# Patient Record
Sex: FEMALE | Race: ASIAN | NOT HISPANIC OR LATINO | Employment: OTHER | URBAN - METROPOLITAN AREA
[De-identification: names, ages, dates, MRNs, and addresses within clinical notes are randomized per-mention and may not be internally consistent; named-entity substitution may affect disease eponyms.]

---

## 2018-04-04 DIAGNOSIS — Z12.31 VISIT FOR SCREENING MAMMOGRAM: Primary | ICD-10-CM

## 2018-04-23 ENCOUNTER — HOSPITAL ENCOUNTER (OUTPATIENT)
Dept: RADIOLOGY | Facility: HOSPITAL | Age: 63
Discharge: HOME | End: 2018-04-23
Attending: INTERNAL MEDICINE
Payer: COMMERCIAL

## 2018-04-23 DIAGNOSIS — Z12.31 VISIT FOR SCREENING MAMMOGRAM: ICD-10-CM

## 2018-04-23 PROCEDURE — 77067 SCR MAMMO BI INCL CAD: CPT | Mod: 26,,, | Performed by: RADIOLOGY

## 2018-04-23 PROCEDURE — 77067 SCR MAMMO BI INCL CAD: CPT | Mod: TC

## 2018-06-11 ENCOUNTER — OFFICE VISIT (OUTPATIENT)
Dept: NEUROLOGY | Facility: HOSPITAL | Age: 63
End: 2018-06-11
Attending: INTERNAL MEDICINE
Payer: COMMERCIAL

## 2018-06-11 VITALS
WEIGHT: 131.38 LBS | TEMPERATURE: 97 F | HEART RATE: 93 BPM | BODY MASS INDEX: 24.18 KG/M2 | HEIGHT: 62 IN | SYSTOLIC BLOOD PRESSURE: 105 MMHG | DIASTOLIC BLOOD PRESSURE: 72 MMHG

## 2018-06-11 DIAGNOSIS — K14.6 GLOSSALGIA: Primary | ICD-10-CM

## 2018-06-11 PROCEDURE — 99214 OFFICE O/P EST MOD 30 MIN: CPT | Performed by: INTERNAL MEDICINE

## 2018-06-11 RX ORDER — NYSTATIN 100000 [USP'U]/ML
6 SUSPENSION ORAL 4 TIMES DAILY
Qty: 240 ML | Refills: 0 | Status: SHIPPED | OUTPATIENT
Start: 2018-06-11 | End: 2018-06-21

## 2018-06-11 RX ORDER — LORATADINE 10 MG/1
10 TABLET ORAL DAILY
COMMUNITY
End: 2022-05-12

## 2018-06-11 RX ORDER — LANOLIN ALCOHOL/MO/W.PET/CERES
1 CREAM (GRAM) TOPICAL 2 TIMES DAILY
COMMUNITY

## 2018-06-11 RX ORDER — OXYBUTYNIN CHLORIDE 5 MG/1
5 TABLET ORAL 3 TIMES DAILY
COMMUNITY
End: 2022-05-12

## 2018-06-11 RX ORDER — B-COMPLEX WITH VITAMIN C
1 TABLET ORAL 2 TIMES DAILY
COMMUNITY

## 2018-06-11 RX ORDER — METOPROLOL SUCCINATE 25 MG/1
25 TABLET, EXTENDED RELEASE ORAL DAILY
COMMUNITY
End: 2022-12-20 | Stop reason: SDUPTHER

## 2018-06-11 NOTE — PROGRESS NOTES
"[facility]    CC: sore tongue    HPI 63 y.o. female presents to clinic for sore tongue with a history of gluten intolerance and for screening for colon cancer. Patient reports that about 20 years ago, she started having vague systemic symptoms including nodules on b/l thighs, myalgias, fatigue, abdominal bloating, occasional diarrhea, occasional constipation, rashes, and bone pain. She went to see her allergy/immunology physician (Dr. Osman) who ran some tests; she does not remember all her results but believes she was rye sensitive and had negative Celiac serology testing. She stopped eating gluten about 10 years ago and reports "100%" resolution of initial symptoms. She presents to clinic today for tongue pain/inflammation and areas of hypo-/hyper-pigmentation on her b/l arms, legs, and back. Denies recent or current abdominal pain, change in bowel habits. Her last CSC was in  and was reportedly normal. Saw a dermatologist for rash but did not want to try creams.    Past Medical History:  Overactive bladder    Past Surgical History:  ,   Tonsillectomy    Social History  Social History   Substance Use Topics    Smoking status: Never Smoker    Smokeless tobacco: Never Used    Alcohol use No     Family History:  Denies history of Celiac's disease, IBD, colon cancer, breast/ovarian cancer.    Review of Systems  General ROS: negative for chills, fever or weight loss, +fatigue  ENT ROS: negative for epistaxis, sore throat or rhinorrhea; +sore tongue  Gastrointestinal ROS: no abdominal pain, change in bowel habits, or black/ bloody stools  Genito-Urinary ROS: no dysuria, trouble voiding, or hematuria  Musculoskeletal ROS: +myalgias, negative for gait disturbance or muscular weakness  Neurological ROS: no syncope or seizures; no ataxia  Dermatological ROS: +rash, negative for pruritis, jaundice    Physical Examination  /72   Pulse 93   Temp 97.4 °F (36.3 °C) (Oral)   Ht 5' 2" (1.575 " m)   Wt 59.6 kg (131 lb 6.3 oz)   LMP  (LMP Unknown)   BMI 24.03 kg/m²   General appearance: alert, cooperative, no distress  HENT: Normocephalic, atraumatic, neck symmetrical, no nasal discharge; no oropharyngeal or tongue candidiasis  Eyes: conjunctivae/corneas clear, PERRL, EOM's intact  Lungs: clear to auscultation bilaterally, no dullness to percussion bilaterally  Heart: regular rate and rhythm without rub; no displacement of the PMI   Abdomen: soft, non-tender; bowel sounds normoactive; no organomegaly  Extremities: extremities symmetric; no clubbing, cyanosis, or edema    Assessment:   64 yo female presents to clinic with sore tongue, history of gluten intolerance vs Celiac's disease, and need for screening for colon cancer. Believes she had negative serologic testing for Celiac's in the past but is unsure; however, is not interested in repeat serology (anti-tissue transglutaminase Ab) or genetic (HLA DQ2/DQ8) testing at this time. For inflamed/sore tongue, will trial Nystatin swish and swallow QID x 10 days; if no improvement, may need ENT referral. She is due for colon cancer screening but would like to avoid colonoscopy at this time; opts for Cologuard.    Plan:   - Patient does not want to pursue further Celiac testing at this time. Will re-address if patient desires.  - For tongue pain, trial Nystatin swish and swallow QID x 10 days. If no improvement, may need ENT referral.  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

## 2018-06-12 ENCOUNTER — TELEPHONE (OUTPATIENT)
Dept: NEUROLOGY | Facility: HOSPITAL | Age: 63
End: 2018-06-12

## 2018-09-17 ENCOUNTER — DOCUMENTATION ONLY (OUTPATIENT)
Dept: NEUROLOGY | Facility: HOSPITAL | Age: 63
End: 2018-09-17

## 2018-09-17 RX ORDER — NYSTATIN 100000 [USP'U]/ML
4 SUSPENSION ORAL 4 TIMES DAILY
Qty: 160 ML | Refills: 0 | Status: SHIPPED | OUTPATIENT
Start: 2018-09-17 | End: 2018-09-27

## 2018-09-17 NOTE — PROGRESS NOTES
Called patient with cologard result which is negative. This exam was not covered by [employer] as the diagnosis submitted was tongue pain and not colon cancer screening?   Will re-submit to [employer] for coverage.   Patient also has recurrent tongue pain - will prescribe a 2nd course of antifungal

## 2018-10-22 ENCOUNTER — TELEPHONE (OUTPATIENT)
Dept: NEUROLOGY | Facility: HOSPITAL | Age: 63
End: 2018-10-22

## 2018-10-22 NOTE — TELEPHONE ENCOUNTER
----- Message from Mona Mota sent at 10/22/2018  9:10 AM CDT -----  Contact: Patient  GI- Patient would like a call back at [phone] to discuss cologuard not being approved.

## 2018-10-22 NOTE — TELEPHONE ENCOUNTER
Returned call to patient and discussed the denial of the cologuard testing through [employer].   Resubmitted the order form with the correct ICD 10 codes to  Re-process the claim.  Will ask Mandy if this is all that needs to be done in the AM.

## 2018-10-23 NOTE — TELEPHONE ENCOUNTER
Pt notified corrected ICD 10 code of Z12.11 faxed to both [employer] and [employer].  Pt acknowledged understanding.

## 2018-10-23 NOTE — TELEPHONE ENCOUNTER
----- Message from Petey Espinosa MD sent at 10/22/2018  1:08 PM CDT -----  This doctor has called me like 19 times about this appeal. Please send the necessary documentation to her insurance company. Her stool DNA test was, of course, ordered for a diagnosis of screening for colon cancer.

## 2018-10-30 ENCOUNTER — TELEPHONE (OUTPATIENT)
Dept: SURGERY | Facility: CLINIC | Age: 63
End: 2018-10-30

## 2018-10-30 NOTE — TELEPHONE ENCOUNTER
Pt notified written authorization from member received for appeal of Cologuard test from [facility].  Pt states she also received request and will fax to [facility].  Pt request copy of office visit to be emailed to [email or web address].  Requested notes emailed as requested.

## 2018-11-08 ENCOUNTER — TELEPHONE (OUTPATIENT)
Dept: NEUROLOGY | Facility: HOSPITAL | Age: 63
End: 2018-11-08

## 2018-11-08 NOTE — TELEPHONE ENCOUNTER
Giovanna with [employer] notified corrected dx code resubmitted to [employer] on 10/22/18.  Giovanna acknowledged understanding.

## 2018-11-08 NOTE — TELEPHONE ENCOUNTER
----- Message from Shonda French sent at 11/8/2018  8:45 AM CST -----  Contact: [employer]   GI- [employer] called to speak to nurse regarding a wrong diagnosis on a claim. # [phone] Giovanna

## 2018-11-13 ENCOUNTER — TELEPHONE (OUTPATIENT)
Dept: NEUROLOGY | Facility: HOSPITAL | Age: 63
End: 2018-11-13

## 2020-04-27 DIAGNOSIS — Z01.84 ANTIBODY RESPONSE EXAMINATION: ICD-10-CM

## 2020-05-27 DIAGNOSIS — Z01.84 ANTIBODY RESPONSE EXAMINATION: ICD-10-CM

## 2020-06-26 DIAGNOSIS — Z01.84 ANTIBODY RESPONSE EXAMINATION: ICD-10-CM

## 2020-07-26 DIAGNOSIS — Z01.84 ANTIBODY RESPONSE EXAMINATION: ICD-10-CM

## 2020-08-25 DIAGNOSIS — Z01.84 ANTIBODY RESPONSE EXAMINATION: ICD-10-CM

## 2020-09-24 DIAGNOSIS — Z01.84 ANTIBODY RESPONSE EXAMINATION: ICD-10-CM

## 2020-10-15 ENCOUNTER — HOSPITAL ENCOUNTER (OUTPATIENT)
Dept: RADIOLOGY | Facility: HOSPITAL | Age: 65
Discharge: HOME | End: 2020-10-15
Attending: INTERNAL MEDICINE
Payer: COMMERCIAL

## 2020-10-15 DIAGNOSIS — Z78.0 OSTEOPENIA AFTER MENOPAUSE: ICD-10-CM

## 2020-10-15 DIAGNOSIS — Z12.31 ENCOUNTER FOR SCREENING MAMMOGRAM FOR MALIGNANT NEOPLASM OF BREAST: ICD-10-CM

## 2020-10-15 DIAGNOSIS — M85.80 OSTEOPENIA AFTER MENOPAUSE: ICD-10-CM

## 2020-10-15 PROCEDURE — 77063 BREAST TOMOSYNTHESIS BI: CPT | Mod: 26,,, | Performed by: RADIOLOGY

## 2020-10-15 PROCEDURE — 77067 SCR MAMMO BI INCL CAD: CPT | Mod: 26,,, | Performed by: RADIOLOGY

## 2020-10-15 PROCEDURE — 77067 MAMMO DIGITAL SCREENING BILAT WITH TOMO: ICD-10-PCS | Mod: 26,,, | Performed by: RADIOLOGY

## 2020-10-15 PROCEDURE — 77080 DEXA BONE DENSITY SPINE HIP: ICD-10-PCS | Mod: 26,,, | Performed by: RADIOLOGY

## 2020-10-15 PROCEDURE — 77080 DXA BONE DENSITY AXIAL: CPT | Mod: TC

## 2020-10-15 PROCEDURE — 77063 MAMMO DIGITAL SCREENING BILAT WITH TOMO: ICD-10-PCS | Mod: 26,,, | Performed by: RADIOLOGY

## 2020-10-15 PROCEDURE — 77080 DXA BONE DENSITY AXIAL: CPT | Mod: 26,,, | Performed by: RADIOLOGY

## 2020-10-15 PROCEDURE — 77067 SCR MAMMO BI INCL CAD: CPT | Mod: TC

## 2020-10-24 DIAGNOSIS — Z01.84 ANTIBODY RESPONSE EXAMINATION: ICD-10-CM

## 2020-11-23 DIAGNOSIS — Z01.84 ANTIBODY RESPONSE EXAMINATION: ICD-10-CM

## 2021-06-03 ENCOUNTER — OFFICE VISIT (OUTPATIENT)
Dept: CARDIOLOGY | Facility: CLINIC | Age: 66
End: 2021-06-03
Payer: MEDICARE

## 2021-06-03 VITALS
HEART RATE: 85 BPM | SYSTOLIC BLOOD PRESSURE: 95 MMHG | DIASTOLIC BLOOD PRESSURE: 66 MMHG | WEIGHT: 135.69 LBS | HEIGHT: 62 IN | BODY MASS INDEX: 24.97 KG/M2

## 2021-06-03 DIAGNOSIS — R05.9 COUGH: ICD-10-CM

## 2021-06-03 DIAGNOSIS — I34.1 MVP (MITRAL VALVE PROLAPSE): ICD-10-CM

## 2021-06-03 DIAGNOSIS — R06.09 DYSPNEA ON EXERTION: Primary | ICD-10-CM

## 2021-06-03 PROCEDURE — 99204 PR OFFICE/OUTPT VISIT, NEW, LEVL IV, 45-59 MIN: ICD-10-PCS | Mod: S$GLB,,, | Performed by: INTERNAL MEDICINE

## 2021-06-03 PROCEDURE — 3288F FALL RISK ASSESSMENT DOCD: CPT | Mod: CPTII,S$GLB,, | Performed by: INTERNAL MEDICINE

## 2021-06-03 PROCEDURE — 99999 PR PBB SHADOW E&M-EST. PATIENT-LVL III: ICD-10-PCS | Mod: PBBFAC,,, | Performed by: INTERNAL MEDICINE

## 2021-06-03 PROCEDURE — 1101F PR PT FALLS ASSESS DOC 0-1 FALLS W/OUT INJ PAST YR: ICD-10-PCS | Mod: CPTII,S$GLB,, | Performed by: INTERNAL MEDICINE

## 2021-06-03 PROCEDURE — 3288F PR FALLS RISK ASSESSMENT DOCUMENTED: ICD-10-PCS | Mod: CPTII,S$GLB,, | Performed by: INTERNAL MEDICINE

## 2021-06-03 PROCEDURE — 1101F PT FALLS ASSESS-DOCD LE1/YR: CPT | Mod: CPTII,S$GLB,, | Performed by: INTERNAL MEDICINE

## 2021-06-03 PROCEDURE — 99999 PR PBB SHADOW E&M-EST. PATIENT-LVL III: CPT | Mod: PBBFAC,,, | Performed by: INTERNAL MEDICINE

## 2021-06-03 PROCEDURE — 3008F BODY MASS INDEX DOCD: CPT | Mod: CPTII,S$GLB,, | Performed by: INTERNAL MEDICINE

## 2021-06-03 PROCEDURE — 99204 OFFICE O/P NEW MOD 45 MIN: CPT | Mod: S$GLB,,, | Performed by: INTERNAL MEDICINE

## 2021-06-03 PROCEDURE — 3008F PR BODY MASS INDEX (BMI) DOCUMENTED: ICD-10-PCS | Mod: CPTII,S$GLB,, | Performed by: INTERNAL MEDICINE

## 2021-06-03 RX ORDER — ESTRADIOL 10 UG/1
INSERT VAGINAL
COMMUNITY
End: 2023-08-23 | Stop reason: SDUPTHER

## 2021-06-03 RX ORDER — CETIRIZINE HYDROCHLORIDE 10 MG/1
TABLET ORAL
COMMUNITY
End: 2023-04-24 | Stop reason: SDUPTHER

## 2021-06-28 ENCOUNTER — HOSPITAL ENCOUNTER (OUTPATIENT)
Dept: RADIOLOGY | Facility: HOSPITAL | Age: 66
Discharge: HOME | End: 2021-06-28
Attending: INTERNAL MEDICINE
Payer: MEDICARE

## 2021-06-28 ENCOUNTER — HOSPITAL ENCOUNTER (OUTPATIENT)
Dept: CARDIOLOGY | Facility: HOSPITAL | Age: 66
Discharge: HOME | End: 2021-06-28
Attending: INTERNAL MEDICINE
Payer: MEDICARE

## 2021-06-28 VITALS — WEIGHT: 135 LBS | HEIGHT: 62 IN | BODY MASS INDEX: 24.84 KG/M2

## 2021-06-28 DIAGNOSIS — R05.9 COUGH: ICD-10-CM

## 2021-06-28 DIAGNOSIS — R06.09 DYSPNEA ON EXERTION: ICD-10-CM

## 2021-06-28 LAB
AORTIC ROOT ANNULUS: 1.98 CM
AORTIC VALVE CUSP SEPERATION: 1.49 CM
ASCENDING AORTA: 2.79 CM
AV INDEX (PROSTH): 0.64
AV MEAN GRADIENT: 4 MMHG
AV PEAK GRADIENT: 6 MMHG
AV VALVE AREA: 1.66 CM2
AV VELOCITY RATIO: 0.71
BSA FOR ECHO PROCEDURE: 1.64 M2
CV ECHO LV RWT: 0.38 CM
DOP CALC AO PEAK VEL: 1.24 M/S
DOP CALC AO VTI: 30.88 CM
DOP CALC LVOT AREA: 2.6 CM2
DOP CALC LVOT DIAMETER: 1.82 CM
DOP CALC LVOT PEAK VEL: 0.88 M/S
DOP CALC LVOT STROKE VOLUME: 51.33 CM3
DOP CALCLVOT PEAK VEL VTI: 19.74 CM
E WAVE DECELERATION TIME: 144.78 MSEC
E/A RATIO: 1.27
E/E' RATIO: 9.89 M/S
ECHO LV POSTERIOR WALL: 0.78 CM (ref 0.6–1.1)
EJECTION FRACTION: 55 %
FRACTIONAL SHORTENING: 26 % (ref 28–44)
INTERVENTRICULAR SEPTUM: 0.96 CM (ref 0.6–1.1)
IVRT: 106.57 MSEC
LA MAJOR: 4.68 CM
LA MINOR: 3.84 CM
LA WIDTH: 3.44 CM
LEFT ATRIUM SIZE: 2.95 CM
LEFT ATRIUM VOLUME INDEX: 22.5 ML/M2
LEFT ATRIUM VOLUME: 36.39 CM3
LEFT INTERNAL DIMENSION IN SYSTOLE: 3.04 CM (ref 2.1–4)
LEFT VENTRICLE DIASTOLIC VOLUME INDEX: 46.7 ML/M2
LEFT VENTRICLE DIASTOLIC VOLUME: 75.65 ML
LEFT VENTRICLE MASS INDEX: 68 G/M2
LEFT VENTRICLE SYSTOLIC VOLUME INDEX: 22.4 ML/M2
LEFT VENTRICLE SYSTOLIC VOLUME: 36.29 ML
LEFT VENTRICULAR INTERNAL DIMENSION IN DIASTOLE: 4.13 CM (ref 3.5–6)
LEFT VENTRICULAR MASS: 110.27 G
LV LATERAL E/E' RATIO: 8.09 M/S
LV SEPTAL E/E' RATIO: 12.71 M/S
MV A" WAVE DURATION": 11.04 MSEC
MV PEAK A VEL: 0.7 M/S
MV PEAK E VEL: 0.89 M/S
PISA TR MAX VEL: 1.91 M/S
PULM VEIN S/D RATIO: 1.69
PV PEAK D VEL: 0.36 M/S
PV PEAK S VEL: 0.61 M/S
PV PEAK VELOCITY: 0.87 CM/S
RA MAJOR: 4.02 CM
RA PRESSURE: 3 MMHG
RA WIDTH: 3.46 CM
RIGHT VENTRICULAR END-DIASTOLIC DIMENSION: 2.31 CM
RV TISSUE DOPPLER FREE WALL SYSTOLIC VELOCITY 1 (APICAL 4 CHAMBER VIEW): 11.05 CM/S
STJ: 2.4 CM
TDI LATERAL: 0.11 M/S
TDI SEPTAL: 0.07 M/S
TDI: 0.09 M/S
TR MAX PG: 15 MMHG
TRICUSPID ANNULAR PLANE SYSTOLIC EXCURSION: 1.9 CM
TV REST PULMONARY ARTERY PRESSURE: 18 MMHG

## 2021-06-28 PROCEDURE — 71260 CT CHEST WITH CONTRAST: ICD-10-PCS | Mod: 26,,, | Performed by: RADIOLOGY

## 2021-06-28 PROCEDURE — 93306 TTE W/DOPPLER COMPLETE: CPT | Mod: 26,,, | Performed by: INTERNAL MEDICINE

## 2021-06-28 PROCEDURE — 71260 CT THORAX DX C+: CPT | Mod: TC

## 2021-06-28 PROCEDURE — 93306 ECHO (CUPID ONLY): ICD-10-PCS | Mod: 26,,, | Performed by: INTERNAL MEDICINE

## 2021-06-28 PROCEDURE — 71260 CT THORAX DX C+: CPT | Mod: 26,,, | Performed by: RADIOLOGY

## 2021-06-28 PROCEDURE — 25500020 PHARM REV CODE 255: Performed by: INTERNAL MEDICINE

## 2021-06-28 PROCEDURE — 93306 TTE W/DOPPLER COMPLETE: CPT

## 2021-06-28 RX ADMIN — IOHEXOL 75 ML: 350 INJECTION, SOLUTION INTRAVENOUS at 10:06

## 2021-10-05 LAB — NONINV COLON CA DNA+OCC BLD SCRN STL QL: NEGATIVE

## 2021-11-04 ENCOUNTER — HOSPITAL ENCOUNTER (OUTPATIENT)
Dept: RADIOLOGY | Facility: HOSPITAL | Age: 66
Discharge: HOME | End: 2021-11-04
Attending: INTERNAL MEDICINE
Payer: MEDICARE

## 2021-11-04 DIAGNOSIS — Z12.31 ENCOUNTER FOR SCREENING MAMMOGRAM FOR MALIGNANT NEOPLASM OF BREAST: ICD-10-CM

## 2021-11-04 PROCEDURE — 77063 BREAST TOMOSYNTHESIS BI: CPT | Mod: 26,,, | Performed by: RADIOLOGY

## 2021-11-04 PROCEDURE — 77067 SCR MAMMO BI INCL CAD: CPT | Mod: TC

## 2021-11-04 PROCEDURE — 77067 SCR MAMMO BI INCL CAD: CPT | Mod: 26,,, | Performed by: RADIOLOGY

## 2021-11-04 PROCEDURE — 77067 MAMMO DIGITAL SCREENING BILAT WITH TOMO: ICD-10-PCS | Mod: 26,,, | Performed by: RADIOLOGY

## 2021-11-04 PROCEDURE — 77063 MAMMO DIGITAL SCREENING BILAT WITH TOMO: ICD-10-PCS | Mod: 26,,, | Performed by: RADIOLOGY

## 2021-11-08 ENCOUNTER — OFFICE VISIT (OUTPATIENT)
Dept: PULMONOLOGY | Facility: CLINIC | Age: 66
End: 2021-11-08
Payer: MEDICARE

## 2021-11-08 VITALS
WEIGHT: 135.81 LBS | RESPIRATION RATE: 18 BRPM | OXYGEN SATURATION: 98 % | DIASTOLIC BLOOD PRESSURE: 78 MMHG | HEIGHT: 62 IN | HEART RATE: 99 BPM | BODY MASS INDEX: 24.99 KG/M2 | SYSTOLIC BLOOD PRESSURE: 113 MMHG

## 2021-11-08 DIAGNOSIS — T78.40XA ALLERGIC DISORDER, INITIAL ENCOUNTER: Primary | ICD-10-CM

## 2021-11-08 PROCEDURE — 1101F PR PT FALLS ASSESS DOC 0-1 FALLS W/OUT INJ PAST YR: ICD-10-PCS | Mod: CPTII,S$GLB,, | Performed by: INTERNAL MEDICINE

## 2021-11-08 PROCEDURE — 99205 PR OFFICE/OUTPT VISIT, NEW, LEVL V, 60-74 MIN: ICD-10-PCS | Mod: S$GLB,,, | Performed by: INTERNAL MEDICINE

## 2021-11-08 PROCEDURE — 3008F PR BODY MASS INDEX (BMI) DOCUMENTED: ICD-10-PCS | Mod: CPTII,S$GLB,, | Performed by: INTERNAL MEDICINE

## 2021-11-08 PROCEDURE — 3008F BODY MASS INDEX DOCD: CPT | Mod: CPTII,S$GLB,, | Performed by: INTERNAL MEDICINE

## 2021-11-08 PROCEDURE — 3074F PR MOST RECENT SYSTOLIC BLOOD PRESSURE < 130 MM HG: ICD-10-PCS | Mod: CPTII,S$GLB,, | Performed by: INTERNAL MEDICINE

## 2021-11-08 PROCEDURE — 1160F RVW MEDS BY RX/DR IN RCRD: CPT | Mod: CPTII,S$GLB,, | Performed by: INTERNAL MEDICINE

## 2021-11-08 PROCEDURE — 99205 OFFICE O/P NEW HI 60 MIN: CPT | Mod: S$GLB,,, | Performed by: INTERNAL MEDICINE

## 2021-11-08 PROCEDURE — 3288F FALL RISK ASSESSMENT DOCD: CPT | Mod: CPTII,S$GLB,, | Performed by: INTERNAL MEDICINE

## 2021-11-08 PROCEDURE — 1159F PR MEDICATION LIST DOCUMENTED IN MEDICAL RECORD: ICD-10-PCS | Mod: CPTII,S$GLB,, | Performed by: INTERNAL MEDICINE

## 2021-11-08 PROCEDURE — 3288F PR FALLS RISK ASSESSMENT DOCUMENTED: ICD-10-PCS | Mod: CPTII,S$GLB,, | Performed by: INTERNAL MEDICINE

## 2021-11-08 PROCEDURE — 3078F PR MOST RECENT DIASTOLIC BLOOD PRESSURE < 80 MM HG: ICD-10-PCS | Mod: CPTII,S$GLB,, | Performed by: INTERNAL MEDICINE

## 2021-11-08 PROCEDURE — 1159F MED LIST DOCD IN RCRD: CPT | Mod: CPTII,S$GLB,, | Performed by: INTERNAL MEDICINE

## 2021-11-08 PROCEDURE — 3078F DIAST BP <80 MM HG: CPT | Mod: CPTII,S$GLB,, | Performed by: INTERNAL MEDICINE

## 2021-11-08 PROCEDURE — 99999 PR PBB SHADOW E&M-EST. PATIENT-LVL IV: ICD-10-PCS | Mod: PBBFAC,,, | Performed by: INTERNAL MEDICINE

## 2021-11-08 PROCEDURE — 1101F PT FALLS ASSESS-DOCD LE1/YR: CPT | Mod: CPTII,S$GLB,, | Performed by: INTERNAL MEDICINE

## 2021-11-08 PROCEDURE — 99999 PR PBB SHADOW E&M-EST. PATIENT-LVL IV: CPT | Mod: PBBFAC,,, | Performed by: INTERNAL MEDICINE

## 2021-11-08 PROCEDURE — 3074F SYST BP LT 130 MM HG: CPT | Mod: CPTII,S$GLB,, | Performed by: INTERNAL MEDICINE

## 2021-11-08 PROCEDURE — 1160F PR REVIEW ALL MEDS BY PRESCRIBER/CLIN PHARMACIST DOCUMENTED: ICD-10-PCS | Mod: CPTII,S$GLB,, | Performed by: INTERNAL MEDICINE

## 2021-11-08 RX ORDER — PANTOPRAZOLE SODIUM 40 MG/1
40 TABLET, DELAYED RELEASE ORAL DAILY
COMMUNITY
Start: 2021-09-27 | End: 2023-09-14

## 2022-05-08 NOTE — PROGRESS NOTES
Subjective:     Patient ID: Juan Cruz is a 67 y.o. female sent by Dr. Carpenter for +RA serology    Chief Complaint: No chief complaint on file.  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  These appear to be incidental & unexpected findings as patient has very little joint swelling.  Patient feels that myalgias began after her last Covid vaccine.    In February got severe pain in R shoulder that responded to CSI but then developed excruciating spasms neck & around both shoulders. Labs gotten by Orthopedic (Dr. Iglesias);   In Jan 27,2022 had FESS for sinus surgery & needed bad infection and required more antibiotics. Developed rash on cheeks on levaquin. Same rash came back now again.    In March had only shoulder pain & pronating L wrist.  Now R knee, L ankle pain & swelling & R foot 3rd & 4th toes even at rest.  Also weak all over  Baclofen helps her spasms & stiffness    AM stiffness x 3-4 hrs (since ~ February, 2022);     .  Denies Raynaud's, tight skin, alopecia, oral ulcers, parotid gland swelling, pleurisy, pericarditis, photosensitivity, skin rashes, thromboses, muscle weakness; fevers, headaches, conjunctivitis, chronic or bloody diarrhea, vaginal/urethral D/C; paresthesias; LBP, thyroid issues;   1 miscarriage 4-6 weeks in 1997; has 1 daughter Csection;   Chronic cough x 1.5 yr after swallowing--fatigues & drains her  Has dry eyes > mouth  Has BAKER & st w eating comes & goes;   Saw Dr. Siegel & told no obstructive pulmonary disease but restrictive.  Has FHx of RA & myelodysplasia (mom)    Started prednisone 10 mg qod ~ end of March, 2022; Helped pain   But had adverse effects on higher steroids in past.  In 1999 NYLA was on decadron but developed myopathy & psychosis on it.  .    TMJ on L x 1 2/22 lasted 1-2days  Neck since 2/22 L>R  B shoulders L>R  Elbows no  Wrists L>R  (flexi carpi ulnaris)  MCPs no but only 2nd R MCP  PIPs & DIP  Hip: no  GT no  Knee: R 1 month ago; since Monday both; just hurt; hurt all the time.  Ankles: L 1 month R since Monday --only swelling  Toes 3 & 4th on R; L of  Both heels hurt  Achilles bilateral.      Current Outpatient Medications   Medication Sig Dispense Refill    B-complex with vitamin C (Z-BEC OR EQUIV) tablet Take 1 tablet by mouth once daily.      calcium citrate-vitamin D3 315-200 mg (CITRACAL+D) 315-200 mg-unit per tablet Take 1 tablet by mouth 2 (two) times daily.      cetirizine (ZYRTEC) 10 MG tablet cetirizine 10 mg tablet   TAKE 1 TABLET BY MOUTH ONCE DAILY      estradioL (VAGIFEM) 10 mcg Tab Yuvafem 10 mcg vaginal tablet   INSERT 1 TABLET VAGINALLY TWICE A WEEK      Lactobacillus rhamnosus GG (CULTERELLE) 5 billion cell PwPk packet Take 1 packet by mouth once daily.      loratadine (CLARITIN) 10 mg tablet Take 10 mg by mouth once daily.      metoprolol succinate (TOPROL-XL) 25 MG 24 hr tablet Take 25 mg by mouth once daily.      oxybutynin (DITROPAN) 5 MG Tab Take 5 mg by mouth 3 (three) times daily.      pantoprazole (PROTONIX) 40 MG tablet        No current facility-administered medications for this visit.       Review of patient's allergies indicates:   Allergen Reactions    Gluten protein     Ppd black rubber mix        Review of Systems   Constitutional: Positive for fatigue. Negative for fever and unexpected weight change.   HENT: Positive for hearing loss (R loss of hearing; following SIADH), postnasal drip, sinus pain and tinnitus. Negative for mouth sores and trouble swallowing.    Eyes: Negative for redness.   Respiratory: Positive for cough (felt post nasal) and shortness of breath. Negative for chest tightness.    Cardiovascular: Negative for chest pain and palpitations (Has had MVP on metoprolol).   Gastrointestinal: Negative.  Negative for constipation and diarrhea.   Endocrine: Positive for cold intolerance.  "  Genitourinary: Positive for enuresis. Negative for dysuria and genital sores.        Incontinence   Musculoskeletal: Positive for arthralgias, joint swelling, myalgias, neck pain and neck stiffness. Negative for back pain.   Skin: Negative for rash.   Neurological: Positive for numbness. Negative for dizziness, seizures, syncope and headaches.   Hematological: Does not bruise/bleed easily.   Psychiatric/Behavioral: Positive for dysphoric mood and sleep disturbance. The patient is not nervous/anxious.        No past medical history on file.    Past Surgical History:   Procedure Laterality Date    BREAST BIOPSY Bilateral        FH:  M: dx 83 w RA; passed away on 84; also had some intestinal problems.  Had myelodysplasia.  1 S hx of chronic myalgia & gets bedridden 2-3 days.  B  due to Covid; seasonal allergies; breathing issues; smoker  2 B: lipids  1 daughter 2 bouts of Covid    Social History     Tobacco Use    Smoking status: Never Smoker    Smokeless tobacco: Never Used   Substance Use Topics    Alcohol use: No    Drug use: No       Objective:   /76   Pulse 103   Ht 5' 2" (1.575 m)   Wt 60.1 kg (132 lb 7.9 oz)   LMP  (LMP Unknown)   BMI 24.23 kg/m²     Physical Exam   Constitutional: She is oriented to person, place, and time. No distress.   Constant coughing   HENT:   Head: Normocephalic and atraumatic.   Mouth/Throat: Oropharynx is clear and moist. No oropharyngeal exudate.   No facial rashes  Parotids not enlarged  No oral ulcers   Eyes: Pupils are equal, round, and reactive to light. Conjunctivae are normal. Right eye exhibits no discharge. Left eye exhibits no discharge. No scleral icterus.   Neck: No JVD present. No tracheal deviation present. No thyromegaly present.   Cardiovascular: Regular rhythm and normal heart sounds. Tachycardia present. Exam reveals no gallop and no friction rub.   No murmur heard.  Pulmonary/Chest: Effort normal. No respiratory distress. She has no wheezes. She " has no rales. She exhibits no tenderness.   Abdominal: Soft. Bowel sounds are normal. She exhibits no distension and no mass. There is no splenomegaly or hepatomegaly. There is no abdominal tenderness. There is no rebound and no guarding.   Musculoskeletal:         General: No tenderness. Normal range of motion.      Cervical back: Neck supple.      Comments: Minimal SHT R 2nd MCP  Adequate ROM all joints.          Lymphadenopathy:     She has no cervical adenopathy.   Neurological: She is alert and oriented to person, place, and time. She has normal reflexes. No cranial nerve deficit. Gait normal.   Proximal and distal muscle strength 5/5.   Skin: Skin is warm and dry. No rash noted. She is not diaphoretic.   Psychiatric: Mood, memory, affect and judgment normal.   Vitals reviewed.    3/14/22: ESR 50 (30) CRP 2; ROBBIE neg; RF 74 (<6) CCP >250  11/30/21: RF<14;      6/28/21: CT chest:  predominant LL patchy increased attenuation and reticulation within the periphery of the bilateral lower lobes c/w sequela of atelectasis; r/o early ILD; no bronchiectasis.  No honeycombing.  No significant ground-glass attenuation.  2. Small sliding-type hiatal hernia.    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  However, there is concern about ILD as it can be associated with autoimmune/rheum serology in the absence of a diagnosable CTD.  We will confirm and update labs and imaging.  She will need to  see Pulmonary; we may put in a referral to our [facility] (REF 1320) if she cannot get an appointment to see her pulmonologist.    As a courtesy to her PCP, I have sent a rx for benzonatate 100 mg tid prn as a symptomatic rx of her cough.    We will contact her after her w/u is completed for disposition on our part.      CC: Gianna Carpenter MD

## 2022-05-12 ENCOUNTER — HOSPITAL ENCOUNTER (OUTPATIENT)
Dept: RADIOLOGY | Facility: HOSPITAL | Age: 67
Discharge: HOME | End: 2022-05-12
Attending: INTERNAL MEDICINE
Payer: MEDICARE

## 2022-05-12 ENCOUNTER — OFFICE VISIT (OUTPATIENT)
Dept: RHEUMATOLOGY | Facility: CLINIC | Age: 67
End: 2022-05-12
Payer: MEDICARE

## 2022-05-12 VITALS
HEIGHT: 62 IN | DIASTOLIC BLOOD PRESSURE: 76 MMHG | BODY MASS INDEX: 24.38 KG/M2 | SYSTOLIC BLOOD PRESSURE: 115 MMHG | HEART RATE: 103 BPM | WEIGHT: 132.5 LBS

## 2022-05-12 DIAGNOSIS — M25.50 PAIN IN JOINT INVOLVING MULTIPLE SITES: ICD-10-CM

## 2022-05-12 DIAGNOSIS — M79.10 MYALGIA AFTER COVID-19 VACCINATION: ICD-10-CM

## 2022-05-12 DIAGNOSIS — R05.9 COUGH: ICD-10-CM

## 2022-05-12 DIAGNOSIS — Z82.61 FAMILY HISTORY OF RHEUMATOID ARTHRITIS: ICD-10-CM

## 2022-05-12 DIAGNOSIS — R06.02 SHORTNESS OF BREATH: ICD-10-CM

## 2022-05-12 DIAGNOSIS — R89.9 ABNORMAL LABORATORY TEST RESULT: ICD-10-CM

## 2022-05-12 DIAGNOSIS — M25.50 PAIN IN JOINT INVOLVING MULTIPLE SITES: Primary | ICD-10-CM

## 2022-05-12 DIAGNOSIS — T50.B95A MYALGIA AFTER COVID-19 VACCINATION: ICD-10-CM

## 2022-05-12 PROCEDURE — 3074F PR MOST RECENT SYSTOLIC BLOOD PRESSURE < 130 MM HG: ICD-10-PCS | Mod: CPTII,S$GLB,, | Performed by: INTERNAL MEDICINE

## 2022-05-12 PROCEDURE — 77077 JOINT SURVEY SINGLE VIEW: CPT | Mod: 26,,, | Performed by: RADIOLOGY

## 2022-05-12 PROCEDURE — 3008F PR BODY MASS INDEX (BMI) DOCUMENTED: ICD-10-PCS | Mod: CPTII,S$GLB,, | Performed by: INTERNAL MEDICINE

## 2022-05-12 PROCEDURE — 99205 OFFICE O/P NEW HI 60 MIN: CPT | Mod: S$GLB,,, | Performed by: INTERNAL MEDICINE

## 2022-05-12 PROCEDURE — 3074F SYST BP LT 130 MM HG: CPT | Mod: CPTII,S$GLB,, | Performed by: INTERNAL MEDICINE

## 2022-05-12 PROCEDURE — 3078F PR MOST RECENT DIASTOLIC BLOOD PRESSURE < 80 MM HG: ICD-10-PCS | Mod: CPTII,S$GLB,, | Performed by: INTERNAL MEDICINE

## 2022-05-12 PROCEDURE — 73130 X-RAY EXAM OF HAND: CPT | Mod: TC,50

## 2022-05-12 PROCEDURE — 1159F PR MEDICATION LIST DOCUMENTED IN MEDICAL RECORD: ICD-10-PCS | Mod: CPTII,S$GLB,, | Performed by: INTERNAL MEDICINE

## 2022-05-12 PROCEDURE — 1160F RVW MEDS BY RX/DR IN RCRD: CPT | Mod: CPTII,S$GLB,, | Performed by: INTERNAL MEDICINE

## 2022-05-12 PROCEDURE — 99205 PR OFFICE/OUTPT VISIT, NEW, LEVL V, 60-74 MIN: ICD-10-PCS | Mod: S$GLB,,, | Performed by: INTERNAL MEDICINE

## 2022-05-12 PROCEDURE — 99999 PR PBB SHADOW E&M-EST. PATIENT-LVL V: ICD-10-PCS | Mod: PBBFAC,,, | Performed by: INTERNAL MEDICINE

## 2022-05-12 PROCEDURE — 1160F PR REVIEW ALL MEDS BY PRESCRIBER/CLIN PHARMACIST DOCUMENTED: ICD-10-PCS | Mod: CPTII,S$GLB,, | Performed by: INTERNAL MEDICINE

## 2022-05-12 PROCEDURE — 73130 X-RAY EXAM OF HAND: CPT | Mod: 26,50,, | Performed by: RADIOLOGY

## 2022-05-12 PROCEDURE — 3078F DIAST BP <80 MM HG: CPT | Mod: CPTII,S$GLB,, | Performed by: INTERNAL MEDICINE

## 2022-05-12 PROCEDURE — 1125F PR PAIN SEVERITY QUANTIFIED, PAIN PRESENT: ICD-10-PCS | Mod: CPTII,S$GLB,, | Performed by: INTERNAL MEDICINE

## 2022-05-12 PROCEDURE — 3008F BODY MASS INDEX DOCD: CPT | Mod: CPTII,S$GLB,, | Performed by: INTERNAL MEDICINE

## 2022-05-12 PROCEDURE — 71046 X-RAY EXAM CHEST 2 VIEWS: CPT | Mod: 26,,, | Performed by: RADIOLOGY

## 2022-05-12 PROCEDURE — 71046 X-RAY EXAM CHEST 2 VIEWS: CPT | Mod: TC

## 2022-05-12 PROCEDURE — 1159F MED LIST DOCD IN RCRD: CPT | Mod: CPTII,S$GLB,, | Performed by: INTERNAL MEDICINE

## 2022-05-12 PROCEDURE — 73130 XR HAND COMPLETE 3 VIEWS BILATERAL: ICD-10-PCS | Mod: 26,50,, | Performed by: RADIOLOGY

## 2022-05-12 PROCEDURE — 71046 XR CHEST PA AND LATERAL: ICD-10-PCS | Mod: 26,,, | Performed by: RADIOLOGY

## 2022-05-12 PROCEDURE — 99999 PR PBB SHADOW E&M-EST. PATIENT-LVL V: CPT | Mod: PBBFAC,,, | Performed by: INTERNAL MEDICINE

## 2022-05-12 PROCEDURE — 1125F AMNT PAIN NOTED PAIN PRSNT: CPT | Mod: CPTII,S$GLB,, | Performed by: INTERNAL MEDICINE

## 2022-05-12 PROCEDURE — 77077 JOINT SURVEY SINGLE VIEW: CPT | Mod: TC

## 2022-05-12 PROCEDURE — 77077 XR ARTHRITIS SURVEY: ICD-10-PCS | Mod: 26,,, | Performed by: RADIOLOGY

## 2022-05-12 RX ORDER — BENZONATATE 100 MG/1
100 CAPSULE ORAL 3 TIMES DAILY PRN
Qty: 90 CAPSULE | Refills: 2 | Status: SHIPPED | OUTPATIENT
Start: 2022-05-12 | End: 2022-05-31

## 2022-05-12 RX ORDER — BACLOFEN 10 MG/1
10 TABLET ORAL 3 TIMES DAILY PRN
COMMUNITY
End: 2022-07-28

## 2022-05-12 RX ORDER — FLUTICASONE PROPIONATE 50 MCG
1 SPRAY, SUSPENSION (ML) NASAL DAILY
COMMUNITY
End: 2022-12-20

## 2022-05-12 RX ORDER — PREDNISONE 5 MG/1
10 TABLET ORAL DAILY
COMMUNITY
End: 2022-07-28

## 2022-05-12 RX ORDER — IBUPROFEN 200 MG
200 TABLET ORAL EVERY 6 HOURS PRN
COMMUNITY
End: 2022-09-16

## 2022-05-12 RX ORDER — ACETAMINOPHEN 325 MG/1
650 TABLET ORAL EVERY 6 HOURS PRN
COMMUNITY

## 2022-05-12 ASSESSMENT — ROUTINE ASSESSMENT OF PATIENT INDEX DATA (RAPID3)
FATIGUE SCORE: 5
MDHAQ FUNCTION SCORE: 1.4
PAIN SCORE: 10
WHEN YOU AWAKENED IN THE MORNING OVER THE LAST WEEK, PLEASE INDICATE THE AMOUNT OF TIME IT TAKES UNTIL YOU ARE AS LIMBER AS YOU WILL BE FOR THE DAY: 4
PSYCHOLOGICAL DISTRESS SCORE: 4.4
TOTAL RAPID3 SCORE: 7.55
AM STIFFNESS SCORE: 1, YES
PATIENT GLOBAL ASSESSMENT SCORE: 8

## 2022-05-12 NOTE — Clinical Note
Please make sure she gets additional labs & imaging Orders may have been put in twice, as there was a computer crash when I was working on the chart. 
clarisse lopez  [phone]/[facility]

## 2022-05-12 NOTE — PROGRESS NOTES
Rapid3 Question Responses and Scores 5/11/2022   MDHAQ Score 1.4   Psychologic Score 4.4   Pain Score 10   When you awakened in the morning OVER THE LAST WEEK, did you feel stiff? Yes   If Yes, please indicate the number of hours until you are as limber as you will be for the day 4   Fatigue Score 5   Global Health Score 8   RAPID3 Score 7.56

## 2022-05-13 ENCOUNTER — TELEPHONE (OUTPATIENT)
Dept: RHEUMATOLOGY | Facility: CLINIC | Age: 67
End: 2022-05-13
Payer: MEDICARE

## 2022-05-13 ENCOUNTER — PATIENT MESSAGE (OUTPATIENT)
Dept: RHEUMATOLOGY | Facility: CLINIC | Age: 67
End: 2022-05-13
Payer: MEDICARE

## 2022-05-13 DIAGNOSIS — M05.79 RHEUMATOID ARTHRITIS INVOLVING MULTIPLE SITES WITH POSITIVE RHEUMATOID FACTOR: Primary | ICD-10-CM

## 2022-05-13 DIAGNOSIS — R06.02 SHORTNESS OF BREATH: ICD-10-CM

## 2022-05-13 DIAGNOSIS — J84.10 PULMONARY FIBROSIS: ICD-10-CM

## 2022-05-13 DIAGNOSIS — M25.50 PAIN IN JOINT INVOLVING MULTIPLE SITES: Primary | ICD-10-CM

## 2022-05-13 DIAGNOSIS — M05.79 RHEUMATOID ARTHRITIS INVOLVING MULTIPLE SITES WITH POSITIVE RHEUMATOID FACTOR: ICD-10-CM

## 2022-05-13 DIAGNOSIS — R05.9 COUGH: ICD-10-CM

## 2022-05-13 NOTE — TELEPHONE ENCOUNTER
2nd note:  1st note lost due to Epic Hang Up.  5/12/22:   ESR 74; CRP 37.2; ; CCP 1162.7; ROBBIE neg; C3, C4 ok; QIG ok; UA ok  CXR w bibasilar coarse interstitial reticulations c/w pulmonary fibrosis.  Joint imaging with mild generalized OA, non erosive changes & blake STS over MCPs.  X-rays personally viewed and interpreted.    Contacted patient about further labs.   Azathioprine, might be a good drug for her.  This was discussed as was referral to [facility].

## 2022-05-13 NOTE — PROGRESS NOTES
Subjective:     Patient ID: Juan Cruz is a 67 y.o. female sent by Dr. Carpenter for +RA serology    Chief Complaint: No chief complaint on file.  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  These appear to be incidental & unexpected findings as patient has very little joint swelling.  Patient feels that myalgias began after her last Covid vaccine.    In February got severe pain in R shoulder that responded to CSI but then developed excruciating spasms neck & around both shoulders. Labs gotten by Orthopedic (Dr. Iglesias);   In Jan 27,2022 had FESS for sinus surgery & needed bad infection and required more antibiotics. Developed rash on cheeks on levaquin. Same rash came back now again.    In March had only shoulder pain & pronating L wrist.  Now R knee, L ankle pain & swelling & R foot 3rd & 4th toes even at rest.  Also weak all over  Baclofen helps her spasms & stiffness    AM stiffness x 3-4 hrs (since ~ February, 2022);     .  Denies Raynaud's, tight skin, alopecia, oral ulcers, parotid gland swelling, pleurisy, pericarditis, photosensitivity, skin rashes, thromboses, muscle weakness; fevers, headaches, conjunctivitis, chronic or bloody diarrhea, vaginal/urethral D/C; paresthesias; LBP, thyroid issues;   1 miscarriage 4-6 weeks in 1997; has 1 daughter Csection;   Chronic cough x 1.5 yr after swallowing--fatigues & drains her  Has dry eyes > mouth  Has BAKER & st w eating comes & goes;   Saw Dr. Siegel & told no obstructive pulmonary disease but restrictive.  Has FHx of RA & myelodysplasia (mom)    Started prednisone 10 mg qod ~ end of March, 2022; Helped pain   But had adverse effects on higher steroids in past.  In 1999 NYLA was on decadron but developed myopathy & psychosis on it.  .    TMJ on L x 1 2/22 lasted 1-2days  Neck since 2/22 L>R  B shoulders L>R  Elbows no  Wrists L>R  (flexi carpi ulnaris)  MCPs no but only 2nd R MCP  PIPs & DIP  Hip: no  GT no  Knee: R 1 month ago; since Monday both; just hurt; hurt all the time.  Ankles: L 1 month R since Monday --only swelling  Toes 3 & 4th on R; L of  Both heels hurt  Achilles bilateral.      Current Outpatient Medications   Medication Sig Dispense Refill    B-complex with vitamin C (Z-BEC OR EQUIV) tablet Take 1 tablet by mouth once daily.      calcium citrate-vitamin D3 315-200 mg (CITRACAL+D) 315-200 mg-unit per tablet Take 1 tablet by mouth 2 (two) times daily.      cetirizine (ZYRTEC) 10 MG tablet cetirizine 10 mg tablet   TAKE 1 TABLET BY MOUTH ONCE DAILY      estradioL (VAGIFEM) 10 mcg Tab Yuvafem 10 mcg vaginal tablet   INSERT 1 TABLET VAGINALLY TWICE A WEEK      Lactobacillus rhamnosus GG (CULTERELLE) 5 billion cell PwPk packet Take 1 packet by mouth once daily.      loratadine (CLARITIN) 10 mg tablet Take 10 mg by mouth once daily.      metoprolol succinate (TOPROL-XL) 25 MG 24 hr tablet Take 25 mg by mouth once daily.      oxybutynin (DITROPAN) 5 MG Tab Take 5 mg by mouth 3 (three) times daily.      pantoprazole (PROTONIX) 40 MG tablet        No current facility-administered medications for this visit.       Review of patient's allergies indicates:   Allergen Reactions    Gluten protein     Ppd black rubber mix        Review of Systems   Constitutional: Positive for fatigue. Negative for fever and unexpected weight change.   HENT: Positive for hearing loss (R loss of hearing; following SIADH), postnasal drip, sinus pain and tinnitus. Negative for mouth sores and trouble swallowing.    Eyes: Negative for redness.   Respiratory: Positive for cough (felt post nasal) and shortness of breath. Negative for chest tightness.    Cardiovascular: Negative for chest pain and palpitations (Has had MVP on metoprolol).   Gastrointestinal: Negative.  Negative for constipation and diarrhea.   Endocrine: Positive for cold intolerance.  "  Genitourinary: Positive for enuresis. Negative for dysuria and genital sores.        Incontinence   Musculoskeletal: Positive for arthralgias, joint swelling, myalgias, neck pain and neck stiffness. Negative for back pain.   Skin: Negative for rash.   Neurological: Positive for numbness. Negative for dizziness, seizures, syncope and headaches.   Hematological: Does not bruise/bleed easily.   Psychiatric/Behavioral: Positive for dysphoric mood and sleep disturbance. The patient is not nervous/anxious.        No past medical history on file.    Past Surgical History:   Procedure Laterality Date    BREAST BIOPSY Bilateral        FH:  M: dx 83 w RA; passed away on 84; also had some intestinal problems.  Had myelodysplasia.  1 S hx of chronic myalgia & gets bedridden 2-3 days.  B  due to Covid; seasonal allergies; breathing issues; smoker  2 B: lipids  1 daughter 2 bouts of Covid    Social History     Tobacco Use    Smoking status: Never Smoker    Smokeless tobacco: Never Used   Substance Use Topics    Alcohol use: No    Drug use: No       Objective:   /76   Pulse 103   Ht 5' 2" (1.575 m)   Wt 60.1 kg (132 lb 7.9 oz)   LMP  (LMP Unknown)   BMI 24.23 kg/m²     Physical Exam   Constitutional: She is oriented to person, place, and time. No distress.   Constant coughing   HENT:   Head: Normocephalic and atraumatic.   Mouth/Throat: Oropharynx is clear and moist. No oropharyngeal exudate.   No facial rashes  Parotids not enlarged  No oral ulcers   Eyes: Pupils are equal, round, and reactive to light. Conjunctivae are normal. Right eye exhibits no discharge. Left eye exhibits no discharge. No scleral icterus.   Neck: No JVD present. No tracheal deviation present. No thyromegaly present.   Cardiovascular: Regular rhythm and normal heart sounds. Tachycardia present. Exam reveals no gallop and no friction rub.   No murmur heard.  Pulmonary/Chest: Effort normal. No respiratory distress. She has no wheezes. She " has no rales. She exhibits no tenderness.   Abdominal: Soft. Bowel sounds are normal. She exhibits no distension and no mass. There is no splenomegaly or hepatomegaly. There is no abdominal tenderness. There is no rebound and no guarding.   Musculoskeletal:         General: No tenderness. Normal range of motion.      Cervical back: Neck supple.      Comments: Minimal SHT R 2nd MCP  Adequate ROM all joints.          Lymphadenopathy:     She has no cervical adenopathy.   Neurological: She is alert and oriented to person, place, and time. She has normal reflexes. No cranial nerve deficit. Gait normal.   Proximal and distal muscle strength 5/5.   Skin: Skin is warm and dry. No rash noted. She is not diaphoretic.   Psychiatric: Mood, memory, affect and judgment normal.   Vitals reviewed.    3/14/22: ESR 50 (30) CRP 2; ROBBIE neg; RF 74 (<6) CCP >250  11/30/21: RF<14;      6/28/21: CT chest:  predominant LL patchy increased attenuation and reticulation within the periphery of the bilateral lower lobes c/w sequela of atelectasis; r/o early ILD; no bronchiectasis.  No honeycombing.  No significant ground-glass attenuation.  2. Small sliding-type hiatal hernia.    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  However, there is concern about ILD as it can be associated with autoimmune/rheum serology in the absence of a diagnosable CTD.  We will confirm and update labs and imaging.  She will need to  see Pulmonary; we may put in a referral to our [facility] (REF 1320) if she cannot get an appointment to see her pulmonologist.    As a courtesy to her PCP, I have sent a rx for benzonatate 100 mg tid prn as a symptomatic rx of her cough.    We will contact her after her w/u is completed for disposition on our part.      CC: Gianna Carpenter MD    Addendum: 5/12/22: ESR 74 (36); CRP 37.2; CBC & CMP ND; UA ok; ; CCP 1162.7; ROBBIE neg; C3 173; C4 22;  QIG ok;     5/12/22: Bilateral hands: personally viewed: mild OA changes bilaterally; STS over blake MCPs; no erosive changes; R w remote nonunited ulnar styloid fx w hardware.    5/12/22: Arthritis survey: personally viewed: mild generalized OA: Cspine, knees, hands, feet; no erosive changes    5/12/22: CXR: personally viewed: bibasilar coarse interstitial reticulations & opacities c/w pulmonary fibrosis.

## 2022-05-16 ENCOUNTER — PATIENT MESSAGE (OUTPATIENT)
Dept: RHEUMATOLOGY | Facility: CLINIC | Age: 67
End: 2022-05-16
Payer: MEDICARE

## 2022-05-16 NOTE — PROGRESS NOTES
Sero+(173) CCP+(1162.7) non erosive RA   Mild SHT MCPs   AM stiffness x3-4 hrs   FHx: RA & myelodysplasia       Chronic cough/SOB/ intermittent BAKER/abn CXR & CT   CT chest r/o early CT   CXR: blake coarse interstitial reticular opacities c/w PF   R/O ILD vs PF    Joint pain/muscle spasms/myalgia--multiple sites--post Covid vaccine   RA/OA/?CSS    Bilateral hip pain    Bilateral shoulder    L wrist    R knee    L ankle    R foot    Cervical spine: mild DDD & spondylolistesis     Some response to baclofen (spasm & stiffness)     Osteoporosis   S/P R wrist fx 3/2019 (ORIF)    S/P rx w alendronate x 9 yrs--currently on holiday >5 yrs   10/15/20: DXA: TLS -2.7; TFN -2.2; TTH -1.8; FRAX 18.9/3.5    S/P SIADH 1999   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    5/12/22: Arthritis survey: personally viewed:  Mild OA Cspine, T spine; tibial spines; hands (Lwrist fx) & feet.    5/12/22: CXR: personally viewed: bibasilar coarse interstitial reticulated opacities c/w PF      Rx: CBC, CMP, TPMT, bilateral hip imaging & ambulatory referral to [facility] ordered. Probably should avoid MTX.

## 2022-05-17 ENCOUNTER — HOSPITAL ENCOUNTER (OUTPATIENT)
Dept: RADIOLOGY | Facility: HOSPITAL | Age: 67
Discharge: HOME | End: 2022-05-17
Attending: INTERNAL MEDICINE
Payer: MEDICARE

## 2022-05-17 ENCOUNTER — TELEPHONE (OUTPATIENT)
Dept: TRANSPLANT | Facility: CLINIC | Age: 67
End: 2022-05-17
Payer: MEDICARE

## 2022-05-17 DIAGNOSIS — M05.79 RHEUMATOID ARTHRITIS INVOLVING MULTIPLE SITES WITH POSITIVE RHEUMATOID FACTOR: ICD-10-CM

## 2022-05-17 DIAGNOSIS — J84.9 INTERSTITIAL LUNG DISEASE: Primary | ICD-10-CM

## 2022-05-17 PROCEDURE — 73521 XR HIPS BILATERAL 2 VIEW INCL AP PELVIS: ICD-10-PCS | Mod: 26,,, | Performed by: RADIOLOGY

## 2022-05-17 PROCEDURE — 73521 X-RAY EXAM HIPS BI 2 VIEWS: CPT | Mod: 26,,, | Performed by: RADIOLOGY

## 2022-05-17 PROCEDURE — 73521 X-RAY EXAM HIPS BI 2 VIEWS: CPT | Mod: TC,FY

## 2022-05-17 NOTE — TELEPHONE ENCOUNTER
"[facility] ([facility]) [facility] Referral Note    Referral from:  Dr. Holcomb    Lung diagnosis: R/o ILD  "Patient does not have significant findings to currently justify a dx of RA.  However, there is concern about ILD as it can be associated with autoimmune/rheum serology in the absence of a diagnosable CTD."    Age: 67 y.o.    Height/Weight/BMI:   5' 2" / 60.1 kg / 24.23    PFT date:   Not done     CXR date: 05/13/2022  FINDINGS:  Redemonstration of bibasal interstitial reticulations representing underlining atelectasis or scarring.  No large consolidation.  Normal appearance of pulmonary vasculature and no pleural effusion or pneumothorax.     The cardiac silhouette is normal in size. The hilar and mediastinal contours are unremarkable.     Bones are intact.     Impression:     No acute abnormalities.    Chest CT date:  6/28/2021  Impression:     1. Lower lobe predominant patchy increased attenuation and reticulation within the periphery of the bilateral lower lobes, favored to represent sequela of atelectasis.  An early interstitial lung disease can present with similar findings and cannot be entirely excluded.  No bronchiectasis.  No honeycombing.  No significant ground-glass attenuation.  2. Small sliding-type hiatal hernia.    Echo date: 06/28/2021    Impression:  · The estimated ejection fraction is 55%.  · Normal systolic function.  · Normal right ventricular size with normal right ventricular systolic function.  · Mild to moderate left atrial enlargement.  · Mild right atrial enlargement.  · Normal central venous pressure (3 mmHg).  · The estimated PA systolic pressure is 18 mmHg.     "

## 2022-05-18 NOTE — TELEPHONE ENCOUNTER
Notified patient of referral to ALD clinic and discussed testing that would be needed with appointment.  Offered to schedule her June 7th.  Pt accepts.  Scheduling card placed on 's desk.

## 2022-05-30 ENCOUNTER — PATIENT MESSAGE (OUTPATIENT)
Dept: RHEUMATOLOGY | Facility: CLINIC | Age: 67
End: 2022-05-30
Payer: MEDICARE

## 2022-05-30 DIAGNOSIS — M05.79 RHEUMATOID ARTHRITIS INVOLVING MULTIPLE SITES WITH POSITIVE RHEUMATOID FACTOR: Primary | ICD-10-CM

## 2022-05-30 NOTE — PROGRESS NOTES
Answers for HPI/ROS submitted by the patient on 5/30/2022  fever: No  eye redness: No  mouth sores: No  headaches: No  shortness of breath: No  chest pain: No  trouble swallowing: No  diarrhea: No  constipation: No  unexpected weight change: No  genital sore: No  dysuria: No  During the last 3 days, have you had a skin rash?: No  Bruises or bleeds easily: No  cough: Yes    Subjective:     Patient ID: Juan Cruz is a 67 y.o. female sent by Dr. Carpenter for +RA serology    Chief Complaint: No chief complaint on file.  PCP: Gianna Carpenter MD   Ortho: Minh Iglesias MD  HPI     67 yr old lady seen for the first time on 5/12/22 w sero+CCP+ non erosive OA and a chronic cough with an abnormal CXR & CT chest c/w pulmonary fibrosis.    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    She has contacted us several times since her initial visit c/o generalized weakness & pain, tinnitus; peeing a lot; hip & knee pain; leg edema and was asked to come in to see us again, but she presents prior to her w/u and appointment to see Dr. Haile on 6/7/22.    Today she has multiple complaints and feels that she is in severe pain and unable to function. She has AM stiffness lasting 4 hrs. She has difficulty getting in and out of bed, dressing herself, washing herself, etc. She is fatigued & is unable to sleep but denies depression & anxiety. She now volunteers she had been seen by Dr.Luis Bee in the past ~ 2007 for similar sxs & no autoimmune/rheum dx was made.     Today, she feels she has developed joint swelling since her last visit, especially in some MCPs, but also c/o edema which improved after she stopped taking NSAIDs. She continue to c/o chronic neck pain.    She is currently taking prednisone 10 mg/d. She states she has bilateral shoulder pain  & is unable to get OOB in AM. No GCA sxs. She is stiff x 4 hrs.     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  These appear to be incidental & unexpected findings as patient has very little joint swelling.  Patient feels that myalgias began after her last Covid vaccine.    In February got severe pain in R shoulder that responded to CSI but then developed excruciating spasms neck & around both shoulders. Labs gotten by Orthopedic (Dr. Iglesias);   In Jan 27,2022 had FESS for sinus surgery & needed bad infection and required more antibiotics. Developed rash on cheeks on levaquin. Same rash came back now again.    In March had only shoulder pain & pronating L wrist.  Now R knee, L ankle pain & swelling & R foot 3rd & 4th toes even at rest.  Also weak all over  Baclofen helps her spasms & stiffness    AM stiffness x 3-4 hrs (since ~ February, 2022);     .  Denies Raynaud's, tight skin, alopecia, oral ulcers, parotid gland swelling, pleurisy, pericarditis, photosensitivity, skin rashes, thromboses, muscle weakness; fevers, headaches, conjunctivitis, chronic or bloody diarrhea, vaginal/urethral D/C; paresthesias; LBP, thyroid issues;   1 miscarriage 4-6 weeks in 1997; has 1 daughter Csection;   Chronic cough x 1.5 yr after swallowing--fatigues & drains her  Has dry eyes > mouth  Has BAKER & st w eating comes & goes;   Saw Dr. Siegel & told no obstructive pulmonary disease but restrictive.  Has FHx of RA & myelodysplasia (mom)    Started prednisone 10 mg qod ~ end of March, 2022; Helped pain   But had adverse effects on higher steroids in past.  In 1999 NYLA was on decadron but developed myopathy & psychosis on it.  .    TMJ on L x 1 2/22 lasted 1-2days  Neck since 2/22 L>R  B shoulders L>R  Elbows no  Wrists L>R (flexi carpi ulnaris)  MCPs no but only 2nd R MCP  PIPs & DIP  Hip: no  GT no  Knee: R 1 month ago; since Monday both; just  hurt; hurt all the time.  Ankles: L 1 month R since Monday --only swelling  Toes 3 & 4th on R; L of  Both heels hurt  Achilles bilateral.    Current Outpatient Medications   Medication Sig Dispense Refill    acetaminophen (TYLENOL) 325 MG tablet Take 650 mg by mouth every 6 (six) hours as needed for Pain.      baclofen (LIORESAL) 10 MG tablet Take 10 mg by mouth 3 (three) times daily.      guaiFENesin (MUCINEX) 600 mg 12 hr tablet Take 1,200 mg by mouth 2 (two) times daily.      predniSONE (DELTASONE) 5 MG tablet Take 5 mg by mouth once daily.      B-complex with vitamin C (Z-BEC OR EQUIV) tablet Take 1 tablet by mouth once daily.      calcium citrate-vitamin D3 315-200 mg (CITRACAL+D) 315-200 mg-unit per tablet Take 1 tablet by mouth 2 (two) times daily.      cetirizine (ZYRTEC) 10 MG tablet cetirizine 10 mg tablet   TAKE 1 TABLET BY MOUTH ONCE DAILY      estradioL (VAGIFEM) 10 mcg Tab Yuvafem 10 mcg vaginal tablet   INSERT 1 TABLET VAGINALLY TWICE A WEEK      fluticasone propionate (FLONASE) 50 mcg/actuation nasal spray 1 spray by Each Nostril route once daily.      fluticasone/vilanterol (BREO ELLIPTA INHL) Inhale into the lungs.      ibuprofen (ADVIL,MOTRIN) 200 MG tablet Take 200 mg by mouth every 6 (six) hours as needed for Pain.      metoprolol succinate (TOPROL-XL) 25 MG 24 hr tablet Take 25 mg by mouth once daily.      pantoprazole (PROTONIX) 40 MG tablet        No current facility-administered medications for this visit.       Review of patient's allergies indicates:   Allergen Reactions    Gluten protein     Ppd black rubber mix        Review of Systems   Constitutional: Positive for fatigue. Negative for fever and unexpected weight change.   HENT: Positive for hearing loss (R loss of hearing; following SIADH), postnasal drip, sinus pain and tinnitus. Negative for mouth sores and trouble swallowing.    Eyes: Negative for redness.   Respiratory: Positive for cough (felt post nasal). Negative  "for chest tightness and shortness of breath.    Cardiovascular: Negative for chest pain and palpitations (Has had MVP on metoprolol).   Gastrointestinal: Negative.  Negative for constipation and diarrhea.   Endocrine: Positive for cold intolerance.   Genitourinary: Positive for enuresis. Negative for dysuria and genital sores.        Incontinence   Musculoskeletal: Positive for arthralgias, joint swelling, myalgias, neck pain and neck stiffness. Negative for back pain.   Skin: Negative for rash.   Neurological: Positive for numbness. Negative for dizziness, seizures, syncope and headaches.   Hematological: Does not bruise/bleed easily.   Psychiatric/Behavioral: Positive for sleep disturbance. Negative for dysphoric mood. The patient is not nervous/anxious.         Denies anxiety/depression but  says she's irritable.       No past medical history on file.    Past Surgical History:   Procedure Laterality Date    BREAST BIOPSY Bilateral        FH:  M: dx 83 w RA; passed away on 85; also had some intestinal problems.  Had myelodysplasia.  1 S hx of chronic myalgia & gets bedridden 2-3 days.  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  Was 132 lb 7.9 oz on 22  Physical Exam   Constitutional: She is oriented to person, place, and time. No distress.   Intermittent dry cough   HENT:   Head: Normocephalic and atraumatic.   Mouth/Throat: Oropharynx is clear and moist. No oropharyngeal exudate.   No facial rashes  Parotids not enlarged  No oral ulcers   Eyes: Pupils " are equal, round, and reactive to light. Conjunctivae are normal. Right eye exhibits no discharge. Left eye exhibits no discharge. No scleral icterus.   Neck: No JVD present. No tracheal deviation present. No thyromegaly present.   Cardiovascular: Regular rhythm and normal heart sounds. Tachycardia present. Exam reveals no gallop and no friction rub.   No murmur heard.  Pulmonary/Chest: Effort normal. No respiratory distress. She has no wheezes. She has no rales. She exhibits no tenderness.   Abdominal: Soft. Bowel sounds are normal. She exhibits no distension and no mass. There is no splenomegaly or hepatomegaly. There is no abdominal tenderness. There is no rebound and no guarding.   Musculoskeletal:         General: No tenderness. Normal range of motion.      Cervical back: Neck supple.      Comments: Mild SHT R 2nd MCP  Adequate ROM all joints.  Bilateral metatarsalgia        Lymphadenopathy:     She has no cervical adenopathy.   Neurological: She is alert and oriented to person, place, and time. She has normal reflexes. No cranial nerve deficit. Gait normal.   Proximal & distal upper extremity strength appropriate.  LE strength probably 4+ (limited by some muscle pain)   Skin: Skin is warm and dry. No rash noted. She is not diaphoretic.   Psychiatric: Memory, affect, judgment and thought content normal.   Vitals reviewed.    5/17/22: CBC Hg 11.6; Ht 34.3; CMP Na 135; alb 3.3; TPMT normal metabolizer  5/12/22: ESR 74 (36); CRP 37.2; UA ok; ; .2; ROBBIE/C3/C4/QIG wnl or neg; IgM aCLA 16.70 (12.49);   3/14/22: ESR 50 (30) CRP 2; ROBBIE neg; RF 74 (<6) CCP >250  11/30/21: RF<14;     5/12/22: Bilateral hands: personally viewed:mild STS dorsal MCPs bilaterally; min OA otherwise; remote healed distal radius fx w hardware.  5/12/22: Arthritis survey: personally viewed:  Mild OA Cspine, T spine; tibial spines; hands (Lwrist fx) & feet.  5/12/22: CXR: personally viewed: bibasilar coarse interstitial reticulated  opacities c/w PF    6/28/21: CT chest:  predominant LL patchy increased attenuation and reticulation within the periphery of the bilateral lower lobes c/w sequela of atelectasis; r/o early ILD; no bronchiectasis.  No honeycombing.  No significant ground-glass attenuation. Small sliding-type hiatal hernia.    10/15/20: DXA: TLS -2.7; TFN -2.2;  TTH -1.8; FRAX 18.9%;/3.5%  Assessment:   Sero+(173) CCP+(1162.7) non erosive RA              Mild SHT MCPs              AM stiffness x3-4 hrs              FHx: RA & myelodysplasia   On prednisone 10 mg/d    Shoulder/pelvic discomfort w AM stiffness   R/O PMR                 Chronic cough/SOB/ intermittent BAKER/abn CXR & CT              CT chest r/o early CT              CXR: blake coarse interstitial reticular opacities c/w PF              R/O ILD vs PF    Central sensitivity syndrome   Muscle aches & pains since childhood.   Some response to gluten restriction   CSI = 47 = hi moderate     Joint pain/muscle spasms/myalgia--multiple sites--   Worse post Covid vaccine              RA/OA/CSS                          Bilateral hip pain                          Bilateral shoulder                          L wrist                          R knee                          L ankle                          R foot                          Cervical spine: mild DDD & spondylolistesis                                      Some response to baclofen (spasm & stiffness)                Osteoporosis              S/P R wrist fx 3/2019 (ORIF)                S/P rx w alendronate x 9 yrs--currently on holiday >5 yrs              10/15/20: DXA: TLS -2.7; TFN -2.2; TTH -1.8; FRAX 18.9/3.5     S/P SIADH 1999              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  Pain is her biggest issue.  Discussed not all joint pain due to RA. Likely most not due to RA.  Discussed PMR--unlikely but w complaints possible--will empirically increase  prednisone for a few days (10 bid & patient will contact me with response. Nevertheless, stressed energizing and sometimes euphoric effect of steroids but mindful of her hx of steroid induced psychosis w hi doses.  Discussed CSS.  Handout provided for [facility]  Best to rx RA w MTX, but azathioprine may be a better choice to get both RA & lung. We will have to get Dr. Beck's input.  Discussed side effects and toxicities of azathioprine, stressing frequent labs, especially at beginning, if this choice is selected.    Pre-DMARDs  If azathioprine: will need weekly labs for 6 weeks then every 2 weeks x 2 then monthly x 3 then q 3 months  CBC & CMP    We will see patient after her visit with the [facility].      CC: Gianna Carpenter MD

## 2022-05-31 ENCOUNTER — OFFICE VISIT (OUTPATIENT)
Dept: RHEUMATOLOGY | Facility: CLINIC | Age: 67
End: 2022-05-31
Payer: MEDICARE

## 2022-05-31 ENCOUNTER — LAB VISIT (OUTPATIENT)
Dept: LAB | Facility: HOSPITAL | Age: 67
End: 2022-05-31
Attending: INTERNAL MEDICINE
Payer: MEDICARE

## 2022-05-31 VITALS
HEART RATE: 91 BPM | DIASTOLIC BLOOD PRESSURE: 67 MMHG | HEIGHT: 62 IN | SYSTOLIC BLOOD PRESSURE: 114 MMHG | WEIGHT: 130 LBS | BODY MASS INDEX: 23.92 KG/M2

## 2022-05-31 DIAGNOSIS — J84.10 PULMONARY FIBROSIS: ICD-10-CM

## 2022-05-31 DIAGNOSIS — Z55.9 EDUCATIONAL CIRCUMSTANCE: ICD-10-CM

## 2022-05-31 DIAGNOSIS — M05.79 RHEUMATOID ARTHRITIS INVOLVING MULTIPLE SITES WITH POSITIVE RHEUMATOID FACTOR: Primary | ICD-10-CM

## 2022-05-31 DIAGNOSIS — M79.10 MYALGIA: ICD-10-CM

## 2022-05-31 DIAGNOSIS — M25.50 PAIN IN JOINT INVOLVING MULTIPLE SITES: ICD-10-CM

## 2022-05-31 DIAGNOSIS — M05.79 RHEUMATOID ARTHRITIS INVOLVING MULTIPLE SITES WITH POSITIVE RHEUMATOID FACTOR: ICD-10-CM

## 2022-05-31 DIAGNOSIS — R05.9 COUGH: ICD-10-CM

## 2022-05-31 LAB
CK SERPL-CCNC: 13 U/L (ref 20–180)
CK SERPL-CCNC: 13 U/L (ref 20–180)

## 2022-05-31 PROCEDURE — 3008F BODY MASS INDEX DOCD: CPT | Mod: CPTII,NTX,S$GLB, | Performed by: INTERNAL MEDICINE

## 2022-05-31 PROCEDURE — 99999 PR PBB SHADOW E&M-EST. PATIENT-LVL III: CPT | Mod: PBBFAC,TXP,, | Performed by: INTERNAL MEDICINE

## 2022-05-31 PROCEDURE — 86682 HELMINTH ANTIBODY: CPT | Mod: NTX | Performed by: INTERNAL MEDICINE

## 2022-05-31 PROCEDURE — 87340 HEPATITIS B SURFACE AG IA: CPT | Mod: NTX | Performed by: INTERNAL MEDICINE

## 2022-05-31 PROCEDURE — 99999 PR PBB SHADOW E&M-EST. PATIENT-LVL III: ICD-10-PCS | Mod: PBBFAC,TXP,, | Performed by: INTERNAL MEDICINE

## 2022-05-31 PROCEDURE — 86790 VIRUS ANTIBODY NOS: CPT | Mod: NTX | Performed by: INTERNAL MEDICINE

## 2022-05-31 PROCEDURE — 87389 HIV-1 AG W/HIV-1&-2 AB AG IA: CPT | Mod: TXP | Performed by: INTERNAL MEDICINE

## 2022-05-31 PROCEDURE — 86803 HEPATITIS C AB TEST: CPT | Mod: TXP | Performed by: INTERNAL MEDICINE

## 2022-05-31 PROCEDURE — 3078F DIAST BP <80 MM HG: CPT | Mod: CPTII,NTX,S$GLB, | Performed by: INTERNAL MEDICINE

## 2022-05-31 PROCEDURE — 3008F PR BODY MASS INDEX (BMI) DOCUMENTED: ICD-10-PCS | Mod: CPTII,NTX,S$GLB, | Performed by: INTERNAL MEDICINE

## 2022-05-31 PROCEDURE — 1159F MED LIST DOCD IN RCRD: CPT | Mod: CPTII,NTX,S$GLB, | Performed by: INTERNAL MEDICINE

## 2022-05-31 PROCEDURE — 36415 COLL VENOUS BLD VENIPUNCTURE: CPT | Mod: TXP | Performed by: INTERNAL MEDICINE

## 2022-05-31 PROCEDURE — 86704 HEP B CORE ANTIBODY TOTAL: CPT | Mod: NTX | Performed by: INTERNAL MEDICINE

## 2022-05-31 PROCEDURE — 3074F SYST BP LT 130 MM HG: CPT | Mod: CPTII,NTX,S$GLB, | Performed by: INTERNAL MEDICINE

## 2022-05-31 PROCEDURE — 1125F PR PAIN SEVERITY QUANTIFIED, PAIN PRESENT: ICD-10-PCS | Mod: CPTII,NTX,S$GLB, | Performed by: INTERNAL MEDICINE

## 2022-05-31 PROCEDURE — 99214 OFFICE O/P EST MOD 30 MIN: CPT | Mod: NTX,S$GLB,, | Performed by: INTERNAL MEDICINE

## 2022-05-31 PROCEDURE — 3074F PR MOST RECENT SYSTOLIC BLOOD PRESSURE < 130 MM HG: ICD-10-PCS | Mod: CPTII,NTX,S$GLB, | Performed by: INTERNAL MEDICINE

## 2022-05-31 PROCEDURE — 82085 ASSAY OF ALDOLASE: CPT | Mod: TXP | Performed by: INTERNAL MEDICINE

## 2022-05-31 PROCEDURE — 86592 SYPHILIS TEST NON-TREP QUAL: CPT | Mod: NTX | Performed by: INTERNAL MEDICINE

## 2022-05-31 PROCEDURE — 82550 ASSAY OF CK (CPK): CPT | Mod: NTX | Performed by: INTERNAL MEDICINE

## 2022-05-31 PROCEDURE — 3078F PR MOST RECENT DIASTOLIC BLOOD PRESSURE < 80 MM HG: ICD-10-PCS | Mod: CPTII,NTX,S$GLB, | Performed by: INTERNAL MEDICINE

## 2022-05-31 PROCEDURE — 1159F PR MEDICATION LIST DOCUMENTED IN MEDICAL RECORD: ICD-10-PCS | Mod: CPTII,NTX,S$GLB, | Performed by: INTERNAL MEDICINE

## 2022-05-31 PROCEDURE — 86706 HEP B SURFACE ANTIBODY: CPT | Mod: TXP | Performed by: INTERNAL MEDICINE

## 2022-05-31 PROCEDURE — 1160F RVW MEDS BY RX/DR IN RCRD: CPT | Mod: CPTII,NTX,S$GLB, | Performed by: INTERNAL MEDICINE

## 2022-05-31 PROCEDURE — 99214 PR OFFICE/OUTPT VISIT, EST, LEVL IV, 30-39 MIN: ICD-10-PCS | Mod: NTX,S$GLB,, | Performed by: INTERNAL MEDICINE

## 2022-05-31 PROCEDURE — 1160F PR REVIEW ALL MEDS BY PRESCRIBER/CLIN PHARMACIST DOCUMENTED: ICD-10-PCS | Mod: CPTII,NTX,S$GLB, | Performed by: INTERNAL MEDICINE

## 2022-05-31 PROCEDURE — 86787 VARICELLA-ZOSTER ANTIBODY: CPT | Mod: TXP | Performed by: INTERNAL MEDICINE

## 2022-05-31 PROCEDURE — 1125F AMNT PAIN NOTED PAIN PRSNT: CPT | Mod: CPTII,NTX,S$GLB, | Performed by: INTERNAL MEDICINE

## 2022-05-31 RX ORDER — GUAIFENESIN 600 MG/1
1200 TABLET, EXTENDED RELEASE ORAL 2 TIMES DAILY
COMMUNITY

## 2022-05-31 SDOH — SOCIAL DETERMINANTS OF HEALTH (SDOH): PROBLEMS RELATED TO EDUCATION AND LITERACY, UNSPECIFIED: Z55.9

## 2022-05-31 ASSESSMENT — ROUTINE ASSESSMENT OF PATIENT INDEX DATA (RAPID3)
PAIN SCORE: 10
TOTAL RAPID3 SCORE: 8.78
WHEN YOU AWAKENED IN THE MORNING OVER THE LAST WEEK, PLEASE INDICATE THE AMOUNT OF TIME IT TAKES UNTIL YOU ARE AS LIMBER AS YOU WILL BE FOR THE DAY: 4 HOURS
MDHAQ FUNCTION SCORE: 2.5
AM STIFFNESS SCORE: 1, YES
PATIENT GLOBAL ASSESSMENT SCORE: 8
PSYCHOLOGICAL DISTRESS SCORE: 3.3
FATIGUE SCORE: 8

## 2022-05-31 NOTE — PROGRESS NOTES
Rapid3 Question Responses and Scores 5/30/2022   MDHAQ Score 2.5   Psychologic Score 3.3   Pain Score 10   When you awakened in the morning OVER THE LAST WEEK, did you feel stiff? Yes   If Yes, please indicate the number of hours until you are as limber as you will be for the day 4   Fatigue Score 8   Global Health Score 8   RAPID3 Score 8.78

## 2022-05-31 NOTE — PATIENT INSTRUCTIONS
Read on fibromyalgia/central sensitivity    [email or web address]  [email or web address]    For your pain try prednisone 10 mg twice a day.  Let us know by Thursday or Friday how you feel.

## 2022-06-01 LAB
ALDOLASE SERPL-CCNC: 4.4 U/L (ref 1.2–7.6)
ALDOLASE SERPL-CCNC: 4.4 U/L (ref 1.2–7.6)
HAV IGG SER QL IA: POSITIVE
HBV CORE AB SERPL QL IA: NEGATIVE
HBV SURFACE AB SER-ACNC: POSITIVE M[IU]/ML
HBV SURFACE AG SERPL QL IA: NEGATIVE
HCV AB SERPL QL IA: NEGATIVE
HIV 1+2 AB+HIV1 P24 AG SERPL QL IA: NEGATIVE
RPR SER QL: NORMAL
STRONGYLOIDES ANTIBODY IGG: NEGATIVE

## 2022-06-02 LAB
VARICELLA INTERPRETATION: POSITIVE
VARICELLA ZOSTER IGG: 3.6 ISR (ref 0–0.9)

## 2022-06-06 ENCOUNTER — DOCUMENTATION ONLY (OUTPATIENT)
Dept: TRANSPLANT | Facility: CLINIC | Age: 67
End: 2022-06-06
Payer: MEDICARE

## 2022-06-06 ENCOUNTER — TELEPHONE (OUTPATIENT)
Dept: TRANSPLANT | Facility: CLINIC | Age: 67
End: 2022-06-06
Payer: MEDICARE

## 2022-06-07 ENCOUNTER — OFFICE VISIT (OUTPATIENT)
Dept: TRANSPLANT | Facility: CLINIC | Age: 67
End: 2022-06-07
Payer: MEDICARE

## 2022-06-07 ENCOUNTER — HOSPITAL ENCOUNTER (OUTPATIENT)
Dept: PULMONOLOGY | Facility: CLINIC | Age: 67
Discharge: HOME | End: 2022-06-07
Payer: MEDICARE

## 2022-06-07 VITALS — WEIGHT: 130.06 LBS | BODY MASS INDEX: 23.93 KG/M2 | HEIGHT: 62 IN

## 2022-06-07 VITALS
TEMPERATURE: 98 F | SYSTOLIC BLOOD PRESSURE: 126 MMHG | BODY MASS INDEX: 23.93 KG/M2 | WEIGHT: 130.06 LBS | OXYGEN SATURATION: 98 % | RESPIRATION RATE: 20 BRPM | HEART RATE: 80 BPM | DIASTOLIC BLOOD PRESSURE: 63 MMHG | HEIGHT: 62 IN

## 2022-06-07 DIAGNOSIS — J84.9 INTERSTITIAL LUNG DISEASE: ICD-10-CM

## 2022-06-07 DIAGNOSIS — R05.9 COUGH: Primary | ICD-10-CM

## 2022-06-07 DIAGNOSIS — J84.10 PULMONARY FIBROSIS: ICD-10-CM

## 2022-06-07 DIAGNOSIS — M05.79 RHEUMATOID ARTHRITIS INVOLVING MULTIPLE SITES WITH POSITIVE RHEUMATOID FACTOR: ICD-10-CM

## 2022-06-07 PROCEDURE — 3008F PR BODY MASS INDEX (BMI) DOCUMENTED: ICD-10-PCS | Mod: CPTII,NTX,S$GLB, | Performed by: INTERNAL MEDICINE

## 2022-06-07 PROCEDURE — 3078F DIAST BP <80 MM HG: CPT | Mod: CPTII,NTX,S$GLB, | Performed by: INTERNAL MEDICINE

## 2022-06-07 PROCEDURE — 3288F FALL RISK ASSESSMENT DOCD: CPT | Mod: CPTII,NTX,S$GLB, | Performed by: INTERNAL MEDICINE

## 2022-06-07 PROCEDURE — 3074F PR MOST RECENT SYSTOLIC BLOOD PRESSURE < 130 MM HG: ICD-10-PCS | Mod: CPTII,NTX,S$GLB, | Performed by: INTERNAL MEDICINE

## 2022-06-07 PROCEDURE — 94010 BREATHING CAPACITY TEST: CPT | Mod: NTX,S$GLB,, | Performed by: INTERNAL MEDICINE

## 2022-06-07 PROCEDURE — 99204 OFFICE O/P NEW MOD 45 MIN: CPT | Mod: NTX,S$GLB,, | Performed by: INTERNAL MEDICINE

## 2022-06-07 PROCEDURE — 1159F PR MEDICATION LIST DOCUMENTED IN MEDICAL RECORD: ICD-10-PCS | Mod: CPTII,NTX,S$GLB, | Performed by: INTERNAL MEDICINE

## 2022-06-07 PROCEDURE — 1159F MED LIST DOCD IN RCRD: CPT | Mod: CPTII,NTX,S$GLB, | Performed by: INTERNAL MEDICINE

## 2022-06-07 PROCEDURE — 1160F RVW MEDS BY RX/DR IN RCRD: CPT | Mod: CPTII,NTX,S$GLB, | Performed by: INTERNAL MEDICINE

## 2022-06-07 PROCEDURE — 1125F PR PAIN SEVERITY QUANTIFIED, PAIN PRESENT: ICD-10-PCS | Mod: CPTII,NTX,S$GLB, | Performed by: INTERNAL MEDICINE

## 2022-06-07 PROCEDURE — 3288F PR FALLS RISK ASSESSMENT DOCUMENTED: ICD-10-PCS | Mod: CPTII,NTX,S$GLB, | Performed by: INTERNAL MEDICINE

## 2022-06-07 PROCEDURE — 99999 PR PBB SHADOW E&M-EST. PATIENT-LVL V: ICD-10-PCS | Mod: PBBFAC,TXP,, | Performed by: INTERNAL MEDICINE

## 2022-06-07 PROCEDURE — 94729 DIFFUSING CAPACITY: CPT | Mod: NTX,S$GLB,, | Performed by: INTERNAL MEDICINE

## 2022-06-07 PROCEDURE — 3074F SYST BP LT 130 MM HG: CPT | Mod: CPTII,NTX,S$GLB, | Performed by: INTERNAL MEDICINE

## 2022-06-07 PROCEDURE — 94010 BREATHING CAPACITY TEST: ICD-10-PCS | Mod: NTX,S$GLB,, | Performed by: INTERNAL MEDICINE

## 2022-06-07 PROCEDURE — 3008F BODY MASS INDEX DOCD: CPT | Mod: CPTII,NTX,S$GLB, | Performed by: INTERNAL MEDICINE

## 2022-06-07 PROCEDURE — 1101F PR PT FALLS ASSESS DOC 0-1 FALLS W/OUT INJ PAST YR: ICD-10-PCS | Mod: CPTII,NTX,S$GLB, | Performed by: INTERNAL MEDICINE

## 2022-06-07 PROCEDURE — 94618 PULMONARY STRESS TESTING: ICD-10-PCS | Mod: NTX,S$GLB,, | Performed by: INTERNAL MEDICINE

## 2022-06-07 PROCEDURE — 1160F PR REVIEW ALL MEDS BY PRESCRIBER/CLIN PHARMACIST DOCUMENTED: ICD-10-PCS | Mod: CPTII,NTX,S$GLB, | Performed by: INTERNAL MEDICINE

## 2022-06-07 PROCEDURE — 1101F PT FALLS ASSESS-DOCD LE1/YR: CPT | Mod: CPTII,NTX,S$GLB, | Performed by: INTERNAL MEDICINE

## 2022-06-07 PROCEDURE — 94618 PULMONARY STRESS TESTING: CPT | Mod: NTX,S$GLB,, | Performed by: INTERNAL MEDICINE

## 2022-06-07 PROCEDURE — 1125F AMNT PAIN NOTED PAIN PRSNT: CPT | Mod: CPTII,NTX,S$GLB, | Performed by: INTERNAL MEDICINE

## 2022-06-07 PROCEDURE — 99999 PR PBB SHADOW E&M-EST. PATIENT-LVL V: CPT | Mod: PBBFAC,TXP,, | Performed by: INTERNAL MEDICINE

## 2022-06-07 PROCEDURE — 3078F PR MOST RECENT DIASTOLIC BLOOD PRESSURE < 80 MM HG: ICD-10-PCS | Mod: CPTII,NTX,S$GLB, | Performed by: INTERNAL MEDICINE

## 2022-06-07 PROCEDURE — 94727 PR PULM FUNCTION TEST BY GAS: ICD-10-PCS | Mod: NTX,S$GLB,, | Performed by: INTERNAL MEDICINE

## 2022-06-07 PROCEDURE — 94729 PR C02/MEMBANE DIFFUSE CAPACITY: ICD-10-PCS | Mod: NTX,S$GLB,, | Performed by: INTERNAL MEDICINE

## 2022-06-07 PROCEDURE — 99204 PR OFFICE/OUTPT VISIT, NEW, LEVL IV, 45-59 MIN: ICD-10-PCS | Mod: NTX,S$GLB,, | Performed by: INTERNAL MEDICINE

## 2022-06-07 PROCEDURE — 94727 GAS DIL/WSHOT DETER LNG VOL: CPT | Mod: NTX,S$GLB,, | Performed by: INTERNAL MEDICINE

## 2022-06-07 NOTE — LETTER
June 8, 2022        Ibis Holcomb  [address]  [facility] [address]  Phone: [phone]  Fax: [phone]             [facility] 1st [facility]  [address]  [facility] [address]  Phone: [phone]   Patient: Juan Cruz   MR Number: [id number]   YOB: 1955   Date of Visit: 6/7/2022       Dear Dr. Ibis Holcomb    Thank you for referring Juan Cruz to me for evaluation. Attached you will find relevant portions of my assessment and plan of care.    If you have questions, please do not hesitate to call me. I look forward to following Juan Cruz along with you.    Sincerely,    Barbara Haile, DO    Enclosure    If you would like to receive this communication electronically, please contact [email or web address] or [phone] to request [employer] access.    [employer] is a tool which provides read-only access to select patient information with whom you have a relationship. Its easy to use and provides real time access to review your patients record including encounter summaries, notes, results, and demographic information.    If you feel you have received this communication in error or would no longer like to receive these types of communications, please e-mail [email or web address]

## 2022-06-07 NOTE — PROCEDURES
Juan Cruz is a 67 y.o.  female patient, who presents for a 6 minute walk test ordered by MD Lazara.  The diagnosis is  (Interstitial lung disease).  The patient's BMI is 23.8 kg/m2.  Predicted distance (lower limit of normal) is 344.71 meters.      Test Results:    The test was completed without stopping.  The total time walked was 360 seconds.  During walking, the patient reported:  Dyspnea (coughing). The patient used a cane  during testing.     06/07/2022---------Distance: 368.81 meters (1210 feet)     O2 Sat % Supplemental Oxygen Heart Rate Blood Pressure Rigoberto Scale   Pre-exercise  (Resting) 98 % Room Air 89 bpm 129/70 mmHg 0   During Exercise 94 % Room Air 116 bpm 132/80 mmHg 2   Post-exercise  (Recovery) 98 % Room Air  93 bpm   mmHg       Recovery Time: 115 seconds    Performing nurse/tech: LIZZETH Miller      PREVIOUS STUDY:   The patient has not had a previous study.      CLINICAL INTERPRETATION:  Six minute walk distance is 368.81 meters (1210 feet) with light dyspnea.  During exercise, there was significant desaturation while breathing room air.  Blood pressure remained stable and Heart rate increased significantly with walking.  This may represent a tachycardic response to exercise.  The patient did not report non-pulmonary symptoms during exercise.  No previous study performed.  Based upon age and body mass index, exercise capacity is normal.

## 2022-06-08 NOTE — PROGRESS NOTES
ADVANCED LUNG DISEASE INITIAL VISIT                                                                                                                                          Reason for Visit:  Possible ILD in the setting of RA    Referring Physician: Ibis Holcomb *    History of Present Illness: Juan Cruz is a 67 y.o. female who is on 0L of oxygen.  She is on no assisted ventilation.  Her [place] Heart Association Class is II and a Karnofsky score of 80% - Normal activity with effort: some symptoms of disease. She is not diabetic.     She presents today for evaluation of possible ILD and is followed by rheum for RA.  She states that she does not have any previous pulmonary history.  In 2019, she was staying at a hotel with down pillows and noted some dyspnea and wheezing but these symptoms resolved when she came back to [place].  In 2020, she noticed increased coughing episodes when she was eating.  These episodes worsened and prompted further evaluation by GI.  She had manometry/BRAVO and EGD which did not show any evidence of reflux or aspiration.  She now complains of a dry non-productive upper airway cough.  Her cardiologist noticed fine bibasilar crackles and she was sent for further pulmonary evaluation which revealed reticulations on CT chest.  She was given Breo and albuterol for which she occasionally takes.  After having cardiology and GI evaluations were normal, she went to see ENT for the continued cough and post nasal gtt.  She underwent FESS and had multiple nasal polyps removed.  She states that the ENT noted arytenoid swelling on laryngoscopy.  She has been on flonase daily, nasal saline rinse, mucinex, and zyrtec.  She cannot identify any environmental triggers (no down pillows, bird exposure, dust exposure, travel history).  She never had asthma as a child.  [facility]  She endorses that she continues to have continued post nasal gtt of clear  thin mucus despite treatments.  In Feb, she developed severe and debilitating pain in her shoulders and knees.  Further work-up found a very elevated RF and antiCCP.  She was started on prednisone which has improved her joint pains but not helped with her cough.  She otherwise has no dyspnea and is limited in mobility/exercise by her joint pain.      Pulm hx: No history of asthma or known allergens.  No smoking history.  No occupational exposures.  Family hx: Negative for any pulmonary disease or autoimmune diseases  Pets: None.  No exposures to birds or pet dander of any kind  Cardiac hx: MVP.  Followed by cardiology.  No history of PH  GI hx: No reflux.  EGD/BRAVO negative for GERD or aspiration  Meds: No pulmonary toxic medications.  Breo and prn Albuterol (no significant relief).  Prednisone for RA  Autoimmune hx: +RF and anti-CCP.  Joint pains in shoulders and knees    Past Medical History:   Diagnosis Date    Chronic sinusitis, unspecified     History of SIADH     Mitral valve prolapse     Osteopenia     Rheumatoid arthritis involving multiple sites        Past Surgical History:   Procedure Laterality Date    BREAST BIOPSY Bilateral     FUNCTIONAL ENDOSCOPIC SINUS SURGERY (FESS)      ORIF WRIST FRACTURE Right        Allergies: Gluten protein and Ppd black rubber mix    Current Outpatient Medications   Medication Sig    acetaminophen (TYLENOL) 325 MG tablet Take 650 mg by mouth every 6 (six) hours as needed for Pain.    B-complex with vitamin C (Z-BEC OR EQUIV) tablet Take 1 tablet by mouth once daily.    baclofen (LIORESAL) 10 MG tablet Take 10 mg by mouth 3 (three) times daily as needed.    calcium citrate-vitamin D3 315-200 mg (CITRACAL+D) 315-200 mg-unit per tablet Take 1 tablet by mouth 2 (two) times daily.    cetirizine (ZYRTEC) 10 MG tablet cetirizine 10 mg tablet   TAKE 1 TABLET BY MOUTH ONCE DAILY    estradioL (VAGIFEM) 10 mcg Tab Yuvafem 10 mcg vaginal tablet   INSERT 1 TABLET VAGINALLY  TWICE A WEEK    fluticasone propionate (FLONASE) 50 mcg/actuation nasal spray 1 spray by Each Nostril route once daily.    fluticasone/vilanterol (BREO ELLIPTA INHL) Inhale 1 puff into the lungs once daily.    guaiFENesin (MUCINEX) 600 mg 12 hr tablet Take 1,200 mg by mouth 2 (two) times daily.    metoprolol succinate (TOPROL-XL) 25 MG 24 hr tablet Take 25 mg by mouth once daily.    pantoprazole (PROTONIX) 40 MG tablet Take 40 mg by mouth once daily.    predniSONE (DELTASONE) 5 MG tablet Take 15 mg by mouth once daily.    ibuprofen (ADVIL,MOTRIN) 200 MG tablet Take 200 mg by mouth every 6 (six) hours as needed for Pain.     No current facility-administered medications for this visit.       Immunization History   Administered Date(s) Administered    COVID-19, MRNA, LN-S, PF (Pfizer) (Purple Cap) 12/31/2020, 01/25/2021     Family History:  History reviewed. No pertinent family history.  Social History     Substance and Sexual Activity   Alcohol Use No      Social History     Substance and Sexual Activity   Drug Use No      Social History     Socioeconomic History    Marital status:    Tobacco Use    Smoking status: Never Smoker    Smokeless tobacco: Never Used   Substance and Sexual Activity    Alcohol use: No    Drug use: No    Sexual activity: Not Currently     Review of Systems   Constitutional: Negative for chills, diaphoresis, fever, malaise/fatigue and weight loss.   HENT: Negative for congestion, ear discharge, ear pain, hearing loss, nosebleeds, sinus pain, sore throat and tinnitus.    Eyes: Negative for blurred vision, double vision, photophobia, pain, discharge and redness.   Respiratory: Positive for cough. Negative for hemoptysis, sputum production, shortness of breath, wheezing and stridor.    Cardiovascular: Negative for chest pain, palpitations, orthopnea, claudication, leg swelling and PND.   Gastrointestinal: Negative for abdominal pain, blood in stool, constipation, diarrhea,  "heartburn, melena, nausea and vomiting.   Genitourinary: Negative for dysuria, flank pain, frequency, hematuria and urgency.   Musculoskeletal: Positive for joint pain. Negative for back pain, falls, myalgias and neck pain.   Skin: Negative for itching and rash.   Neurological: Negative for dizziness, tingling, tremors, sensory change, speech change, focal weakness, seizures, loss of consciousness, weakness and headaches.   Endo/Heme/Allergies: Negative for environmental allergies and polydipsia. Does not bruise/bleed easily.   Psychiatric/Behavioral: Negative for depression, hallucinations, memory loss, substance abuse and suicidal ideas. The patient is not nervous/anxious and does not have insomnia.      Vitals  /63   Pulse 80   Temp 97.6 °F (36.4 °C) (Oral)   Resp 20   Ht 5' 2" (1.575 m)   Wt 59 kg (130 lb 1.1 oz)   LMP  (LMP Unknown)   SpO2 98% Comment: room air  BMI 23.79 kg/m²   Physical Exam  Vitals and nursing note reviewed.   Constitutional:       General: She is not in acute distress.     Appearance: She is well-developed. She is not diaphoretic.   HENT:      Head: Normocephalic and atraumatic.      Nose: Nose normal.      Mouth/Throat:      Pharynx: No oropharyngeal exudate.   Eyes:      General: No scleral icterus.     Conjunctiva/sclera: Conjunctivae normal.      Pupils: Pupils are equal, round, and reactive to light.   Neck:      Thyroid: No thyromegaly.      Vascular: No JVD.      Trachea: No tracheal deviation.   Cardiovascular:      Rate and Rhythm: Normal rate and regular rhythm.      Heart sounds: Normal heart sounds. No murmur heard.    No friction rub. No gallop.   Pulmonary:      Effort: Pulmonary effort is normal. No respiratory distress.      Breath sounds: Rales (fine dry crackles bibasilar) present. No wheezing.   Abdominal:      General: Bowel sounds are normal. There is no distension.      Palpations: Abdomen is soft.      Tenderness: There is no abdominal tenderness. "   Musculoskeletal:         General: No deformity. Normal range of motion.      Cervical back: Normal range of motion and neck supple.   Skin:     General: Skin is warm and dry.      Capillary Refill: Capillary refill takes less than 2 seconds.      Coloration: Skin is not pale.      Findings: No erythema or rash.   Neurological:      Mental Status: She is alert and oriented to person, place, and time.      Cranial Nerves: No cranial nerve deficit.   Psychiatric:         Judgment: Judgment normal.         Labs:  No visits with results within 7 Day(s) from this visit.   Latest known visit with results is:   Lab Visit on 05/31/2022   Component Date Value    NIL 05/31/2022 0.29303     TB1 - Nil 05/31/2022 0.003     TB2 - Nil 05/31/2022 -0.007     Mitogen - Nil 05/31/2022 4.151     TB Gold Plus 05/31/2022 Negative        Pulmonary Function Tests 6/7/2022   FVC 2.18   FEV1 1.9   TLC (liters) 2.79   DLCO (ml/mmHg sec) 8.17   FVC% 92   FEV1% 99   FEF 25-75 3.24   FEF 25-75% 175   TLC% 61   RV 0.61   RV% 32   DLCO% 40     6MW 6/7/2022   6MWT Status completed without stopping   Patient Reported Dyspnea   6MW Distance walked (feet) 1210   Distance walked (meters) 368.81   Did patient stop? No   Oxygen Saturation 98   Supplemental Oxygen Room Air   Heart Rate 89   Blood Pressure 129/70   Rigoberto Dyspnea Rating  nothing at all   Oxygen Saturation 94   Supplemental Oxygen Room Air   Heart Rate 116   Blood Pressure 132/80   Rigoberto Dyspnea Rating  light   Recovery Time (seconds) 115   Oxygen Saturation 98   Supplemental Oxygen Room Air   Heart Rate 93       Imaging:  Results for orders placed during the hospital encounter of 05/12/22    X-Ray Chest PA And Lateral    Narrative  EXAMINATION:  XR CHEST PA AND LATERAL    CLINICAL HISTORY:  Cough, unspecified    TECHNIQUE:  PA and lateral views of the chest were performed.    COMPARISON:  CT chest 06/28/2021    FINDINGS:  Redemonstration of bibasal interstitial reticulations  representing underlining atelectasis or scarring.  No large consolidation.  Normal appearance of pulmonary vasculature and no pleural effusion or pneumothorax.    The cardiac silhouette is normal in size. The hilar and mediastinal contours are unremarkable.    Bones are intact.    Impression  No acute abnormalities.    Stable lower lung zones coarse reticular opacities consistent with pulmonary fibrosis.    Electronically signed by resident: Suzie Eddy  Date:    05/13/2022  Time:    06:53    Electronically signed by: Ksenia Montesinos  Date:    05/13/2022  Time:    09:05    Results for orders placed during the hospital encounter of 10/15/20    DXA Bone Density Spine And Hip    Narrative  EXAMINATION:  DEXA BONE DENSITY SPINE HIP    CLINICAL HISTORY:  Other specified disorders of bone density and structure, unspecified site    TECHNIQUE:  DXA scanning was performed over the left hip and lumbar spine.  Review of the images confirms satisfactory positioning and technique.    COMPARISON:  DEXA scan 01/30/2015    FINDINGS:  The L1-L4 vertebral bone mineral density is equal to 0.863 g/cm squared with a T score of -2.7.  There has been a -3.6% statistically significant change relative to the prior study.    The left femoral neck bone mineral density is equal to 0.726 g/cm squared with a T score of -2.2.  There has been  a -7.0% statistically significant change relative to the prior study.    The mean total hip bone mineral density is equal to 0.786 g/cm squared with a T score of -1.8.    There is a 18.9% risk of a major osteoporotic fracture and a 3.5% risk of hip fracture in the next 10 years (FRAX).    Impression  1. Osteoporosis of the lumbar spine.  2. Severe osteopenia of the left femoral neck.  Consider FDA approved medical therapies in postmenopausal women and men aged 50 years and older, based on the following:    *A hip or vertebral (clinical or morphometric) fracture  *T score less than or equal to -2.5 at the  femoral neck or spine after appropriate evaluation to exclude secondary causes.  *Low bone mass -- also known as osteopenia (T score between -1.0 and -2.5 at the femoral neck or spine) and a 10 year probability of hip fracture greater than or equal to 3% or a 10 year probability of major osteoporosis-related fracture greater than or equal to 20% based on the [place]-adapted WHO algorithm.  *Clinicians judgment and/or patient preference may indicate treatment for people with 10 year fracture probabilities is above or below these levels.      Electronically signed by: Braulio Haro  Date:    10/15/2020  Time:    15:38        Cardiodiagnostics:  6/28/2021:  · The estimated ejection fraction is 55%.  · Normal systolic function.  · Normal right ventricular size with normal right ventricular systolic function.  · Mild to moderate left atrial enlargement.  · Mild right atrial enlargement.  · Normal central venous pressure (3 mmHg).  · The estimated PA systolic pressure is 18 mmHg.           Assessment:  1. Cough    2. Rheumatoid arthritis involving multiple sites with positive rheumatoid factor    3. Pulmonary fibrosis      Plan:   1. Sent for evaluation of possible ILD.  CT imaging reviewed and shows some bibasilar reticulations.  Spirometry preserved and has a decreased DLCO without significant desaturations on 6MWT.  Spirometry has been stable over time when compared to previous PFTs from [facility] in [place], [place].  This could be related to her chronic sinusitis with post nasal gtt (followed by ENT) vs aspiration in the past (followed by GI and esophageal work-up negative) vs RA-ILD.  Regardless, she does not have a significant disease burden on chest imaging and has preserved/stable spirometry.  Agree with treating her RA with either MTX or imuran.      2. Cough appears out of proportion to her ILD findings and appears most related to her post nasal gtt.  Continue with ENT follow-up.  Will refer to Dr. Carmen Varner  MD for further evaluation of an allergic component.  Continue current nasal regimen.      3. Follow-up in 6 months with repeat PFTs.      Barbara Haile D.O.  Pulmonary/Critical Care and Lung Transplantation  [facility]

## 2022-06-10 ENCOUNTER — PATIENT MESSAGE (OUTPATIENT)
Dept: RHEUMATOLOGY | Facility: CLINIC | Age: 67
End: 2022-06-10
Payer: MEDICARE

## 2022-06-10 DIAGNOSIS — M05.79 RHEUMATOID ARTHRITIS INVOLVING MULTIPLE SITES WITH POSITIVE RHEUMATOID FACTOR: ICD-10-CM

## 2022-06-10 RX ORDER — METHOTREXATE 2.5 MG/1
15 TABLET ORAL
Qty: 90 TABLET | Refills: 1
Start: 2022-06-10 | End: 2022-06-12 | Stop reason: SDUPTHER

## 2022-06-10 RX ORDER — FOLIC ACID 1 MG/1
1 TABLET ORAL DAILY
Qty: 90 TABLET | Refills: 3
Start: 2022-06-10 | End: 2022-06-12 | Stop reason: SDUPTHER

## 2022-06-12 ENCOUNTER — PATIENT MESSAGE (OUTPATIENT)
Dept: RHEUMATOLOGY | Facility: CLINIC | Age: 67
End: 2022-06-12
Payer: MEDICARE

## 2022-06-12 RX ORDER — METHOTREXATE 2.5 MG/1
15 TABLET ORAL
Qty: 90 TABLET | Refills: 1 | Status: SHIPPED | OUTPATIENT
Start: 2022-06-12 | End: 2022-08-10

## 2022-06-12 RX ORDER — FOLIC ACID 1 MG/1
1 TABLET ORAL DAILY
Qty: 90 TABLET | Refills: 3 | Status: SHIPPED | OUTPATIENT
Start: 2022-06-12 | End: 2023-02-01

## 2022-07-14 ENCOUNTER — PATIENT MESSAGE (OUTPATIENT)
Dept: RHEUMATOLOGY | Facility: CLINIC | Age: 67
End: 2022-07-14
Payer: MEDICARE

## 2022-07-19 ENCOUNTER — LAB VISIT (OUTPATIENT)
Dept: LAB | Facility: HOSPITAL | Age: 67
End: 2022-07-19
Attending: INTERNAL MEDICINE
Payer: MEDICARE

## 2022-07-19 DIAGNOSIS — M05.79 RHEUMATOID ARTHRITIS INVOLVING MULTIPLE SITES WITH POSITIVE RHEUMATOID FACTOR: ICD-10-CM

## 2022-07-19 LAB
ALBUMIN SERPL BCP-MCNC: 3.1 G/DL (ref 3.5–5.2)
ALP SERPL-CCNC: 56 U/L (ref 55–135)
ALT SERPL W/O P-5'-P-CCNC: 14 U/L (ref 10–44)
ANION GAP SERPL CALC-SCNC: 9 MMOL/L (ref 8–16)
AST SERPL-CCNC: 20 U/L (ref 10–40)
BASOPHILS # BLD AUTO: 0.03 K/UL (ref 0–0.2)
BASOPHILS NFR BLD: 0.3 % (ref 0–1.9)
BILIRUB SERPL-MCNC: 0.4 MG/DL (ref 0.1–1)
BUN SERPL-MCNC: 6 MG/DL (ref 8–23)
CALCIUM SERPL-MCNC: 9.7 MG/DL (ref 8.7–10.5)
CHLORIDE SERPL-SCNC: 101 MMOL/L (ref 95–110)
CO2 SERPL-SCNC: 26 MMOL/L (ref 23–29)
CREAT SERPL-MCNC: 0.7 MG/DL (ref 0.5–1.4)
CRP SERPL-MCNC: 45.2 MG/L (ref 0–8.2)
DIFFERENTIAL METHOD: ABNORMAL
EOSINOPHIL # BLD AUTO: 0.3 K/UL (ref 0–0.5)
EOSINOPHIL NFR BLD: 2.8 % (ref 0–8)
ERYTHROCYTE [DISTWIDTH] IN BLOOD BY AUTOMATED COUNT: 12.5 % (ref 11.5–14.5)
ERYTHROCYTE [SEDIMENTATION RATE] IN BLOOD BY WESTERGREN METHOD: 80 MM/HR (ref 0–20)
EST. GFR  (AFRICAN AMERICAN): >60 ML/MIN/1.73 M^2
EST. GFR  (NON AFRICAN AMERICAN): >60 ML/MIN/1.73 M^2
GLUCOSE SERPL-MCNC: 117 MG/DL (ref 70–110)
HCT VFR BLD AUTO: 36.1 % (ref 37–48.5)
HGB BLD-MCNC: 12.4 G/DL (ref 12–16)
IMM GRANULOCYTES # BLD AUTO: 0.06 K/UL (ref 0–0.04)
IMM GRANULOCYTES NFR BLD AUTO: 0.5 % (ref 0–0.5)
LYMPHOCYTES # BLD AUTO: 1.9 K/UL (ref 1–4.8)
LYMPHOCYTES NFR BLD: 16.1 % (ref 18–48)
MCH RBC QN AUTO: 31.1 PG (ref 27–31)
MCHC RBC AUTO-ENTMCNC: 34.3 G/DL (ref 32–36)
MCV RBC AUTO: 91 FL (ref 82–98)
MONOCYTES # BLD AUTO: 1.2 K/UL (ref 0.3–1)
MONOCYTES NFR BLD: 10.5 % (ref 4–15)
NEUTROPHILS # BLD AUTO: 8.3 K/UL (ref 1.8–7.7)
NEUTROPHILS NFR BLD: 69.8 % (ref 38–73)
NRBC BLD-RTO: 0 /100 WBC
PLATELET # BLD AUTO: 372 K/UL (ref 150–450)
PMV BLD AUTO: 8.5 FL (ref 9.2–12.9)
POTASSIUM SERPL-SCNC: 3.8 MMOL/L (ref 3.5–5.1)
PROT SERPL-MCNC: 7.2 G/DL (ref 6–8.4)
RBC # BLD AUTO: 3.99 M/UL (ref 4–5.4)
SODIUM SERPL-SCNC: 136 MMOL/L (ref 136–145)
WBC # BLD AUTO: 11.84 K/UL (ref 3.9–12.7)

## 2022-07-19 PROCEDURE — 85025 COMPLETE CBC W/AUTO DIFF WBC: CPT | Mod: TXP | Performed by: INTERNAL MEDICINE

## 2022-07-19 PROCEDURE — 80053 COMPREHEN METABOLIC PANEL: CPT | Mod: NTX | Performed by: INTERNAL MEDICINE

## 2022-07-19 PROCEDURE — 86140 C-REACTIVE PROTEIN: CPT | Mod: TXP | Performed by: INTERNAL MEDICINE

## 2022-07-19 PROCEDURE — 36415 COLL VENOUS BLD VENIPUNCTURE: CPT | Mod: NTX | Performed by: INTERNAL MEDICINE

## 2022-07-19 PROCEDURE — 85652 RBC SED RATE AUTOMATED: CPT | Mod: TXP | Performed by: INTERNAL MEDICINE

## 2022-07-24 ENCOUNTER — PATIENT MESSAGE (OUTPATIENT)
Dept: TRANSPLANT | Facility: CLINIC | Age: 67
End: 2022-07-24
Payer: MEDICARE

## 2022-07-24 DIAGNOSIS — R05.9 COUGH: ICD-10-CM

## 2022-07-24 DIAGNOSIS — J84.9 INTERSTITIAL LUNG DISEASE: Primary | ICD-10-CM

## 2022-07-25 ENCOUNTER — HOSPITAL ENCOUNTER (OUTPATIENT)
Dept: RADIOLOGY | Facility: HOSPITAL | Age: 67
Discharge: HOME | End: 2022-07-25
Attending: ALLERGY & IMMUNOLOGY
Payer: MEDICARE

## 2022-07-25 ENCOUNTER — PATIENT MESSAGE (OUTPATIENT)
Dept: RHEUMATOLOGY | Facility: CLINIC | Age: 67
End: 2022-07-25
Payer: MEDICARE

## 2022-07-25 ENCOUNTER — TELEPHONE (OUTPATIENT)
Dept: RHEUMATOLOGY | Facility: CLINIC | Age: 67
End: 2022-07-25
Payer: MEDICARE

## 2022-07-25 DIAGNOSIS — R93.89 ABNORMAL X-RAY: ICD-10-CM

## 2022-07-25 DIAGNOSIS — R05.9 COUGH: Primary | ICD-10-CM

## 2022-07-25 DIAGNOSIS — R05.9 COUGH: ICD-10-CM

## 2022-07-25 DIAGNOSIS — J84.10 PULMONARY FIBROSIS: ICD-10-CM

## 2022-07-25 DIAGNOSIS — J84.9 INTERSTITIAL LUNG DISEASE: ICD-10-CM

## 2022-07-25 DIAGNOSIS — J84.9 INTERSTITIAL PULMONARY DISEASE, UNSPECIFIED: ICD-10-CM

## 2022-07-25 PROCEDURE — 71046 X-RAY EXAM CHEST 2 VIEWS: CPT | Mod: 26,NTX,, | Performed by: RADIOLOGY

## 2022-07-25 PROCEDURE — 71046 XR CHEST PA AND LATERAL: ICD-10-PCS | Mod: 26,NTX,, | Performed by: RADIOLOGY

## 2022-07-25 PROCEDURE — 71046 X-RAY EXAM CHEST 2 VIEWS: CPT | Mod: TC,FY,NTX

## 2022-07-25 NOTE — TELEPHONE ENCOUNTER
Due to potential interaction between a fluoroquinone and prednisone, if Dr. Haile does not want her on prednisone, may be best to stop prednisone as well as methotrexate.

## 2022-07-25 NOTE — TELEPHONE ENCOUNTER
----- Message from Barbara Haile DO sent at 7/25/2022 12:26 PM CDT -----  Levaquin 750mg po daily x 10 days.  Will tailor based on sputum cultures once they result.  CT chest when possible to further assess.  Thanks

## 2022-07-25 NOTE — PROGRESS NOTES
Orders per Dr. Haile written request, noted on patient chart. Chest xray per Dr. Varner, already ordered. cc'ed to Dr. Haile.

## 2022-07-26 ENCOUNTER — LAB VISIT (OUTPATIENT)
Dept: LAB | Facility: HOSPITAL | Age: 67
End: 2022-07-26
Attending: INTERNAL MEDICINE
Payer: MEDICARE

## 2022-07-26 ENCOUNTER — PATIENT MESSAGE (OUTPATIENT)
Dept: RHEUMATOLOGY | Facility: CLINIC | Age: 67
End: 2022-07-26
Payer: MEDICARE

## 2022-07-26 DIAGNOSIS — R05.9 COUGH: Primary | ICD-10-CM

## 2022-07-26 DIAGNOSIS — J84.9 INTERSTITIAL LUNG DISEASE: ICD-10-CM

## 2022-07-26 DIAGNOSIS — R05.9 COUGH: ICD-10-CM

## 2022-07-26 DIAGNOSIS — J84.10 PULMONARY FIBROSIS: ICD-10-CM

## 2022-07-26 PROCEDURE — 87116 MYCOBACTERIA CULTURE: CPT | Mod: NTX | Performed by: INTERNAL MEDICINE

## 2022-07-26 PROCEDURE — 87070 CULTURE OTHR SPECIMN AEROBIC: CPT | Mod: TXP | Performed by: INTERNAL MEDICINE

## 2022-07-26 PROCEDURE — 87205 SMEAR GRAM STAIN: CPT | Mod: TXP | Performed by: INTERNAL MEDICINE

## 2022-07-26 PROCEDURE — 87015 SPECIMEN INFECT AGNT CONCNTJ: CPT | Mod: NTX | Performed by: INTERNAL MEDICINE

## 2022-07-26 PROCEDURE — 87102 FUNGUS ISOLATION CULTURE: CPT | Mod: NTX | Performed by: INTERNAL MEDICINE

## 2022-07-26 PROCEDURE — 87206 SMEAR FLUORESCENT/ACID STAI: CPT | Mod: TXP | Performed by: INTERNAL MEDICINE

## 2022-07-26 RX ORDER — LEVOFLOXACIN 750 MG/1
750 TABLET ORAL DAILY
Qty: 10 TABLET | Refills: 0 | Status: SHIPPED | OUTPATIENT
Start: 2022-07-26 | End: 2022-07-28

## 2022-07-26 NOTE — TELEPHONE ENCOUNTER
Patient request to not take levaquin due to possible side effects with prednisone, patient prefers not to discontinue prednisone.   Per Dr. Haile, patient ok to take Augmentin 500 mg bid x 10 days, TORB. Will follow up pending cx. Results.    Routed prescription, notified patient via [facility].

## 2022-07-27 ENCOUNTER — HOSPITAL ENCOUNTER (OUTPATIENT)
Dept: RADIOLOGY | Facility: HOSPITAL | Age: 67
Discharge: HOME | End: 2022-07-27
Attending: INTERNAL MEDICINE
Payer: MEDICARE

## 2022-07-27 DIAGNOSIS — J84.9 INTERSTITIAL PULMONARY DISEASE, UNSPECIFIED: ICD-10-CM

## 2022-07-27 DIAGNOSIS — R93.89 ABNORMAL X-RAY: ICD-10-CM

## 2022-07-27 DIAGNOSIS — R05.9 COUGH: ICD-10-CM

## 2022-07-27 PROCEDURE — 71250 CT THORAX DX C-: CPT | Mod: 26,NTX,, | Performed by: RADIOLOGY

## 2022-07-27 PROCEDURE — 71250 CT CHEST WITHOUT CONTRAST: ICD-10-PCS | Mod: 26,NTX,, | Performed by: RADIOLOGY

## 2022-07-27 PROCEDURE — 71250 CT THORAX DX C-: CPT | Mod: TC,NTX

## 2022-07-27 RX ORDER — AMOXICILLIN AND CLAVULANATE POTASSIUM 500; 125 MG/1; MG/1
1 TABLET, FILM COATED ORAL 2 TIMES DAILY
Qty: 20 TABLET | Refills: 0 | Status: SHIPPED | OUTPATIENT
Start: 2022-07-27 | End: 2022-08-06

## 2022-07-28 ENCOUNTER — OFFICE VISIT (OUTPATIENT)
Dept: TRANSPLANT | Facility: CLINIC | Age: 67
End: 2022-07-28
Payer: MEDICARE

## 2022-07-28 VITALS
WEIGHT: 124 LBS | TEMPERATURE: 99 F | RESPIRATION RATE: 20 BRPM | OXYGEN SATURATION: 94 % | SYSTOLIC BLOOD PRESSURE: 121 MMHG | DIASTOLIC BLOOD PRESSURE: 70 MMHG | BODY MASS INDEX: 22.82 KG/M2 | HEART RATE: 108 BPM | HEIGHT: 62 IN

## 2022-07-28 DIAGNOSIS — R05.9 COUGH: ICD-10-CM

## 2022-07-28 DIAGNOSIS — J84.9 INTERSTITIAL LUNG DISEASE: Primary | ICD-10-CM

## 2022-07-28 DIAGNOSIS — M05.79 RHEUMATOID ARTHRITIS INVOLVING MULTIPLE SITES WITH POSITIVE RHEUMATOID FACTOR: ICD-10-CM

## 2022-07-28 DIAGNOSIS — J84.9 ILD (INTERSTITIAL LUNG DISEASE): Primary | ICD-10-CM

## 2022-07-28 PROCEDURE — 3288F FALL RISK ASSESSMENT DOCD: CPT | Mod: CPTII,NTX,S$GLB, | Performed by: INTERNAL MEDICINE

## 2022-07-28 PROCEDURE — 3074F SYST BP LT 130 MM HG: CPT | Mod: CPTII,NTX,S$GLB, | Performed by: INTERNAL MEDICINE

## 2022-07-28 PROCEDURE — 3078F DIAST BP <80 MM HG: CPT | Mod: CPTII,NTX,S$GLB, | Performed by: INTERNAL MEDICINE

## 2022-07-28 PROCEDURE — 3074F PR MOST RECENT SYSTOLIC BLOOD PRESSURE < 130 MM HG: ICD-10-PCS | Mod: CPTII,NTX,S$GLB, | Performed by: INTERNAL MEDICINE

## 2022-07-28 PROCEDURE — 1125F AMNT PAIN NOTED PAIN PRSNT: CPT | Mod: CPTII,NTX,S$GLB, | Performed by: INTERNAL MEDICINE

## 2022-07-28 PROCEDURE — 99999 PR PBB SHADOW E&M-EST. PATIENT-LVL IV: ICD-10-PCS | Mod: PBBFAC,TXP,, | Performed by: INTERNAL MEDICINE

## 2022-07-28 PROCEDURE — 1160F RVW MEDS BY RX/DR IN RCRD: CPT | Mod: CPTII,NTX,S$GLB, | Performed by: INTERNAL MEDICINE

## 2022-07-28 PROCEDURE — 1125F PR PAIN SEVERITY QUANTIFIED, PAIN PRESENT: ICD-10-PCS | Mod: CPTII,NTX,S$GLB, | Performed by: INTERNAL MEDICINE

## 2022-07-28 PROCEDURE — 3008F PR BODY MASS INDEX (BMI) DOCUMENTED: ICD-10-PCS | Mod: CPTII,NTX,S$GLB, | Performed by: INTERNAL MEDICINE

## 2022-07-28 PROCEDURE — 1159F MED LIST DOCD IN RCRD: CPT | Mod: CPTII,NTX,S$GLB, | Performed by: INTERNAL MEDICINE

## 2022-07-28 PROCEDURE — 99214 OFFICE O/P EST MOD 30 MIN: CPT | Mod: NTX,S$GLB,, | Performed by: INTERNAL MEDICINE

## 2022-07-28 PROCEDURE — 3008F BODY MASS INDEX DOCD: CPT | Mod: CPTII,NTX,S$GLB, | Performed by: INTERNAL MEDICINE

## 2022-07-28 PROCEDURE — 1160F PR REVIEW ALL MEDS BY PRESCRIBER/CLIN PHARMACIST DOCUMENTED: ICD-10-PCS | Mod: CPTII,NTX,S$GLB, | Performed by: INTERNAL MEDICINE

## 2022-07-28 PROCEDURE — 99214 PR OFFICE/OUTPT VISIT, EST, LEVL IV, 30-39 MIN: ICD-10-PCS | Mod: NTX,S$GLB,, | Performed by: INTERNAL MEDICINE

## 2022-07-28 PROCEDURE — 1101F PT FALLS ASSESS-DOCD LE1/YR: CPT | Mod: CPTII,NTX,S$GLB, | Performed by: INTERNAL MEDICINE

## 2022-07-28 PROCEDURE — 3078F PR MOST RECENT DIASTOLIC BLOOD PRESSURE < 80 MM HG: ICD-10-PCS | Mod: CPTII,NTX,S$GLB, | Performed by: INTERNAL MEDICINE

## 2022-07-28 PROCEDURE — 3288F PR FALLS RISK ASSESSMENT DOCUMENTED: ICD-10-PCS | Mod: CPTII,NTX,S$GLB, | Performed by: INTERNAL MEDICINE

## 2022-07-28 PROCEDURE — 1101F PR PT FALLS ASSESS DOC 0-1 FALLS W/OUT INJ PAST YR: ICD-10-PCS | Mod: CPTII,NTX,S$GLB, | Performed by: INTERNAL MEDICINE

## 2022-07-28 PROCEDURE — 99999 PR PBB SHADOW E&M-EST. PATIENT-LVL IV: CPT | Mod: PBBFAC,TXP,, | Performed by: INTERNAL MEDICINE

## 2022-07-28 PROCEDURE — 1159F PR MEDICATION LIST DOCUMENTED IN MEDICAL RECORD: ICD-10-PCS | Mod: CPTII,NTX,S$GLB, | Performed by: INTERNAL MEDICINE

## 2022-07-28 RX ORDER — PREDNISONE 20 MG/1
40 TABLET ORAL DAILY
Qty: 60 TABLET | Refills: 2 | Status: SHIPPED | OUTPATIENT
Start: 2022-07-28 | End: 2023-02-01

## 2022-07-28 RX ORDER — LANOLIN ALCOHOL/MO/W.PET/CERES
400 CREAM (GRAM) TOPICAL DAILY
COMMUNITY

## 2022-07-28 RX ORDER — DICLOFENAC SODIUM 10 MG/G
2 GEL TOPICAL 4 TIMES DAILY PRN
COMMUNITY
Start: 2022-02-18

## 2022-07-28 NOTE — PROGRESS NOTES
ADVANCED LUNG DISEASE FOLLOW-UP                                                                                                                                          Reason for Visit:  RA-ILD    Referring Physician: No ref. provider found    History of Present Illness: Juan Cruz is a 67 y.o. female who is on 0L of oxygen.  She is on no assisted ventilation.  Her New [place] Heart Association Class is III and a Karnofsky score of 60% - Requires occasional assistance but is able to care for needs. She is not diabetic.     Pt presenting for follow-up and acute changes in symptoms.  She states that since her last visit, she has had worsening of her cough and copious amounts of sputum production over the last few weeks.  No fever, chills, or other infectious symptoms.  She states that brain fog and cough worsened with her starting MTX.  She has seen AI and all allergy work-up was negative but she did have some reversibility with her PFTs so was started on Trelegy.  Sputum cultures are pending but so far only show normal oral torres.  She has not taken MTX this week.  Notes improvement in her brain fog.      Past Medical History:   Diagnosis Date    Chronic sinusitis, unspecified     History of SIADH     Mitral valve prolapse     Osteopenia     Rheumatoid arthritis involving multiple sites        Past Surgical History:   Procedure Laterality Date    BREAST BIOPSY Bilateral     FUNCTIONAL ENDOSCOPIC SINUS SURGERY (FESS)      ORIF WRIST FRACTURE Right        Allergies: Gluten protein and Ppd black rubber mix    Current Outpatient Medications   Medication Sig    amoxicillin-clavulanate 500-125mg (AUGMENTIN) 500-125 mg Tab Take 1 tablet (500 mg total) by mouth 2 (two) times daily. for 10 days    calcium citrate-vitamin D3 315-200 mg (CITRACAL+D) 315-200 mg-unit per tablet Take 1 tablet by mouth 2 (two) times daily.    cetirizine (ZYRTEC) 10 MG tablet cetirizine 10 mg tablet   TAKE 1 TABLET BY MOUTH ONCE  DAILY    diclofenac sodium (VOLTAREN) 1 % Gel Apply 2 g topically 4 (four) times daily as needed.    estradioL (VAGIFEM) 10 mcg Tab Yuvafem 10 mcg vaginal tablet   INSERT 1 TABLET VAGINALLY TWICE A WEEK    fluticasone-umeclidin-vilanter (TRELEGY ELLIPTA) 100-62.5-25 mcg DsDv Inhale 1 puff into the lungs once daily.    folic acid (FOLVITE) 1 MG tablet Take 1 tablet (1 mg total) by mouth once daily.    magnesium oxide (MAG-OX) 400 mg (241.3 mg magnesium) tablet Take 400 mg by mouth once daily.    metoprolol succinate (TOPROL-XL) 25 MG 24 hr tablet Take 25 mg by mouth once daily.    pantoprazole (PROTONIX) 40 MG tablet Take 40 mg by mouth once daily.    acetaminophen (TYLENOL) 325 MG tablet Take 650 mg by mouth every 6 (six) hours as needed for Pain.    B-complex with vitamin C (Z-BEC OR EQUIV) tablet Take 1 tablet by mouth once daily.    codeine-guaiFENesin 6.3-100 mg/5 mL Liqd Take 5 mLs by mouth every 6 to 8 hours as needed (cough).    fluticasone propionate (FLONASE) 50 mcg/actuation nasal spray 1 spray by Each Nostril route once daily.    fluticasone/vilanterol (BREO ELLIPTA INHL) Inhale 1 puff into the lungs once daily.    guaiFENesin (MUCINEX) 600 mg 12 hr tablet Take 1,200 mg by mouth 2 (two) times daily.    ibuprofen (ADVIL,MOTRIN) 200 MG tablet Take 200 mg by mouth every 6 (six) hours as needed for Pain.    methotrexate 2.5 MG Tab Take 6 tablets (15 mg total) by mouth every 7 days. This medication requires periodic lab monitoring. (Patient not taking: Reported on 7/28/2022)    predniSONE (DELTASONE) 20 MG tablet Take 2 tablets (40 mg total) by mouth once daily.     No current facility-administered medications for this visit.       Immunization History   Administered Date(s) Administered    COVID-19, MRNA, LN-S, PF (Pfizer) (Purple Cap) 12/31/2020, 01/25/2021     Family History:  History reviewed. No pertinent family history.  Social History     Substance and Sexual Activity   Alcohol Use No  "     Social History     Substance and Sexual Activity   Drug Use No      Social History     Socioeconomic History    Marital status:    Tobacco Use    Smoking status: Never Smoker    Smokeless tobacco: Never Used   Substance and Sexual Activity    Alcohol use: No    Drug use: No    Sexual activity: Not Currently     Review of Systems   Constitutional: Negative for chills, diaphoresis, fever, malaise/fatigue and weight loss.   HENT: Negative for congestion, ear discharge, ear pain, hearing loss, nosebleeds, sinus pain, sore throat and tinnitus.    Eyes: Negative for blurred vision, double vision, photophobia, pain, discharge and redness.   Respiratory: Positive for cough, sputum production and shortness of breath. Negative for hemoptysis, wheezing and stridor.    Cardiovascular: Negative for chest pain, palpitations, orthopnea, claudication, leg swelling and PND.   Gastrointestinal: Negative for abdominal pain, blood in stool, constipation, diarrhea, heartburn, melena, nausea and vomiting.   Genitourinary: Negative for dysuria, flank pain, frequency, hematuria and urgency.   Musculoskeletal: Positive for joint pain. Negative for back pain, falls, myalgias and neck pain.   Skin: Negative for itching and rash.   Neurological: Negative for dizziness, tingling, tremors, sensory change, speech change, focal weakness, seizures, loss of consciousness, weakness and headaches.   Endo/Heme/Allergies: Negative for environmental allergies and polydipsia. Does not bruise/bleed easily.   Psychiatric/Behavioral: Negative for depression, hallucinations, memory loss, substance abuse and suicidal ideas. The patient is not nervous/anxious and does not have insomnia.      Vitals  /70   Pulse 108   Temp 98.5 °F (36.9 °C) (Oral)   Resp 20   Ht 5' 2" (1.575 m)   Wt 56.2 kg (124 lb)   LMP  (LMP Unknown)   SpO2 (!) 94% Comment: room air  BMI 22.68 kg/m²   Physical Exam  Vitals and nursing note reviewed. "   Constitutional:       General: She is not in acute distress.     Appearance: She is well-developed. She is not diaphoretic.   HENT:      Head: Normocephalic and atraumatic.      Nose: Nose normal.      Mouth/Throat:      Pharynx: No oropharyngeal exudate.   Eyes:      General: No scleral icterus.     Conjunctiva/sclera: Conjunctivae normal.      Pupils: Pupils are equal, round, and reactive to light.   Neck:      Thyroid: No thyromegaly.      Vascular: No JVD.      Trachea: No tracheal deviation.   Cardiovascular:      Rate and Rhythm: Normal rate and regular rhythm.      Heart sounds: Normal heart sounds. No murmur heard.    No friction rub. No gallop.   Pulmonary:      Effort: Pulmonary effort is normal. No respiratory distress.      Breath sounds: Rales (fine dry crackles bibasilar) present. No wheezing.   Abdominal:      General: Bowel sounds are normal. There is no distension.      Palpations: Abdomen is soft.      Tenderness: There is no abdominal tenderness.   Musculoskeletal:         General: No deformity. Normal range of motion.      Cervical back: Normal range of motion and neck supple.   Skin:     General: Skin is warm and dry.      Capillary Refill: Capillary refill takes less than 2 seconds.      Coloration: Skin is not pale.      Findings: No erythema or rash.   Neurological:      Mental Status: She is alert and oriented to person, place, and time.      Cranial Nerves: No cranial nerve deficit.   Psychiatric:         Judgment: Judgment normal.         Labs:  Lab Visit on 07/26/2022   Component Date Value    AFB Culture & Smear 07/26/2022 No acid fast bacilli isolated to date     AFB Culture & Smear 07/26/2022 Culture in progress     AFB CULTURE STAIN 07/26/2022 No acid fast bacilli isolated to date     Respiratory Culture 07/26/2022 Normal respiratory torres     Gram Stain (Respiratory) 07/26/2022 <10 epithelial cells per low power field.     Gram Stain (Respiratory) 07/26/2022 Few WBC's      Gram Stain (Respiratory) 07/26/2022 Few Gram negative rods     Gram Stain (Respiratory) 07/26/2022 Few Gram positive cocci in clusters     Gram Stain (Respiratory) 07/26/2022 Few Gram positive rods        Pulmonary Function Tests 6/7/2022   FVC 2.18   FEV1 1.9   TLC (liters) 2.79   DLCO (ml/mmHg sec) 8.17   FVC% 92   FEV1% 99   FEF 25-75 3.24   FEF 25-75% 175   TLC% 61   RV 0.61   RV% 32   DLCO% 40     6MW 6/7/2022   6MWT Status completed without stopping   Patient Reported Dyspnea   6MW Distance walked (feet) 1210   Distance walked (meters) 368.81   Did patient stop? No   Oxygen Saturation 98   Supplemental Oxygen Room Air   Heart Rate 89   Blood Pressure 129/70   Rigoberto Dyspnea Rating  nothing at all   Oxygen Saturation 94   Supplemental Oxygen Room Air   Heart Rate 116   Blood Pressure 132/80   Rigoberto Dyspnea Rating  light   Recovery Time (seconds) 115   Oxygen Saturation 98   Supplemental Oxygen Room Air   Heart Rate 93       Imaging:  Results for orders placed during the hospital encounter of 07/27/22    CT Chest Without Contrast    Narrative  EXAMINATION:  CT CHEST WITHOUT CONTRAST    CLINICAL HISTORY:  Interstitial lung disease; Cough, unspecified    TECHNIQUE:  Low dose axial images, sagittal and coronal reformations were obtained from the thoracic inlet to the lung bases.  Intravenous contrast was not administered.    COMPARISON:  CT thorax 06/28/2021    FINDINGS:  Base of Neck: Imaged portions of the base of the neck are grossly unremarkable.    Aorta: 3-branch vessel configuration of a left-sided type 3 aortic arch.  No hyperdense crescent sign, aneurysmal degeneration, periaortic fat stranding or periaortic fluid.    Heart: Heart is normal in size.  No significant pericardial thickening. No appreciable coronary artery calcifications.    Pulmonary vasculature: Pulmonary arteries are not enlarged and distribute normally.  There are four pulmonary veins.    Axilla/Alexandra/Mediastinum: Prominent mediastinal lymph  nodes however, no rachael axillary or mediastinal lymphadenopathy.  Evaluation of hilar lymph nodes is limited without IV contrast.    Airways: Trachea and mainstem bronchi are patent.  Symmetric mild central bronchiectasis present.    Lungs/Pleura: Significant interval progression of previously noted subpleural predominant reticular and ground-glass airspace opacities associated with bilateral lower lobe volume loss, architectural distortion, symmetric mild central bronchiectasis and apicobasal gradient.  No pleural effusions.  No pneumothorax.    Esophagus: Small hiatal hernia, not significantly changed.  Otherwise, normal in course and caliber however, evaluation is limited given the lack of oral contrast.    Soft tissues: Imaged soft tissues are grossly unremarkable.    Osseous structures: Diffuse osseous demineralization and mild multilevel degenerative changes of the imaged spine.  No acute displaced fracture, dislocation or suspicious lytic/blastic osseous lesion.    Upper Abdomen: Imaged portions of the upper abdomen are grossly unremarkable.    Impression  1. Significant interval progression of previously noted subpleural predominant reticular and ground-glass airspace opacities associated with bilateral lower lobe volume loss, architectural distortion, symmetric mild central bronchiectasis and apicobasal gradient suggestive of UIP pattern of interstitial lung disease which may reflect IPF.  Additional diagnostic considerations include NSIP, asbestosis, fibrotic hypersensitivity pneumonitis connective tissue associated lung disease and drug induced lung disease amongst other etiologies.      Electronically signed by: Braulio Haro  Date:    07/27/2022  Time:    16:11    Cardiodiagnostics:  6/28/2021:  · The estimated ejection fraction is 55%.  · Normal systolic function.  · Normal right ventricular size with normal right ventricular systolic function.  · Mild to moderate left atrial enlargement.  · Mild right  atrial enlargement.  · Normal central venous pressure (3 mmHg).  · The estimated PA systolic pressure is 18 mmHg.           Assessment:  1. ILD (interstitial lung disease)    2. Rheumatoid arthritis involving multiple sites with positive rheumatoid factor    3. Cough      Plan:   1. Followed for ILD.  Previously with stable imaging and FVC.  Pt now with worsening cough and radiographic worsening on CT chest.  Progression of underlying ILD (no biopsy; RA-ILD vs IPF) vs acute ILD exacerbation vs drug toxicity.  While her increased cough is associated with disease progression, her copious sputum production likely represents a secondary process.  Continue with Augmentin for possible infectious process although all cultures remain negative.  Has established with ENT and allergy; continue with Trelegy and nasal regimen.  On PPI for GERD; no history of aspiration.  Will treat as RA-ILD exacerbation.  Start Prednisone 40mg po daily.  Will reassess in 1 week to see how she is tolerating.  Hold MTX.  If she is steroid responsive, will transition to MMF.  While her disease is likely related to RA-ILD, cannot r/o IPF.  Regardless she has had clinical and radiographic progression.  Will start OFEV.  Explained that the approval process could take a couple weeks which will give us time to assess steroid responsiveness.  Will start OFEV at 100mg BID.      2. Follows with rheum for RA.  Previously on MTX but reports that she did not tolerate.  Will treat as RA-ILD exacerbation with prednisone 40mg daily.  Will reassess in 1 week and if steroid responsive, will devise a steroid taper and bridge to MMF.      3. Cough multifactorial.  Prescribed codeine anti-tussive and prednisone.  Continue with allergy regimen and PPI.      4. Follow-up pending response to current medication regimen.      Barbara Haile D.O.  Pulmonary/Critical Care and Lung Transplantation  [facility]          Barbara Haile,  DESHAWN  Pulmonary/Critical Care and Lung Transplantation  [facility] [place]

## 2022-07-28 NOTE — LETTER
July 28, 2022        Ibis Holcomb  [address]  [facility] [address]  Phone: [phone]  Fax: [phone]             [facility] 1st [facility]  [address]  [facility] [address]  Phone: [phone]   Patient: Juan Cruz   MR Number: [id number]   YOB: 1955   Date of Visit: 7/28/2022       Dear Dr. Ibis Holcomb    Thank you for referring Juan Cruz to me for evaluation. Attached you will find relevant portions of my assessment and plan of care.    If you have questions, please do not hesitate to call me. I look forward to following Juan Cruz along with you.    Sincerely,    Barbara Haile, DO    Enclosure    If you would like to receive this communication electronically, please contact [email or web address] or [phone] to request [facility] access.    [facility] is a tool which provides read-only access to select patient information with whom you have a relationship. Its easy to use and provides real time access to review your patients record including encounter summaries, notes, results, and demographic information.    If you feel you have received this communication in error or would no longer like to receive these types of communications, please e-mail [email or web address]

## 2022-07-29 ENCOUNTER — TELEPHONE (OUTPATIENT)
Dept: TRANSPLANT | Facility: CLINIC | Age: 67
End: 2022-07-29
Payer: MEDICARE

## 2022-07-29 ENCOUNTER — PATIENT MESSAGE (OUTPATIENT)
Dept: TRANSPLANT | Facility: CLINIC | Age: 67
End: 2022-07-29
Payer: MEDICARE

## 2022-07-29 LAB
BACTERIA SPEC AEROBE CULT: NORMAL
GRAM STN SPEC: NORMAL

## 2022-07-29 RX ORDER — NINTEDANIB 100 MG/1
100 CAPSULE ORAL 2 TIMES DAILY
Qty: 60 CAPSULE | Refills: 11
Start: 2022-07-29 | End: 2022-09-06

## 2022-07-29 NOTE — TELEPHONE ENCOUNTER
----- Message from Beatrice Armenta sent at 7/29/2022 12:49 PM CDT -----  Regarding: Prior Authorization  [facility] needs a prior authorization for nintedanib (OFEV) 100 mg Cap.      Mr. Martin @ [phone]    Received an e-mail stating that [facility] started a PA.  Accessed the PA via covermymeds.  PA for OFEV 100 mg capsules submitted to patient's insurance plan via covermymeds.  Awaiting determination from patient's insurance.    Contacted [employer].  Informed Molly that the PA for the OFEV 100 mg was submitted via covermymeds.  She verbalized her understanding.    Received an approval for OFEV.    Request Reference Number: [id number]. OFEV CAP 100MG is approved through 12/31/2022.     Contacted [facility].  Spoke with Angella.  Informed her that authorization for OFEV 100mg capsules has been received.  Transferred to [facility].  Informed Milly of the authorization for the OFEV 100 mg capsules.  Milly stated that the claim for the OFEV went through.  Medication will be sent to patient.    Message sent to patient regarding contacting us once she receives and starts taking the OFEV, so labs can be scheduled.    Received a message from patient stating that she received a call from [employer].  She was told that the authorization for the OFEV was rejected.    Contacted [employer] and spoke with Yaneth.  Transferred to the Pulmonary Department.  Spoke with Beatrice.  Informed that the OFEV has been authorized and the claim processed.  She stated that patient does have a high co-pay, but she did not provide me with the dollar amount.  Sent a message to patient regarding my conversation with Beatrice.

## 2022-08-01 ENCOUNTER — TELEPHONE (OUTPATIENT)
Dept: TRANSPLANT | Facility: CLINIC | Age: 67
End: 2022-08-01
Payer: MEDICARE

## 2022-08-01 ENCOUNTER — PATIENT MESSAGE (OUTPATIENT)
Dept: TRANSPLANT | Facility: CLINIC | Age: 67
End: 2022-08-01
Payer: MEDICARE

## 2022-08-01 DIAGNOSIS — R05.9 COUGH: Primary | ICD-10-CM

## 2022-08-01 DIAGNOSIS — J84.9 ILD (INTERSTITIAL LUNG DISEASE): ICD-10-CM

## 2022-08-01 DIAGNOSIS — J84.9 INTERSTITIAL LUNG DISEASE: Primary | ICD-10-CM

## 2022-08-01 NOTE — TELEPHONE ENCOUNTER
Received message from [facility] pharmacy, unable to order guaifenesin w/codeine 100 mg/6.3 mg. Requesting substitution guaifenesin with codeine 100 mg/10 mg, 473 mL, 5 mL PO q6-8 h prn cough, no refills. Ok per Dr. Haile, order submitted, per torb, routed to provider.

## 2022-08-01 NOTE — PROGRESS NOTES
Received a message from patient inquiring about pulmonary rehab.  Patient stated that she discussed pulmonary rehab with Dr. Haile during her visit last week.  Patient requested [facility] for the location.      Discussed with Dr. Haile.  Instructions received for pulmonary rehab.      Message sent to Aurora Marinelli , regarding orders for pulmonary rehab.

## 2022-08-01 NOTE — TELEPHONE ENCOUNTER
"SW contacted Rudy Stanford Jr / [facility] (ph: [phone], fx: [phone]) re: internal orders. Confirmed orders were received.     8/2/2022 - Per Rudy Stanford Jr, "Checked on your patients status and it's still pending. I have authorizations pushing it through."    8/4/22 - Authorization approved. Pt to be scheduled for first apt today. SW remains available.     ----- Message from Alejandra Dimas RN sent at 8/1/2022  9:42 AM CDT -----  Regarding: pulmonary rehab  Received orders for pulmonary rehab from Dr. Haile.  Orders entered for [facility] per patient's request.    Thanks,  SL      "

## 2022-08-02 RX ORDER — CODEINE PHOSPHATE AND GUAIFENESIN 10; 100 MG/5ML; MG/5ML
5 SOLUTION ORAL
Qty: 473 ML | Refills: 0 | Status: SHIPPED | OUTPATIENT
Start: 2022-08-02 | End: 2022-08-10

## 2022-08-04 NOTE — PROGRESS NOTES
"    Subjective:     Patient ID: Juan Cruz is a 67 y.o. female w sero+CCP+RA-ILD    Chief Complaint: No chief complaint on file.  PCP: Gianna Carpenter MD   Ortho: Minh Iglesias MD  HPI     67 yr old lady seen for the first time on 5/12/22 w sero+CCP+ non erosive RA and a chronic cough with an abnormal CXR & CT chest c/w pulmonary fibrosis.  She was seen again on 5/31/22 at which time MTX was added to her prednisone for her joint pain, but she had intolerance to it (headaches, fatigue, anorexia, dysgeusia, sleep issues) but stayed on it until ~ 7/26 when she developed worsening cough and abnormal CT chest and it was stopped. CT Chest on 7/27 showed significant interval progression of subpleural predominant reticular and ground-glass airspace opacities associated with bilateral lower lobe volume loss, architectural distortion, symmetric mild central bronchiectasis and apicobasal gradient suggestive of UIP pattern of ILD which may reflect IPF.  Additional diagnostic considerations include NSIP, asbestosis, fibrotic hypersensitivity pneumonitis connective tissue associated lung disease and drug induced lung disease amongst other etiologies.    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    She was seen by Dr. Haile on 7/28 at which time due to progression of cough & SOB (& worsening CT chest) prednisone 40 mg/d was begun for presumed RA-ILD. Patient was to continue her antibiotic (copious sputum; cultures NTD) & Trelegy which was begun by AI for some PFT reversibility. The plan was to reassess & if there has been a response to add MMF. Ofev also to be added (has not gotten it yet).    She returns for f/u. She is much improved on prednisone and has no joint swelling but has some "tendon" pain L wrist and  L foot.  Also some neck " pain. Feels that steroids + her new mattress helping pain & sleep. AM stiffness is minimal by her report (1 hr by questionnaire--was 4 hrs previously). Has some dry eyes & mild dry mouth. Denies Raynaud's, tight skin, alopecia, oral ulcers, parotid gland swelling, pleurisy, pericarditis, photosensitivity, skin rashes, thromboses, muscle weakness; fevers, headaches, conjunctivitis, chronic or bloody diarrhea, vaginal/urethral D/C; paresthesias; LBP, thyroid issues;   1 miscarriage 4-6 weeks in 1997; has 1 daughter Csection;  Has FHx of RA & myelodysplasia (mom).      5/31/22  She has contacted us several times since her initial visit c/o generalized weakness & pain, tinnitus; peeing a lot; hip & knee pain; leg edema and was asked to come in to see us again, but she presents prior to her w/u and appointment to see Dr. Haile on 6/7/22.    Today she has multiple complaints and feels that she is in severe pain and unable to function. She has AM stiffness lasting 4 hrs. She has difficulty getting in and out of bed, dressing herself, washing herself, etc. She is fatigued & is unable to sleep but denies depression & anxiety. She now volunteers she had been seen by Dr.Luis Bee in the past ~ 2007 for similar sxs & no autoimmune/rheum dx was made.     Today, she feels she has developed joint swelling since her last visit, especially in some MCPs, but also c/o edema which improved after she stopped taking NSAIDs. She continue to c/o chronic neck pain.    She is currently taking prednisone 10 mg/d. She states she has bilateral shoulder pain & is unable to get OOB in AM. No GCA sxs. She is stiff x 4 hrs.     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  These appear to be incidental & unexpected findings as patient has very little joint swelling.  Patient feels that myalgias began after her last Covid vaccine.    In February got  severe pain in R shoulder that responded to CSI but then developed excruciating spasms neck & around both shoulders. Labs gotten by Orthopedic (Dr. Iglesias);   In Jan 27,2022 had FESS for sinus surgery & needed bad infection and required more antibiotics. Developed rash on cheeks on levaquin. Same rash came back now again.    In March had only shoulder pain & pronating L wrist.  Now R knee, L ankle pain & swelling & R foot 3rd & 4th toes even at rest.  Also weak all over  Baclofen helps her spasms & stiffness    AM stiffness x 3-4 hrs (since ~ February, 2022);     .  Denies Raynaud's, tight skin, alopecia, oral ulcers, parotid gland swelling, pleurisy, pericarditis, photosensitivity, skin rashes, thromboses, muscle weakness; fevers, headaches, conjunctivitis, chronic or bloody diarrhea, vaginal/urethral D/C; paresthesias; LBP, thyroid issues;   1 miscarriage 4-6 weeks in 1997; has 1 daughter Csection;   Chronic cough x 1.5 yr after swallowing--fatigues & drains her  Has dry eyes > mouth  Has BAKER & st w eating comes & goes;   Saw Dr. Siegel & told no obstructive pulmonary disease but restrictive.  Has FHx of RA & myelodysplasia (mom)    Started prednisone 10 mg qod ~ end of March, 2022; Helped pain   But had adverse effects on higher steroids in past.  In 1999 [facility]LA was on decadron but developed myopathy & psychosis on it.  .    TMJ on L x 1 2/22 lasted 1-2days  Neck since 2/22 L>R  B shoulders L>R  Elbows no  Wrists L>R (flexi carpi ulnaris)  MCPs no but only 2nd R MCP  PIPs & DIP  Hip: no  GT no  Knee: R 1 month ago; since Monday both; just hurt; hurt all the time.  Ankles: L 1 month R since Monday --only swelling  Toes 3 & 4th on R; L of  Both heels hurt  Achilles bilateral.    Current Outpatient Medications   Medication Sig Dispense Refill    B-complex with vitamin C (Z-BEC OR EQUIV) tablet Take 1 tablet by mouth once daily.      calcium citrate-vitamin D3 315-200 mg (CITRACAL+D) 315-200 mg-unit per tablet Take 1  tablet by mouth 2 (two) times daily.      cetirizine (ZYRTEC) 10 MG tablet cetirizine 10 mg tablet   TAKE 1 TABLET BY MOUTH ONCE DAILY      estradioL (VAGIFEM) 10 mcg Tab Yuvafem 10 mcg vaginal tablet   INSERT 1 TABLET VAGINALLY TWICE A WEEK      fluticasone-umeclidin-vilanter (TRELEGY ELLIPTA) 100-62.5-25 mcg DsDv Inhale 1 puff into the lungs once daily.      folic acid (FOLVITE) 1 MG tablet Take 1 tablet (1 mg total) by mouth once daily. 90 tablet 3    magnesium oxide (MAG-OX) 400 mg (241.3 mg magnesium) tablet Take 400 mg by mouth once daily.      metoprolol succinate (TOPROL-XL) 25 MG 24 hr tablet Take 25 mg by mouth once daily.      pantoprazole (PROTONIX) 40 MG tablet Take 40 mg by mouth once daily.      acetaminophen (TYLENOL) 325 MG tablet Take 650 mg by mouth every 6 (six) hours as needed for Pain.      diclofenac sodium (VOLTAREN) 1 % Gel Apply 2 g topically 4 (four) times daily as needed.      fluticasone propionate (FLONASE) 50 mcg/actuation nasal spray 1 spray by Each Nostril route once daily.      fluticasone/vilanterol (BREO ELLIPTA INHL) Inhale 1 puff into the lungs once daily.      guaiFENesin (MUCINEX) 600 mg 12 hr tablet Take 1,200 mg by mouth 2 (two) times daily.      ibuprofen (ADVIL,MOTRIN) 200 MG tablet Take 200 mg by mouth every 6 (six) hours as needed for Pain.      nintedanib (OFEV) 100 mg Cap Take 100 mg by mouth 2 (two) times a day. (Patient not taking: Reported on 8/10/2022) 60 capsule 11    predniSONE (DELTASONE) 20 MG tablet Take 2 tablets (40 mg total) by mouth once daily. 60 tablet 2    predniSONE (DELTASONE) 5 MG tablet Take 7 tablets (35 mg total) by mouth once daily for 7 days, THEN 6 tablets (30 mg total) once daily for 7 days, THEN 5 tablets (25 mg total) once daily for 7 days, THEN 4 tablets (20 mg total) once daily for 7 days. 154 tablet 0     No current facility-administered medications for this visit.         Review of patient's allergies indicates:    Allergen Reactions    Gluten protein     Ppd black rubber mix        Review of Systems   Constitutional: Negative for fatigue, fever and unexpected weight change.   HENT: Positive for hearing loss (R loss of hearing; following SIADH) and tinnitus. Negative for mouth sores, postnasal drip, sinus pain and trouble swallowing.    Eyes: Positive for redness.   Respiratory: Positive for cough (better) and shortness of breath. Negative for chest tightness.    Cardiovascular: Negative for chest pain and palpitations (Has had MVP on metoprolol).   Gastrointestinal: Negative.  Negative for constipation and diarrhea.   Endocrine: Positive for cold intolerance.   Genitourinary: Positive for enuresis. Negative for dysuria and genital sores.        Incontinence   Musculoskeletal: Positive for arthralgias, myalgias, neck pain and neck stiffness. Negative for back pain and joint swelling.   Skin: Negative for rash.   Neurological: Positive for numbness. Negative for dizziness, seizures, syncope and headaches.   Hematological: Does not bruise/bleed easily.   Psychiatric/Behavioral: Positive for dysphoric mood and sleep disturbance (attributed in part to steroids.). The patient is not nervous/anxious.         Feels prednisone has made her irritable.       Past Medical History:   Diagnosis Date    Chronic sinusitis, unspecified     History of SIADH     Mitral valve prolapse     Osteopenia     Rheumatoid arthritis involving multiple sites        Past Surgical History:   Procedure Laterality Date    BREAST BIOPSY Bilateral     FUNCTIONAL ENDOSCOPIC SINUS SURGERY (FESS)      ORIF WRIST FRACTURE Right        FH:  M: dx 83 w RA; passed away on 85; also had some intestinal problems.  Had myelodysplasia.  1 S hx of chronic myalgia & gets bedridden 2-3 days.  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   Was 132 lb 7.9 oz on 5/12/22  Physical Exam   Constitutional: She is oriented to person, place, and time. No distress.   Not coughing this time   HENT:   Head: Normocephalic and atraumatic.   Mouth/Throat: Oropharynx is clear and moist. No oropharyngeal exudate.   No facial rashes  Parotids not enlarged  No oral ulcers   Eyes: Pupils are equal, round, and reactive to light. Conjunctivae are normal. Right eye exhibits no discharge. Left eye exhibits no discharge. No scleral icterus.   Neck: No JVD present. No tracheal deviation present. No thyromegaly present.   Cardiovascular: Regular rhythm and normal heart sounds. Tachycardia present. Exam reveals no gallop and no friction rub.   No murmur heard.  Pulmonary/Chest: Effort normal. No respiratory distress. She has no wheezes. She has no rales. She exhibits no tenderness.   Pulmonary Comments: Scattered post basilar rales;  Breath sounds diminished at bases.  Abdominal: Soft. Normal appearance and bowel sounds are normal. She exhibits no distension and no mass. There is no splenomegaly or hepatomegaly. There is no abdominal tenderness. There is no rebound and no guarding.   Musculoskeletal:         General: No tenderness. Normal range of motion.      Cervical back: Neck supple.      Comments: No SHT anywhere  Adequate ROM all joints.  No metatarsalgia        Lymphadenopathy:     She has no cervical adenopathy.   Neurological: She is alert and oriented to person, place, and time. She has normal reflexes. No cranial nerve deficit. Gait normal.   Proximal & distal upper extremity strength appropriate.  LE strength probably 4+ (limited by some muscle pain)   Skin: Skin is warm and " dry. No rash noted. She is not diaphoretic.   Psychiatric: Mood, memory, affect, judgment and thought content normal.   Vitals reviewed.    7/19/22: ESR 80 (20); CRP 45.2; CBC Ht 36.1; CMP glu 117; alb 3.1;   5/17/22: CBC Hg 11.6; Ht 34.3; CMP Na 135; alb 3.3; TPMT normal metabolizer; CPK 13; pre-DMARDs ok;   5/12/22: ESR 74 (36); CRP 37.2; UA ok; ; .2; ROBBIE/C3/C4/QIG wnl or neg; IgM aCLA 16.70 (12.49);   3/14/22: ESR 50 (30) CRP 2; ROBBIE neg; RF 74 (<6) CCP >250  11/30/21: RF<14;     7/28/22: CT chest:  significant interval progression of previously noted subpleural predominant reticular and ground-glass airspace opacities associated with bilateral lower lobe volume loss, architectural distortion, symmetric mild central bronchiectasis and apicobasal gradient suggestive of UIP pattern of interstitial lung disease which may reflect IPF.  Additional diagnostic considerations include NSIP, asbestosis, fibrotic hypersensitivity pneumonitis connective tissue associated lung disease and drug induced lung disease amongst other etiologies.    5/12/22: Bilateral hands: personally viewed:mild STS dorsal MCPs bilaterally; min OA otherwise; remote healed distal radius fx w hardware.  5/12/22: Arthritis survey: personally viewed:  Mild OA Cspine, T spine; tibial spines; hands (Lwrist fx) & feet.  5/12/22: CXR: personally viewed: bibasilar coarse interstitial reticulated opacities c/w PF    6/28/21: CT chest:  predominant LL patchy increased attenuation and reticulation within the periphery of the bilateral lower lobes c/w sequela of atelectasis; r/o early ILD; no bronchiectasis.  No honeycombing.  No significant ground-glass attenuation. Small sliding-type hiatal hernia.    10/15/20: DXA: TLS -2.7; TFN -2.2;  TTH -1.8; FRAX 18.9%;/3.5%  Assessment:   Sero+(173) CCP+(1162.7) non erosive RA              Mild SHT MCPs--resolved on mod dose pred              AM stiffness x3-4 hrs now <1 hr.              FHx: RA &  myelodysplasia   On prednisone 35 mg/d   MTX ~ 5/31 - 7/26/22 w multiple intolerance exs           Chronic cough/SOB/ intermittent BAKER/abn CXR & CT c/w ILD              CT chest w ILD              CXR: blake coarse interstitial reticular opacities c/w PF              On prednisone started 40 mg to taper   Pulmonary getting Ofev for her    Central sensitivity syndrome   Muscle aches & pains since childhood.   Some response to gluten restriction   CSI = 48 = hi moderate     Joint pain/muscle spasms/myalgia--multiple sites--   Worse post Covid vaccine              RA/OA/CSS                          Bilateral hip pain                          Bilateral shoulder                          L wrist                          R knee                          L ankle                          R foot                          Cervical spine: mild DDD & spondylolistesis                                      Some response to baclofen (spasm & stiffness)                Osteoporosis              S/P R wrist fx 3/2019 (ORIF)                S/P rx w alendronate x 9 yrs--currently on holiday >5 yrs              10/15/20: DXA: TLS -2.7; TFN -2.2; TTH -1.8; FRAX 18.9/3.5     S/P SIADH 1999              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  Nevertheless, will need definitive rx for RA.  Discussed rx to help both IPF and RA.  MMF does not help joints much.  Discussed azathioprine and rituximab at length.  Patient ok to use azathioprine and follow labs and clinical px.  Dr. Haile is ok with azathioprine, too.  On azathioprine: will need weekly labs for 8 weeks then every 2 weeks x 2 then monthly x 3 then q 3 months  Patient to contact us in 6 weeks to review labs and progress.  Will also get labs today  Also discussed need for OP rx.  She thinks she had a recent DXA.  She prefers to discuss with Dr. Carpenter.  RTC 3 months or prn      CC: Barbara  DO Gianna Haile MD      Addendum: 8/10/22: ESR 25 (36); CRP 0.5; CMP alb 3.1;

## 2022-08-05 ENCOUNTER — HOSPITAL ENCOUNTER (OUTPATIENT)
Dept: PULMONOLOGY | Facility: OTHER | Age: 67
Discharge: HOME | End: 2022-08-05
Attending: INTERNAL MEDICINE
Payer: MEDICARE

## 2022-08-05 VITALS — BODY MASS INDEX: 22.82 KG/M2 | HEIGHT: 62 IN | WEIGHT: 124 LBS

## 2022-08-05 DIAGNOSIS — J84.9 ILD (INTERSTITIAL LUNG DISEASE): Primary | ICD-10-CM

## 2022-08-05 DIAGNOSIS — J84.9 INTERSTITIAL LUNG DISEASE: ICD-10-CM

## 2022-08-05 PROCEDURE — 94618 PULMONARY STRESS TESTING: CPT | Mod: TXP

## 2022-08-05 NOTE — PROGRESS NOTES
"[facility] ([facility])  Six Minute Walk     SUMMARY     Ordering Provider: Barbara Haile      Performing nurse/tech/RT: KIM Stanford RRT  Diagnosis: Pulmonary Fibrosis  Height: 5' 2" (157.5 cm)  Weight: 56.2 kg (124 lb)  BMI (Calculated): 22.7   Patient Race:             Phase Oxygen Assessment Supplemental O2 Heart   Rate Blood Pressure Rigoberto Dyspnea Scale Rating   Resting 96 % Room Air 82 bpm 106/65 4   Exercise        Minute        1           2           3           4           5           6  91 % Room Air 110 bpm 118/79 7-8   Recovery        Minute        1           2           3           4 96 % Room Air 86 bpm 113/76 3     Six Minute Walk Summary  6MWT Status: completed without stopping  Patient Reported: No complaints   Distance: 1034 ft.     Interpretation:                                                     Predicted Distance Meters (Calculated): 489.04 meters                        "

## 2022-08-08 ENCOUNTER — PATIENT MESSAGE (OUTPATIENT)
Dept: TRANSPLANT | Facility: CLINIC | Age: 67
End: 2022-08-08
Payer: MEDICARE

## 2022-08-08 DIAGNOSIS — J84.10 PULMONARY FIBROSIS: ICD-10-CM

## 2022-08-08 DIAGNOSIS — J84.9 INTERSTITIAL LUNG DISEASE: ICD-10-CM

## 2022-08-08 DIAGNOSIS — R05.9 COUGH: Primary | ICD-10-CM

## 2022-08-08 NOTE — TELEPHONE ENCOUNTER
Prescription to pharmacy per DAMIEN Ward. Patient reports improved breathing on the steroids.     Prednisone 5 mg, take 7 tabs daily x 7 days, then 6 tabs daily x 7 days, then 5 tabs daily x 7 days, then 4 tabs daily x 7 days. #154 tabs. No refills. To patient's preferred pharmacy..

## 2022-08-09 ENCOUNTER — HOSPITAL ENCOUNTER (OUTPATIENT)
Dept: PULMONOLOGY | Facility: OTHER | Age: 67
Discharge: HOME | End: 2022-08-09
Attending: INTERNAL MEDICINE
Payer: MEDICARE

## 2022-08-09 DIAGNOSIS — T88.7XXA SIDE EFFECT OF MEDICATION: ICD-10-CM

## 2022-08-09 DIAGNOSIS — Z79.60 LONG-TERM USE OF IMMUNOSUPPRESSANT MEDICATION: ICD-10-CM

## 2022-08-09 DIAGNOSIS — B37.0 THRUSH: Primary | ICD-10-CM

## 2022-08-09 PROCEDURE — G0238 OTH RESP PROC, INDIV: HCPCS | Mod: TXP

## 2022-08-09 PROCEDURE — G0237 THERAPEUTIC PROCD STRG ENDUR: HCPCS | Mod: NTX

## 2022-08-09 RX ORDER — PREDNISONE 5 MG/1
TABLET ORAL
Qty: 154 TABLET | Refills: 0 | Status: SHIPPED | OUTPATIENT
Start: 2022-08-09 | End: 2022-09-06

## 2022-08-09 RX ORDER — NYSTATIN 100000 [USP'U]/ML
5 SUSPENSION ORAL 4 TIMES DAILY
Qty: 473 ML | Refills: 0 | Status: SHIPPED | OUTPATIENT
Start: 2022-08-09 | End: 2022-08-10

## 2022-08-09 NOTE — TELEPHONE ENCOUNTER
Per Dr. Haile, Order for nystatin suspension, swish and swallow 5 mL qid, #473 mL, 0 refills. Routed for signature, then to pharmacy

## 2022-08-10 ENCOUNTER — OFFICE VISIT (OUTPATIENT)
Dept: RHEUMATOLOGY | Facility: CLINIC | Age: 67
End: 2022-08-10
Payer: MEDICARE

## 2022-08-10 ENCOUNTER — LAB VISIT (OUTPATIENT)
Dept: LAB | Facility: HOSPITAL | Age: 67
End: 2022-08-10
Attending: INTERNAL MEDICINE
Payer: MEDICARE

## 2022-08-10 ENCOUNTER — PATIENT MESSAGE (OUTPATIENT)
Dept: RHEUMATOLOGY | Facility: CLINIC | Age: 67
End: 2022-08-10

## 2022-08-10 VITALS
HEIGHT: 62 IN | HEART RATE: 80 BPM | DIASTOLIC BLOOD PRESSURE: 72 MMHG | WEIGHT: 131.81 LBS | BODY MASS INDEX: 24.26 KG/M2 | SYSTOLIC BLOOD PRESSURE: 110 MMHG

## 2022-08-10 DIAGNOSIS — T88.7XXA SIDE EFFECT OF MEDICATION: ICD-10-CM

## 2022-08-10 DIAGNOSIS — Z55.9 EDUCATIONAL CIRCUMSTANCE: ICD-10-CM

## 2022-08-10 DIAGNOSIS — Z79.60 LONG-TERM USE OF IMMUNOSUPPRESSANT MEDICATION: ICD-10-CM

## 2022-08-10 DIAGNOSIS — B37.0 THRUSH: Primary | ICD-10-CM

## 2022-08-10 DIAGNOSIS — J84.9 INTERSTITIAL LUNG DISEASE: ICD-10-CM

## 2022-08-10 DIAGNOSIS — M05.79 RHEUMATOID ARTHRITIS INVOLVING MULTIPLE SITES WITH POSITIVE RHEUMATOID FACTOR: Primary | ICD-10-CM

## 2022-08-10 DIAGNOSIS — Z79.52 LONG TERM (CURRENT) USE OF SYSTEMIC STEROIDS: ICD-10-CM

## 2022-08-10 DIAGNOSIS — M81.0 OSTEOPOROSIS, UNSPECIFIED OSTEOPOROSIS TYPE, UNSPECIFIED PATHOLOGICAL FRACTURE PRESENCE: ICD-10-CM

## 2022-08-10 DIAGNOSIS — M05.79 RHEUMATOID ARTHRITIS INVOLVING MULTIPLE SITES WITH POSITIVE RHEUMATOID FACTOR: ICD-10-CM

## 2022-08-10 LAB
ALBUMIN SERPL BCP-MCNC: 3.1 G/DL (ref 3.5–5.2)
ALP SERPL-CCNC: 42 U/L (ref 55–135)
ALT SERPL W/O P-5'-P-CCNC: 14 U/L (ref 10–44)
ANION GAP SERPL CALC-SCNC: 6 MMOL/L (ref 8–16)
AST SERPL-CCNC: 13 U/L (ref 10–40)
BASOPHILS # BLD AUTO: 0.03 K/UL (ref 0–0.2)
BASOPHILS NFR BLD: 0.2 % (ref 0–1.9)
BILIRUB SERPL-MCNC: 0.2 MG/DL (ref 0.1–1)
BUN SERPL-MCNC: 11 MG/DL (ref 8–23)
CALCIUM SERPL-MCNC: 9 MG/DL (ref 8.7–10.5)
CHLORIDE SERPL-SCNC: 102 MMOL/L (ref 95–110)
CO2 SERPL-SCNC: 29 MMOL/L (ref 23–29)
CREAT SERPL-MCNC: 0.7 MG/DL (ref 0.5–1.4)
CRP SERPL-MCNC: 0.5 MG/L (ref 0–8.2)
DIFFERENTIAL METHOD: ABNORMAL
EOSINOPHIL # BLD AUTO: 0.1 K/UL (ref 0–0.5)
EOSINOPHIL NFR BLD: 0.4 % (ref 0–8)
ERYTHROCYTE [DISTWIDTH] IN BLOOD BY AUTOMATED COUNT: 13.7 % (ref 11.5–14.5)
ERYTHROCYTE [SEDIMENTATION RATE] IN BLOOD BY PHOTOMETRIC METHOD: 25 MM/HR (ref 0–36)
EST. GFR  (NO RACE VARIABLE): >60 ML/MIN/1.73 M^2
GLUCOSE SERPL-MCNC: 94 MG/DL (ref 70–110)
HCT VFR BLD AUTO: 38.1 % (ref 37–48.5)
HGB BLD-MCNC: 12.9 G/DL (ref 12–16)
IMM GRANULOCYTES # BLD AUTO: 0.29 K/UL (ref 0–0.04)
IMM GRANULOCYTES NFR BLD AUTO: 1.5 % (ref 0–0.5)
LYMPHOCYTES # BLD AUTO: 3.1 K/UL (ref 1–4.8)
LYMPHOCYTES NFR BLD: 16.4 % (ref 18–48)
MCH RBC QN AUTO: 31.2 PG (ref 27–31)
MCHC RBC AUTO-ENTMCNC: 33.9 G/DL (ref 32–36)
MCV RBC AUTO: 92 FL (ref 82–98)
MONOCYTES # BLD AUTO: 1.5 K/UL (ref 0.3–1)
MONOCYTES NFR BLD: 7.9 % (ref 4–15)
NEUTROPHILS # BLD AUTO: 13.9 K/UL (ref 1.8–7.7)
NEUTROPHILS NFR BLD: 73.6 % (ref 38–73)
NRBC BLD-RTO: 0 /100 WBC
PLATELET # BLD AUTO: 369 K/UL (ref 150–450)
PMV BLD AUTO: 9.6 FL (ref 9.2–12.9)
POTASSIUM SERPL-SCNC: 3.6 MMOL/L (ref 3.5–5.1)
PROT SERPL-MCNC: 6.4 G/DL (ref 6–8.4)
RBC # BLD AUTO: 4.13 M/UL (ref 4–5.4)
SODIUM SERPL-SCNC: 137 MMOL/L (ref 136–145)
WBC # BLD AUTO: 18.89 K/UL (ref 3.9–12.7)

## 2022-08-10 PROCEDURE — 1125F AMNT PAIN NOTED PAIN PRSNT: CPT | Mod: CPTII,NTX,S$GLB, | Performed by: INTERNAL MEDICINE

## 2022-08-10 PROCEDURE — 99999 PR PBB SHADOW E&M-EST. PATIENT-LVL V: ICD-10-PCS | Mod: PBBFAC,TXP,, | Performed by: INTERNAL MEDICINE

## 2022-08-10 PROCEDURE — 99214 PR OFFICE/OUTPT VISIT, EST, LEVL IV, 30-39 MIN: ICD-10-PCS | Mod: NTX,S$GLB,, | Performed by: INTERNAL MEDICINE

## 2022-08-10 PROCEDURE — 36415 COLL VENOUS BLD VENIPUNCTURE: CPT | Mod: TXP | Performed by: INTERNAL MEDICINE

## 2022-08-10 PROCEDURE — 3008F PR BODY MASS INDEX (BMI) DOCUMENTED: ICD-10-PCS | Mod: CPTII,NTX,S$GLB, | Performed by: INTERNAL MEDICINE

## 2022-08-10 PROCEDURE — 1125F PR PAIN SEVERITY QUANTIFIED, PAIN PRESENT: ICD-10-PCS | Mod: CPTII,NTX,S$GLB, | Performed by: INTERNAL MEDICINE

## 2022-08-10 PROCEDURE — 3078F PR MOST RECENT DIASTOLIC BLOOD PRESSURE < 80 MM HG: ICD-10-PCS | Mod: CPTII,NTX,S$GLB, | Performed by: INTERNAL MEDICINE

## 2022-08-10 PROCEDURE — 99999 PR PBB SHADOW E&M-EST. PATIENT-LVL V: CPT | Mod: PBBFAC,TXP,, | Performed by: INTERNAL MEDICINE

## 2022-08-10 PROCEDURE — 80053 COMPREHEN METABOLIC PANEL: CPT | Mod: TXP | Performed by: INTERNAL MEDICINE

## 2022-08-10 PROCEDURE — 99499 UNLISTED E&M SERVICE: CPT | Mod: S$GLB,TXP,, | Performed by: INTERNAL MEDICINE

## 2022-08-10 PROCEDURE — 1160F RVW MEDS BY RX/DR IN RCRD: CPT | Mod: CPTII,NTX,S$GLB, | Performed by: INTERNAL MEDICINE

## 2022-08-10 PROCEDURE — 3078F DIAST BP <80 MM HG: CPT | Mod: CPTII,NTX,S$GLB, | Performed by: INTERNAL MEDICINE

## 2022-08-10 PROCEDURE — 1159F PR MEDICATION LIST DOCUMENTED IN MEDICAL RECORD: ICD-10-PCS | Mod: CPTII,NTX,S$GLB, | Performed by: INTERNAL MEDICINE

## 2022-08-10 PROCEDURE — 1160F PR REVIEW ALL MEDS BY PRESCRIBER/CLIN PHARMACIST DOCUMENTED: ICD-10-PCS | Mod: CPTII,NTX,S$GLB, | Performed by: INTERNAL MEDICINE

## 2022-08-10 PROCEDURE — 99499 RISK ADDL DX/OHS AUDIT: ICD-10-PCS | Mod: S$GLB,TXP,, | Performed by: INTERNAL MEDICINE

## 2022-08-10 PROCEDURE — 1159F MED LIST DOCD IN RCRD: CPT | Mod: CPTII,NTX,S$GLB, | Performed by: INTERNAL MEDICINE

## 2022-08-10 PROCEDURE — 3074F PR MOST RECENT SYSTOLIC BLOOD PRESSURE < 130 MM HG: ICD-10-PCS | Mod: CPTII,NTX,S$GLB, | Performed by: INTERNAL MEDICINE

## 2022-08-10 PROCEDURE — 99214 OFFICE O/P EST MOD 30 MIN: CPT | Mod: NTX,S$GLB,, | Performed by: INTERNAL MEDICINE

## 2022-08-10 PROCEDURE — 3008F BODY MASS INDEX DOCD: CPT | Mod: CPTII,NTX,S$GLB, | Performed by: INTERNAL MEDICINE

## 2022-08-10 PROCEDURE — 3074F SYST BP LT 130 MM HG: CPT | Mod: CPTII,NTX,S$GLB, | Performed by: INTERNAL MEDICINE

## 2022-08-10 PROCEDURE — 86140 C-REACTIVE PROTEIN: CPT | Mod: NTX | Performed by: INTERNAL MEDICINE

## 2022-08-10 PROCEDURE — 85652 RBC SED RATE AUTOMATED: CPT | Mod: NTX | Performed by: INTERNAL MEDICINE

## 2022-08-10 PROCEDURE — 85025 COMPLETE CBC W/AUTO DIFF WBC: CPT | Mod: NTX | Performed by: INTERNAL MEDICINE

## 2022-08-10 RX ORDER — AZATHIOPRINE 50 MG/1
150 TABLET ORAL DAILY
Qty: 90 TABLET | Refills: 1 | Status: SHIPPED | OUTPATIENT
Start: 2022-08-10 | End: 2022-11-01 | Stop reason: ALTCHOICE

## 2022-08-10 SDOH — SOCIAL DETERMINANTS OF HEALTH (SDOH): PROBLEMS RELATED TO EDUCATION AND LITERACY, UNSPECIFIED: Z55.9

## 2022-08-11 ENCOUNTER — HOSPITAL ENCOUNTER (OUTPATIENT)
Dept: PULMONOLOGY | Facility: OTHER | Age: 67
Discharge: HOME | End: 2022-08-11
Attending: INTERNAL MEDICINE
Payer: MEDICARE

## 2022-08-11 DIAGNOSIS — Z79.60 LONG-TERM USE OF IMMUNOSUPPRESSANT MEDICATION: Primary | ICD-10-CM

## 2022-08-11 DIAGNOSIS — J84.10 PULMONARY FIBROSIS: ICD-10-CM

## 2022-08-11 PROCEDURE — G0237 THERAPEUTIC PROCD STRG ENDUR: HCPCS | Mod: TXP

## 2022-08-11 PROCEDURE — G0238 OTH RESP PROC, INDIV: HCPCS | Mod: TXP

## 2022-08-11 RX ORDER — NYSTATIN 100000 [USP'U]/ML
5 SUSPENSION ORAL 4 TIMES DAILY
Qty: 473 ML | Refills: 0 | Status: SHIPPED | OUTPATIENT
Start: 2022-08-11 | End: 2022-11-01

## 2022-08-11 NOTE — PROGRESS NOTES
Per protocol SARTHAK-33, initiation of OFEV, CMP prior to therapy (8/10/22), repeat CMP at 2 weeks (8/24), q4 weeks x2 (9/7, 10/7), and every three months thereafter while on therapy. Patient notified on plan, scheduled for labs.

## 2022-08-12 ENCOUNTER — PATIENT MESSAGE (OUTPATIENT)
Dept: TRANSPLANT | Facility: CLINIC | Age: 67
End: 2022-08-12
Payer: MEDICARE

## 2022-08-12 DIAGNOSIS — J84.10 PULMONARY FIBROSIS: Primary | ICD-10-CM

## 2022-08-12 DIAGNOSIS — M05.79 RHEUMATOID ARTHRITIS INVOLVING MULTIPLE SITES WITH POSITIVE RHEUMATOID FACTOR: ICD-10-CM

## 2022-08-12 DIAGNOSIS — J84.9 INTERSTITIAL LUNG DISEASE: ICD-10-CM

## 2022-08-12 NOTE — PROGRESS NOTES
Per DAMIEN Ward patient to have PFTs, 6MWT with RTC in 6 weeks (~9/26/22). Orders per provider, request to .   Ambulatory

## 2022-08-16 ENCOUNTER — HOSPITAL ENCOUNTER (OUTPATIENT)
Dept: PULMONOLOGY | Facility: OTHER | Age: 67
Discharge: HOME | End: 2022-08-16
Attending: INTERNAL MEDICINE
Payer: MEDICARE

## 2022-08-16 PROCEDURE — G0238 OTH RESP PROC, INDIV: HCPCS | Mod: TXP

## 2022-08-16 PROCEDURE — G0237 THERAPEUTIC PROCD STRG ENDUR: HCPCS | Mod: NTX

## 2022-08-17 DIAGNOSIS — J84.9 INTERSTITIAL LUNG DISEASE: Primary | ICD-10-CM

## 2022-08-18 ENCOUNTER — HOSPITAL ENCOUNTER (OUTPATIENT)
Dept: PULMONOLOGY | Facility: OTHER | Age: 67
Discharge: HOME | End: 2022-08-18
Attending: INTERNAL MEDICINE
Payer: MEDICARE

## 2022-08-18 PROCEDURE — G0237 THERAPEUTIC PROCD STRG ENDUR: HCPCS | Mod: NTX

## 2022-08-18 PROCEDURE — G0238 OTH RESP PROC, INDIV: HCPCS | Mod: NTX

## 2022-08-22 ENCOUNTER — LAB VISIT (OUTPATIENT)
Dept: LAB | Facility: HOSPITAL | Age: 67
End: 2022-08-22
Attending: INTERNAL MEDICINE
Payer: MEDICARE

## 2022-08-22 ENCOUNTER — PATIENT MESSAGE (OUTPATIENT)
Dept: TRANSPLANT | Facility: CLINIC | Age: 67
End: 2022-08-22
Payer: MEDICARE

## 2022-08-22 ENCOUNTER — TELEPHONE (OUTPATIENT)
Dept: TRANSPLANT | Facility: CLINIC | Age: 67
End: 2022-08-22
Payer: MEDICARE

## 2022-08-22 DIAGNOSIS — M05.79 RHEUMATOID ARTHRITIS INVOLVING MULTIPLE SITES WITH POSITIVE RHEUMATOID FACTOR: ICD-10-CM

## 2022-08-22 DIAGNOSIS — J84.10 PULMONARY FIBROSIS: ICD-10-CM

## 2022-08-22 DIAGNOSIS — J84.9 INTERSTITIAL LUNG DISEASE: ICD-10-CM

## 2022-08-22 DIAGNOSIS — Z79.60 LONG-TERM USE OF IMMUNOSUPPRESSANT MEDICATION: ICD-10-CM

## 2022-08-22 LAB
ALBUMIN SERPL BCP-MCNC: 3 G/DL (ref 3.5–5.2)
ALP SERPL-CCNC: 39 U/L (ref 55–135)
ALT SERPL W/O P-5'-P-CCNC: 18 U/L (ref 10–44)
ANION GAP SERPL CALC-SCNC: 9 MMOL/L (ref 8–16)
AST SERPL-CCNC: 14 U/L (ref 10–40)
BASOPHILS # BLD AUTO: 0.01 K/UL (ref 0–0.2)
BASOPHILS NFR BLD: 0.1 % (ref 0–1.9)
BILIRUB SERPL-MCNC: 0.4 MG/DL (ref 0.1–1)
BUN SERPL-MCNC: 13 MG/DL (ref 8–23)
CALCIUM SERPL-MCNC: 8.8 MG/DL (ref 8.7–10.5)
CHLORIDE SERPL-SCNC: 105 MMOL/L (ref 95–110)
CO2 SERPL-SCNC: 22 MMOL/L (ref 23–29)
CREAT SERPL-MCNC: 0.6 MG/DL (ref 0.5–1.4)
DIFFERENTIAL METHOD: ABNORMAL
EOSINOPHIL # BLD AUTO: 0.1 K/UL (ref 0–0.5)
EOSINOPHIL NFR BLD: 1.1 % (ref 0–8)
ERYTHROCYTE [DISTWIDTH] IN BLOOD BY AUTOMATED COUNT: 13.9 % (ref 11.5–14.5)
EST. GFR  (NO RACE VARIABLE): >60 ML/MIN/1.73 M^2
FUNGUS SPEC CULT: NORMAL
GLUCOSE SERPL-MCNC: 122 MG/DL (ref 70–110)
HCT VFR BLD AUTO: 35.4 % (ref 37–48.5)
HGB BLD-MCNC: 11.9 G/DL (ref 12–16)
IMM GRANULOCYTES # BLD AUTO: 0.06 K/UL (ref 0–0.04)
IMM GRANULOCYTES NFR BLD AUTO: 0.5 % (ref 0–0.5)
LYMPHOCYTES # BLD AUTO: 2.7 K/UL (ref 1–4.8)
LYMPHOCYTES NFR BLD: 20.9 % (ref 18–48)
MCH RBC QN AUTO: 30.8 PG (ref 27–31)
MCHC RBC AUTO-ENTMCNC: 33.6 G/DL (ref 32–36)
MCV RBC AUTO: 92 FL (ref 82–98)
MONOCYTES # BLD AUTO: 0.9 K/UL (ref 0.3–1)
MONOCYTES NFR BLD: 7 % (ref 4–15)
NEUTROPHILS # BLD AUTO: 9 K/UL (ref 1.8–7.7)
NEUTROPHILS NFR BLD: 70.4 % (ref 38–73)
NRBC BLD-RTO: 0 /100 WBC
PLATELET # BLD AUTO: 203 K/UL (ref 150–450)
PMV BLD AUTO: 9.6 FL (ref 9.2–12.9)
POTASSIUM SERPL-SCNC: 3.8 MMOL/L (ref 3.5–5.1)
PROT SERPL-MCNC: 6.4 G/DL (ref 6–8.4)
RBC # BLD AUTO: 3.86 M/UL (ref 4–5.4)
SODIUM SERPL-SCNC: 136 MMOL/L (ref 136–145)
WBC # BLD AUTO: 12.82 K/UL (ref 3.9–12.7)

## 2022-08-22 PROCEDURE — 80053 COMPREHEN METABOLIC PANEL: CPT | Mod: TXP | Performed by: INTERNAL MEDICINE

## 2022-08-22 PROCEDURE — 85025 COMPLETE CBC W/AUTO DIFF WBC: CPT | Mod: TXP | Performed by: INTERNAL MEDICINE

## 2022-08-22 PROCEDURE — 36415 COLL VENOUS BLD VENIPUNCTURE: CPT | Mod: NTX | Performed by: INTERNAL MEDICINE

## 2022-08-22 NOTE — TELEPHONE ENCOUNTER
----- Message from Barbara Haile DO sent at 8/22/2022 10:36 AM CDT -----  No changes to anti fibrotic therapy    Message regarding lab results sent to patient via [facility].

## 2022-08-23 ENCOUNTER — HOSPITAL ENCOUNTER (OUTPATIENT)
Dept: PULMONOLOGY | Facility: OTHER | Age: 67
Discharge: HOME | End: 2022-08-23
Attending: INTERNAL MEDICINE
Payer: MEDICARE

## 2022-08-23 PROCEDURE — G0237 THERAPEUTIC PROCD STRG ENDUR: HCPCS | Mod: NTX

## 2022-08-23 PROCEDURE — G0238 OTH RESP PROC, INDIV: HCPCS | Mod: NTX

## 2022-08-25 ENCOUNTER — HOSPITAL ENCOUNTER (OUTPATIENT)
Dept: PULMONOLOGY | Facility: OTHER | Age: 67
Discharge: HOME | End: 2022-08-25
Attending: INTERNAL MEDICINE
Payer: MEDICARE

## 2022-08-25 PROCEDURE — G0237 THERAPEUTIC PROCD STRG ENDUR: HCPCS | Mod: NTX

## 2022-08-25 PROCEDURE — G0238 OTH RESP PROC, INDIV: HCPCS | Mod: TXP

## 2022-08-29 ENCOUNTER — PATIENT MESSAGE (OUTPATIENT)
Dept: RHEUMATOLOGY | Facility: CLINIC | Age: 67
End: 2022-08-29
Payer: MEDICARE

## 2022-08-29 ENCOUNTER — LAB VISIT (OUTPATIENT)
Dept: LAB | Facility: HOSPITAL | Age: 67
End: 2022-08-29
Attending: INTERNAL MEDICINE
Payer: MEDICARE

## 2022-08-29 ENCOUNTER — PATIENT MESSAGE (OUTPATIENT)
Dept: TRANSPLANT | Facility: CLINIC | Age: 67
End: 2022-08-29
Payer: MEDICARE

## 2022-08-29 DIAGNOSIS — M10.9 PODAGRA: Primary | ICD-10-CM

## 2022-08-29 DIAGNOSIS — Z79.60 LONG-TERM USE OF IMMUNOSUPPRESSANT MEDICATION: ICD-10-CM

## 2022-08-29 DIAGNOSIS — M05.79 RHEUMATOID ARTHRITIS INVOLVING MULTIPLE SITES WITH POSITIVE RHEUMATOID FACTOR: ICD-10-CM

## 2022-08-29 DIAGNOSIS — J84.9 INTERSTITIAL LUNG DISEASE: ICD-10-CM

## 2022-08-29 LAB
ALBUMIN SERPL BCP-MCNC: 3.1 G/DL (ref 3.5–5.2)
ALP SERPL-CCNC: 41 U/L (ref 55–135)
ALT SERPL W/O P-5'-P-CCNC: 20 U/L (ref 10–44)
ANION GAP SERPL CALC-SCNC: 6 MMOL/L (ref 8–16)
AST SERPL-CCNC: 19 U/L (ref 10–40)
BASOPHILS # BLD AUTO: 0.03 K/UL (ref 0–0.2)
BASOPHILS # BLD AUTO: 0.03 K/UL (ref 0–0.2)
BASOPHILS NFR BLD: 0.3 % (ref 0–1.9)
BASOPHILS NFR BLD: 0.3 % (ref 0–1.9)
BILIRUB SERPL-MCNC: 0.4 MG/DL (ref 0.1–1)
BUN SERPL-MCNC: 9 MG/DL (ref 8–23)
CALCIUM SERPL-MCNC: 8.9 MG/DL (ref 8.7–10.5)
CHLORIDE SERPL-SCNC: 104 MMOL/L (ref 95–110)
CO2 SERPL-SCNC: 28 MMOL/L (ref 23–29)
CREAT SERPL-MCNC: 0.7 MG/DL (ref 0.5–1.4)
CRP SERPL-MCNC: 6.6 MG/L (ref 0–8.2)
DIFFERENTIAL METHOD: ABNORMAL
DIFFERENTIAL METHOD: ABNORMAL
EOSINOPHIL # BLD AUTO: 0.3 K/UL (ref 0–0.5)
EOSINOPHIL # BLD AUTO: 0.3 K/UL (ref 0–0.5)
EOSINOPHIL NFR BLD: 3.1 % (ref 0–8)
EOSINOPHIL NFR BLD: 3.1 % (ref 0–8)
ERYTHROCYTE [DISTWIDTH] IN BLOOD BY AUTOMATED COUNT: 14.2 % (ref 11.5–14.5)
ERYTHROCYTE [DISTWIDTH] IN BLOOD BY AUTOMATED COUNT: 14.2 % (ref 11.5–14.5)
ERYTHROCYTE [SEDIMENTATION RATE] IN BLOOD BY WESTERGREN METHOD: 26 MM/HR (ref 0–20)
EST. GFR  (NO RACE VARIABLE): >60 ML/MIN/1.73 M^2
GLUCOSE SERPL-MCNC: 124 MG/DL (ref 70–110)
HCT VFR BLD AUTO: 37.6 % (ref 37–48.5)
HCT VFR BLD AUTO: 37.6 % (ref 37–48.5)
HGB BLD-MCNC: 12.6 G/DL (ref 12–16)
HGB BLD-MCNC: 12.6 G/DL (ref 12–16)
IMM GRANULOCYTES # BLD AUTO: 0.06 K/UL (ref 0–0.04)
IMM GRANULOCYTES # BLD AUTO: 0.06 K/UL (ref 0–0.04)
IMM GRANULOCYTES NFR BLD AUTO: 0.6 % (ref 0–0.5)
IMM GRANULOCYTES NFR BLD AUTO: 0.6 % (ref 0–0.5)
LYMPHOCYTES # BLD AUTO: 2.2 K/UL (ref 1–4.8)
LYMPHOCYTES # BLD AUTO: 2.2 K/UL (ref 1–4.8)
LYMPHOCYTES NFR BLD: 21.7 % (ref 18–48)
LYMPHOCYTES NFR BLD: 21.7 % (ref 18–48)
MCH RBC QN AUTO: 30.5 PG (ref 27–31)
MCH RBC QN AUTO: 30.5 PG (ref 27–31)
MCHC RBC AUTO-ENTMCNC: 33.5 G/DL (ref 32–36)
MCHC RBC AUTO-ENTMCNC: 33.5 G/DL (ref 32–36)
MCV RBC AUTO: 91 FL (ref 82–98)
MCV RBC AUTO: 91 FL (ref 82–98)
MONOCYTES # BLD AUTO: 0.9 K/UL (ref 0.3–1)
MONOCYTES # BLD AUTO: 0.9 K/UL (ref 0.3–1)
MONOCYTES NFR BLD: 9.1 % (ref 4–15)
MONOCYTES NFR BLD: 9.1 % (ref 4–15)
NEUTROPHILS # BLD AUTO: 6.5 K/UL (ref 1.8–7.7)
NEUTROPHILS # BLD AUTO: 6.5 K/UL (ref 1.8–7.7)
NEUTROPHILS NFR BLD: 65.2 % (ref 38–73)
NEUTROPHILS NFR BLD: 65.2 % (ref 38–73)
NRBC BLD-RTO: 0 /100 WBC
NRBC BLD-RTO: 0 /100 WBC
PLATELET # BLD AUTO: 217 K/UL (ref 150–450)
PLATELET # BLD AUTO: 217 K/UL (ref 150–450)
PMV BLD AUTO: 8.9 FL (ref 9.2–12.9)
PMV BLD AUTO: 8.9 FL (ref 9.2–12.9)
POTASSIUM SERPL-SCNC: 3.5 MMOL/L (ref 3.5–5.1)
PROT SERPL-MCNC: 6.4 G/DL (ref 6–8.4)
RBC # BLD AUTO: 4.13 M/UL (ref 4–5.4)
RBC # BLD AUTO: 4.13 M/UL (ref 4–5.4)
SODIUM SERPL-SCNC: 138 MMOL/L (ref 136–145)
WBC # BLD AUTO: 9.95 K/UL (ref 3.9–12.7)
WBC # BLD AUTO: 9.95 K/UL (ref 3.9–12.7)

## 2022-08-29 PROCEDURE — 85652 RBC SED RATE AUTOMATED: CPT | Mod: TXP | Performed by: INTERNAL MEDICINE

## 2022-08-29 PROCEDURE — 80053 COMPREHEN METABOLIC PANEL: CPT | Mod: NTX | Performed by: INTERNAL MEDICINE

## 2022-08-29 PROCEDURE — 86140 C-REACTIVE PROTEIN: CPT | Mod: TXP | Performed by: INTERNAL MEDICINE

## 2022-08-29 PROCEDURE — 85025 COMPLETE CBC W/AUTO DIFF WBC: CPT | Mod: NTX | Performed by: INTERNAL MEDICINE

## 2022-08-29 PROCEDURE — 36415 COLL VENOUS BLD VENIPUNCTURE: CPT | Mod: TXP | Performed by: INTERNAL MEDICINE

## 2022-08-30 ENCOUNTER — HOSPITAL ENCOUNTER (OUTPATIENT)
Dept: PULMONOLOGY | Facility: OTHER | Age: 67
Discharge: HOME | End: 2022-08-30
Attending: INTERNAL MEDICINE
Payer: MEDICARE

## 2022-08-30 ENCOUNTER — TELEPHONE (OUTPATIENT)
Dept: TRANSPLANT | Facility: CLINIC | Age: 67
End: 2022-08-30
Payer: MEDICARE

## 2022-08-30 PROCEDURE — G0237 THERAPEUTIC PROCD STRG ENDUR: HCPCS | Mod: NTX

## 2022-08-30 PROCEDURE — G0238 OTH RESP PROC, INDIV: HCPCS | Mod: NTX

## 2022-09-01 ENCOUNTER — HOSPITAL ENCOUNTER (OUTPATIENT)
Dept: PULMONOLOGY | Facility: OTHER | Age: 67
Discharge: HOME | End: 2022-09-01
Attending: INTERNAL MEDICINE
Payer: MEDICARE

## 2022-09-01 PROCEDURE — G0238 OTH RESP PROC, INDIV: HCPCS | Mod: NTX

## 2022-09-01 PROCEDURE — G0237 THERAPEUTIC PROCD STRG ENDUR: HCPCS | Mod: TXP

## 2022-09-02 ENCOUNTER — PATIENT MESSAGE (OUTPATIENT)
Dept: RHEUMATOLOGY | Facility: CLINIC | Age: 67
End: 2022-09-02
Payer: MEDICARE

## 2022-09-06 ENCOUNTER — TELEPHONE (OUTPATIENT)
Dept: TRANSPLANT | Facility: CLINIC | Age: 67
End: 2022-09-06
Payer: MEDICARE

## 2022-09-06 ENCOUNTER — HOSPITAL ENCOUNTER (OUTPATIENT)
Dept: PULMONOLOGY | Facility: OTHER | Age: 67
Discharge: HOME | End: 2022-09-06
Attending: INTERNAL MEDICINE
Payer: MEDICARE

## 2022-09-06 ENCOUNTER — PATIENT MESSAGE (OUTPATIENT)
Dept: TRANSPLANT | Facility: CLINIC | Age: 67
End: 2022-09-06
Payer: MEDICARE

## 2022-09-06 ENCOUNTER — TELEPHONE (OUTPATIENT)
Dept: RHEUMATOLOGY | Facility: CLINIC | Age: 67
End: 2022-09-06
Payer: MEDICARE

## 2022-09-06 DIAGNOSIS — J84.9 INTERSTITIAL LUNG DISEASE: Primary | ICD-10-CM

## 2022-09-06 DIAGNOSIS — Z79.899 HIGH RISK MEDICATION USE: ICD-10-CM

## 2022-09-06 DIAGNOSIS — R82.90 URINE ABNORMALITY: ICD-10-CM

## 2022-09-06 DIAGNOSIS — Z79.52 LONG TERM (CURRENT) USE OF SYSTEMIC STEROIDS: Primary | ICD-10-CM

## 2022-09-06 PROCEDURE — G0237 THERAPEUTIC PROCD STRG ENDUR: HCPCS | Mod: NTX

## 2022-09-06 PROCEDURE — G0238 OTH RESP PROC, INDIV: HCPCS | Mod: TXP

## 2022-09-06 NOTE — TELEPHONE ENCOUNTER
----- Message from Barbara Haile DO sent at 9/6/2022 12:37 PM CDT -----  Stop OFEV.  Repeat CMP Friday

## 2022-09-06 NOTE — TELEPHONE ENCOUNTER
Per Dr. Haile, patient to have PFTs/clinic appt on 9/12. Patient aware and agreeable. Patient to repeat CMP per Dr. Haile on Friday, 9/9. Orders per provider.  ----- Message from Barbara Haile DO sent at 9/6/2022  9:24 AM CDT -----  Can you see about getting her PFTs and clinic appointment sooner?  Thanks

## 2022-09-07 ENCOUNTER — PATIENT MESSAGE (OUTPATIENT)
Dept: TRANSPLANT | Facility: CLINIC | Age: 67
End: 2022-09-07
Payer: MEDICARE

## 2022-09-08 ENCOUNTER — PATIENT MESSAGE (OUTPATIENT)
Dept: TRANSPLANT | Facility: CLINIC | Age: 67
End: 2022-09-08
Payer: MEDICARE

## 2022-09-08 ENCOUNTER — PATIENT MESSAGE (OUTPATIENT)
Dept: RHEUMATOLOGY | Facility: CLINIC | Age: 67
End: 2022-09-08
Payer: MEDICARE

## 2022-09-08 ENCOUNTER — HOSPITAL ENCOUNTER (OUTPATIENT)
Dept: PULMONOLOGY | Facility: OTHER | Age: 67
Discharge: HOME | End: 2022-09-08
Attending: INTERNAL MEDICINE
Payer: MEDICARE

## 2022-09-08 DIAGNOSIS — Z79.52 LONG TERM (CURRENT) USE OF SYSTEMIC STEROIDS: Primary | ICD-10-CM

## 2022-09-08 DIAGNOSIS — J84.9 INTERSTITIAL LUNG DISEASE: ICD-10-CM

## 2022-09-08 DIAGNOSIS — R23.3 PETECHIAE: ICD-10-CM

## 2022-09-08 DIAGNOSIS — Z79.899 HIGH RISK MEDICATION USE: Primary | ICD-10-CM

## 2022-09-08 DIAGNOSIS — Z79.60 LONG-TERM USE OF IMMUNOSUPPRESSANT MEDICATION: ICD-10-CM

## 2022-09-08 DIAGNOSIS — T14.8XXA BRUISE: ICD-10-CM

## 2022-09-08 PROCEDURE — G0237 THERAPEUTIC PROCD STRG ENDUR: HCPCS | Mod: NTX

## 2022-09-08 PROCEDURE — 27000221 HC OXYGEN, UP TO 24 HOURS: Mod: TXP

## 2022-09-08 PROCEDURE — G0238 OTH RESP PROC, INDIV: HCPCS | Mod: TXP

## 2022-09-09 ENCOUNTER — PATIENT MESSAGE (OUTPATIENT)
Dept: TRANSPLANT | Facility: CLINIC | Age: 67
End: 2022-09-09
Payer: MEDICARE

## 2022-09-09 ENCOUNTER — LAB VISIT (OUTPATIENT)
Dept: LAB | Facility: HOSPITAL | Age: 67
End: 2022-09-09
Attending: INTERNAL MEDICINE
Payer: MEDICARE

## 2022-09-09 DIAGNOSIS — R23.3 PETECHIAE: ICD-10-CM

## 2022-09-09 DIAGNOSIS — M10.9 PODAGRA: ICD-10-CM

## 2022-09-09 DIAGNOSIS — Z79.899 HIGH RISK MEDICATION USE: ICD-10-CM

## 2022-09-09 DIAGNOSIS — T14.8XXA BRUISE: ICD-10-CM

## 2022-09-09 DIAGNOSIS — Z79.60 LONG-TERM USE OF IMMUNOSUPPRESSANT MEDICATION: ICD-10-CM

## 2022-09-09 DIAGNOSIS — J84.9 INTERSTITIAL LUNG DISEASE: ICD-10-CM

## 2022-09-09 DIAGNOSIS — M05.79 RHEUMATOID ARTHRITIS INVOLVING MULTIPLE SITES WITH POSITIVE RHEUMATOID FACTOR: ICD-10-CM

## 2022-09-09 DIAGNOSIS — Z79.52 LONG TERM (CURRENT) USE OF SYSTEMIC STEROIDS: ICD-10-CM

## 2022-09-09 LAB
ALBUMIN SERPL BCP-MCNC: 3 G/DL (ref 3.5–5.2)
ALP SERPL-CCNC: 109 U/L (ref 55–135)
ALT SERPL W/O P-5'-P-CCNC: 187 U/L (ref 10–44)
ANION GAP SERPL CALC-SCNC: 9 MMOL/L (ref 8–16)
APTT BLDCRRT: 23.2 SEC (ref 21–32)
AST SERPL-CCNC: 104 U/L (ref 10–40)
BASOPHILS # BLD AUTO: 0.04 K/UL (ref 0–0.2)
BASOPHILS # BLD AUTO: 0.04 K/UL (ref 0–0.2)
BASOPHILS NFR BLD: 0.4 % (ref 0–1.9)
BASOPHILS NFR BLD: 0.4 % (ref 0–1.9)
BILIRUB SERPL-MCNC: 1.1 MG/DL (ref 0.1–1)
BUN SERPL-MCNC: 7 MG/DL (ref 8–23)
CALCIUM SERPL-MCNC: 9.5 MG/DL (ref 8.7–10.5)
CHLORIDE SERPL-SCNC: 101 MMOL/L (ref 95–110)
CO2 SERPL-SCNC: 28 MMOL/L (ref 23–29)
CREAT SERPL-MCNC: 0.7 MG/DL (ref 0.5–1.4)
CRP SERPL-MCNC: 8.5 MG/L (ref 0–8.2)
DIFFERENTIAL METHOD: ABNORMAL
DIFFERENTIAL METHOD: ABNORMAL
EOSINOPHIL # BLD AUTO: 0.1 K/UL (ref 0–0.5)
EOSINOPHIL # BLD AUTO: 0.1 K/UL (ref 0–0.5)
EOSINOPHIL NFR BLD: 1.2 % (ref 0–8)
EOSINOPHIL NFR BLD: 1.2 % (ref 0–8)
ERYTHROCYTE [DISTWIDTH] IN BLOOD BY AUTOMATED COUNT: 14.8 % (ref 11.5–14.5)
ERYTHROCYTE [DISTWIDTH] IN BLOOD BY AUTOMATED COUNT: 14.8 % (ref 11.5–14.5)
ERYTHROCYTE [SEDIMENTATION RATE] IN BLOOD BY WESTERGREN METHOD: 37 MM/HR (ref 0–20)
EST. GFR  (NO RACE VARIABLE): >60 ML/MIN/1.73 M^2
GLUCOSE SERPL-MCNC: 135 MG/DL (ref 70–110)
HCT VFR BLD AUTO: 39.8 % (ref 37–48.5)
HCT VFR BLD AUTO: 39.8 % (ref 37–48.5)
HGB BLD-MCNC: 13.7 G/DL (ref 12–16)
HGB BLD-MCNC: 13.7 G/DL (ref 12–16)
IMM GRANULOCYTES # BLD AUTO: 0.13 K/UL (ref 0–0.04)
IMM GRANULOCYTES # BLD AUTO: 0.13 K/UL (ref 0–0.04)
IMM GRANULOCYTES NFR BLD AUTO: 1.3 % (ref 0–0.5)
IMM GRANULOCYTES NFR BLD AUTO: 1.3 % (ref 0–0.5)
INR PPP: 0.9 (ref 0.8–1.2)
LYMPHOCYTES # BLD AUTO: 2.8 K/UL (ref 1–4.8)
LYMPHOCYTES # BLD AUTO: 2.8 K/UL (ref 1–4.8)
LYMPHOCYTES NFR BLD: 28 % (ref 18–48)
LYMPHOCYTES NFR BLD: 28 % (ref 18–48)
MCH RBC QN AUTO: 31.5 PG (ref 27–31)
MCH RBC QN AUTO: 31.5 PG (ref 27–31)
MCHC RBC AUTO-ENTMCNC: 34.4 G/DL (ref 32–36)
MCHC RBC AUTO-ENTMCNC: 34.4 G/DL (ref 32–36)
MCV RBC AUTO: 92 FL (ref 82–98)
MCV RBC AUTO: 92 FL (ref 82–98)
MONOCYTES # BLD AUTO: 0.5 K/UL (ref 0.3–1)
MONOCYTES # BLD AUTO: 0.5 K/UL (ref 0.3–1)
MONOCYTES NFR BLD: 4.8 % (ref 4–15)
MONOCYTES NFR BLD: 4.8 % (ref 4–15)
NEUTROPHILS # BLD AUTO: 6.5 K/UL (ref 1.8–7.7)
NEUTROPHILS # BLD AUTO: 6.5 K/UL (ref 1.8–7.7)
NEUTROPHILS NFR BLD: 64.3 % (ref 38–73)
NEUTROPHILS NFR BLD: 64.3 % (ref 38–73)
NRBC BLD-RTO: 0 /100 WBC
NRBC BLD-RTO: 0 /100 WBC
PLATELET # BLD AUTO: 290 K/UL (ref 150–450)
PLATELET # BLD AUTO: 290 K/UL (ref 150–450)
PMV BLD AUTO: 9.6 FL (ref 9.2–12.9)
PMV BLD AUTO: 9.6 FL (ref 9.2–12.9)
POTASSIUM SERPL-SCNC: 3.8 MMOL/L (ref 3.5–5.1)
PROT SERPL-MCNC: 6.9 G/DL (ref 6–8.4)
PROTHROMBIN TIME: 9.4 SEC (ref 9–12.5)
RBC # BLD AUTO: 4.35 M/UL (ref 4–5.4)
RBC # BLD AUTO: 4.35 M/UL (ref 4–5.4)
SODIUM SERPL-SCNC: 138 MMOL/L (ref 136–145)
URATE SERPL-MCNC: 3.8 MG/DL (ref 2.4–5.7)
WBC # BLD AUTO: 10.07 K/UL (ref 3.9–12.7)
WBC # BLD AUTO: 10.07 K/UL (ref 3.9–12.7)

## 2022-09-09 PROCEDURE — 85652 RBC SED RATE AUTOMATED: CPT | Mod: NTX | Performed by: INTERNAL MEDICINE

## 2022-09-09 PROCEDURE — 85610 PROTHROMBIN TIME: CPT | Mod: NTX | Performed by: INTERNAL MEDICINE

## 2022-09-09 PROCEDURE — 84550 ASSAY OF BLOOD/URIC ACID: CPT | Mod: NTX | Performed by: INTERNAL MEDICINE

## 2022-09-09 PROCEDURE — 85025 COMPLETE CBC W/AUTO DIFF WBC: CPT | Mod: TXP | Performed by: INTERNAL MEDICINE

## 2022-09-09 PROCEDURE — 80053 COMPREHEN METABOLIC PANEL: CPT | Mod: NTX | Performed by: INTERNAL MEDICINE

## 2022-09-09 PROCEDURE — 36415 COLL VENOUS BLD VENIPUNCTURE: CPT | Mod: NTX | Performed by: INTERNAL MEDICINE

## 2022-09-09 PROCEDURE — 85730 THROMBOPLASTIN TIME PARTIAL: CPT | Mod: NTX | Performed by: INTERNAL MEDICINE

## 2022-09-09 PROCEDURE — 86140 C-REACTIVE PROTEIN: CPT | Mod: NTX | Performed by: INTERNAL MEDICINE

## 2022-09-12 ENCOUNTER — LAB VISIT (OUTPATIENT)
Dept: LAB | Facility: HOSPITAL | Age: 67
End: 2022-09-12
Attending: INTERNAL MEDICINE
Payer: MEDICARE

## 2022-09-12 ENCOUNTER — HOSPITAL ENCOUNTER (OUTPATIENT)
Dept: PULMONOLOGY | Facility: CLINIC | Age: 67
Discharge: HOME | End: 2022-09-12
Payer: MEDICARE

## 2022-09-12 ENCOUNTER — TELEPHONE (OUTPATIENT)
Dept: TRANSPLANT | Facility: CLINIC | Age: 67
End: 2022-09-12
Payer: MEDICARE

## 2022-09-12 ENCOUNTER — OFFICE VISIT (OUTPATIENT)
Dept: TRANSPLANT | Facility: CLINIC | Age: 67
End: 2022-09-12
Payer: MEDICARE

## 2022-09-12 VITALS
WEIGHT: 130.5 LBS | SYSTOLIC BLOOD PRESSURE: 133 MMHG | HEART RATE: 99 BPM | TEMPERATURE: 99 F | OXYGEN SATURATION: 96 % | HEIGHT: 62 IN | DIASTOLIC BLOOD PRESSURE: 76 MMHG | RESPIRATION RATE: 20 BRPM | BODY MASS INDEX: 24.01 KG/M2

## 2022-09-12 VITALS — WEIGHT: 130.5 LBS | BODY MASS INDEX: 24.01 KG/M2 | HEIGHT: 62 IN

## 2022-09-12 DIAGNOSIS — J84.10 PULMONARY FIBROSIS: ICD-10-CM

## 2022-09-12 DIAGNOSIS — J84.9 INTERSTITIAL LUNG DISEASE: ICD-10-CM

## 2022-09-12 DIAGNOSIS — R74.8 ELEVATED LIVER ENZYMES: ICD-10-CM

## 2022-09-12 DIAGNOSIS — K21.9 GASTROESOPHAGEAL REFLUX DISEASE WITHOUT ESOPHAGITIS: ICD-10-CM

## 2022-09-12 DIAGNOSIS — M05.79 RHEUMATOID ARTHRITIS INVOLVING MULTIPLE SITES WITH POSITIVE RHEUMATOID FACTOR: Primary | ICD-10-CM

## 2022-09-12 DIAGNOSIS — Z79.60 LONG-TERM USE OF IMMUNOSUPPRESSANT MEDICATION: ICD-10-CM

## 2022-09-12 DIAGNOSIS — T14.8XXA BRUISE: ICD-10-CM

## 2022-09-12 DIAGNOSIS — R23.3 PETECHIAE: ICD-10-CM

## 2022-09-12 LAB
ALBUMIN SERPL BCP-MCNC: 3.5 G/DL (ref 3.5–5.2)
ALP SERPL-CCNC: 96 U/L (ref 55–135)
ALT SERPL W/O P-5'-P-CCNC: 109 U/L (ref 10–44)
ANION GAP SERPL CALC-SCNC: 8 MMOL/L (ref 8–16)
AST SERPL-CCNC: 34 U/L (ref 10–40)
BILIRUB SERPL-MCNC: 0.6 MG/DL (ref 0.1–1)
BUN SERPL-MCNC: 9 MG/DL (ref 8–23)
CALCIUM SERPL-MCNC: 10 MG/DL (ref 8.7–10.5)
CHLORIDE SERPL-SCNC: 104 MMOL/L (ref 95–110)
CO2 SERPL-SCNC: 28 MMOL/L (ref 23–29)
CREAT SERPL-MCNC: 0.7 MG/DL (ref 0.5–1.4)
DLCO ADJ PRE: 8.61 ML/(MIN*MMHG) (ref 14.89–26.36)
DLCO SINGLE BREATH LLN: 14.89
DLCO SINGLE BREATH PRE REF: 42.1 %
DLCO SINGLE BREATH REF: 20.63
DLCOC SBVA LLN: 2.91
DLCOC SBVA PRE REF: 80.3 %
DLCOC SBVA REF: 4.48
DLCOC SINGLE BREATH LLN: 14.89
DLCOC SINGLE BREATH PRE REF: 41.7 %
DLCOC SINGLE BREATH REF: 20.63
DLCOCSBVAULN: 6.06
DLCOCSINGLEBREATHULN: 26.36
DLCOSINGLEBREATHULN: 26.36
DLCOVA LLN: 2.91
DLCOVA PRE REF: 81 %
DLCOVA PRE: 3.63 ML/(MIN*MMHG*L) (ref 2.91–6.06)
DLCOVA REF: 4.48
DLCOVAULN: 6.06
DLVAADJ PRE: 3.6 ML/(MIN*MMHG*L) (ref 2.91–6.06)
ERV LLN: -16449.31
ERV PRE REF: 99.8 %
ERV REF: 0.69
ERVULN: ABNORMAL
EST. GFR  (NO RACE VARIABLE): >60 ML/MIN/1.73 M^2
FEF 25 75 LLN: 0.92
FEF 25 75 PRE REF: 146.4 %
FEF 25 75 REF: 1.84
FEV05 LLN: 0.81
FEV05 REF: 1.66
FEV1 FVC LLN: 70
FEV1 FVC PRE REF: 109.4 %
FEV1 FVC REF: 81
FEV1 LLN: 1.37
FEV1 PRE REF: 84.6 %
FEV1 REF: 1.91
FRCPLETH LLN: 1.77
FRCPLETH PREREF: 61.5 %
FRCPLETH REF: 2.59
FRCPLETHULN: 3.42
FVC LLN: 1.72
FVC PRE REF: 77 %
FVC REF: 2.37
GLUCOSE SERPL-MCNC: 135 MG/DL (ref 70–110)
IVC PRE: 1.82 L (ref 1.72–3.04)
IVC SINGLE BREATH LLN: 1.72
IVC SINGLE BREATH PRE REF: 77.2 %
IVC SINGLE BREATH REF: 2.37
IVCSINGLEBREATHULN: 3.04
LLN IC: -16448.21
PEF LLN: 4.09
PEF PRE REF: 70.1 %
PEF REF: 5.57
PHYSICIAN COMMENT: ABNORMAL
POTASSIUM SERPL-SCNC: 4.9 MMOL/L (ref 3.5–5.1)
PRE DLCO: 8.69 ML/(MIN*MMHG) (ref 14.89–26.36)
PRE ERV: 0.69 L (ref -16449.31–16450.69)
PRE FEF 25 75: 2.69 L/S (ref 0.92–3.1)
PRE FET 100: 5.77 SEC
PRE FEV05 REF: 83.7 %
PRE FEV1 FVC: 88.72 % (ref 69.87–90.94)
PRE FEV1: 1.62 L (ref 1.37–2.43)
PRE FEV5: 1.39 L (ref 0.81–2.52)
PRE FRC PL: 1.59 L (ref 1.77–3.42)
PRE FVC: 1.82 L (ref 1.72–3.04)
PRE IC: 1.14 L (ref -16448.21–16451.79)
PRE PEF: 3.9 L/S (ref 4.09–7.05)
PRE REF IC: 63.7 %
PRE RV: 0.91 L (ref 1.33–2.48)
PRE TLC: 2.73 L (ref 3.62–5.59)
PRE VTG: 1.73 L
PROT SERPL-MCNC: 7.5 G/DL (ref 6–8.4)
RAW PRE REF: 180.9 %
RAW PRE: 5.53 CMH2O*S/L (ref 3.06–3.06)
RAW REF: 3.06
REF IC: 1.79
RV LLN: 1.33
RV PRE REF: 47.7 %
RV REF: 1.91
RVTLC LLN: 32
RVTLC PRE REF: 80.3 %
RVTLC PRE: 33.25 % (ref 31.81–50.99)
RVTLC REF: 41
RVTLCULN: 51
RVULN: 2.48
SGAW PRE REF: 92.6 %
SGAW PRE: 0.09 1/(CMH2O*S) (ref 0.1–0.1)
SGAW REF: 0.1
SODIUM SERPL-SCNC: 140 MMOL/L (ref 136–145)
TLC LLN: 3.62
TLC PRE REF: 59.4 %
TLC REF: 4.6
TLC ULN: 5.59
ULN IC: ABNORMAL
VA PRE: 2.39 L (ref 4.45–4.45)
VA SINGLE BREATH LLN: 4.45
VA SINGLE BREATH PRE REF: 53.7 %
VA SINGLE BREATH REF: 4.45
VASINGLEBREATHULN: 4.45
VC LLN: 1.72
VC PRE REF: 77.2 %
VC PRE: 1.82 L (ref 1.72–3.04)
VC REF: 2.37
VC ULN: 3.04

## 2022-09-12 PROCEDURE — 1101F PT FALLS ASSESS-DOCD LE1/YR: CPT | Mod: CPTII,NTX,S$GLB, | Performed by: INTERNAL MEDICINE

## 2022-09-12 PROCEDURE — 1101F PR PT FALLS ASSESS DOC 0-1 FALLS W/OUT INJ PAST YR: ICD-10-PCS | Mod: CPTII,NTX,S$GLB, | Performed by: INTERNAL MEDICINE

## 2022-09-12 PROCEDURE — 3075F SYST BP GE 130 - 139MM HG: CPT | Mod: CPTII,NTX,S$GLB, | Performed by: INTERNAL MEDICINE

## 2022-09-12 PROCEDURE — 36415 COLL VENOUS BLD VENIPUNCTURE: CPT | Mod: NTX | Performed by: INTERNAL MEDICINE

## 2022-09-12 PROCEDURE — 94010 BREATHING CAPACITY TEST: CPT | Mod: NTX,S$GLB,, | Performed by: INTERNAL MEDICINE

## 2022-09-12 PROCEDURE — 94010 BREATHING CAPACITY TEST: ICD-10-PCS | Mod: NTX,S$GLB,, | Performed by: INTERNAL MEDICINE

## 2022-09-12 PROCEDURE — 3078F PR MOST RECENT DIASTOLIC BLOOD PRESSURE < 80 MM HG: ICD-10-PCS | Mod: CPTII,NTX,S$GLB, | Performed by: INTERNAL MEDICINE

## 2022-09-12 PROCEDURE — 99215 OFFICE O/P EST HI 40 MIN: CPT | Mod: NTX,S$GLB,, | Performed by: INTERNAL MEDICINE

## 2022-09-12 PROCEDURE — 1160F RVW MEDS BY RX/DR IN RCRD: CPT | Mod: CPTII,NTX,S$GLB, | Performed by: INTERNAL MEDICINE

## 2022-09-12 PROCEDURE — 3075F PR MOST RECENT SYSTOLIC BLOOD PRESS GE 130-139MM HG: ICD-10-PCS | Mod: CPTII,NTX,S$GLB, | Performed by: INTERNAL MEDICINE

## 2022-09-12 PROCEDURE — 1125F AMNT PAIN NOTED PAIN PRSNT: CPT | Mod: CPTII,NTX,S$GLB, | Performed by: INTERNAL MEDICINE

## 2022-09-12 PROCEDURE — 94726 PLETHYSMOGRAPHY LUNG VOLUMES: CPT | Mod: NTX,S$GLB,, | Performed by: INTERNAL MEDICINE

## 2022-09-12 PROCEDURE — 80053 COMPREHEN METABOLIC PANEL: CPT | Mod: TXP | Performed by: INTERNAL MEDICINE

## 2022-09-12 PROCEDURE — 94618 PULMONARY STRESS TESTING: CPT | Mod: NTX,S$GLB,, | Performed by: INTERNAL MEDICINE

## 2022-09-12 PROCEDURE — 94729 PR C02/MEMBANE DIFFUSE CAPACITY: ICD-10-PCS | Mod: NTX,S$GLB,, | Performed by: INTERNAL MEDICINE

## 2022-09-12 PROCEDURE — 94618 PULMONARY STRESS TESTING: ICD-10-PCS | Mod: NTX,S$GLB,, | Performed by: INTERNAL MEDICINE

## 2022-09-12 PROCEDURE — 3008F PR BODY MASS INDEX (BMI) DOCUMENTED: ICD-10-PCS | Mod: CPTII,NTX,S$GLB, | Performed by: INTERNAL MEDICINE

## 2022-09-12 PROCEDURE — 1159F PR MEDICATION LIST DOCUMENTED IN MEDICAL RECORD: ICD-10-PCS | Mod: CPTII,NTX,S$GLB, | Performed by: INTERNAL MEDICINE

## 2022-09-12 PROCEDURE — 3078F DIAST BP <80 MM HG: CPT | Mod: CPTII,NTX,S$GLB, | Performed by: INTERNAL MEDICINE

## 2022-09-12 PROCEDURE — 3288F FALL RISK ASSESSMENT DOCD: CPT | Mod: CPTII,NTX,S$GLB, | Performed by: INTERNAL MEDICINE

## 2022-09-12 PROCEDURE — 94729 DIFFUSING CAPACITY: CPT | Mod: NTX,S$GLB,, | Performed by: INTERNAL MEDICINE

## 2022-09-12 PROCEDURE — 3288F PR FALLS RISK ASSESSMENT DOCUMENTED: ICD-10-PCS | Mod: CPTII,NTX,S$GLB, | Performed by: INTERNAL MEDICINE

## 2022-09-12 PROCEDURE — 99999 PR PBB SHADOW E&M-EST. PATIENT-LVL V: CPT | Mod: PBBFAC,TXP,, | Performed by: INTERNAL MEDICINE

## 2022-09-12 PROCEDURE — 1159F MED LIST DOCD IN RCRD: CPT | Mod: CPTII,NTX,S$GLB, | Performed by: INTERNAL MEDICINE

## 2022-09-12 PROCEDURE — 3008F BODY MASS INDEX DOCD: CPT | Mod: CPTII,NTX,S$GLB, | Performed by: INTERNAL MEDICINE

## 2022-09-12 PROCEDURE — 94726 PULM FUNCT TST PLETHYSMOGRAP: ICD-10-PCS | Mod: NTX,S$GLB,, | Performed by: INTERNAL MEDICINE

## 2022-09-12 PROCEDURE — 1160F PR REVIEW ALL MEDS BY PRESCRIBER/CLIN PHARMACIST DOCUMENTED: ICD-10-PCS | Mod: CPTII,NTX,S$GLB, | Performed by: INTERNAL MEDICINE

## 2022-09-12 PROCEDURE — 99999 PR PBB SHADOW E&M-EST. PATIENT-LVL V: ICD-10-PCS | Mod: PBBFAC,TXP,, | Performed by: INTERNAL MEDICINE

## 2022-09-12 PROCEDURE — 99215 PR OFFICE/OUTPT VISIT, EST, LEVL V, 40-54 MIN: ICD-10-PCS | Mod: NTX,S$GLB,, | Performed by: INTERNAL MEDICINE

## 2022-09-12 PROCEDURE — 1125F PR PAIN SEVERITY QUANTIFIED, PAIN PRESENT: ICD-10-PCS | Mod: CPTII,NTX,S$GLB, | Performed by: INTERNAL MEDICINE

## 2022-09-12 NOTE — LETTER
September 13, 2022        Ibis Holcomb  [address]  [facility] [address]  Phone: [phone]  Fax: [phone]             [facility] 1st Fl  [address]  [facility] [address]  Phone: [phone]   Patient: Juan Cruz   MR Number: [id number]   YOB: 1955   Date of Visit: 9/12/2022       Dear Dr. Ibis Holcomb    Thank you for referring Juan Cruz to me for evaluation. Attached you will find relevant portions of my assessment and plan of care.    If you have questions, please do not hesitate to call me. I look forward to following Juan Cruz along with you.    Sincerely,    Barbara Haile, DO    Enclosure    If you would like to receive this communication electronically, please contact [email or web address] or [phone] to request [employer] Link access.    [employer] Link is a tool which provides read-only access to select patient information with whom you have a relationship. Its easy to use and provides real time access to review your patients record including encounter summaries, notes, results, and demographic information.    If you feel you have received this communication in error or would no longer like to receive these types of communications, please e-mail [email or web address]

## 2022-09-12 NOTE — PROGRESS NOTES
ADVANCED LUNG DISEASE FOLLOW-UP                                                                                                                                          Reason for Visit:  RA-ILD    Referring Physician: Ibis Holcomb    History of Present Illness: Juan Cruz is a 67 y.o. female who is on 0L of oxygen.  She is on no assisted ventilation.  Her New York Heart Association Class is III and a Karnofsky score of 60% - Requires occasional assistance but is able to care for needs. She is not diabetic.     She presents today with her  for follow-up and worsening symptoms over the past few days. Since her last visit with us, she completed a course of antibiotics and started on a prednisone taper and stopped MTX with concerns for MTX toxicity. She at that time endorsed rapid improvement and ultimately started on azathioprine for her RA and OFEV due to concerns of her rapid change in her imaging and clinical picture. She initially tolerated the changes well, felt significantly improved over the next few weeks. She continued to taper her prednisone without any change in her functional status, however labs showed a significant transaminitis and instructed to stop her OFEV which she did on 9/6/22. She then began noticing bruising along her thighs and pruritis and informed her rheumatologist, however ultimately unable to tolerate it and stopped azathioprine this past Friday. She initially felt not change, however since yesterday she has noticed worsening post nasal drip, cough, productive sputum, fatigue, and tachypnea that feels similar to before (albeit not as symptomatic). She is currently on prednisone 15mg daily. We discussed restarting azathioprine until she can see her Dr. Holcomb and she is hesitant due to the bruising and concerns of liver toxicity. She continues to take the trelegy inhalers, zyrtec, and nasal saline. Reviewed PFTs and 6MWT with her today as well as her  labwork.    Past Medical History:   Diagnosis Date    Chronic sinusitis, unspecified     History of SIADH     Mitral valve prolapse     Osteopenia     Rheumatoid arthritis involving multiple sites        Past Surgical History:   Procedure Laterality Date    BREAST BIOPSY Bilateral     FUNCTIONAL ENDOSCOPIC SINUS SURGERY (FESS)      ORIF WRIST FRACTURE Right        Allergies: Gluten protein and Ppd black rubber mix    Current Outpatient Medications   Medication Sig    B-complex with vitamin C (Z-BEC OR EQUIV) tablet Take 0.5 tablets by mouth 2 (two) times a day.    calcium citrate-vitamin D3 315-200 mg (CITRACAL+D) 315-200 mg-unit per tablet Take 1 tablet by mouth 2 (two) times daily.    cetirizine (ZYRTEC) 10 MG tablet cetirizine 10 mg tablet   TAKE 1 TABLET BY MOUTH ONCE DAILY    estradioL (VAGIFEM) 10 mcg Tab Yuvafem 10 mcg vaginal tablet   INSERT 1 TABLET VAGINALLY TWICE A WEEK    fluticasone/vilanterol (BREO ELLIPTA INHL) Inhale 1 puff into the lungs once daily.    folic acid (FOLVITE) 1 MG tablet Take 1 tablet (1 mg total) by mouth once daily.    magnesium oxide (MAG-OX) 400 mg (241.3 mg magnesium) tablet Take 400 mg by mouth once daily.    metoprolol succinate (TOPROL-XL) 25 MG 24 hr tablet Take 25 mg by mouth once daily.    nystatin (MYCOSTATIN) 100,000 unit/mL suspension Take 5 mLs (500,000 Units total) by mouth 4 (four) times daily. Swish and swallow 5mL (1 teaspoon) by mouth four times daily.    pantoprazole (PROTONIX) 40 MG tablet Take 40 mg by mouth once daily.    predniSONE (DELTASONE) 20 MG tablet Take 2 tablets (40 mg total) by mouth once daily.    sod chlor,sod bicarb/neti pot (NEILMED NASAFLO FRANK) 1 Dose by sinus irrigation route daily as needed.    acetaminophen (TYLENOL) 325 MG tablet Take 650 mg by mouth every 6 (six) hours as needed for Pain.    azaTHIOprine (IMURAN) 50 mg Tab Take 3 tablets (150 mg total) by mouth once daily. (Patient not taking: Reported on 9/12/2022)    diclofenac sodium  (VOLTAREN) 1 % Gel Apply 2 g topically 4 (four) times daily as needed.    fluticasone propionate (FLONASE) 50 mcg/actuation nasal spray 1 spray by Each Nostril route once daily.    fluticasone-umeclidin-vilanter (TRELEGY ELLIPTA) 100-62.5-25 mcg DsDv Inhale 1 puff into the lungs once daily.    guaiFENesin (MUCINEX) 600 mg 12 hr tablet Take 1,200 mg by mouth 2 (two) times daily.    ibuprofen (ADVIL,MOTRIN) 200 MG tablet Take 200 mg by mouth every 6 (six) hours as needed for Pain. None since June     No current facility-administered medications for this visit.       Immunization History   Administered Date(s) Administered    COVID-19, MRNA, LN-S, PF (Pfizer) (Purple Cap) 12/31/2020, 01/25/2021     Family History:  No family history on file.  Social History     Substance and Sexual Activity   Alcohol Use No      Social History     Substance and Sexual Activity   Drug Use No      Social History     Socioeconomic History    Marital status:    Tobacco Use    Smoking status: Never    Smokeless tobacco: Never   Substance and Sexual Activity    Alcohol use: No    Drug use: No    Sexual activity: Not Currently     Review of Systems   Constitutional:  Positive for malaise/fatigue. Negative for chills, diaphoresis, fever and weight loss.   HENT:  Positive for congestion. Negative for ear discharge, ear pain, hearing loss, nosebleeds, sinus pain, sore throat and tinnitus.    Eyes:  Negative for blurred vision, double vision, photophobia, pain, discharge and redness.   Respiratory:  Positive for cough, sputum production and shortness of breath. Negative for hemoptysis, wheezing and stridor.    Cardiovascular:  Negative for chest pain, palpitations, orthopnea, claudication, leg swelling and PND.   Gastrointestinal:  Negative for abdominal pain, blood in stool, constipation, diarrhea, heartburn, melena, nausea and vomiting.   Genitourinary:  Negative for dysuria, flank pain, frequency, hematuria and urgency.  "  Musculoskeletal:  Positive for joint pain. Negative for back pain, falls, myalgias and neck pain.   Skin:  Negative for itching and rash.   Neurological:  Negative for dizziness, tingling, tremors, sensory change, speech change, focal weakness, seizures, loss of consciousness, weakness and headaches.   Endo/Heme/Allergies:  Negative for environmental allergies and polydipsia. Bruises/bleeds easily.   Psychiatric/Behavioral:  Negative for depression, hallucinations, memory loss, substance abuse and suicidal ideas. The patient is not nervous/anxious and does not have insomnia.    Vitals  /76 (BP Location: Right arm, Patient Position: Sitting, BP Method: Medium (Automatic))   Pulse 99   Temp 98.9 °F (37.2 °C) (Oral)   Resp 20   Ht 5' 2" (1.575 m)   Wt 59.2 kg (130 lb 8.2 oz)   LMP  (LMP Unknown)   SpO2 96% Comment: room air  BMI 23.87 kg/m²   Physical Exam  Vitals and nursing note reviewed.   Constitutional:       General: She is not in acute distress.     Appearance: She is well-developed. She is not diaphoretic.   HENT:      Head: Normocephalic and atraumatic.      Nose: Rhinorrhea present.      Mouth/Throat:      Mouth: Mucous membranes are moist.      Pharynx: No oropharyngeal exudate.   Eyes:      General: No scleral icterus.     Conjunctiva/sclera: Conjunctivae normal.      Pupils: Pupils are equal, round, and reactive to light.   Neck:      Thyroid: No thyromegaly.      Vascular: No JVD.      Trachea: No tracheal deviation.   Cardiovascular:      Rate and Rhythm: Normal rate and regular rhythm.      Pulses: Normal pulses.      Heart sounds: Normal heart sounds. No murmur heard.    No friction rub. No gallop.   Pulmonary:      Effort: Pulmonary effort is normal. No respiratory distress.      Breath sounds: Rales (fine dry crackles bibasilar) present. No wheezing.   Abdominal:      General: Abdomen is flat. Bowel sounds are normal. There is no distension.      Palpations: Abdomen is soft.      " Tenderness: There is no abdominal tenderness.   Musculoskeletal:         General: No deformity. Normal range of motion.      Cervical back: Normal range of motion and neck supple.   Skin:     General: Skin is warm and dry.      Capillary Refill: Capillary refill takes less than 2 seconds.      Coloration: Skin is not pale.      Findings: Bruising present. No erythema or rash.   Neurological:      General: No focal deficit present.      Mental Status: She is alert and oriented to person, place, and time. Mental status is at baseline.      Cranial Nerves: No cranial nerve deficit.   Psychiatric:         Mood and Affect: Mood normal.         Behavior: Behavior normal.         Thought Content: Thought content normal.         Judgment: Judgment normal.       Labs:  Lab Visit on 09/12/2022   Component Date Value    Sodium 09/12/2022 140     Potassium 09/12/2022 4.9     Chloride 09/12/2022 104     CO2 09/12/2022 28     Glucose 09/12/2022 135 (H)     BUN 09/12/2022 9     Creatinine 09/12/2022 0.7     Calcium 09/12/2022 10.0     Total Protein 09/12/2022 7.5     Albumin 09/12/2022 3.5     Total Bilirubin 09/12/2022 0.6     Alkaline Phosphatase 09/12/2022 96     AST 09/12/2022 34     ALT 09/12/2022 109 (H)     Anion Gap 09/12/2022 8     eGFR 09/12/2022 >60.0    Hospital Outpatient Visit on 09/12/2022   Component Date Value    Physician Comment 09/12/2022                      Value:Spirometry is normal. Lung volumes show moderate restriction is present. Airway mechanics are abnormal showing increased airway resistance and decreased conductance. DLCO is moderately decreased.   Â   Notes:  Â       Pre FVC 09/12/2022 1.82     PRE FEV5 09/12/2022 1.39     Pre FEV1 09/12/2022 1.62     Pre FEV1 FVC 09/12/2022 88.72     Pre FEF 25 75 09/12/2022 2.69     Pre PEF 09/12/2022 3.90 (L)     Pre  09/12/2022 5.77     Pre DLCO 09/12/2022 8.69 (L)     DLCO ADJ PRE 09/12/2022 8.61 (L)     DLCOVA PRE 09/12/2022 3.63     DLVAAdj PRE  09/12/2022 3.60     VA PRE 09/12/2022 2.39 (L)     IVC PRE 09/12/2022 1.82     Pre TLC 09/12/2022 2.73 (L)     VC PRE 09/12/2022 1.82     PRE IC 09/12/2022 1.14     Pre FRC PL 09/12/2022 1.59 (L)     Pre ERV 09/12/2022 0.69     Pre RV 09/12/2022 0.91 (L)     RVTLC PRE 09/12/2022 33.25     Raw PRE 09/12/2022 5.53 (H)     sGaw PRE 09/12/2022 0.09 (L)     Pre VTG 09/12/2022 1.73     FVC Ref 09/12/2022 2.37     FVC LLN 09/12/2022 1.72     FVC Pre Ref 09/12/2022 77.0     FEV05 REF 09/12/2022 1.66     FEV05 LLN 09/12/2022 0.81     PRE FEV05 REF 09/12/2022 83.7     FEV1 Ref 09/12/2022 1.91     FEV1 LLN 09/12/2022 1.37     FEV1 Pre Ref 09/12/2022 84.6     FEV1 FVC Ref 09/12/2022 81     FEV1 FVC LLN 09/12/2022 70     FEV1 FVC Pre Ref 09/12/2022 109.4     FEF 25 75 Ref 09/12/2022 1.84     FEF 25 75 LLN 09/12/2022 0.92     FEF 25 75 Pre Ref 09/12/2022 146.4     PEF Ref 09/12/2022 5.57     PEF LLN 09/12/2022 4.09     PEF Pre Ref 09/12/2022 70.1     TLC Ref 09/12/2022 4.60     TLC LLN 09/12/2022 3.62     TLC ULN 09/12/2022 5.59     TLC Pre Ref 09/12/2022 59.4     VC Ref 09/12/2022 2.37     VC LLN 09/12/2022 1.72     VC ULN 09/12/2022 3.04     VC Pre Ref 09/12/2022 77.2     REF IC 09/12/2022 1.79     LLN IC 09/12/2022 -45534.21     ULN IC 09/12/2022 33441.79     PRE REF IC 09/12/2022 63.7     FRCpleth Ref 09/12/2022 2.59     FRCpleth LLN 09/12/2022 1.77     FRC PLETH ULN 09/12/2022 3.42     FRCpleth PreRef 09/12/2022 61.5     ERV Ref 09/12/2022 0.69     ERV LLN 09/12/2022 -67033.31     ERV ULN 09/12/2022 49895.69     ERV Pre Ref 09/12/2022 99.8     RV Ref 09/12/2022 1.91     RV LLN 09/12/2022 1.33     RV ULN 09/12/2022 2.48     RV Pre Ref 09/12/2022 47.7     RVTLC Ref 09/12/2022 41     RVTLC LLN 09/12/2022 32     RV TLC ULN 09/12/2022 51     RVTLC Pre Ref 09/12/2022 80.3     Raw Ref 09/12/2022 3.06     Raw Pre Ref 09/12/2022 180.9     sGaw Ref 09/12/2022 0.10     sGaw Pre Ref 09/12/2022 92.6     DLCO Single Breath Ref 09/12/2022  20.63     DLCO Single Breath LLN 09/12/2022 14.89     DLCO SINGLEBREATH ULN 09/12/2022 26.36     DLCO Single Breath Pre R* 09/12/2022 42.1     DLCOc Single Breath Ref 09/12/2022 20.63     DLCOc Single Breath LLN 09/12/2022 14.89     DLCOC SINGLEBREATH ULN 09/12/2022 26.36     DLCOc Single Breath Pre * 09/12/2022 41.7     DLCOVA Ref 09/12/2022 4.48     DLCOVA LLN 09/12/2022 2.91     DLCOVA ULN 09/12/2022 6.06     DLCOVA Pre Ref 09/12/2022 81.0     DLCOc SBVA Ref 09/12/2022 4.48     DLCOc SBVA LLN 09/12/2022 2.91     DLCOCSBVA ULN 09/12/2022 6.06     DLCOc SBVA Pre Ref 09/12/2022 80.3     VA Single Breath Ref 09/12/2022 4.45     VA Single Breath LLN 09/12/2022 4.45     VA SINGLEBREATH ULN 09/12/2022 4.45     VA Single Breath Pre Ref 09/12/2022 53.7     IVC Single Breath Ref 09/12/2022 2.37     IVC Single Breath LLN 09/12/2022 1.72     IVC SINGLEBREATH ULN 09/12/2022 3.04     IVC Single Breath Pre Ref 09/12/2022 77.2    Lab Visit on 09/09/2022   Component Date Value    Uric Acid 09/09/2022 3.8     Sodium 09/09/2022 138     Potassium 09/09/2022 3.8     Chloride 09/09/2022 101     CO2 09/09/2022 28     Glucose 09/09/2022 135 (H)     BUN 09/09/2022 7 (L)     Creatinine 09/09/2022 0.7     Calcium 09/09/2022 9.5     Total Protein 09/09/2022 6.9     Albumin 09/09/2022 3.0 (L)     Total Bilirubin 09/09/2022 1.1 (H)     Alkaline Phosphatase 09/09/2022 109     AST 09/09/2022 104 (H)     ALT 09/09/2022 187 (H)     Anion Gap 09/09/2022 9     eGFR 09/09/2022 >60     CRP 09/09/2022 8.5 (H)     Sed Rate 09/09/2022 37 (H)     WBC 09/09/2022 10.07     RBC 09/09/2022 4.35     Hemoglobin 09/09/2022 13.7     Hematocrit 09/09/2022 39.8     MCV 09/09/2022 92     MCH 09/09/2022 31.5 (H)     MCHC 09/09/2022 34.4     RDW 09/09/2022 14.8 (H)     Platelets 09/09/2022 290     MPV 09/09/2022 9.6     Immature Granulocytes 09/09/2022 1.3 (H)     Gran # (ANC) 09/09/2022 6.5     Immature Grans (Abs) 09/09/2022 0.13 (H)     Lymph # 09/09/2022 2.8      Mono # 09/09/2022 0.5     Eos # 09/09/2022 0.1     Baso # 09/09/2022 0.04     nRBC 09/09/2022 0     Gran % 09/09/2022 64.3     Lymph % 09/09/2022 28.0     Mono % 09/09/2022 4.8     Eosinophil % 09/09/2022 1.2     Basophil % 09/09/2022 0.4     Differential Method 09/09/2022 Automated     WBC 09/09/2022 10.07     RBC 09/09/2022 4.35     Hemoglobin 09/09/2022 13.7     Hematocrit 09/09/2022 39.8     MCV 09/09/2022 92     MCH 09/09/2022 31.5 (H)     MCHC 09/09/2022 34.4     RDW 09/09/2022 14.8 (H)     Platelets 09/09/2022 290     MPV 09/09/2022 9.6     Immature Granulocytes 09/09/2022 1.3 (H)     Gran # (ANC) 09/09/2022 6.5     Immature Grans (Abs) 09/09/2022 0.13 (H)     Lymph # 09/09/2022 2.8     Mono # 09/09/2022 0.5     Eos # 09/09/2022 0.1     Baso # 09/09/2022 0.04     nRBC 09/09/2022 0     Gran % 09/09/2022 64.3     Lymph % 09/09/2022 28.0     Mono % 09/09/2022 4.8     Eosinophil % 09/09/2022 1.2     Basophil % 09/09/2022 0.4     Differential Method 09/09/2022 Automated     aPTT 09/09/2022 23.2     Prothrombin Time 09/09/2022 9.4     INR 09/09/2022 0.9    Lab Visit on 09/06/2022   Component Date Value    Specimen UA 09/06/2022 Urine, Clean Catch     Color, UA 09/06/2022 Yellow     Appearance, UA 09/06/2022 Clear     pH, UA 09/06/2022 7.0     Specific Gravity, UA 09/06/2022 <=1.005 (A)     Protein, UA 09/06/2022 Negative     Glucose, UA 09/06/2022 Negative     Ketones, UA 09/06/2022 Negative     Bilirubin (UA) 09/06/2022 Negative     Occult Blood UA 09/06/2022 Negative     Nitrite, UA 09/06/2022 Negative     Urobilinogen, UA 09/06/2022 Negative     Leukocytes, UA 09/06/2022 Negative     Protein, Urine Random 09/06/2022 <7     Creatinine, Urine 09/06/2022 19.8     Prot/Creat Ratio, Urine 09/06/2022 Unable to calculate    Lab Visit on 09/06/2022   Component Date Value    WBC 09/06/2022 11.44     RBC 09/06/2022 4.46     Hemoglobin 09/06/2022 13.8     Hematocrit 09/06/2022 41.6     MCV 09/06/2022 93     MCH 09/06/2022  30.9     MCHC 09/06/2022 33.2     RDW 09/06/2022 14.3     Platelets 09/06/2022 279     MPV 09/06/2022 9.5     Immature Granulocytes 09/06/2022 0.8 (H)     Gran # (ANC) 09/06/2022 8.6 (H)     Immature Grans (Abs) 09/06/2022 0.09 (H)     Lymph # 09/06/2022 1.6     Mono # 09/06/2022 0.9     Eos # 09/06/2022 0.2     Baso # 09/06/2022 0.02     nRBC 09/06/2022 0     Gran % 09/06/2022 75.3 (H)     Lymph % 09/06/2022 14.1 (L)     Mono % 09/06/2022 8.0     Eosinophil % 09/06/2022 1.6     Basophil % 09/06/2022 0.2     Differential Method 09/06/2022 Automated     Sodium 09/06/2022 137     Potassium 09/06/2022 4.4     Chloride 09/06/2022 102     CO2 09/06/2022 23     Glucose 09/06/2022 102     BUN 09/06/2022 5 (L)     Creatinine 09/06/2022 0.6     Calcium 09/06/2022 9.3     Total Protein 09/06/2022 7.0     Albumin 09/06/2022 3.1 (L)     Total Bilirubin 09/06/2022 1.9 (H)     Alkaline Phosphatase 09/06/2022 97     AST 09/06/2022 139 (H)     ALT 09/06/2022 167 (H)     Anion Gap 09/06/2022 12     eGFR 09/06/2022 >60     Uric Acid 09/06/2022 3.7     Sed Rate 09/06/2022 35 (H)     CRP 09/06/2022 5.8        Pulmonary Function Tests 9/12/2022 6/7/2022   FVC 1.82 2.18   FEV1 1.62 1.9   TLC (liters) - 2.79   DLCO (ml/mmHg sec) - 8.17   FVC% 77 92   FEV1% 85 99   FEF 25-75 2.69 3.24   FEF 25-75% 146 175   TLC% - 61   RV - 0.61   RV% - 32   DLCO% - 40     6MW 9/12/2022 8/5/2022 6/7/2022   6MWT Status completed without stopping completed without stopping completed without stopping   Patient Reported Dyspnea No complaints Dyspnea   Was O2 used? No - -   6MW Distance walked (feet) 1300 1034 1210   Distance walked (meters) 396.24 315.16 368.81   Did patient stop? No No No   Oxygen Saturation 98 96 98   Supplemental Oxygen Room Air Room Air Room Air   Heart Rate 89 82 89   Blood Pressure 120/74 106/65 129/70   Rigoberto Dyspnea Rating  light somewhat heavy nothing at all   Oxygen Saturation 95 91 94   Supplemental Oxygen Room Air Room Air Room Air    Heart Rate 113 110 116   Blood Pressure 131/77 118/79 132/80   Rigoberto Dyspnea Rating  somewhat heavy very heavy light   Recovery Time (seconds) 72 180 115   Oxygen Saturation 98 96 98   Supplemental Oxygen Room Air Room Air Room Air   Heart Rate 97 86 93       Imaging:  Results for orders placed during the hospital encounter of 07/27/22    CT Chest Without Contrast    Narrative  EXAMINATION:  CT CHEST WITHOUT CONTRAST    CLINICAL HISTORY:  Interstitial lung disease; Cough, unspecified    TECHNIQUE:  Low dose axial images, sagittal and coronal reformations were obtained from the thoracic inlet to the lung bases.  Intravenous contrast was not administered.    COMPARISON:  CT thorax 06/28/2021    FINDINGS:  Base of Neck: Imaged portions of the base of the neck are grossly unremarkable.    Aorta: 3-branch vessel configuration of a left-sided type 3 aortic arch.  No hyperdense crescent sign, aneurysmal degeneration, periaortic fat stranding or periaortic fluid.    Heart: Heart is normal in size.  No significant pericardial thickening. No appreciable coronary artery calcifications.    Pulmonary vasculature: Pulmonary arteries are not enlarged and distribute normally.  There are four pulmonary veins.    Axilla/Alexandra/Mediastinum: Prominent mediastinal lymph nodes however, no rachael axillary or mediastinal lymphadenopathy.  Evaluation of hilar lymph nodes is limited without IV contrast.    Airways: Trachea and mainstem bronchi are patent.  Symmetric mild central bronchiectasis present.    Lungs/Pleura: Significant interval progression of previously noted subpleural predominant reticular and ground-glass airspace opacities associated with bilateral lower lobe volume loss, architectural distortion, symmetric mild central bronchiectasis and apicobasal gradient.  No pleural effusions.  No pneumothorax.    Esophagus: Small hiatal hernia, not significantly changed.  Otherwise, normal in course and caliber however, evaluation is  limited given the lack of oral contrast.    Soft tissues: Imaged soft tissues are grossly unremarkable.    Osseous structures: Diffuse osseous demineralization and mild multilevel degenerative changes of the imaged spine.  No acute displaced fracture, dislocation or suspicious lytic/blastic osseous lesion.    Upper Abdomen: Imaged portions of the upper abdomen are grossly unremarkable.    Impression  1. Significant interval progression of previously noted subpleural predominant reticular and ground-glass airspace opacities associated with bilateral lower lobe volume loss, architectural distortion, symmetric mild central bronchiectasis and apicobasal gradient suggestive of UIP pattern of interstitial lung disease which may reflect IPF.  Additional diagnostic considerations include NSIP, asbestosis, fibrotic hypersensitivity pneumonitis connective tissue associated lung disease and drug induced lung disease amongst other etiologies.      Electronically signed by: Braulio Haro  Date:    07/27/2022  Time:    16:11    Cardiodiagnostics:  6/28/2021:  The estimated ejection fraction is 55%.  Normal systolic function.  Normal right ventricular size with normal right ventricular systolic function.  Mild to moderate left atrial enlargement.  Mild right atrial enlargement.  Normal central venous pressure (3 mmHg).  The estimated PA systolic pressure is 18 mmHg.       Assessment:  1. Rheumatoid arthritis involving multiple sites with positive rheumatoid factor    2. Elevated liver enzymes    3. Interstitial lung disease    4. Long-term use of immunosuppressant medication    5. Petechiae    6. Bruise    7. Pulmonary fibrosis    8. Gastroesophageal reflux disease without esophagitis      Plan:   1. Followed for ILD. Since her last visit had stopped MTX, started prednisone due to concerns of drug toxicity and subjectively improved a substantial amount. She had been started on azathioprine and OFEV and continued to endorse  clinical improvement, however over the past few weeks noticed bruising and labs showed a transaminitis. OFEV was instructed to stop 9/6 and she ultimately could not tolerate the pruritis any further so stopped azathioprine 9/9. She now endorses worsening symptoms the past day: cough, dyspnea, tachypnea, post nasal drip.  - With her symptoms resolving so rapidly with drug cessation and prednisone, suspect this is from RA-ILD  - Discussed with her regarding restarting azathioprine until she can be transitioned to another agent such as rituximab, however she is resistant due to the side effects and her overall concerns. Will discuss with Dr. Holcomb regarding her care and other potential options  - For the time being, increase prednisone from 15mg to 20mg daily, will have her keep at that dose to see if it alleviates some symptoms.  - Continue f/u with ENT & Rheumatology; continue trelegy, nasal regimen, and PPI for GERD    2. Rheumatoid Arthritis. Follows with Rheumatology.  Previously on MTX, then azathioprine but reports that she did not tolerate. Increase prednisone to 20mg daily and will discuss with rheumatology to see if we can get her on another steroid-sparing agent.    3. Cough multifactorial: Post Nasal Drip, GERD. Continue allergy regimen, PPI.    4. Transaminitis. Likely 2/2 OFEV, has since stopped. 9/12 labs with improvement. Synthetic function normal and bilirubin was mildly elevated, will defer ultrasound unless clinically indicated. Will stop OFEV.     Clinic follow-up dependent on when we can get her initiated on another therapy and plan for follow-up imaging when we can get her on a more stable regimen; will likely be in the next 1-3 months.    Jaime Ramirez MD  [facility]    Attending Note:     I have seen and evaluated the patient with Dr. Ramirez. Their note reflects the content of our discussion and my plan of care.  Pt with RA-ILD and recent MTX induced lung  injury.  Had improved with steroids.  Has been tapering off while taking Aza and OFEV.  She developed drug induced liver injury from OFEV and transaminases/tbili have been trending down since discontinuation.  She also stopped taking Aza last week and does not wish to resume.  She prefers rituxan and I instructed her to reach out to Dr. Holcomb.      Follow-up in 1 month.      Barbara Haile D.O.  Pulmonary/Critical Care and Lung Transplantation  [facility] [place]

## 2022-09-12 NOTE — TELEPHONE ENCOUNTER
----- Message from Barbara Haile DO sent at 9/12/2022  4:24 PM CDT -----  LFTs improving back to baseline.

## 2022-09-13 ENCOUNTER — HOSPITAL ENCOUNTER (OUTPATIENT)
Dept: PULMONOLOGY | Facility: OTHER | Age: 67
Discharge: HOME | End: 2022-09-13
Attending: INTERNAL MEDICINE
Payer: MEDICARE

## 2022-09-13 LAB
ACID FAST MOD KINY STN SPEC: NORMAL
MYCOBACTERIUM SPEC QL CULT: NORMAL

## 2022-09-13 PROCEDURE — G0237 THERAPEUTIC PROCD STRG ENDUR: HCPCS | Mod: TXP

## 2022-09-13 PROCEDURE — G0238 OTH RESP PROC, INDIV: HCPCS | Mod: NTX

## 2022-09-13 NOTE — PROCEDURES
Juan Cruz is a 67 y.o.  female patient, who presents for a 6 minute walk test ordered by DO Lazara.  The diagnosis is Interstitial Lung Disease.  The patient's BMI is 23.9 kg/m2.  Predicted distance (lower limit of normal) is 344.08 meters.      Test Results:    The test was completed without stopping.  The total time walked was 360 seconds.  During walking, the patient reported:  Dyspnea.  The patient used no assistive devices during testing.     09/12/2022---------Distance: 396.24 meters (1300 feet)     O2 Sat % Supplemental Oxygen Heart Rate Blood Pressure Rigoberto Scale   Pre-exercise  (Resting) 98 % Room Air 89 bpm 120/74 mmHg 2   During Exercise 95 % Room Air 113 bpm 131/77 mmHg 4   Post-exercise  (Recovery) 98 % Room Air  97 bpm       Recovery Time: 72 seconds    Performing nurse/tech: Estopinal [id number]      PREVIOUS STUDY:   06/07/2022---------Distance: 368.81 meters (1210 feet)       O2 Sat % Supplemental Oxygen Heart Rate Blood Pressure Rigoberto Scale   Pre-exercise  (Resting) 98 % Room Air 89 bpm 129/70 mmHg 0   During Exercise 94 % Room Air 116 bpm 132/80 mmHg 2   Post-exercise  (Recovery) 98 % Room Air  93 bpm   mmHg        CLINICAL INTERPRETATION:  Six minute walk distance is 396.24 meters (1300 feet) with somewhat heavy dyspnea.  During exercise, there was no significant desaturation while breathing room air.  Blood pressure remained stable and Heart rate increased significantly with walking.  The patient did not report non-pulmonary symptoms during exercise.  Since the previous study in June 2022, exercise capacity is unchanged.  Based upon age and body mass index, exercise capacity is normal.

## 2022-09-15 ENCOUNTER — HOSPITAL ENCOUNTER (OUTPATIENT)
Dept: PULMONOLOGY | Facility: OTHER | Age: 67
Discharge: HOME | End: 2022-09-15
Attending: INTERNAL MEDICINE
Payer: MEDICARE

## 2022-09-15 ENCOUNTER — PATIENT MESSAGE (OUTPATIENT)
Dept: RHEUMATOLOGY | Facility: CLINIC | Age: 67
End: 2022-09-15
Payer: MEDICARE

## 2022-09-15 PROCEDURE — 27000221 HC OXYGEN, UP TO 24 HOURS: Mod: TXP

## 2022-09-15 PROCEDURE — G0238 OTH RESP PROC, INDIV: HCPCS | Mod: TXP

## 2022-09-15 PROCEDURE — G0237 THERAPEUTIC PROCD STRG ENDUR: HCPCS | Mod: NTX

## 2022-09-15 RX ORDER — HEPARIN 100 UNIT/ML
500 SYRINGE INTRAVENOUS
Status: CANCELLED | OUTPATIENT
Start: 2022-09-20

## 2022-09-15 RX ORDER — FAMOTIDINE 10 MG/ML
20 INJECTION INTRAVENOUS
Status: CANCELLED | OUTPATIENT
Start: 2022-09-20

## 2022-09-15 RX ORDER — SODIUM CHLORIDE 0.9 % (FLUSH) 0.9 %
10 SYRINGE (ML) INJECTION
Status: CANCELLED | OUTPATIENT
Start: 2022-09-20

## 2022-09-15 RX ORDER — ACETAMINOPHEN 325 MG/1
650 TABLET ORAL
Status: CANCELLED | OUTPATIENT
Start: 2022-09-20

## 2022-09-16 ENCOUNTER — OFFICE VISIT (OUTPATIENT)
Dept: PRIMARY CARE CLINIC | Facility: CLINIC | Age: 67
End: 2022-09-16
Payer: MEDICARE

## 2022-09-16 VITALS
BODY MASS INDEX: 24.07 KG/M2 | RESPIRATION RATE: 18 BRPM | HEART RATE: 97 BPM | SYSTOLIC BLOOD PRESSURE: 118 MMHG | HEIGHT: 62 IN | OXYGEN SATURATION: 97 % | WEIGHT: 130.81 LBS | DIASTOLIC BLOOD PRESSURE: 66 MMHG | TEMPERATURE: 98 F

## 2022-09-16 DIAGNOSIS — R06.09 DYSPNEA ON EXERTION: ICD-10-CM

## 2022-09-16 DIAGNOSIS — K21.9 GASTROESOPHAGEAL REFLUX DISEASE WITHOUT ESOPHAGITIS: ICD-10-CM

## 2022-09-16 DIAGNOSIS — R06.02 SHORTNESS OF BREATH: ICD-10-CM

## 2022-09-16 DIAGNOSIS — Z00.00 PREVENTATIVE HEALTH CARE: ICD-10-CM

## 2022-09-16 DIAGNOSIS — R00.0 SINUS TACHYCARDIA: ICD-10-CM

## 2022-09-16 DIAGNOSIS — M81.8 OTHER OSTEOPOROSIS WITHOUT CURRENT PATHOLOGICAL FRACTURE: Primary | ICD-10-CM

## 2022-09-16 DIAGNOSIS — M81.0 OSTEOPOROSIS WITHOUT CURRENT PATHOLOGICAL FRACTURE, UNSPECIFIED OSTEOPOROSIS TYPE: ICD-10-CM

## 2022-09-16 DIAGNOSIS — M05.79 RHEUMATOID ARTHRITIS INVOLVING MULTIPLE SITES WITH POSITIVE RHEUMATOID FACTOR: ICD-10-CM

## 2022-09-16 DIAGNOSIS — Z79.60 LONG-TERM USE OF IMMUNOSUPPRESSANT MEDICATION: ICD-10-CM

## 2022-09-16 DIAGNOSIS — J84.9 INTERSTITIAL LUNG DISEASE: ICD-10-CM

## 2022-09-16 PROCEDURE — 3074F SYST BP LT 130 MM HG: CPT | Mod: CPTII,NTX,S$GLB, | Performed by: INTERNAL MEDICINE

## 2022-09-16 PROCEDURE — 99499 RISK ADDL DX/OHS AUDIT: ICD-10-PCS | Mod: S$GLB,TXP,, | Performed by: INTERNAL MEDICINE

## 2022-09-16 PROCEDURE — 3078F DIAST BP <80 MM HG: CPT | Mod: CPTII,NTX,S$GLB, | Performed by: INTERNAL MEDICINE

## 2022-09-16 PROCEDURE — 1125F AMNT PAIN NOTED PAIN PRSNT: CPT | Mod: CPTII,NTX,S$GLB, | Performed by: INTERNAL MEDICINE

## 2022-09-16 PROCEDURE — 3074F PR MOST RECENT SYSTOLIC BLOOD PRESSURE < 130 MM HG: ICD-10-PCS | Mod: CPTII,NTX,S$GLB, | Performed by: INTERNAL MEDICINE

## 2022-09-16 PROCEDURE — 1101F PR PT FALLS ASSESS DOC 0-1 FALLS W/OUT INJ PAST YR: ICD-10-PCS | Mod: CPTII,NTX,S$GLB, | Performed by: INTERNAL MEDICINE

## 2022-09-16 PROCEDURE — 99499 UNLISTED E&M SERVICE: CPT | Mod: S$GLB,TXP,, | Performed by: INTERNAL MEDICINE

## 2022-09-16 PROCEDURE — 3078F PR MOST RECENT DIASTOLIC BLOOD PRESSURE < 80 MM HG: ICD-10-PCS | Mod: CPTII,NTX,S$GLB, | Performed by: INTERNAL MEDICINE

## 2022-09-16 PROCEDURE — 99999 PR PBB SHADOW E&M-EST. PATIENT-LVL IV: CPT | Mod: PBBFAC,TXP,, | Performed by: INTERNAL MEDICINE

## 2022-09-16 PROCEDURE — 1101F PT FALLS ASSESS-DOCD LE1/YR: CPT | Mod: CPTII,NTX,S$GLB, | Performed by: INTERNAL MEDICINE

## 2022-09-16 PROCEDURE — 3288F PR FALLS RISK ASSESSMENT DOCUMENTED: ICD-10-PCS | Mod: CPTII,NTX,S$GLB, | Performed by: INTERNAL MEDICINE

## 2022-09-16 PROCEDURE — 3008F BODY MASS INDEX DOCD: CPT | Mod: CPTII,NTX,S$GLB, | Performed by: INTERNAL MEDICINE

## 2022-09-16 PROCEDURE — 99999 PR PBB SHADOW E&M-EST. PATIENT-LVL IV: ICD-10-PCS | Mod: PBBFAC,TXP,, | Performed by: INTERNAL MEDICINE

## 2022-09-16 PROCEDURE — 1125F PR PAIN SEVERITY QUANTIFIED, PAIN PRESENT: ICD-10-PCS | Mod: CPTII,NTX,S$GLB, | Performed by: INTERNAL MEDICINE

## 2022-09-16 PROCEDURE — 99397 PER PM REEVAL EST PAT 65+ YR: CPT | Mod: GZ,NTX,S$GLB, | Performed by: INTERNAL MEDICINE

## 2022-09-16 PROCEDURE — 3008F PR BODY MASS INDEX (BMI) DOCUMENTED: ICD-10-PCS | Mod: CPTII,NTX,S$GLB, | Performed by: INTERNAL MEDICINE

## 2022-09-16 PROCEDURE — 3288F FALL RISK ASSESSMENT DOCD: CPT | Mod: CPTII,NTX,S$GLB, | Performed by: INTERNAL MEDICINE

## 2022-09-16 PROCEDURE — 99397 PR PREVENTIVE VISIT,EST,65 & OVER: ICD-10-PCS | Mod: GZ,NTX,S$GLB, | Performed by: INTERNAL MEDICINE

## 2022-09-16 NOTE — ASSESSMENT & PLAN NOTE
-cont Breo, pulm rehab and keep appt with [facility], Dr. Haile; pt now off of OFEV due to transaminitis with repeat on LFTs on 9/19/2022  -repeat TTE given ILD and hx hypoxemia  -monitor O2 sat and keep >90%

## 2022-09-16 NOTE — PROGRESS NOTES
[facility] Progress Note      Chief Complaint     Establish Care   for annual PE    Subjective:      History of Present Illness:  Juan Cruz is a 67 y.o. female     RA with ILD/pulmonary fibrosis confirmed by CT of chest 7/2022 and PFTS 9/2022 officially dx'ed by labs in 3/2022 by Dr. Holcomb; did not tolerate MTX due to SOB and hypoxemia due to ILD; Aug 15 on Azathioprine; was stopped off of OFEV for ILD as per pulmonary, Dr. Haile due to transamintiis on 9/6 with improved liver enzymes; off of Azathioprine due to elevated ALT down from 187 9/9 down 109 on 9/12 and repeat on 9/19; now on prednisone 20mg daily increased from 15 gm po daily this week due to SOB; now has joint pain of hands and feet; stopped working since March 2022 due to pain and stiffness of neck.     ILD/Pulm fibrosis:  Currently taking Breo which helps slightly but aluberol does not help.  Goes to Pulm rehab at [facility] biweely for the past 5 weeks through Dec 2022  O2 sat 95% with ambulation on Breo;    Pt sees Pulm, Dr. Haile; NYHA Class III with Karnofsky score 60% - requires assistance but able to care for her needs most recently in 9/2022 who agreed to stop OFEV on 9/06 due to transaminitis which has been trending downward.  She has been trying to relax with yoga breathing and has help from her brother-in-law who has moved in with her  and her to help out since she has BAKER with minimal activity.   Last ECHO 6/2021 with Dr. Sheehan.   PFTs: 9/12/2022:  normal; with mod restriction and mod decreased DLCO    Osteoporosis of LS -2.7 and left hip -1.8 by DEXA 5/2022 with Vit D def'y:  check Vit D level; start Prolia 60mg SQ bid; s/p Vit D injection 5/2022; did not tolerate bisphosphonates due to GERD ie Alendronate.     Immunocompromised:  COVID x 3; needs bivalent vaccine; needs Prevnar 20 - did have pneumovax and prevnar    Sinus Tachycardia: stable on Metoprolol Er 25 mg po daily with HR   and max 110 with exercise.    GERD: cont to take PPI on steroids     Past Medical History:  Past Medical History:   Diagnosis Date    Chronic sinusitis, unspecified     History of SIADH     Mitral valve prolapse     Osteopenia     Rheumatoid arthritis involving multiple sites        Past Surgical History:  Past Surgical History:   Procedure Laterality Date    BREAST BIOPSY Bilateral     FUNCTIONAL ENDOSCOPIC SINUS SURGERY (FESS)      ORIF WRIST FRACTURE Right        Allergies:  Review of patient's allergies indicates:   Allergen Reactions    Methotrexate Shortness Of Breath    Gluten protein Other (See Comments)     Sensitive - not allergic    Ppd black rubber mix        Home Medications:  Current Outpatient Medications   Medication Sig Dispense Refill    B-complex with vitamin C (Z-BEC OR EQUIV) tablet Take 0.5 tablets by mouth 2 (two) times a day.      calcium citrate-vitamin D3 315-200 mg (CITRACAL+D) 315-200 mg-unit per tablet Take 1 tablet by mouth 2 (two) times daily.      cetirizine (ZYRTEC) 10 MG tablet cetirizine 10 mg tablet   TAKE 1 TABLET BY MOUTH ONCE DAILY      diclofenac sodium (VOLTAREN) 1 % Gel Apply 2 g topically 4 (four) times daily as needed.      estradioL (VAGIFEM) 10 mcg Tab Yuvafem 10 mcg vaginal tablet   INSERT 1 TABLET VAGINALLY TWICE A WEEK      fluticasone propionate (FLONASE) 50 mcg/actuation nasal spray 1 spray by Each Nostril route once daily.      folic acid (FOLVITE) 1 MG tablet Take 1 tablet (1 mg total) by mouth once daily. 90 tablet 3    guaiFENesin (MUCINEX) 600 mg 12 hr tablet Take 1,200 mg by mouth 2 (two) times daily.      metoprolol succinate (TOPROL-XL) 25 MG 24 hr tablet Take 25 mg by mouth once daily.      nystatin (MYCOSTATIN) 100,000 unit/mL suspension Take 5 mLs (500,000 Units total) by mouth 4 (four) times daily. Swish and swallow 5mL (1 teaspoon) by mouth four times daily. 473 mL 0    pantoprazole (PROTONIX) 40 MG tablet Take 40 mg by mouth once daily.       "predniSONE (DELTASONE) 20 MG tablet Take 2 tablets (40 mg total) by mouth once daily. 60 tablet 2    sod chlor,sod bicarb/neti pot (NEILMED NASAFLO FRANK) 1 Dose by sinus irrigation route daily as needed.      acetaminophen (TYLENOL) 325 MG tablet Take 650 mg by mouth every 6 (six) hours as needed for Pain.      azaTHIOprine (IMURAN) 50 mg Tab Take 3 tablets (150 mg total) by mouth once daily. (Patient not taking: No sig reported) 90 tablet 1    denosumab (PROLIA) 60 mg/mL Syrg Inject 1 mL (60 mg total) into the skin every 6 (six) months. 2 mL 3    fluticasone/vilanterol (BREO ELLIPTA INHL) Inhale 1 puff into the lungs once daily.      magnesium oxide (MAG-OX) 400 mg (241.3 mg magnesium) tablet Take 400 mg by mouth once daily.       No current facility-administered medications for this visit.        Family History:  No family history on file.    Social History:  Social History     Tobacco Use    Smoking status: Never    Smokeless tobacco: Never   Substance Use Topics    Alcohol use: No    Drug use: No       Review of Systems:  Pertinent positives and negatives listed in HPI. All other systems are reviewed and are negative.    Health Maintaince :   Health Maintenance Topics with due status: Not Due       Topic Last Completion Date    DEXA Scan 05/06/2022           Eye: appt 10/2022  Dental: need to make    Immunizations:   Tdap: UTD in 2016.  Influenza: NONE due to reaction.  Pneumovax: UTD?  Shingrex x 2: UTD  Hepatitis C: neg  Cancer Screening:  PAP: UTD Dr. Glass 2021  Mammogram: UTD 2022   Colonoscopy: Cologuard 10/2021 negative  DEXA:  osteoporosis 5/2022 of LS  The ASCVD Risk score (Perkinston DK, et al., 2019) failed to calculate for the following reasons:    Cannot find a previous HDL lab    Cannot find a previous total cholesterol lab      Objective:   /66 (BP Location: Left arm, Patient Position: Sitting, BP Method: Small (Manual))   Pulse 97   Temp 98.1 °F (36.7 °C) (Oral)   Resp 18   Ht 5' 2" (1.575 " m)   Wt 59.3 kg (130 lb 12.8 oz)   LMP  (LMP Unknown)   SpO2 97%   BMI 23.92 kg/m²      Body mass index is 23.92 kg/m².       Physical Examination:  General: Alert and awake in no apparent distress  Head:  Normocephalic and atraumatic  Eyes:  PERRL; EOMi with anicteric sclera and clear conjunctivae  Mouth:  Oropharynx clear and without exudate; moist mucous membranes  Neck:   Cervical nodes not enlarged; supple; no bruits  Cardio:  Regular rate and rhythm with normal S1 and S2; no murmurs or rubs; crackles at the base  Resp:  CTAB; respirations unlabored; no wheezes, crackles or rhonchi  Abdom: Soft, NTND with normoactive bowel sounds  Extrem: Warm and well-perfused with no clubbing, cyanosis or edema; Jose and Heberdon nodes; right 5th metacarpal tendonitis   Skin:  No rashes, lesions, or color changes  Pulses:  2+ and symmetric distally  Neuro:  AAOx3; cooperative and pleasant with no focal deficits    Laboratory:      Most Recent Data:  Lab Results   Component Value Date    WBC 10.07 09/09/2022    WBC 10.07 09/09/2022    HGB 13.7 09/09/2022    HGB 13.7 09/09/2022    HCT 39.8 09/09/2022    HCT 39.8 09/09/2022     09/09/2022     09/09/2022     (H) 09/12/2022    AST 34 09/12/2022     09/12/2022    K 4.9 09/12/2022     09/12/2022    BUN 9 09/12/2022    CO2 28 09/12/2022    INR 0.9 09/09/2022              CBC:   WBC   Date Value Ref Range Status   09/09/2022 10.07 3.90 - 12.70 K/uL Final   09/09/2022 10.07 3.90 - 12.70 K/uL Final     Hemoglobin   Date Value Ref Range Status   09/09/2022 13.7 12.0 - 16.0 g/dL Final   09/09/2022 13.7 12.0 - 16.0 g/dL Final     Hematocrit   Date Value Ref Range Status   09/09/2022 39.8 37.0 - 48.5 % Final   09/09/2022 39.8 37.0 - 48.5 % Final     Platelets   Date Value Ref Range Status   09/09/2022 290 150 - 450 K/uL Final   09/09/2022 290 150 - 450 K/uL Final     MCV   Date Value Ref Range Status   09/09/2022 92 82 - 98 fL Final   09/09/2022 92  82 - 98 fL Final     RDW   Date Value Ref Range Status   09/09/2022 14.8 (H) 11.5 - 14.5 % Final   09/09/2022 14.8 (H) 11.5 - 14.5 % Final     BMP:   Sodium   Date Value Ref Range Status   09/12/2022 140 136 - 145 mmol/L Final     Potassium   Date Value Ref Range Status   09/12/2022 4.9 3.5 - 5.1 mmol/L Final     Chloride   Date Value Ref Range Status   09/12/2022 104 95 - 110 mmol/L Final     CO2   Date Value Ref Range Status   09/12/2022 28 23 - 29 mmol/L Final     BUN   Date Value Ref Range Status   09/12/2022 9 8 - 23 mg/dL Final     Creatinine   Date Value Ref Range Status   09/12/2022 0.7 0.5 - 1.4 mg/dL Final     Glucose   Date Value Ref Range Status   09/12/2022 135 (H) 70 - 110 mg/dL Final     Calcium   Date Value Ref Range Status   09/12/2022 10.0 8.7 - 10.5 mg/dL Final     LFTs:   Total Protein   Date Value Ref Range Status   09/12/2022 7.5 6.0 - 8.4 g/dL Final     Albumin   Date Value Ref Range Status   09/12/2022 3.5 3.5 - 5.2 g/dL Final     Total Bilirubin   Date Value Ref Range Status   09/12/2022 0.6 0.1 - 1.0 mg/dL Final     Comment:     For infants and newborns, interpretation of results should be based  on gestational age, weight and in agreement with clinical  observations.    Premature Infant recommended reference ranges:  Up to 24 hours.............<8.0 mg/dL  Up to 48 hours............<12.0 mg/dL  3-5 days..................<15.0 mg/dL  6-29 days.................<15.0 mg/dL       AST   Date Value Ref Range Status   09/12/2022 34 10 - 40 U/L Final     Alkaline Phosphatase   Date Value Ref Range Status   09/12/2022 96 55 - 135 U/L Final     ALT   Date Value Ref Range Status   09/12/2022 109 (H) 10 - 44 U/L Final     Coags:   INR   Date Value Ref Range Status   09/09/2022 0.9 0.8 - 1.2 Final     Comment:     Coumadin Therapy:  2.0 - 3.0 for INR for all indicators except mechanical heart valves  and antiphospholipid syndromes which should use 2.5 - 3.5.       FLP:    No results found for: CHOL  No  results found for: HDL  No results found for: LDLCALC  No results found for: TRIG  No results found for: CHOLHDL   DM:      Creatinine   Date Value Ref Range Status   09/12/2022 0.7 0.5 - 1.4 mg/dL Final     Thyroid: No results found for: TSH, FREET4, Z0VKEGN, C4UADHW, THYROIDAB  Anemia: No results found for: IRON, TIBC, FERRITIN, [id number], FOLATE  Cardiac: No results found for: TROPONINI, CKTOTAL, CKMB, BNP  Urinalysis:   Color, UA   Date Value Ref Range Status   09/06/2022 Yellow Yellow, Straw, Krystal Final     Specific Gravity, UA   Date Value Ref Range Status   09/06/2022 <=1.005 (A) 1.005 - 1.030 Final     Nitrite, UA   Date Value Ref Range Status   09/06/2022 Negative Negative Final     Ketones, UA   Date Value Ref Range Status   09/06/2022 Negative Negative Final     Urobilinogen, UA   Date Value Ref Range Status   09/06/2022 Negative <2.0 EU/dL Final       Other Laboratory Data:      Other Results:  EKG (my interpretation):   Radiology:  CT Chest Without Contrast  Narrative: EXAMINATION:  CT CHEST WITHOUT CONTRAST    CLINICAL HISTORY:  Interstitial lung disease; Cough, unspecified    TECHNIQUE:  Low dose axial images, sagittal and coronal reformations were obtained from the thoracic inlet to the lung bases.  Intravenous contrast was not administered.    COMPARISON:  CT thorax 06/28/2021    FINDINGS:  Base of Neck: Imaged portions of the base of the neck are grossly unremarkable.    Aorta: 3-branch vessel configuration of a left-sided type 3 aortic arch.  No hyperdense crescent sign, aneurysmal degeneration, periaortic fat stranding or periaortic fluid.    Heart: Heart is normal in size.  No significant pericardial thickening. No appreciable coronary artery calcifications.    Pulmonary vasculature: Pulmonary arteries are not enlarged and distribute normally.  There are four pulmonary veins.    Axilla/Alexandra/Mediastinum: Prominent mediastinal lymph nodes however, no rachael axillary or mediastinal  lymphadenopathy.  Evaluation of hilar lymph nodes is limited without IV contrast.    Airways: Trachea and mainstem bronchi are patent.  Symmetric mild central bronchiectasis present.    Lungs/Pleura: Significant interval progression of previously noted subpleural predominant reticular and ground-glass airspace opacities associated with bilateral lower lobe volume loss, architectural distortion, symmetric mild central bronchiectasis and apicobasal gradient.  No pleural effusions.  No pneumothorax.    Esophagus: Small hiatal hernia, not significantly changed.  Otherwise, normal in course and caliber however, evaluation is limited given the lack of oral contrast.    Soft tissues: Imaged soft tissues are grossly unremarkable.    Osseous structures: Diffuse osseous demineralization and mild multilevel degenerative changes of the imaged spine.  No acute displaced fracture, dislocation or suspicious lytic/blastic osseous lesion.    Upper Abdomen: Imaged portions of the upper abdomen are grossly unremarkable.  Impression: 1. Significant interval progression of previously noted subpleural predominant reticular and ground-glass airspace opacities associated with bilateral lower lobe volume loss, architectural distortion, symmetric mild central bronchiectasis and apicobasal gradient suggestive of UIP pattern of interstitial lung disease which may reflect IPF.  Additional diagnostic considerations include NSIP, asbestosis, fibrotic hypersensitivity pneumonitis connective tissue associated lung disease and drug induced lung disease amongst other etiologies.    Electronically signed by: Braulio Haro  Date:    07/27/2022  Time:    16:11          Assessment/Plan     Juan Benny Cruz is a 67 y.o. female with:  1. Other osteoporosis without current pathological fracture  Assessment & Plan:  -start Prolia 60mg SQ bid  -s/p Vit D injection on 5/2022; check level now    Orders:  -     Vitamin D    2. Osteoporosis without current  pathological fracture, unspecified osteoporosis type  -     denosumab (PROLIA) 60 mg/mL Syrg  -     Vitamin D    3. Preventative health care  Assessment & Plan:  -get COVID #4; check pneumococcal immunizations  -check Vit D level and FLP    Orders:  -     Vitamin D    4. Rheumatoid arthritis involving multiple sites with positive rheumatoid factor  Assessment & Plan:  -hope to start Rituximab soon  -check FLP   -get routine labs ESR, CRP, CBC, CMP on 9/19  -cont Pulm Rehab    Orders:  -     Lipid Panel  -     Echo  -     Vitamin D    5. Interstitial lung disease  Assessment & Plan:  -cont Breo, pulm rehab and keep appt with Pulm, Dr. Haile; pt now off of OFEV due to transaminitis with repeat on LFTs on 9/19/2022  -repeat TTE given ILD and hx hypoxemia  -monitor O2 sat and keep >90%      Orders:  -     Echo  -     Ambulatory referral/consult to Cardiology    6. Shortness of breath  -     Vitamin D    7. Essential (primary) hypertension  -     Lipid Panel  -     Vitamin D    8. Dyspnea on exertion  Assessment & Plan:  -refer back to Dr. Russell  -get TTE given ILD    Orders:  -     Vitamin D    9. Long-term use of immunosuppressant medication  Assessment & Plan:  -check pneumococcal status  -get COVID #4      Orders:  -     Vitamin D    10. Sinus tachycardia  Assessment & Plan:  -cont Metoprolol ER 25mg po daily and keep HR <100 BPM    Orders:  -     Vitamin D    11. Gastroesophageal reflux disease without esophagitis  Assessment & Plan:  -cont PPI while on sterroids    Orders:  -     Vitamin D           I spent a total of 45 minutes on the day of the visit.This includes face to face time and non-face to face time preparing to see the patient (eg, review of tests), obtaining and/or reviewing separately obtained history, documenting clinical information in the electronic or other health record, independently interpreting results and communicating results to the patient/family/caregiver, or care coordinator.   Code  Status:     Gianna Carpenter MD

## 2022-09-16 NOTE — ASSESSMENT & PLAN NOTE
-hope to start Rituximab soon  -check FLP   -get routine labs ESR, CRP, CBC, CMP on 9/19  -cont Pulm Rehab

## 2022-09-17 ENCOUNTER — PATIENT MESSAGE (OUTPATIENT)
Dept: RHEUMATOLOGY | Facility: CLINIC | Age: 67
End: 2022-09-17
Payer: MEDICARE

## 2022-09-19 ENCOUNTER — LAB VISIT (OUTPATIENT)
Dept: LAB | Facility: HOSPITAL | Age: 67
End: 2022-09-19
Attending: INTERNAL MEDICINE
Payer: MEDICARE

## 2022-09-19 ENCOUNTER — TELEPHONE (OUTPATIENT)
Dept: TRANSPLANT | Facility: CLINIC | Age: 67
End: 2022-09-19
Payer: MEDICARE

## 2022-09-19 DIAGNOSIS — K21.9 GASTROESOPHAGEAL REFLUX DISEASE WITHOUT ESOPHAGITIS: ICD-10-CM

## 2022-09-19 DIAGNOSIS — T14.8XXA BRUISE: ICD-10-CM

## 2022-09-19 DIAGNOSIS — M81.8 OTHER OSTEOPOROSIS WITHOUT CURRENT PATHOLOGICAL FRACTURE: ICD-10-CM

## 2022-09-19 DIAGNOSIS — M10.9 PODAGRA: ICD-10-CM

## 2022-09-19 DIAGNOSIS — I10 ESSENTIAL (PRIMARY) HYPERTENSION: ICD-10-CM

## 2022-09-19 DIAGNOSIS — R00.0 SINUS TACHYCARDIA: ICD-10-CM

## 2022-09-19 DIAGNOSIS — Z00.00 PREVENTATIVE HEALTH CARE: ICD-10-CM

## 2022-09-19 DIAGNOSIS — M05.79 RHEUMATOID ARTHRITIS INVOLVING MULTIPLE SITES WITH POSITIVE RHEUMATOID FACTOR: ICD-10-CM

## 2022-09-19 DIAGNOSIS — Z79.60 LONG-TERM USE OF IMMUNOSUPPRESSANT MEDICATION: ICD-10-CM

## 2022-09-19 DIAGNOSIS — M81.0 OSTEOPOROSIS WITHOUT CURRENT PATHOLOGICAL FRACTURE, UNSPECIFIED OSTEOPOROSIS TYPE: ICD-10-CM

## 2022-09-19 DIAGNOSIS — R06.02 SHORTNESS OF BREATH: ICD-10-CM

## 2022-09-19 DIAGNOSIS — R23.3 PETECHIAE: ICD-10-CM

## 2022-09-19 DIAGNOSIS — J84.9 INTERSTITIAL LUNG DISEASE: ICD-10-CM

## 2022-09-19 DIAGNOSIS — Z79.899 HIGH RISK MEDICATION USE: ICD-10-CM

## 2022-09-19 DIAGNOSIS — R06.09 DYSPNEA ON EXERTION: ICD-10-CM

## 2022-09-19 LAB
25(OH)D3+25(OH)D2 SERPL-MCNC: 61 NG/ML (ref 30–96)
ALBUMIN SERPL BCP-MCNC: 3.1 G/DL (ref 3.5–5.2)
ALP SERPL-CCNC: 59 U/L (ref 55–135)
ALT SERPL W/O P-5'-P-CCNC: 37 U/L (ref 10–44)
ANION GAP SERPL CALC-SCNC: 9 MMOL/L (ref 8–16)
AST SERPL-CCNC: 19 U/L (ref 10–40)
BASOPHILS # BLD AUTO: 0.04 K/UL (ref 0–0.2)
BASOPHILS NFR BLD: 0.4 % (ref 0–1.9)
BILIRUB SERPL-MCNC: 0.5 MG/DL (ref 0.1–1)
BUN SERPL-MCNC: 8 MG/DL (ref 8–23)
CALCIUM SERPL-MCNC: 9.5 MG/DL (ref 8.7–10.5)
CHLORIDE SERPL-SCNC: 106 MMOL/L (ref 95–110)
CHOLEST SERPL-MCNC: 238 MG/DL (ref 120–199)
CHOLEST/HDLC SERPL: 3.3 {RATIO} (ref 2–5)
CO2 SERPL-SCNC: 25 MMOL/L (ref 23–29)
CREAT SERPL-MCNC: 0.7 MG/DL (ref 0.5–1.4)
CRP SERPL-MCNC: 27.2 MG/L (ref 0–8.2)
DIFFERENTIAL METHOD: ABNORMAL
EOSINOPHIL # BLD AUTO: 0.2 K/UL (ref 0–0.5)
EOSINOPHIL NFR BLD: 1.5 % (ref 0–8)
ERYTHROCYTE [DISTWIDTH] IN BLOOD BY AUTOMATED COUNT: 13.8 % (ref 11.5–14.5)
ERYTHROCYTE [SEDIMENTATION RATE] IN BLOOD BY WESTERGREN METHOD: 51 MM/HR (ref 0–20)
EST. GFR  (NO RACE VARIABLE): >60 ML/MIN/1.73 M^2
GLUCOSE SERPL-MCNC: 87 MG/DL (ref 70–110)
HCT VFR BLD AUTO: 40 % (ref 37–48.5)
HDLC SERPL-MCNC: 73 MG/DL (ref 40–75)
HDLC SERPL: 30.7 % (ref 20–50)
HGB BLD-MCNC: 13.4 G/DL (ref 12–16)
IMM GRANULOCYTES # BLD AUTO: 0.12 K/UL (ref 0–0.04)
IMM GRANULOCYTES NFR BLD AUTO: 1.2 % (ref 0–0.5)
LDLC SERPL CALC-MCNC: 143.6 MG/DL (ref 63–159)
LYMPHOCYTES # BLD AUTO: 3.4 K/UL (ref 1–4.8)
LYMPHOCYTES NFR BLD: 32.8 % (ref 18–48)
MCH RBC QN AUTO: 30.9 PG (ref 27–31)
MCHC RBC AUTO-ENTMCNC: 33.5 G/DL (ref 32–36)
MCV RBC AUTO: 92 FL (ref 82–98)
MONOCYTES # BLD AUTO: 0.9 K/UL (ref 0.3–1)
MONOCYTES NFR BLD: 9 % (ref 4–15)
NEUTROPHILS # BLD AUTO: 5.7 K/UL (ref 1.8–7.7)
NEUTROPHILS NFR BLD: 55.1 % (ref 38–73)
NONHDLC SERPL-MCNC: 165 MG/DL
NRBC BLD-RTO: 0 /100 WBC
PLATELET # BLD AUTO: 384 K/UL (ref 150–450)
PMV BLD AUTO: 9.2 FL (ref 9.2–12.9)
POTASSIUM SERPL-SCNC: 4.3 MMOL/L (ref 3.5–5.1)
PROT SERPL-MCNC: 6.9 G/DL (ref 6–8.4)
RBC # BLD AUTO: 4.34 M/UL (ref 4–5.4)
SODIUM SERPL-SCNC: 140 MMOL/L (ref 136–145)
TRIGL SERPL-MCNC: 107 MG/DL (ref 30–150)
URATE SERPL-MCNC: 3.5 MG/DL (ref 2.4–5.7)
WBC # BLD AUTO: 10.28 K/UL (ref 3.9–12.7)

## 2022-09-19 PROCEDURE — 85652 RBC SED RATE AUTOMATED: CPT | Mod: TXP | Performed by: INTERNAL MEDICINE

## 2022-09-19 PROCEDURE — 80053 COMPREHEN METABOLIC PANEL: CPT | Mod: NTX | Performed by: INTERNAL MEDICINE

## 2022-09-19 PROCEDURE — 36415 COLL VENOUS BLD VENIPUNCTURE: CPT | Mod: NTX | Performed by: INTERNAL MEDICINE

## 2022-09-19 PROCEDURE — 82306 VITAMIN D 25 HYDROXY: CPT | Mod: NTX | Performed by: INTERNAL MEDICINE

## 2022-09-19 PROCEDURE — 80061 LIPID PANEL: CPT | Mod: NTX | Performed by: INTERNAL MEDICINE

## 2022-09-19 PROCEDURE — 86140 C-REACTIVE PROTEIN: CPT | Mod: NTX | Performed by: INTERNAL MEDICINE

## 2022-09-19 PROCEDURE — 85025 COMPLETE CBC W/AUTO DIFF WBC: CPT | Mod: TXP | Performed by: INTERNAL MEDICINE

## 2022-09-19 PROCEDURE — 84550 ASSAY OF BLOOD/URIC ACID: CPT | Mod: NTX | Performed by: INTERNAL MEDICINE

## 2022-09-19 NOTE — TELEPHONE ENCOUNTER
Patient notified via Zova message.  ----- Message from Barbara Haile DO sent at 9/19/2022  8:27 AM CDT -----  LFTs back to normal following discontinuation of OFEV

## 2022-09-20 ENCOUNTER — PATIENT MESSAGE (OUTPATIENT)
Dept: ADMINISTRATIVE | Facility: HOSPITAL | Age: 67
End: 2022-09-20
Payer: MEDICARE

## 2022-09-20 ENCOUNTER — HOSPITAL ENCOUNTER (OUTPATIENT)
Dept: PULMONOLOGY | Facility: OTHER | Age: 67
Discharge: HOME | End: 2022-09-20
Attending: INTERNAL MEDICINE
Payer: MEDICARE

## 2022-09-20 PROCEDURE — G0238 OTH RESP PROC, INDIV: HCPCS | Mod: NTX

## 2022-09-20 PROCEDURE — G0237 THERAPEUTIC PROCD STRG ENDUR: HCPCS | Mod: NTX

## 2022-09-21 ENCOUNTER — PATIENT MESSAGE (OUTPATIENT)
Dept: TRANSPLANT | Facility: CLINIC | Age: 67
End: 2022-09-21
Payer: MEDICARE

## 2022-09-22 ENCOUNTER — HOSPITAL ENCOUNTER (OUTPATIENT)
Dept: PULMONOLOGY | Facility: OTHER | Age: 67
Discharge: HOME | End: 2022-09-22
Attending: INTERNAL MEDICINE
Payer: MEDICARE

## 2022-09-22 PROCEDURE — G0237 THERAPEUTIC PROCD STRG ENDUR: HCPCS | Mod: NTX

## 2022-09-22 PROCEDURE — 41000056 HC PULMONARY REHAB - PHASE IV: Mod: NTX

## 2022-09-23 ENCOUNTER — PATIENT MESSAGE (OUTPATIENT)
Dept: RHEUMATOLOGY | Facility: CLINIC | Age: 67
End: 2022-09-23
Payer: MEDICARE

## 2022-09-23 ENCOUNTER — PATIENT MESSAGE (OUTPATIENT)
Dept: TRANSPLANT | Facility: CLINIC | Age: 67
End: 2022-09-23
Payer: MEDICARE

## 2022-09-26 ENCOUNTER — PATIENT MESSAGE (OUTPATIENT)
Dept: PRIMARY CARE CLINIC | Facility: CLINIC | Age: 67
End: 2022-09-26
Payer: MEDICARE

## 2022-09-26 ENCOUNTER — LAB VISIT (OUTPATIENT)
Dept: LAB | Facility: HOSPITAL | Age: 67
End: 2022-09-26
Attending: INTERNAL MEDICINE
Payer: MEDICARE

## 2022-09-26 DIAGNOSIS — M10.9 PODAGRA: ICD-10-CM

## 2022-09-26 DIAGNOSIS — J84.10 PULMONARY FIBROSIS: Primary | ICD-10-CM

## 2022-09-26 LAB
CRP SERPL-MCNC: 28.6 MG/L (ref 0–8.2)
ERYTHROCYTE [SEDIMENTATION RATE] IN BLOOD BY WESTERGREN METHOD: 54 MM/HR (ref 0–20)
URATE SERPL-MCNC: 4 MG/DL (ref 2.4–5.7)

## 2022-09-26 PROCEDURE — 85652 RBC SED RATE AUTOMATED: CPT | Mod: TXP | Performed by: INTERNAL MEDICINE

## 2022-09-26 PROCEDURE — 36415 COLL VENOUS BLD VENIPUNCTURE: CPT | Mod: TXP | Performed by: INTERNAL MEDICINE

## 2022-09-26 PROCEDURE — 84550 ASSAY OF BLOOD/URIC ACID: CPT | Mod: TXP | Performed by: INTERNAL MEDICINE

## 2022-09-26 PROCEDURE — 86140 C-REACTIVE PROTEIN: CPT | Mod: TXP | Performed by: INTERNAL MEDICINE

## 2022-09-26 RX ORDER — FLUTICASONE FUROATE AND VILANTEROL 200; 25 UG/1; UG/1
POWDER RESPIRATORY (INHALATION) DAILY
OUTPATIENT
Start: 2022-09-26

## 2022-09-27 ENCOUNTER — HOSPITAL ENCOUNTER (OUTPATIENT)
Dept: PULMONOLOGY | Facility: OTHER | Age: 67
Discharge: HOME | End: 2022-09-27
Attending: INTERNAL MEDICINE
Payer: MEDICARE

## 2022-09-27 PROCEDURE — G0238 OTH RESP PROC, INDIV: HCPCS | Mod: TXP

## 2022-09-27 PROCEDURE — G0237 THERAPEUTIC PROCD STRG ENDUR: HCPCS | Mod: TXP

## 2022-09-29 ENCOUNTER — PATIENT MESSAGE (OUTPATIENT)
Dept: TRANSPLANT | Facility: CLINIC | Age: 67
End: 2022-09-29
Payer: MEDICARE

## 2022-09-29 ENCOUNTER — TELEPHONE (OUTPATIENT)
Dept: TRANSPLANT | Facility: CLINIC | Age: 67
End: 2022-09-29
Payer: MEDICARE

## 2022-09-29 ENCOUNTER — HOSPITAL ENCOUNTER (OUTPATIENT)
Dept: CARDIOLOGY | Facility: HOSPITAL | Age: 67
Discharge: HOME | End: 2022-09-29
Attending: INTERNAL MEDICINE
Payer: MEDICARE

## 2022-09-29 ENCOUNTER — OFFICE VISIT (OUTPATIENT)
Dept: CARDIOLOGY | Facility: CLINIC | Age: 67
End: 2022-09-29
Payer: MEDICARE

## 2022-09-29 VITALS
HEIGHT: 62 IN | WEIGHT: 134.13 LBS | BODY MASS INDEX: 24.68 KG/M2 | HEART RATE: 87 BPM | DIASTOLIC BLOOD PRESSURE: 74 MMHG | SYSTOLIC BLOOD PRESSURE: 110 MMHG

## 2022-09-29 DIAGNOSIS — M05.79 RHEUMATOID ARTHRITIS INVOLVING MULTIPLE SITES WITH POSITIVE RHEUMATOID FACTOR: ICD-10-CM

## 2022-09-29 DIAGNOSIS — R06.09 DYSPNEA ON EXERTION: Primary | ICD-10-CM

## 2022-09-29 DIAGNOSIS — R06.02 SHORTNESS OF BREATH: ICD-10-CM

## 2022-09-29 DIAGNOSIS — J84.9 ILD (INTERSTITIAL LUNG DISEASE): ICD-10-CM

## 2022-09-29 DIAGNOSIS — J84.9 INTERSTITIAL LUNG DISEASE: ICD-10-CM

## 2022-09-29 LAB
AV INDEX (PROSTH): 0.93
AV MEAN GRADIENT: 4 MMHG
AV PEAK GRADIENT: 7 MMHG
AV VALVE AREA: 2.86 CM2
AV VELOCITY RATIO: 0.9
CV ECHO LV RWT: 0.64 CM
DOP CALC AO PEAK VEL: 1.34 M/S
DOP CALC AO VTI: 26.6 CM
DOP CALC LVOT AREA: 3.1 CM2
DOP CALC LVOT DIAMETER: 1.98 CM
DOP CALC LVOT PEAK VEL: 1.2 M/S
DOP CALC LVOT STROKE VOLUME: 76.01 CM3
DOP CALC MV VTI: 23.9 CM
DOP CALCLVOT PEAK VEL VTI: 24.7 CM
E WAVE DECELERATION TIME: 213.37 MSEC
E/A RATIO: 1.1
E/E' RATIO: 9.37 M/S
ECHO LV POSTERIOR WALL: 1.07 CM (ref 0.6–1.1)
EJECTION FRACTION: 55 %
FRACTIONAL SHORTENING: 30 % (ref 28–44)
INTERVENTRICULAR SEPTUM: 1.06 CM (ref 0.6–1.1)
IVC DIAMETER: 1.45 CM
IVRT: 87.54 MSEC
LA MAJOR: 3.71 CM
LA MINOR: 4.25 CM
LEFT ATRIUM SIZE: 2.88 CM
LEFT ATRIUM VOLUME MOD: 28.25 CM3
LEFT INTERNAL DIMENSION IN SYSTOLE: 2.34 CM (ref 2.1–4)
LEFT VENTRICLE DIASTOLIC VOLUME: 44.63 ML
LEFT VENTRICLE SYSTOLIC VOLUME: 18.86 ML
LEFT VENTRICULAR INTERNAL DIMENSION IN DIASTOLE: 3.32 CM (ref 3.5–6)
LEFT VENTRICULAR MASS: 104.84 G
LV LATERAL E/E' RATIO: 6.85 M/S
LV SEPTAL E/E' RATIO: 14.83 M/S
LVOT MG: 2.52 MMHG
LVOT MV: 0.73 CM/S
MV MEAN GRADIENT: 1 MMHG
MV PEAK A VEL: 0.81 M/S
MV PEAK E VEL: 0.89 M/S
MV PEAK GRADIENT: 3 MMHG
MV STENOSIS PRESSURE HALF TIME: 72.52 MS
MV VALVE AREA BY CONTINUITY EQUATION: 3.18 CM2
MV VALVE AREA P 1/2 METHOD: 3.03 CM2
PISA MRMAX VEL: 3.77 M/S
PISA TR MAX VEL: 2.63 M/S
PV MV: 0.7 M/S
PV PEAK VELOCITY: 1.08 CM/S
RA MAJOR: 4.02 CM
RA PRESSURE: 3 MMHG
RA WIDTH: 4.03 CM
RIGHT VENTRICULAR END-DIASTOLIC DIMENSION: 2.37 CM
TDI LATERAL: 0.13 M/S
TDI SEPTAL: 0.06 M/S
TDI: 0.1 M/S
TR MAX PG: 28 MMHG
TRICUSPID ANNULAR PLANE SYSTOLIC EXCURSION: 1.97 CM
TV REST PULMONARY ARTERY PRESSURE: 31 MMHG

## 2022-09-29 PROCEDURE — 3288F PR FALLS RISK ASSESSMENT DOCUMENTED: ICD-10-PCS | Mod: CPTII,NTX,S$GLB, | Performed by: INTERNAL MEDICINE

## 2022-09-29 PROCEDURE — 99214 OFFICE O/P EST MOD 30 MIN: CPT | Mod: 25,NTX,S$GLB, | Performed by: INTERNAL MEDICINE

## 2022-09-29 PROCEDURE — 1159F PR MEDICATION LIST DOCUMENTED IN MEDICAL RECORD: ICD-10-PCS | Mod: CPTII,NTX,S$GLB, | Performed by: INTERNAL MEDICINE

## 2022-09-29 PROCEDURE — 1101F PT FALLS ASSESS-DOCD LE1/YR: CPT | Mod: CPTII,NTX,S$GLB, | Performed by: INTERNAL MEDICINE

## 2022-09-29 PROCEDURE — 1101F PR PT FALLS ASSESS DOC 0-1 FALLS W/OUT INJ PAST YR: ICD-10-PCS | Mod: CPTII,NTX,S$GLB, | Performed by: INTERNAL MEDICINE

## 2022-09-29 PROCEDURE — 93306 TTE W/DOPPLER COMPLETE: CPT | Mod: 26,NTX,, | Performed by: INTERNAL MEDICINE

## 2022-09-29 PROCEDURE — 1159F MED LIST DOCD IN RCRD: CPT | Mod: CPTII,NTX,S$GLB, | Performed by: INTERNAL MEDICINE

## 2022-09-29 PROCEDURE — 1126F AMNT PAIN NOTED NONE PRSNT: CPT | Mod: CPTII,NTX,S$GLB, | Performed by: INTERNAL MEDICINE

## 2022-09-29 PROCEDURE — 93306 TTE W/DOPPLER COMPLETE: CPT | Mod: NTX

## 2022-09-29 PROCEDURE — 99999 PR PBB SHADOW E&M-EST. PATIENT-LVL V: CPT | Mod: PBBFAC,TXP,, | Performed by: INTERNAL MEDICINE

## 2022-09-29 PROCEDURE — 99214 PR OFFICE/OUTPT VISIT, EST, LEVL IV, 30-39 MIN: ICD-10-PCS | Mod: 25,NTX,S$GLB, | Performed by: INTERNAL MEDICINE

## 2022-09-29 PROCEDURE — 3008F PR BODY MASS INDEX (BMI) DOCUMENTED: ICD-10-PCS | Mod: CPTII,NTX,S$GLB, | Performed by: INTERNAL MEDICINE

## 2022-09-29 PROCEDURE — 3074F PR MOST RECENT SYSTOLIC BLOOD PRESSURE < 130 MM HG: ICD-10-PCS | Mod: CPTII,NTX,S$GLB, | Performed by: INTERNAL MEDICINE

## 2022-09-29 PROCEDURE — 1160F RVW MEDS BY RX/DR IN RCRD: CPT | Mod: CPTII,NTX,S$GLB, | Performed by: INTERNAL MEDICINE

## 2022-09-29 PROCEDURE — 3078F PR MOST RECENT DIASTOLIC BLOOD PRESSURE < 80 MM HG: ICD-10-PCS | Mod: CPTII,NTX,S$GLB, | Performed by: INTERNAL MEDICINE

## 2022-09-29 PROCEDURE — 1160F PR REVIEW ALL MEDS BY PRESCRIBER/CLIN PHARMACIST DOCUMENTED: ICD-10-PCS | Mod: CPTII,NTX,S$GLB, | Performed by: INTERNAL MEDICINE

## 2022-09-29 PROCEDURE — 99999 PR PBB SHADOW E&M-EST. PATIENT-LVL V: ICD-10-PCS | Mod: PBBFAC,TXP,, | Performed by: INTERNAL MEDICINE

## 2022-09-29 PROCEDURE — 93306 ECHO (CUPID ONLY): ICD-10-PCS | Mod: 26,NTX,, | Performed by: INTERNAL MEDICINE

## 2022-09-29 PROCEDURE — 1126F PR PAIN SEVERITY QUANTIFIED, NO PAIN PRESENT: ICD-10-PCS | Mod: CPTII,NTX,S$GLB, | Performed by: INTERNAL MEDICINE

## 2022-09-29 PROCEDURE — 3288F FALL RISK ASSESSMENT DOCD: CPT | Mod: CPTII,NTX,S$GLB, | Performed by: INTERNAL MEDICINE

## 2022-09-29 PROCEDURE — 3074F SYST BP LT 130 MM HG: CPT | Mod: CPTII,NTX,S$GLB, | Performed by: INTERNAL MEDICINE

## 2022-09-29 PROCEDURE — 3008F BODY MASS INDEX DOCD: CPT | Mod: CPTII,NTX,S$GLB, | Performed by: INTERNAL MEDICINE

## 2022-09-29 PROCEDURE — 3078F DIAST BP <80 MM HG: CPT | Mod: CPTII,NTX,S$GLB, | Performed by: INTERNAL MEDICINE

## 2022-09-29 NOTE — PROGRESS NOTES
[id number]     SUBJECTIVE:     History of Present Illness:  Patient is a 67 y.o. female presents for evaluation of shortness of breath. 7/22 was worse. Diagnosed with RA 3/22; lot of ortho issues prior to diagnosis of RA. CXR done in 7/22 showed bilateral pneumonia but later changed to diagnosis of RA fibrosis of the lungs. Treated with steroids and felt better.   Primary Diagnosis:   ILD  RA    ROS  A. 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 None with walking in the stores .  B. No palpitations of any concern  C. No chest pains  D. No syncope   Review of patient's allergies indicates:   Allergen Reactions    Methotrexate Shortness Of Breath    Gluten protein Other (See Comments)     Sensitive - not allergic    Ppd black rubber mix        Past Medical History:   Diagnosis Date    Chronic sinusitis, unspecified     History of SIADH     Mitral valve prolapse     Osteopenia     Rheumatoid arthritis involving multiple sites        Past Surgical History:   Procedure Laterality Date    BREAST BIOPSY Bilateral     FUNCTIONAL ENDOSCOPIC SINUS SURGERY (FESS)      ORIF WRIST FRACTURE Right        History reviewed. No pertinent family history.    Social History     Tobacco Use    Smoking status: Never    Smokeless tobacco: Never   Substance Use Topics    Alcohol use: No    Drug use: No        Past Hospitalization:     Home meds:  Current Outpatient Medications on File Prior to Visit   Medication Sig Dispense Refill    acetaminophen (TYLENOL) 325 MG tablet Take 650 mg by mouth every 6 (six) hours as needed for Pain.      azaTHIOprine (IMURAN) 50 mg Tab Take 3 tablets (150 mg total) by mouth once daily. 90 tablet 1    B-complex with vitamin C (Z-BEC OR EQUIV) tablet Take 0.5 tablets by mouth 2 (two) times a day.      calcium citrate-vitamin D3 315-200 mg (CITRACAL+D) 315-200 mg-unit per  tablet Take 1 tablet by mouth 2 (two) times daily.      cetirizine (ZYRTEC) 10 MG tablet cetirizine 10 mg tablet   TAKE 1 TABLET BY MOUTH ONCE DAILY      denosumab (PROLIA) 60 mg/mL Syrg Inject 1 mL (60 mg total) into the skin every 6 (six) months. 2 mL 3    diclofenac sodium (VOLTAREN) 1 % Gel Apply 2 g topically 4 (four) times daily as needed.      estradioL (VAGIFEM) 10 mcg Tab Yuvafem 10 mcg vaginal tablet   INSERT 1 TABLET VAGINALLY TWICE A WEEK      fluticasone propionate (FLONASE) 50 mcg/actuation nasal spray 1 spray by Each Nostril route once daily.      fluticasone/vilanterol (BREO ELLIPTA INHL) Inhale 1 puff into the lungs once daily.      folic acid (FOLVITE) 1 MG tablet Take 1 tablet (1 mg total) by mouth once daily. 90 tablet 3    guaiFENesin (MUCINEX) 600 mg 12 hr tablet Take 1,200 mg by mouth 2 (two) times daily.      magnesium oxide (MAG-OX) 400 mg (241.3 mg magnesium) tablet Take 400 mg by mouth once daily.      metoprolol succinate (TOPROL-XL) 25 MG 24 hr tablet Take 25 mg by mouth once daily.      nystatin (MYCOSTATIN) 100,000 unit/mL suspension Take 5 mLs (500,000 Units total) by mouth 4 (four) times daily. Swish and swallow 5mL (1 teaspoon) by mouth four times daily. 473 mL 0    pantoprazole (PROTONIX) 40 MG tablet Take 40 mg by mouth once daily.      predniSONE (DELTASONE) 20 MG tablet Take 2 tablets (40 mg total) by mouth once daily. 60 tablet 2    sod chlor,sod bicarb/neti pot (NEILMED NASAFLO FRANK) 1 Dose by sinus irrigation route daily as needed.       No current facility-administered medications on file prior to visit.       Cardiac meds:  Metoprolol succinate 25 mg         OBJECTIVE:     Vital Signs (Most Recent)  Pulse: 87 (09/29/22 1332)  BP: 110/74 (09/29/22 1332)      Physical Exam:    Neck: normal carotids, no bruits; normal JVP  Lungs basilar dry crackles   Heart: RR, normal S1,S2, no murmurs, no gallops; midsystolic click   Abd: no masses; no bruits;   Exts: normal DP and PT pulses  bilaterally, no edema noted             LABS      CBC  Hemoglobin (g/dL)   Date Value   09/19/2022 13.4   09/09/2022 13.7   09/09/2022 13.7     Hematocrit (%)   Date Value   09/19/2022 40.0   09/09/2022 39.8   09/09/2022 39.8          BMP  Sodium (mmol/L)   Date Value   09/19/2022 140   09/12/2022 140   09/09/2022 138     Potassium (mmol/L)   Date Value   09/19/2022 4.3   09/12/2022 4.9   09/09/2022 3.8     CO2 (mmol/L)   Date Value   09/19/2022 25   09/12/2022 28   09/09/2022 28     Chloride (mmol/L)   Date Value   09/19/2022 106   09/12/2022 104   09/09/2022 101     BUN (mg/dL)   Date Value   09/19/2022 8   09/12/2022 9   09/09/2022 7 (L)     Creatinine (mg/dL)   Date Value   09/19/2022 0.7   09/12/2022 0.7   09/09/2022 0.7     Glucose (mg/dL)   Date Value   09/19/2022 87   09/12/2022 135 (H)   09/09/2022 135 (H)     eGFR if non African American (mL/min/1.73 m^2)   Date Value   07/19/2022 >60   05/17/2022 >60   06/28/2021 >60       BNP  No results found for: BNP    Troponin panel:   No results found for: TROPONIN      COAGS  INR (no units)   Date Value   09/09/2022 0.9     aPTT (sec)   Date Value   09/09/2022 23.2         Lipid panel:    Lab Results   Component Value Date    CHOL 238 (H) 09/19/2022     Lab Results   Component Value Date    HDL 73 09/19/2022     Lab Results   Component Value Date    LDLCALC 143.6 09/19/2022     Lab Results   Component Value Date    TRIG 107 09/19/2022     Lab Results   Component Value Date    CHOLHDL 30.7 09/19/2022       Diagnostic Results:      Chart review:      EKGs: 2019: sinus; LAE   Echo: 6/21: normal EF; LAE, mild CHAPINCITO  Echo: 9/22: normal EF ; mild CHAPINCITO; will need to read and check   CT chest: 7/22: bilateral infiltrates worse left  ASSESSMENT/PLAN:     1. RA with interstitial infiltrates from this. Will need review of echocardiogram which clearly shows normal LV systolic function. Will check right side     Plan: review Echo  Sheila Sheehan MD

## 2022-09-29 NOTE — TELEPHONE ENCOUNTER
[facility] message to patient, notifying of plan for follow up once she has been on rheum prescribed meds.  ----- Message from Barbara Haile DO sent at 9/29/2022  8:42 AM CDT -----  Regarding: RE: follow up  I'd prefer to wait till she starts therapy.  She will be symptomatic and the rec will be the same.... start therapy.  But if she wants to come in October, I'm happy to see her and tell her that in person.  Thanks    ----- Message -----  From: Josiane Lind  Sent: 9/28/2022   4:24 PM CDT  To: Barbara Haile DO  Subject: follow up                                        In your last note on 9/12, you had said f/u in 1 month, once on multi-agent therapy. It appears she has not received rituximab from what I can see. Should we defer f/u or do you want to see her in mid October? Thank you.

## 2022-09-30 ENCOUNTER — HOSPITAL ENCOUNTER (OUTPATIENT)
Dept: PULMONOLOGY | Facility: OTHER | Age: 67
Discharge: HOME | End: 2022-09-30
Attending: INTERNAL MEDICINE
Payer: MEDICARE

## 2022-09-30 PROCEDURE — G0238 OTH RESP PROC, INDIV: HCPCS | Mod: NTX

## 2022-09-30 PROCEDURE — G0237 THERAPEUTIC PROCD STRG ENDUR: HCPCS | Mod: NTX

## 2022-10-03 ENCOUNTER — LAB VISIT (OUTPATIENT)
Dept: LAB | Facility: HOSPITAL | Age: 67
End: 2022-10-03
Attending: INTERNAL MEDICINE
Payer: MEDICARE

## 2022-10-03 DIAGNOSIS — R93.89 ABNORMAL X-RAY: ICD-10-CM

## 2022-10-03 DIAGNOSIS — J84.9 INTERSTITIAL LUNG DISEASE: Primary | ICD-10-CM

## 2022-10-03 DIAGNOSIS — R05.9 COUGH, UNSPECIFIED TYPE: ICD-10-CM

## 2022-10-03 DIAGNOSIS — Z79.52 LONG TERM (CURRENT) USE OF SYSTEMIC STEROIDS: ICD-10-CM

## 2022-10-03 DIAGNOSIS — M10.9 PODAGRA: ICD-10-CM

## 2022-10-03 DIAGNOSIS — Z79.60 LONG-TERM USE OF IMMUNOSUPPRESSANT MEDICATION: ICD-10-CM

## 2022-10-03 DIAGNOSIS — M05.79 RHEUMATOID ARTHRITIS INVOLVING MULTIPLE SITES WITH POSITIVE RHEUMATOID FACTOR: ICD-10-CM

## 2022-10-03 DIAGNOSIS — J84.9 INTERSTITIAL LUNG DISEASE: ICD-10-CM

## 2022-10-03 LAB
ALBUMIN SERPL BCP-MCNC: 3.1 G/DL (ref 3.5–5.2)
ALP SERPL-CCNC: 48 U/L (ref 55–135)
ALT SERPL W/O P-5'-P-CCNC: 22 U/L (ref 10–44)
ANION GAP SERPL CALC-SCNC: 8 MMOL/L (ref 8–16)
AST SERPL-CCNC: 15 U/L (ref 10–40)
BASOPHILS # BLD AUTO: 0.05 K/UL (ref 0–0.2)
BASOPHILS NFR BLD: 0.3 % (ref 0–1.9)
BILIRUB SERPL-MCNC: 0.2 MG/DL (ref 0.1–1)
BUN SERPL-MCNC: 9 MG/DL (ref 8–23)
CALCIUM SERPL-MCNC: 9.5 MG/DL (ref 8.7–10.5)
CHLORIDE SERPL-SCNC: 105 MMOL/L (ref 95–110)
CO2 SERPL-SCNC: 26 MMOL/L (ref 23–29)
CREAT SERPL-MCNC: 0.7 MG/DL (ref 0.5–1.4)
CRP SERPL-MCNC: 14.4 MG/L (ref 0–8.2)
DIFFERENTIAL METHOD: ABNORMAL
EOSINOPHIL # BLD AUTO: 0.2 K/UL (ref 0–0.5)
EOSINOPHIL NFR BLD: 1.3 % (ref 0–8)
ERYTHROCYTE [DISTWIDTH] IN BLOOD BY AUTOMATED COUNT: 13.2 % (ref 11.5–14.5)
ERYTHROCYTE [SEDIMENTATION RATE] IN BLOOD BY WESTERGREN METHOD: 48 MM/HR (ref 0–20)
EST. GFR  (NO RACE VARIABLE): >60 ML/MIN/1.73 M^2
GLUCOSE SERPL-MCNC: 119 MG/DL (ref 70–110)
HCT VFR BLD AUTO: 39.6 % (ref 37–48.5)
HGB BLD-MCNC: 12.9 G/DL (ref 12–16)
IMM GRANULOCYTES # BLD AUTO: 0.2 K/UL (ref 0–0.04)
IMM GRANULOCYTES NFR BLD AUTO: 1.3 % (ref 0–0.5)
LYMPHOCYTES # BLD AUTO: 3.8 K/UL (ref 1–4.8)
LYMPHOCYTES NFR BLD: 25.1 % (ref 18–48)
MCH RBC QN AUTO: 30.4 PG (ref 27–31)
MCHC RBC AUTO-ENTMCNC: 32.6 G/DL (ref 32–36)
MCV RBC AUTO: 93 FL (ref 82–98)
MONOCYTES # BLD AUTO: 1.3 K/UL (ref 0.3–1)
MONOCYTES NFR BLD: 8.3 % (ref 4–15)
NEUTROPHILS # BLD AUTO: 9.5 K/UL (ref 1.8–7.7)
NEUTROPHILS NFR BLD: 63.7 % (ref 38–73)
NRBC BLD-RTO: 0 /100 WBC
PLATELET # BLD AUTO: 315 K/UL (ref 150–450)
PMV BLD AUTO: 9.1 FL (ref 9.2–12.9)
POTASSIUM SERPL-SCNC: 4.1 MMOL/L (ref 3.5–5.1)
PROT SERPL-MCNC: 6.9 G/DL (ref 6–8.4)
RBC # BLD AUTO: 4.24 M/UL (ref 4–5.4)
SODIUM SERPL-SCNC: 139 MMOL/L (ref 136–145)
URATE SERPL-MCNC: 3.5 MG/DL (ref 2.4–5.7)
WBC # BLD AUTO: 14.99 K/UL (ref 3.9–12.7)

## 2022-10-03 PROCEDURE — 36415 COLL VENOUS BLD VENIPUNCTURE: CPT | Mod: TXP | Performed by: INTERNAL MEDICINE

## 2022-10-03 PROCEDURE — 85652 RBC SED RATE AUTOMATED: CPT | Mod: TXP | Performed by: INTERNAL MEDICINE

## 2022-10-03 PROCEDURE — 85025 COMPLETE CBC W/AUTO DIFF WBC: CPT | Mod: TXP | Performed by: INTERNAL MEDICINE

## 2022-10-03 PROCEDURE — 84550 ASSAY OF BLOOD/URIC ACID: CPT | Mod: NTX | Performed by: INTERNAL MEDICINE

## 2022-10-03 PROCEDURE — 80053 COMPREHEN METABOLIC PANEL: CPT | Mod: TXP | Performed by: INTERNAL MEDICINE

## 2022-10-03 PROCEDURE — 86140 C-REACTIVE PROTEIN: CPT | Mod: TXP | Performed by: INTERNAL MEDICINE

## 2022-10-03 RX ORDER — FLUTICASONE FUROATE AND VILANTEROL 200; 25 UG/1; UG/1
1 POWDER RESPIRATORY (INHALATION) DAILY
Qty: 60 EACH | Refills: 11 | Status: SHIPPED | OUTPATIENT
Start: 2022-10-03 | End: 2023-11-03 | Stop reason: SDUPTHER

## 2022-10-03 NOTE — TELEPHONE ENCOUNTER
Routed prescription per patient request. Per documentation with PCP patient request for Breo 200/25.

## 2022-10-04 ENCOUNTER — HOSPITAL ENCOUNTER (OUTPATIENT)
Dept: PULMONOLOGY | Facility: OTHER | Age: 67
Discharge: HOME | End: 2022-10-04
Attending: INTERNAL MEDICINE
Payer: MEDICARE

## 2022-10-04 PROCEDURE — G0238 OTH RESP PROC, INDIV: HCPCS | Mod: NTX

## 2022-10-04 PROCEDURE — G0237 THERAPEUTIC PROCD STRG ENDUR: HCPCS | Mod: TXP

## 2022-10-05 ENCOUNTER — PATIENT MESSAGE (OUTPATIENT)
Dept: RHEUMATOLOGY | Facility: CLINIC | Age: 67
End: 2022-10-05
Payer: MEDICARE

## 2022-10-06 ENCOUNTER — PATIENT MESSAGE (OUTPATIENT)
Dept: RHEUMATOLOGY | Facility: CLINIC | Age: 67
End: 2022-10-06
Payer: MEDICARE

## 2022-10-06 ENCOUNTER — PATIENT MESSAGE (OUTPATIENT)
Dept: TRANSPLANT | Facility: CLINIC | Age: 67
End: 2022-10-06
Payer: MEDICARE

## 2022-10-06 ENCOUNTER — HOSPITAL ENCOUNTER (OUTPATIENT)
Dept: PULMONOLOGY | Facility: OTHER | Age: 67
Discharge: HOME | End: 2022-10-06
Attending: INTERNAL MEDICINE
Payer: MEDICARE

## 2022-10-06 ENCOUNTER — TELEPHONE (OUTPATIENT)
Dept: RHEUMATOLOGY | Facility: CLINIC | Age: 67
End: 2022-10-06
Payer: MEDICARE

## 2022-10-06 PROCEDURE — G0237 THERAPEUTIC PROCD STRG ENDUR: HCPCS | Mod: NTX

## 2022-10-06 PROCEDURE — G0238 OTH RESP PROC, INDIV: HCPCS | Mod: TXP

## 2022-10-06 NOTE — TELEPHONE ENCOUNTER
Rutixan infusion not approved yet.  Spoke to prior authorization, and was told they   PA was in process.  Voice message left for patient, and also message sent via [employer].

## 2022-10-07 ENCOUNTER — TELEPHONE (OUTPATIENT)
Dept: RHEUMATOLOGY | Facility: CLINIC | Age: 67
End: 2022-10-07
Payer: MEDICARE

## 2022-10-10 ENCOUNTER — LAB VISIT (OUTPATIENT)
Dept: LAB | Facility: HOSPITAL | Age: 67
End: 2022-10-10
Attending: INTERNAL MEDICINE
Payer: MEDICARE

## 2022-10-10 DIAGNOSIS — M05.79 RHEUMATOID ARTHRITIS INVOLVING MULTIPLE SITES WITH POSITIVE RHEUMATOID FACTOR: ICD-10-CM

## 2022-10-10 DIAGNOSIS — J84.9 INTERSTITIAL LUNG DISEASE: ICD-10-CM

## 2022-10-10 DIAGNOSIS — J84.10 PULMONARY FIBROSIS: ICD-10-CM

## 2022-10-10 DIAGNOSIS — Z79.60 LONG-TERM USE OF IMMUNOSUPPRESSANT MEDICATION: ICD-10-CM

## 2022-10-10 LAB
ALBUMIN SERPL BCP-MCNC: 3.1 G/DL (ref 3.5–5.2)
ALP SERPL-CCNC: 46 U/L (ref 55–135)
ALT SERPL W/O P-5'-P-CCNC: 21 U/L (ref 10–44)
ANION GAP SERPL CALC-SCNC: 9 MMOL/L (ref 8–16)
AST SERPL-CCNC: 17 U/L (ref 10–40)
BASOPHILS # BLD AUTO: 0.04 K/UL (ref 0–0.2)
BASOPHILS NFR BLD: 0.3 % (ref 0–1.9)
BILIRUB SERPL-MCNC: 0.3 MG/DL (ref 0.1–1)
BUN SERPL-MCNC: 10 MG/DL (ref 8–23)
CALCIUM SERPL-MCNC: 9.3 MG/DL (ref 8.7–10.5)
CHLORIDE SERPL-SCNC: 106 MMOL/L (ref 95–110)
CO2 SERPL-SCNC: 26 MMOL/L (ref 23–29)
CREAT SERPL-MCNC: 0.7 MG/DL (ref 0.5–1.4)
DIFFERENTIAL METHOD: ABNORMAL
EOSINOPHIL # BLD AUTO: 0.1 K/UL (ref 0–0.5)
EOSINOPHIL NFR BLD: 0.8 % (ref 0–8)
ERYTHROCYTE [DISTWIDTH] IN BLOOD BY AUTOMATED COUNT: 13.2 % (ref 11.5–14.5)
EST. GFR  (NO RACE VARIABLE): >60 ML/MIN/1.73 M^2
GLUCOSE SERPL-MCNC: 112 MG/DL (ref 70–110)
HCT VFR BLD AUTO: 38.5 % (ref 37–48.5)
HGB BLD-MCNC: 12.8 G/DL (ref 12–16)
IMM GRANULOCYTES # BLD AUTO: 0.14 K/UL (ref 0–0.04)
IMM GRANULOCYTES NFR BLD AUTO: 0.9 % (ref 0–0.5)
LYMPHOCYTES # BLD AUTO: 3.7 K/UL (ref 1–4.8)
LYMPHOCYTES NFR BLD: 24.3 % (ref 18–48)
MCH RBC QN AUTO: 30.8 PG (ref 27–31)
MCHC RBC AUTO-ENTMCNC: 33.2 G/DL (ref 32–36)
MCV RBC AUTO: 93 FL (ref 82–98)
MONOCYTES # BLD AUTO: 1.3 K/UL (ref 0.3–1)
MONOCYTES NFR BLD: 8.2 % (ref 4–15)
NEUTROPHILS # BLD AUTO: 10 K/UL (ref 1.8–7.7)
NEUTROPHILS NFR BLD: 65.5 % (ref 38–73)
NRBC BLD-RTO: 0 /100 WBC
PLATELET # BLD AUTO: 305 K/UL (ref 150–450)
PMV BLD AUTO: 9.6 FL (ref 9.2–12.9)
POTASSIUM SERPL-SCNC: 4 MMOL/L (ref 3.5–5.1)
PROT SERPL-MCNC: 6.9 G/DL (ref 6–8.4)
RBC # BLD AUTO: 4.15 M/UL (ref 4–5.4)
SODIUM SERPL-SCNC: 141 MMOL/L (ref 136–145)
WBC # BLD AUTO: 15.3 K/UL (ref 3.9–12.7)

## 2022-10-10 PROCEDURE — 80053 COMPREHEN METABOLIC PANEL: CPT | Mod: TXP | Performed by: INTERNAL MEDICINE

## 2022-10-10 PROCEDURE — 85025 COMPLETE CBC W/AUTO DIFF WBC: CPT | Mod: NTX | Performed by: INTERNAL MEDICINE

## 2022-10-10 PROCEDURE — 36415 COLL VENOUS BLD VENIPUNCTURE: CPT | Mod: TXP | Performed by: INTERNAL MEDICINE

## 2022-10-11 ENCOUNTER — PATIENT MESSAGE (OUTPATIENT)
Dept: RHEUMATOLOGY | Facility: CLINIC | Age: 67
End: 2022-10-11
Payer: MEDICARE

## 2022-10-11 ENCOUNTER — HOSPITAL ENCOUNTER (OUTPATIENT)
Dept: PULMONOLOGY | Facility: OTHER | Age: 67
Discharge: HOME | End: 2022-10-11
Attending: INTERNAL MEDICINE
Payer: MEDICARE

## 2022-10-11 ENCOUNTER — TELEPHONE (OUTPATIENT)
Dept: RHEUMATOLOGY | Facility: CLINIC | Age: 67
End: 2022-10-11
Payer: MEDICARE

## 2022-10-11 PROCEDURE — G0238 OTH RESP PROC, INDIV: HCPCS | Mod: TXP

## 2022-10-11 PROCEDURE — G0237 THERAPEUTIC PROCD STRG ENDUR: HCPCS | Mod: TXP

## 2022-10-13 ENCOUNTER — HOSPITAL ENCOUNTER (OUTPATIENT)
Dept: PULMONOLOGY | Facility: OTHER | Age: 67
Discharge: HOME | End: 2022-10-13
Attending: INTERNAL MEDICINE
Payer: MEDICARE

## 2022-10-13 PROCEDURE — G0238 OTH RESP PROC, INDIV: HCPCS | Mod: NTX

## 2022-10-13 PROCEDURE — G0237 THERAPEUTIC PROCD STRG ENDUR: HCPCS | Mod: TXP

## 2022-10-20 ENCOUNTER — HOSPITAL ENCOUNTER (OUTPATIENT)
Dept: PULMONOLOGY | Facility: OTHER | Age: 67
Discharge: HOME | End: 2022-10-20
Attending: INTERNAL MEDICINE
Payer: MEDICARE

## 2022-10-20 PROCEDURE — G0238 OTH RESP PROC, INDIV: HCPCS | Mod: NTX

## 2022-10-20 PROCEDURE — G0237 THERAPEUTIC PROCD STRG ENDUR: HCPCS | Mod: NTX

## 2022-10-21 ENCOUNTER — HOSPITAL ENCOUNTER (OUTPATIENT)
Dept: PULMONOLOGY | Facility: OTHER | Age: 67
Discharge: HOME | End: 2022-10-21
Attending: INTERNAL MEDICINE
Payer: MEDICARE

## 2022-10-21 PROCEDURE — G0237 THERAPEUTIC PROCD STRG ENDUR: HCPCS | Mod: NTX

## 2022-10-21 PROCEDURE — G0238 OTH RESP PROC, INDIV: HCPCS | Mod: TXP

## 2022-10-23 ENCOUNTER — PATIENT MESSAGE (OUTPATIENT)
Dept: RHEUMATOLOGY | Facility: CLINIC | Age: 67
End: 2022-10-23
Payer: MEDICARE

## 2022-10-23 NOTE — PROGRESS NOTES
Subjective:     Patient ID: Juan Cruz is a 67 y.o. 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  PCP: Gianna Carpenter MD   Ortho: Minh Iglesias MD  HPI     67 yr old lady seen for the first time on 5/12/22 w sero+CCP+ non erosive RA and a chronic cough with an abnormal CXR & CT chest c/w pulmonary fibrosis (suggestive of UIP).  She was last seen on 8/10/22.  She has been intolerant of MTX & had worsening cough on it. She was on a very short course of azathiopine but stopped it due to concomitant abn LFTs (also on Ofev) &  her request for RTX.    She is currently on prednisone 20 mg/d & RTX--1st dose 10/24/22 (after issues with her insurance company). She tolerated it very well.   She is due for her 2nd dose on 11/7/22.    Has stiffness in AM that varies from minutes to up to 2 hours. No swollen joints but still c/o her tendon pain especially L foot, L wrist and neck.   Feels better after exercise. Has some dry eyes & mild dry mouth. Denies Raynaud's, tight skin, alopecia, oral ulcers, parotid gland swelling, pleurisy, pericarditis, photosensitivity, skin rashes, thromboses, muscle weakness; fevers, headaches, conjunctivitis, chronic or bloody diarrhea, vaginal/urethral D/C; paresthesias; LBP, thyroid issues;   1 miscarriage 4-6 weeks in 1997; has 1 daughter Csection;  Has FHx of RA & myelodysplasia (mom).    Pulmonary wise still coughing (productive of clear sputum) & SOB about the same.  Gets Pulmonary Rehab and feels her joints feel better due to the exercise.    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            PMH  She was seen again on 5/31/22 at which time MTX was added to her prednisone  for her joint pain, but she had intolerance to it (headaches, fatigue, anorexia, dysgeusia, sleep issues) but stayed on it until ~ 7/26 when she developed worsening cough and abnormal CT chest and it was stopped. CT Chest on 7/27 showed significant interval progression of subpleural predominant reticular and ground-glass airspace opacities associated with bilateral lower lobe volume loss, architectural distortion, symmetric mild central bronchiectasis and apicobasal gradient suggestive of UIP pattern of ILD which may reflect IPF.  Additional diagnostic considerations include NSIP, asbestosis, fibrotic hypersensitivity pneumonitis connective tissue associated lung disease and drug induced lung disease amongst other etiologies.    She was seen by Dr. Haile on 7/28 at which time due to progression of cough & SOB (& worsening CT chest) prednisone 40 mg/d was begun for presumed RA-ILD. Patient was to continue her antibiotic (copious sputum; cultures NTD) & Trelegy which was begun by AI for some PFT reversibility. The plan was to reassess & if there has been a response to add MMF. Ofev also to be added (has not gotten it yet).        5/31/22  She has contacted us several times since her initial visit c/o generalized weakness & pain, tinnitus; peeing a lot; hip & knee pain; leg edema and was asked to come in to see us again, but she presents prior to her w/u and appointment to see Dr. Haile on 6/7/22.    Today she has multiple complaints and feels that she is in severe pain and unable to function. She has AM stiffness lasting 4 hrs. She has difficulty getting in and out of bed, dressing herself, washing herself, etc. She is fatigued & is unable to sleep but denies depression & anxiety. She now volunteers she had been seen by Dr.Luis Bee in the past ~ 2007 for similar sxs & no autoimmune/rheum dx was made.     Today, she feels she has developed joint swelling since her last visit, especially in some MCPs, but  also c/o edema which improved after she stopped taking NSAIDs. She continue to c/o chronic neck pain.    She is currently taking prednisone 10 mg/d. She states she has bilateral shoulder pain & is unable to get OOB in AM. No GCA sxs. She is stiff x 4 hrs.     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  These appear to be incidental & unexpected findings as patient has very little joint swelling.  Patient feels that myalgias began after her last Covid vaccine.    In February got severe pain in R shoulder that responded to CSI but then developed excruciating spasms neck & around both shoulders. Labs gotten by Orthopedic (Dr. Iglesias);   In Jan 27,2022 had FESS for sinus surgery & needed bad infection and required more antibiotics. Developed rash on cheeks on levaquin. Same rash came back now again.    In March had only shoulder pain & pronating L wrist.  Now R knee, L ankle pain & swelling & R foot 3rd & 4th toes even at rest.  Also weak all over  Baclofen helps her spasms & stiffness    AM stiffness x 3-4 hrs (since ~ February, 2022);     .  Denies Raynaud's, tight skin, alopecia, oral ulcers, parotid gland swelling, pleurisy, pericarditis, photosensitivity, skin rashes, thromboses, muscle weakness; fevers, headaches, conjunctivitis, chronic or bloody diarrhea, vaginal/urethral D/C; paresthesias; LBP, thyroid issues;   1 miscarriage 4-6 weeks in 1997; has 1 daughter Csection;   Chronic cough x 1.5 yr after swallowing--fatigues & drains her  Has dry eyes > mouth  Has BAKER & st w eating comes & goes;   Saw Dr. Siegel & told no obstructive pulmonary disease but restrictive.  Has FHx of RA & myelodysplasia (mom)    Started prednisone 10 mg qod ~ end of March, 2022; Helped pain   But had adverse effects on higher steroids in past.  In 1999 NYLA was on decadron but developed myopathy & psychosis on it.  .    TMJ on L x 1 2/22 lasted  1-2days  Neck since 2/22 L>R  B shoulders L>R  Elbows no  Wrists L>R (flexi carpi ulnaris)  MCPs no but only 2nd R MCP  PIPs & DIP  Hip: no  GT no  Knee: R 1 month ago; since Monday both; just hurt; hurt all the time.  Ankles: L 1 month R since Monday --only swelling  Toes 3 & 4th on R; L of  Both heels hurt  Achilles bilateral.    Current Outpatient Medications   Medication Sig Dispense Refill    B-complex with vitamin C (Z-BEC OR EQUIV) tablet Take 0.5 tablets by mouth 2 (two) times a day.      calcium citrate-vitamin D3 315-200 mg (CITRACAL+D) 315-200 mg-unit per tablet Take 1 tablet by mouth 2 (two) times daily.      cetirizine (ZYRTEC) 10 MG tablet cetirizine 10 mg tablet   TAKE 1 TABLET BY MOUTH ONCE DAILY      denosumab (PROLIA) 60 mg/mL Syrg Inject 1 mL (60 mg total) into the skin every 6 (six) months. 2 mL 3    estradioL (VAGIFEM) 10 mcg Tab Yuvafem 10 mcg vaginal tablet   INSERT 1 TABLET VAGINALLY TWICE A WEEK      fluticasone/vilanterol (BREO ELLIPTA INHL) Inhale 1 puff into the lungs once daily.      folic acid (FOLVITE) 1 MG tablet Take 1 tablet (1 mg total) by mouth once daily. 90 tablet 3    guaiFENesin (MUCINEX) 600 mg 12 hr tablet Take 1,200 mg by mouth 2 (two) times daily.      magnesium oxide (MAG-OX) 400 mg (241.3 mg magnesium) tablet Take 400 mg by mouth once daily.      metoprolol succinate (TOPROL-XL) 25 MG 24 hr tablet Take 25 mg by mouth once daily.      pantoprazole (PROTONIX) 40 MG tablet Take 40 mg by mouth once daily.      predniSONE (DELTASONE) 20 MG tablet Take 2 tablets (40 mg total) by mouth once daily. 60 tablet 2    sod chlor,sod bicarb/neti pot (NEILMED NASAFLO FRANK) 1 Dose by sinus irrigation route daily as needed.      acetaminophen (TYLENOL) 325 MG tablet Take 650 mg by mouth every 6 (six) hours as needed for Pain.      diclofenac sodium (VOLTAREN) 1 % Gel Apply 2 g topically 4 (four) times daily as needed.      fluticasone propionate (FLONASE) 50 mcg/actuation nasal spray 1  spray by Each Nostril route once daily.       No current facility-administered medications for this visit.             Review of patient's allergies indicates:   Allergen Reactions    Methotrexate Shortness Of Breath    Gluten protein Other (See Comments)     Sensitive - not allergic    Ppd black rubber mix        Review of Systems   Constitutional:  Negative for fatigue, fever and unexpected weight change.   HENT:  Positive for hearing loss (R loss of hearing; following SIADH) and tinnitus. Negative for mouth sores, postnasal drip, sinus pain and trouble swallowing.    Eyes:  Positive for redness.   Respiratory:  Positive for cough (better) and shortness of breath. Negative for chest tightness.    Cardiovascular:  Negative for chest pain and palpitations (Has had MVP on metoprolol).   Gastrointestinal: Negative.  Negative for constipation and diarrhea.   Endocrine: Positive for cold intolerance.   Genitourinary:  Positive for enuresis. Negative for dysuria and genital sores.        Incontinence   Musculoskeletal:  Positive for arthralgias, myalgias, neck pain and neck stiffness. Negative for back pain and joint swelling.   Skin:  Negative for rash.   Neurological:  Positive for numbness. Negative for dizziness, seizures, syncope and headaches.   Hematological:  Does not bruise/bleed easily.   Psychiatric/Behavioral:  Positive for dysphoric mood and sleep disturbance (attributed in part to steroids.). The patient is not nervous/anxious.         Feels prednisone has made her irritable.     Past Medical History:   Diagnosis Date    Chronic sinusitis, unspecified     History of SIADH     Mitral valve prolapse     Osteopenia     Rheumatoid arthritis involving multiple sites        Past Surgical History:   Procedure Laterality Date    BREAST BIOPSY Bilateral     FUNCTIONAL ENDOSCOPIC SINUS SURGERY (FESS)      ORIF WRIST FRACTURE Right        FH:  M: dx 83 w RA; passed away on 85; also had some intestinal problems.  Had  "myelodysplasia.  1 S hx of chronic myalgia & gets bedridden 2-3 days.  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 normal appearance. No distress.   Not coughing this time   HENT:   Head: Normocephalic and atraumatic.   Mouth/Throat: Oropharynx is clear and moist. No oropharyngeal exudate.   No facial rashes  Parotids not enlarged     Eyes: Pupils are equal, round, and reactive to light. Conjunctivae are normal. Right eye exhibits no discharge. Left eye exhibits no discharge. No scleral icterus.   Neck: No JVD present. No tracheal deviation present. No thyromegaly present.   Cardiovascular: Regular rhythm and normal heart sounds. Tachycardia present. Exam reveals no gallop and no friction rub.   No murmur heard.  Pulmonary/Chest: Effort normal. No respiratory distress. She has no wheezes. She has no rales. She exhibits no tenderness.   Pulmonary Comments: Scattered post basilar rales;  Breath sounds diminished at bases.  Abdominal: Soft. Bowel sounds are normal. She exhibits no distension and no mass. There is no splenomegaly or hepatomegaly. There is no abdominal tenderness. There is no rebound and no guarding.   Musculoskeletal:         General: No tenderness. Normal range of motion.      Cervical back: Neck supple.      Comments: No SHT anywhere  Adequate ROM all joints.  No metatarsalgia        Lymphadenopathy:     She has no cervical adenopathy.   Neurological: She is alert and oriented to person, place, and time. She has normal reflexes. No cranial nerve " deficit. Gait normal.   Proximal & distal upper extremity strength appropriate.  LE strength probably 4+ (limited by some muscle pain)   Skin: Skin is warm and dry. No rash noted. She is not diaphoretic.   Psychiatric: Mood, memory, affect, judgment and thought content normal.   Vitals reviewed.    10/10/22: CBC WC 15.30; CMP glu 112; alb 3.1;   10/3/22: ESR 48 (20); CRP 14.4  8/10/22: ESR 25 (36); CRP 0.5; CMP alb 3.1;   7/19/22: ESR 80 (20); CRP 45.2; CBC Ht 36.1; CMP glu 117; alb 3.1;   5/17/22: CBC Hg 11.6; Ht 34.3; CMP Na 135; alb 3.3; TPMT normal metabolizer; CPK 13; pre-DMARDs ok;   5/12/22: ESR 74 (36); CRP 37.2; UA ok; ; .2; ROBBIE/C3/C4/QIG wnl or neg; IgM aCLA 16.70 (12.49);   3/14/22: ESR 50 (30) CRP 2; ROBBIE neg; RF 74 (<6) CCP >250  11/30/21: RF<14;     9/29/22: ECHO: mild CHAPINCITO; mild TVS; PA press 31;   7/28/22: CT chest:  significant interval progression of previously noted subpleural predominant reticular and ground-glass airspace opacities associated with bilateral lower lobe volume loss, architectural distortion, symmetric mild central bronchiectasis and apicobasal gradient suggestive of UIP pattern of interstitial lung disease which may reflect IPF.  Additional diagnostic considerations include NSIP, asbestosis, fibrotic hypersensitivity pneumonitis connective tissue associated lung disease and drug induced lung disease amongst other etiologies.    5/12/22: Bilateral hands: personally viewed:mild STS dorsal MCPs bilaterally; min OA otherwise; remote healed distal radius fx w hardware.  5/12/22: Arthritis survey: personally viewed:  Mild OA Cspine, T spine; tibial spines; hands (Lwrist fx) & feet.  5/12/22: CXR: personally viewed: bibasilar coarse interstitial reticulated opacities c/w PF    6/28/21: CT chest:  predominant LL patchy increased attenuation and reticulation within the periphery of the bilateral lower lobes c/w sequela of atelectasis; r/o early ILD; no bronchiectasis.  No  honeycombing.  No significant ground-glass attenuation. Small sliding-type hiatal hernia.    10/15/20: DXA: TLS -2.7; TFN -2.2;  TTH -1.8; FRAX 18.9%;/3.5%  Assessment:   Sero+(173) CCP+(1162.7) non erosive RA              Mild SHT MCPs--resolved on mod dose pred              AM stiffness x3-4 hrs now varies up to 2hr.              FHx: RA & myelodysplasia   On prednisone 20 mg/d   S/P MTX ~ 5/31 - 7/26/22 w multiple intolerance exs   Brief azathioprine   RTX 10/24/22           Chronic cough/SOB/ intermittent BAKER/abn CXR & CT c/w ILD              CT chest w ILD              CXR: blake coarse interstitial reticular opacities c/w PF              Rx prednisone started 40 mg to taper   Ofev w abn LFTs     Central sensitivity syndrome   Muscle aches & pains since childhood.   Some response to gluten restriction   CSI = 48 = hi moderate     Joint pain/muscle spasms/myalgia--multiple sites--   Worse post Covid vaccine              RA/OA/CSS                          Bilateral hip pain                          Bilateral shoulder                          L wrist                          R knee                          L ankle                          R foot                          Cervical spine: mild DDD & spondylolistesis                                      Some response to baclofen (spasm & stiffness)                Osteoporosis handled by PCP              S/P R wrist fx 3/2019 (ORIF)                S/P rx w alendronate x 9 yrs--was on holiday >5 yrs   Denosumab last 9/16/22              10/15/20: DXA: TLS -2.7; TFN -2.2; TTH -1.8; FRAX 18.9/3.5       S/P SIADH 1999              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  See AVS  Also discussed that all joint pain not RA and do not require steroids.  Discuss preference for dietary calcium as opposed to supplements.  Discussed vitamin D  supplementation  Daily exercise.  Switch labs to 11/15/22 at [facility] ~ 3 months or prn      CC: DO Gianna James MD

## 2022-10-24 ENCOUNTER — INFUSION (OUTPATIENT)
Dept: INFUSION THERAPY | Facility: HOSPITAL | Age: 67
End: 2022-10-24
Payer: MEDICARE

## 2022-10-24 VITALS
BODY MASS INDEX: 24.19 KG/M2 | WEIGHT: 132.25 LBS | SYSTOLIC BLOOD PRESSURE: 113 MMHG | DIASTOLIC BLOOD PRESSURE: 67 MMHG | TEMPERATURE: 98 F | HEART RATE: 81 BPM | RESPIRATION RATE: 18 BRPM

## 2022-10-24 DIAGNOSIS — J84.10 PULMONARY FIBROSIS: Primary | ICD-10-CM

## 2022-10-24 DIAGNOSIS — J84.9 INTERSTITIAL LUNG DISEASE: ICD-10-CM

## 2022-10-24 DIAGNOSIS — M05.79 RHEUMATOID ARTHRITIS INVOLVING MULTIPLE SITES WITH POSITIVE RHEUMATOID FACTOR: ICD-10-CM

## 2022-10-24 PROCEDURE — 63600175 PHARM REV CODE 636 W HCPCS: Performed by: INTERNAL MEDICINE

## 2022-10-24 PROCEDURE — 96367 TX/PROPH/DG ADDL SEQ IV INF: CPT

## 2022-10-24 PROCEDURE — 96413 CHEMO IV INFUSION 1 HR: CPT

## 2022-10-24 PROCEDURE — 96375 TX/PRO/DX INJ NEW DRUG ADDON: CPT

## 2022-10-24 PROCEDURE — 25000003 PHARM REV CODE 250: Performed by: INTERNAL MEDICINE

## 2022-10-24 PROCEDURE — 96415 CHEMO IV INFUSION ADDL HR: CPT

## 2022-10-24 RX ORDER — ACETAMINOPHEN 325 MG/1
650 TABLET ORAL
Status: CANCELLED | OUTPATIENT
Start: 2022-11-07

## 2022-10-24 RX ORDER — ACETAMINOPHEN 325 MG/1
650 TABLET ORAL
Status: COMPLETED | OUTPATIENT
Start: 2022-10-24 | End: 2022-10-24

## 2022-10-24 RX ORDER — SODIUM CHLORIDE 0.9 % (FLUSH) 0.9 %
10 SYRINGE (ML) INJECTION
Status: DISCONTINUED | OUTPATIENT
Start: 2022-10-24 | End: 2022-10-24 | Stop reason: HOSPADM

## 2022-10-24 RX ORDER — SODIUM CHLORIDE 0.9 % (FLUSH) 0.9 %
10 SYRINGE (ML) INJECTION
Status: CANCELLED | OUTPATIENT
Start: 2022-11-07

## 2022-10-24 RX ORDER — HEPARIN 100 UNIT/ML
500 SYRINGE INTRAVENOUS
Status: CANCELLED | OUTPATIENT
Start: 2022-11-07

## 2022-10-24 RX ORDER — FAMOTIDINE 10 MG/ML
20 INJECTION INTRAVENOUS
Status: COMPLETED | OUTPATIENT
Start: 2022-10-24 | End: 2022-10-24

## 2022-10-24 RX ORDER — FAMOTIDINE 10 MG/ML
20 INJECTION INTRAVENOUS
Status: CANCELLED | OUTPATIENT
Start: 2022-11-07

## 2022-10-24 RX ADMIN — DEXTROSE 100 MG: 50 INJECTION, SOLUTION INTRAVENOUS at 09:10

## 2022-10-24 RX ADMIN — RITUXIMAB-ABBS 1000 MG: 10 INJECTION, SOLUTION INTRAVENOUS at 10:10

## 2022-10-24 RX ADMIN — DIPHENHYDRAMINE HYDROCHLORIDE 25 MG: 50 INJECTION, SOLUTION INTRAMUSCULAR; INTRAVENOUS at 08:10

## 2022-10-24 RX ADMIN — FAMOTIDINE 20 MG: 10 INJECTION INTRAVENOUS at 08:10

## 2022-10-24 RX ADMIN — ACETAMINOPHEN 650 MG: 325 TABLET ORAL at 08:10

## 2022-10-24 NOTE — PLAN OF CARE
0840 pt here for D1C1 Rituximab infusion, labs, hx, meds, allergies reviewed, pt with no new complaints at this time, ady avila chair, continue to monitor  
6340 pt tolerated rituxan infusion without issue, pt to rtc 11/7/22 for #2, no distress noted upon d/c to home with spouse  
HEENT: No HA, no vision changes, no facial trauma  Chest: no chest wall pain, no dyspnea  GI: No abdominal pain, no nausea, no vomiting  Neuro: No LOC, no paresthesias, no weakness  MSK: +msk pain, no swelling  Skin: +abrasions, no lacerations, no ecchymosis  ROS otherwise negative except as noted in HPI

## 2022-10-27 ENCOUNTER — HOSPITAL ENCOUNTER (OUTPATIENT)
Dept: PULMONOLOGY | Facility: OTHER | Age: 67
Discharge: HOME | End: 2022-10-27
Attending: INTERNAL MEDICINE
Payer: MEDICARE

## 2022-10-27 PROCEDURE — G0238 OTH RESP PROC, INDIV: HCPCS | Mod: TXP

## 2022-10-27 PROCEDURE — G0237 THERAPEUTIC PROCD STRG ENDUR: HCPCS | Mod: TXP

## 2022-10-28 ENCOUNTER — HOSPITAL ENCOUNTER (OUTPATIENT)
Dept: PULMONOLOGY | Facility: OTHER | Age: 67
Discharge: HOME | End: 2022-10-28
Attending: INTERNAL MEDICINE
Payer: MEDICARE

## 2022-10-28 PROCEDURE — G0238 OTH RESP PROC, INDIV: HCPCS | Mod: TXP

## 2022-10-28 PROCEDURE — G0237 THERAPEUTIC PROCD STRG ENDUR: HCPCS | Mod: NTX

## 2022-11-01 ENCOUNTER — HOSPITAL ENCOUNTER (OUTPATIENT)
Dept: PULMONOLOGY | Facility: OTHER | Age: 67
Discharge: HOME | End: 2022-11-01
Attending: INTERNAL MEDICINE
Payer: MEDICARE

## 2022-11-01 ENCOUNTER — OFFICE VISIT (OUTPATIENT)
Dept: RHEUMATOLOGY | Facility: CLINIC | Age: 67
End: 2022-11-01
Payer: MEDICARE

## 2022-11-01 VITALS — BODY MASS INDEX: 24.75 KG/M2 | WEIGHT: 134.5 LBS | HEIGHT: 62 IN

## 2022-11-01 DIAGNOSIS — M05.79 RHEUMATOID ARTHRITIS INVOLVING MULTIPLE SITES WITH POSITIVE RHEUMATOID FACTOR: Primary | ICD-10-CM

## 2022-11-01 DIAGNOSIS — Z79.60 LONG-TERM USE OF IMMUNOSUPPRESSANT MEDICATION: ICD-10-CM

## 2022-11-01 DIAGNOSIS — J84.9 INTERSTITIAL LUNG DISEASE: ICD-10-CM

## 2022-11-01 DIAGNOSIS — M81.0 OSTEOPOROSIS, UNSPECIFIED OSTEOPOROSIS TYPE, UNSPECIFIED PATHOLOGICAL FRACTURE PRESENCE: ICD-10-CM

## 2022-11-01 PROCEDURE — 99214 OFFICE O/P EST MOD 30 MIN: CPT | Mod: NTX,S$GLB,, | Performed by: INTERNAL MEDICINE

## 2022-11-01 PROCEDURE — 1159F MED LIST DOCD IN RCRD: CPT | Mod: CPTII,NTX,S$GLB, | Performed by: INTERNAL MEDICINE

## 2022-11-01 PROCEDURE — 99499 RISK ADDL DX/OHS AUDIT: ICD-10-PCS | Mod: S$GLB,TXP,, | Performed by: INTERNAL MEDICINE

## 2022-11-01 PROCEDURE — 1160F RVW MEDS BY RX/DR IN RCRD: CPT | Mod: CPTII,NTX,S$GLB, | Performed by: INTERNAL MEDICINE

## 2022-11-01 PROCEDURE — 1159F PR MEDICATION LIST DOCUMENTED IN MEDICAL RECORD: ICD-10-PCS | Mod: CPTII,NTX,S$GLB, | Performed by: INTERNAL MEDICINE

## 2022-11-01 PROCEDURE — 99214 PR OFFICE/OUTPT VISIT, EST, LEVL IV, 30-39 MIN: ICD-10-PCS | Mod: NTX,S$GLB,, | Performed by: INTERNAL MEDICINE

## 2022-11-01 PROCEDURE — G0237 THERAPEUTIC PROCD STRG ENDUR: HCPCS | Mod: TXP

## 2022-11-01 PROCEDURE — 99499 UNLISTED E&M SERVICE: CPT | Mod: S$GLB,TXP,, | Performed by: INTERNAL MEDICINE

## 2022-11-01 PROCEDURE — G0238 OTH RESP PROC, INDIV: HCPCS | Mod: NTX

## 2022-11-01 PROCEDURE — 99999 PR PBB SHADOW E&M-EST. PATIENT-LVL III: CPT | Mod: PBBFAC,TXP,, | Performed by: INTERNAL MEDICINE

## 2022-11-01 PROCEDURE — 1160F PR REVIEW ALL MEDS BY PRESCRIBER/CLIN PHARMACIST DOCUMENTED: ICD-10-PCS | Mod: CPTII,NTX,S$GLB, | Performed by: INTERNAL MEDICINE

## 2022-11-01 PROCEDURE — 99999 PR PBB SHADOW E&M-EST. PATIENT-LVL III: ICD-10-PCS | Mod: PBBFAC,TXP,, | Performed by: INTERNAL MEDICINE

## 2022-11-01 RX ORDER — PREDNISONE 5 MG/1
15 TABLET ORAL DAILY
Qty: 90 TABLET | Refills: 3 | Status: SHIPPED | OUTPATIENT
Start: 2022-11-01 | End: 2023-02-01

## 2022-11-01 ASSESSMENT — ROUTINE ASSESSMENT OF PATIENT INDEX DATA (RAPID3)
PATIENT GLOBAL ASSESSMENT SCORE: 4
MDHAQ FUNCTION SCORE: 0.9
FATIGUE SCORE: 2
PSYCHOLOGICAL DISTRESS SCORE: 3.3
WHEN YOU AWAKENED IN THE MORNING OVER THE LAST WEEK, PLEASE INDICATE THE AMOUNT OF TIME IT TAKES UNTIL YOU ARE AS LIMBER AS YOU WILL BE FOR THE DAY: 2 HOURS
PAIN SCORE: 5
AM STIFFNESS SCORE: 1, YES
TOTAL RAPID3 SCORE: 4

## 2022-11-01 NOTE — PROGRESS NOTES
Rapid3 Question Responses and Scores 10/27/2022   MDHAQ Score 0.9   Psychologic Score 3.3   Pain Score 5   When you awakened in the morning OVER THE LAST WEEK, did you feel stiff? Yes   If Yes, please indicate the number of hours until you are as limber as you will be for the day 2   Fatigue Score 2   Global Health Score 4   RAPID3 Score 4

## 2022-11-01 NOTE — PATIENT INSTRUCTIONS
Take vitamin D3 5,000 IU once a week or 1000 IU daily.    Calcium preferred through dietary means.  Read labels.  You need ~ 1200 mg/day.    Decrease prednisone to 15 mg/d stay on this dose for at least 2 weeks then, if possible, decrease 12.5 every day or 15 mg alternating with 10 mg for another 2 weeks then go down to 10 mg/day & stay there.

## 2022-11-03 ENCOUNTER — HOSPITAL ENCOUNTER (OUTPATIENT)
Dept: PULMONOLOGY | Facility: OTHER | Age: 67
Discharge: HOME | End: 2022-11-03
Attending: INTERNAL MEDICINE
Payer: MEDICARE

## 2022-11-03 PROCEDURE — G0238 OTH RESP PROC, INDIV: HCPCS | Mod: NTX

## 2022-11-03 PROCEDURE — G0237 THERAPEUTIC PROCD STRG ENDUR: HCPCS | Mod: NTX

## 2022-11-07 ENCOUNTER — TELEPHONE (OUTPATIENT)
Dept: TRANSPLANT | Facility: CLINIC | Age: 67
End: 2022-11-07
Payer: MEDICARE

## 2022-11-07 ENCOUNTER — INFUSION (OUTPATIENT)
Dept: INFUSION THERAPY | Facility: HOSPITAL | Age: 67
End: 2022-11-07
Payer: MEDICARE

## 2022-11-07 VITALS
HEIGHT: 62 IN | BODY MASS INDEX: 24.83 KG/M2 | SYSTOLIC BLOOD PRESSURE: 142 MMHG | WEIGHT: 134.94 LBS | HEART RATE: 96 BPM | RESPIRATION RATE: 18 BRPM | DIASTOLIC BLOOD PRESSURE: 82 MMHG | TEMPERATURE: 98 F

## 2022-11-07 DIAGNOSIS — J84.10 PULMONARY FIBROSIS: Primary | ICD-10-CM

## 2022-11-07 DIAGNOSIS — J84.9 INTERSTITIAL LUNG DISEASE: ICD-10-CM

## 2022-11-07 DIAGNOSIS — M05.79 RHEUMATOID ARTHRITIS INVOLVING MULTIPLE SITES WITH POSITIVE RHEUMATOID FACTOR: Primary | ICD-10-CM

## 2022-11-07 DIAGNOSIS — M05.79 RHEUMATOID ARTHRITIS INVOLVING MULTIPLE SITES WITH POSITIVE RHEUMATOID FACTOR: ICD-10-CM

## 2022-11-07 PROCEDURE — 96375 TX/PRO/DX INJ NEW DRUG ADDON: CPT

## 2022-11-07 PROCEDURE — 25000003 PHARM REV CODE 250: Performed by: INTERNAL MEDICINE

## 2022-11-07 PROCEDURE — 96413 CHEMO IV INFUSION 1 HR: CPT

## 2022-11-07 PROCEDURE — 96415 CHEMO IV INFUSION ADDL HR: CPT

## 2022-11-07 PROCEDURE — 63600175 PHARM REV CODE 636 W HCPCS: Mod: TB | Performed by: INTERNAL MEDICINE

## 2022-11-07 PROCEDURE — 96367 TX/PROPH/DG ADDL SEQ IV INF: CPT

## 2022-11-07 RX ORDER — FAMOTIDINE 10 MG/ML
20 INJECTION INTRAVENOUS
Status: CANCELLED | OUTPATIENT
Start: 2022-11-07

## 2022-11-07 RX ORDER — HEPARIN 100 UNIT/ML
500 SYRINGE INTRAVENOUS
Status: CANCELLED | OUTPATIENT
Start: 2022-11-07

## 2022-11-07 RX ORDER — SODIUM CHLORIDE 0.9 % (FLUSH) 0.9 %
10 SYRINGE (ML) INJECTION
Status: DISCONTINUED | OUTPATIENT
Start: 2022-11-07 | End: 2022-11-07 | Stop reason: HOSPADM

## 2022-11-07 RX ORDER — FAMOTIDINE 10 MG/ML
20 INJECTION INTRAVENOUS
Status: COMPLETED | OUTPATIENT
Start: 2022-11-07 | End: 2022-11-07

## 2022-11-07 RX ORDER — ACETAMINOPHEN 325 MG/1
650 TABLET ORAL
Status: COMPLETED | OUTPATIENT
Start: 2022-11-07 | End: 2022-11-07

## 2022-11-07 RX ORDER — SODIUM CHLORIDE 0.9 % (FLUSH) 0.9 %
10 SYRINGE (ML) INJECTION
Status: CANCELLED | OUTPATIENT
Start: 2022-11-07

## 2022-11-07 RX ORDER — ACETAMINOPHEN 325 MG/1
650 TABLET ORAL
Status: CANCELLED | OUTPATIENT
Start: 2022-11-07

## 2022-11-07 RX ADMIN — SODIUM CHLORIDE: 0.9 INJECTION, SOLUTION INTRAVENOUS at 08:11

## 2022-11-07 RX ADMIN — DEXTROSE 100 MG: 50 INJECTION, SOLUTION INTRAVENOUS at 08:11

## 2022-11-07 RX ADMIN — DIPHENHYDRAMINE HYDROCHLORIDE 25 MG: 50 INJECTION, SOLUTION INTRAMUSCULAR; INTRAVENOUS at 09:11

## 2022-11-07 RX ADMIN — ACETAMINOPHEN 650 MG: 325 TABLET ORAL at 08:11

## 2022-11-07 RX ADMIN — RITUXIMAB-ABBS 1000 MG: 10 INJECTION, SOLUTION INTRAVENOUS at 10:11

## 2022-11-07 RX ADMIN — FAMOTIDINE 20 MG: 10 INJECTION INTRAVENOUS at 08:11

## 2022-11-07 NOTE — TELEPHONE ENCOUNTER
Orders per provider, request to . Message sent to patient to arrange date.  ----- Message from Barbara Haile DO sent at 11/5/2022 12:14 PM CDT -----  Regarding: RE: RTC  3 weeks after her next ritux dose with PFTs and 6MWT please.  Thanks    ----- Message -----  From: Josiane Lind  Sent: 11/4/2022   6:52 PM CDT  To: Barbara Haile DO  Subject: RTC                                              Per Dr. Holcomb 11/1 office visit:    She is currently on prednisone 20 mg/d & RTX--1st dose 10/24/22 (after issues with her insurance company). She tolerated it very well.   She is due for her 2nd dose on 11/7/22.      Please advise when you would like patient to RTC and if any testing is needed. Thank you.

## 2022-11-07 NOTE — PLAN OF CARE
1314 pt tolerated Cycle 2 truxima infusion without issue, pt has no upcoming appts scheduled at this time, no distress noted upon d/c to home with daughter

## 2022-11-07 NOTE — PLAN OF CARE
0815 pt here for truxima infusion #2, labs, hx, meds, allergies reviewed, pt with with c/o rheumatoid pain to hands and fingers, pt reclined in chair, warm blanket provided, continue to monitor

## 2022-11-08 ENCOUNTER — PATIENT MESSAGE (OUTPATIENT)
Dept: RHEUMATOLOGY | Facility: CLINIC | Age: 67
End: 2022-11-08
Payer: MEDICARE

## 2022-11-08 ENCOUNTER — PATIENT MESSAGE (OUTPATIENT)
Dept: TRANSPLANT | Facility: CLINIC | Age: 67
End: 2022-11-08
Payer: MEDICARE

## 2022-11-10 ENCOUNTER — HOSPITAL ENCOUNTER (OUTPATIENT)
Dept: PULMONOLOGY | Facility: OTHER | Age: 67
Discharge: HOME | End: 2022-11-10
Attending: INTERNAL MEDICINE
Payer: MEDICARE

## 2022-11-11 ENCOUNTER — HOSPITAL ENCOUNTER (OUTPATIENT)
Dept: PULMONOLOGY | Facility: OTHER | Age: 67
Discharge: HOME | End: 2022-11-11
Attending: INTERNAL MEDICINE
Payer: MEDICARE

## 2022-11-11 PROCEDURE — G0237 THERAPEUTIC PROCD STRG ENDUR: HCPCS | Mod: NTX

## 2022-11-11 PROCEDURE — G0238 OTH RESP PROC, INDIV: HCPCS | Mod: TXP

## 2022-11-15 ENCOUNTER — HOSPITAL ENCOUNTER (OUTPATIENT)
Dept: PULMONOLOGY | Facility: OTHER | Age: 67
Discharge: HOME | End: 2022-11-15
Attending: INTERNAL MEDICINE
Payer: MEDICARE

## 2022-11-15 PROCEDURE — G0238 OTH RESP PROC, INDIV: HCPCS | Mod: TXP

## 2022-11-15 PROCEDURE — G0237 THERAPEUTIC PROCD STRG ENDUR: HCPCS | Mod: NTX

## 2022-11-17 ENCOUNTER — HOSPITAL ENCOUNTER (OUTPATIENT)
Dept: PULMONOLOGY | Facility: OTHER | Age: 67
Discharge: HOME | End: 2022-11-17
Attending: INTERNAL MEDICINE
Payer: MEDICARE

## 2022-11-17 PROCEDURE — G0238 OTH RESP PROC, INDIV: HCPCS | Mod: TXP

## 2022-11-17 PROCEDURE — G0237 THERAPEUTIC PROCD STRG ENDUR: HCPCS | Mod: NTX

## 2022-11-22 ENCOUNTER — HOSPITAL ENCOUNTER (OUTPATIENT)
Dept: PULMONOLOGY | Facility: OTHER | Age: 67
Discharge: HOME | End: 2022-11-22
Attending: INTERNAL MEDICINE
Payer: MEDICARE

## 2022-11-22 PROCEDURE — G0237 THERAPEUTIC PROCD STRG ENDUR: HCPCS | Mod: NTX

## 2022-11-22 PROCEDURE — 94626 PHY/QHP OP PULM RHB W/MNTR: CPT | Mod: TXP

## 2022-11-28 ENCOUNTER — PATIENT MESSAGE (OUTPATIENT)
Dept: TRANSPLANT | Facility: CLINIC | Age: 67
End: 2022-11-28
Payer: MEDICARE

## 2022-11-28 DIAGNOSIS — R05.9 COUGH, UNSPECIFIED TYPE: Primary | ICD-10-CM

## 2022-11-28 DIAGNOSIS — R05.8 COUGH PRODUCTIVE OF YELLOW SPUTUM: ICD-10-CM

## 2022-11-28 DIAGNOSIS — J84.9 INTERSTITIAL LUNG DISEASE: ICD-10-CM

## 2022-11-28 NOTE — TELEPHONE ENCOUNTER
Patient with complaints of productive cough, denies fever/other s/s illness/infection. Per Dr. Koehler:    Levaquin 750 mg  daily x 7 days.  CXR PA/lat.  If any SOB/desat please inform us ASAP      Patient is aware of above. She will start levaquin tonight (has 500mg tab on hand at home and will take 1.5 tab PO tonight.) order routed per patient request to [facility] pharmacy.    Patient aware of x-ray scheduled 8:15 am 11/29. Will follow up once results.    Patient is aware to go to ED if indicated. She reports she is monitoring her oxygen level, it is 98-99%.

## 2022-11-29 ENCOUNTER — HOSPITAL ENCOUNTER (OUTPATIENT)
Dept: RADIOLOGY | Facility: HOSPITAL | Age: 67
Discharge: HOME | End: 2022-11-29
Attending: INTERNAL MEDICINE
Payer: MEDICARE

## 2022-11-29 DIAGNOSIS — J84.9 INTERSTITIAL LUNG DISEASE: ICD-10-CM

## 2022-11-29 DIAGNOSIS — R05.9 COUGH, UNSPECIFIED TYPE: ICD-10-CM

## 2022-11-29 PROCEDURE — 71046 XR CHEST PA AND LATERAL: ICD-10-PCS | Mod: 26,NTX,, | Performed by: RADIOLOGY

## 2022-11-29 PROCEDURE — 71046 X-RAY EXAM CHEST 2 VIEWS: CPT | Mod: TC,FY,TXP

## 2022-11-29 PROCEDURE — 71046 X-RAY EXAM CHEST 2 VIEWS: CPT | Mod: 26,NTX,, | Performed by: RADIOLOGY

## 2022-11-29 RX ORDER — LEVOFLOXACIN 750 MG/1
750 TABLET ORAL DAILY
Qty: 7 TABLET | Refills: 0 | Status: SHIPPED | OUTPATIENT
Start: 2022-11-29 | End: 2022-12-20

## 2022-11-29 NOTE — TELEPHONE ENCOUNTER
Notified patient of results per Dr. Koehler message. Patient is aware to continue complete course of antibiotics, and keep follow up appointments as scheduled. She is worried about her cough that occurs when she is speaking. Patient is aware we will further assess at visit next week, after completing antibiotics. Patient is aware that she is to proceed to ED if she has any desaturations or worsening shortness of breath.  ----- Message from Zuri Koehler MD sent at 11/29/2022 10:59 AM CST -----  New increased infiltrate in the right and left lower bases suggestive of pneumonia

## 2022-12-05 ENCOUNTER — PATIENT OUTREACH (OUTPATIENT)
Dept: ADMINISTRATIVE | Facility: HOSPITAL | Age: 67
End: 2022-12-05
Payer: MEDICARE

## 2022-12-06 ENCOUNTER — HOSPITAL ENCOUNTER (OUTPATIENT)
Dept: PULMONOLOGY | Facility: OTHER | Age: 67
Discharge: HOME | End: 2022-12-06
Attending: INTERNAL MEDICINE
Payer: MEDICARE

## 2022-12-08 ENCOUNTER — HOSPITAL ENCOUNTER (OUTPATIENT)
Dept: PULMONOLOGY | Facility: CLINIC | Age: 67
Discharge: HOME | End: 2022-12-08
Payer: MEDICARE

## 2022-12-08 ENCOUNTER — OFFICE VISIT (OUTPATIENT)
Dept: TRANSPLANT | Facility: CLINIC | Age: 67
End: 2022-12-08
Payer: MEDICARE

## 2022-12-08 VITALS
SYSTOLIC BLOOD PRESSURE: 132 MMHG | DIASTOLIC BLOOD PRESSURE: 78 MMHG | HEART RATE: 87 BPM | RESPIRATION RATE: 20 BRPM | WEIGHT: 136.44 LBS | TEMPERATURE: 98 F | BODY MASS INDEX: 24.75 KG/M2 | WEIGHT: 134.5 LBS | HEIGHT: 62 IN | BODY MASS INDEX: 25.11 KG/M2 | OXYGEN SATURATION: 99 % | HEIGHT: 62 IN

## 2022-12-08 DIAGNOSIS — M05.79 RHEUMATOID ARTHRITIS INVOLVING MULTIPLE SITES WITH POSITIVE RHEUMATOID FACTOR: ICD-10-CM

## 2022-12-08 DIAGNOSIS — J84.9 INTERSTITIAL LUNG DISEASE: ICD-10-CM

## 2022-12-08 DIAGNOSIS — R05.9 COUGH, UNSPECIFIED TYPE: ICD-10-CM

## 2022-12-08 DIAGNOSIS — R73.9 HYPERGLYCEMIA: ICD-10-CM

## 2022-12-08 DIAGNOSIS — J84.9 ILD (INTERSTITIAL LUNG DISEASE): ICD-10-CM

## 2022-12-08 DIAGNOSIS — J84.9 INTERSTITIAL PULMONARY DISEASE, UNSPECIFIED: Primary | ICD-10-CM

## 2022-12-08 PROCEDURE — 94618 PULMONARY STRESS TESTING: CPT | Mod: NTX,S$GLB,, | Performed by: INTERNAL MEDICINE

## 2022-12-08 PROCEDURE — 99214 PR OFFICE/OUTPT VISIT, EST, LEVL IV, 30-39 MIN: ICD-10-PCS | Mod: NTX,S$GLB,, | Performed by: INTERNAL MEDICINE

## 2022-12-08 PROCEDURE — 3078F PR MOST RECENT DIASTOLIC BLOOD PRESSURE < 80 MM HG: ICD-10-PCS | Mod: CPTII,NTX,S$GLB, | Performed by: INTERNAL MEDICINE

## 2022-12-08 PROCEDURE — 1100F PTFALLS ASSESS-DOCD GE2>/YR: CPT | Mod: CPTII,NTX,S$GLB, | Performed by: INTERNAL MEDICINE

## 2022-12-08 PROCEDURE — 94726 PULM FUNCT TST PLETHYSMOGRAP: ICD-10-PCS | Mod: NTX,S$GLB,, | Performed by: INTERNAL MEDICINE

## 2022-12-08 PROCEDURE — 94729 DIFFUSING CAPACITY: CPT | Mod: NTX,S$GLB,, | Performed by: INTERNAL MEDICINE

## 2022-12-08 PROCEDURE — 1125F PR PAIN SEVERITY QUANTIFIED, PAIN PRESENT: ICD-10-PCS | Mod: CPTII,NTX,S$GLB, | Performed by: INTERNAL MEDICINE

## 2022-12-08 PROCEDURE — 3075F SYST BP GE 130 - 139MM HG: CPT | Mod: CPTII,NTX,S$GLB, | Performed by: INTERNAL MEDICINE

## 2022-12-08 PROCEDURE — 99999 PR PBB SHADOW E&M-EST. PATIENT-LVL III: ICD-10-PCS | Mod: PBBFAC,TXP,, | Performed by: INTERNAL MEDICINE

## 2022-12-08 PROCEDURE — 3008F PR BODY MASS INDEX (BMI) DOCUMENTED: ICD-10-PCS | Mod: CPTII,NTX,S$GLB, | Performed by: INTERNAL MEDICINE

## 2022-12-08 PROCEDURE — 3075F PR MOST RECENT SYSTOLIC BLOOD PRESS GE 130-139MM HG: ICD-10-PCS | Mod: CPTII,NTX,S$GLB, | Performed by: INTERNAL MEDICINE

## 2022-12-08 PROCEDURE — 3288F FALL RISK ASSESSMENT DOCD: CPT | Mod: CPTII,NTX,S$GLB, | Performed by: INTERNAL MEDICINE

## 2022-12-08 PROCEDURE — 94618 PULMONARY STRESS TESTING: ICD-10-PCS | Mod: NTX,S$GLB,, | Performed by: INTERNAL MEDICINE

## 2022-12-08 PROCEDURE — 3288F PR FALLS RISK ASSESSMENT DOCUMENTED: ICD-10-PCS | Mod: CPTII,NTX,S$GLB, | Performed by: INTERNAL MEDICINE

## 2022-12-08 PROCEDURE — 94010 BREATHING CAPACITY TEST: ICD-10-PCS | Mod: NTX,S$GLB,, | Performed by: INTERNAL MEDICINE

## 2022-12-08 PROCEDURE — 3008F BODY MASS INDEX DOCD: CPT | Mod: CPTII,NTX,S$GLB, | Performed by: INTERNAL MEDICINE

## 2022-12-08 PROCEDURE — 99214 OFFICE O/P EST MOD 30 MIN: CPT | Mod: NTX,S$GLB,, | Performed by: INTERNAL MEDICINE

## 2022-12-08 PROCEDURE — 94729 PR C02/MEMBANE DIFFUSE CAPACITY: ICD-10-PCS | Mod: NTX,S$GLB,, | Performed by: INTERNAL MEDICINE

## 2022-12-08 PROCEDURE — 1100F PR PT FALLS ASSESS DOC 2+ FALLS/FALL W/INJURY/YR: ICD-10-PCS | Mod: CPTII,NTX,S$GLB, | Performed by: INTERNAL MEDICINE

## 2022-12-08 PROCEDURE — 99999 PR PBB SHADOW E&M-EST. PATIENT-LVL III: CPT | Mod: PBBFAC,TXP,, | Performed by: INTERNAL MEDICINE

## 2022-12-08 PROCEDURE — 1159F MED LIST DOCD IN RCRD: CPT | Mod: CPTII,NTX,S$GLB, | Performed by: INTERNAL MEDICINE

## 2022-12-08 PROCEDURE — 1159F PR MEDICATION LIST DOCUMENTED IN MEDICAL RECORD: ICD-10-PCS | Mod: CPTII,NTX,S$GLB, | Performed by: INTERNAL MEDICINE

## 2022-12-08 PROCEDURE — 1125F AMNT PAIN NOTED PAIN PRSNT: CPT | Mod: CPTII,NTX,S$GLB, | Performed by: INTERNAL MEDICINE

## 2022-12-08 PROCEDURE — 1160F RVW MEDS BY RX/DR IN RCRD: CPT | Mod: CPTII,NTX,S$GLB, | Performed by: INTERNAL MEDICINE

## 2022-12-08 PROCEDURE — 94726 PLETHYSMOGRAPHY LUNG VOLUMES: CPT | Mod: NTX,S$GLB,, | Performed by: INTERNAL MEDICINE

## 2022-12-08 PROCEDURE — 94010 BREATHING CAPACITY TEST: CPT | Mod: NTX,S$GLB,, | Performed by: INTERNAL MEDICINE

## 2022-12-08 PROCEDURE — 1160F PR REVIEW ALL MEDS BY PRESCRIBER/CLIN PHARMACIST DOCUMENTED: ICD-10-PCS | Mod: CPTII,NTX,S$GLB, | Performed by: INTERNAL MEDICINE

## 2022-12-08 PROCEDURE — 3078F DIAST BP <80 MM HG: CPT | Mod: CPTII,NTX,S$GLB, | Performed by: INTERNAL MEDICINE

## 2022-12-08 NOTE — PROGRESS NOTES
ADVANCED LUNG DISEASE FOLLOW-UP                                                                                                                                          Reason for Visit:  RA-ILD    Referring Physician: Ibis Holcomb    History of Present Illness: Juan Cruz is a 67 y.o. female who is on 0L of oxygen.  She is on no assisted ventilation.  Her New York Heart Association Class is III and a Karnofsky score of 60% - Requires occasional assistance but is able to care for needs. She is not diabetic.     She presents today for follow-up of RA-ILD.  Currently on rituxan therapy after having MTX induced lung toxicity and failing azathioprine therapy.  She had her rituxan infusions beginning of November 2022.  She tolerated well with no side effects.  Around Thanksgiving, she had increased cough with sputum productions.  Was given Levaquin for possible PNA.  She states that her cough is somewhat improved.  She still has sputum production.  Follows with ENT; taken off flonase.  She continues to take zyrtec.  Takes a PPI; has had a full GI work-up which was unrevealing.  Is having tendon pain on there L>R hand.  She follows with a hand surgeon.  Was previously taking OFEV which had to be discontinued for drug induced liver injury.      Past Medical History:   Diagnosis Date    Chronic sinusitis, unspecified     History of SIADH     Mitral valve prolapse     Osteopenia     Rheumatoid arthritis involving multiple sites        Past Surgical History:   Procedure Laterality Date    BREAST BIOPSY Bilateral     FUNCTIONAL ENDOSCOPIC SINUS SURGERY (FESS)      ORIF WRIST FRACTURE Right        Allergies: Methotrexate, Gluten protein, and Ppd black rubber mix    Current Outpatient Medications   Medication Sig    acetaminophen (TYLENOL) 325 MG tablet Take 650 mg by mouth every 6 (six) hours as needed for Pain.    B-complex with vitamin C (Z-BEC OR EQUIV) tablet Take 0.5 tablets by mouth 2 (two) times a day.     calcium citrate-vitamin D3 315-200 mg (CITRACAL+D) 315-200 mg-unit per tablet Take 1 tablet by mouth 2 (two) times daily.    cetirizine (ZYRTEC) 10 MG tablet cetirizine 10 mg tablet   TAKE 1 TABLET BY MOUTH ONCE DAILY    denosumab (PROLIA) 60 mg/mL Syrg Inject 1 mL (60 mg total) into the skin every 6 (six) months.    diclofenac sodium (VOLTAREN) 1 % Gel Apply 2 g topically 4 (four) times daily as needed.    estradioL (VAGIFEM) 10 mcg Tab Yuvafem 10 mcg vaginal tablet   INSERT 1 TABLET VAGINALLY TWICE A WEEK    fluticasone propionate (FLONASE) 50 mcg/actuation nasal spray 1 spray by Each Nostril route once daily.    fluticasone/vilanterol (BREO ELLIPTA INHL) Inhale 1 puff into the lungs once daily.    folic acid (FOLVITE) 1 MG tablet Take 1 tablet (1 mg total) by mouth once daily.    guaiFENesin (MUCINEX) 600 mg 12 hr tablet Take 1,200 mg by mouth 2 (two) times daily.    levoFLOXacin (LEVAQUIN) 750 MG tablet Take 1 tablet (750 mg total) by mouth once daily.    magnesium oxide (MAG-OX) 400 mg (241.3 mg magnesium) tablet Take 400 mg by mouth once daily.    metoprolol succinate (TOPROL-XL) 25 MG 24 hr tablet Take 25 mg by mouth once daily.    pantoprazole (PROTONIX) 40 MG tablet Take 40 mg by mouth once daily.    predniSONE (DELTASONE) 20 MG tablet Take 2 tablets (40 mg total) by mouth once daily.    predniSONE (DELTASONE) 5 MG tablet Take 3 tablets (15 mg total) by mouth once daily.    sod chlor,sod bicarb/neti pot (NEILMED NASAFLO FRANK) 1 Dose by sinus irrigation route daily as needed.     No current facility-administered medications for this visit.       Immunization History   Administered Date(s) Administered    COVID-19, MRNA, LN-S, PF (Pfizer) (Purple Cap) 12/31/2020, 01/25/2021    Pneumococcal Polysaccharide - 23 Valent 09/16/2022    Yellow Fever 08/31/2017    Zoster Recombinant 01/18/2020, 08/19/2020     Family History:  History reviewed. No pertinent family history.  Social History     Substance and Sexual  "Activity   Alcohol Use No      Social History     Substance and Sexual Activity   Drug Use No      Social History     Socioeconomic History    Marital status:    Tobacco Use    Smoking status: Never    Smokeless tobacco: Never   Substance and Sexual Activity    Alcohol use: No    Drug use: No    Sexual activity: Not Currently     Review of Systems   Constitutional:  Negative for chills, diaphoresis, fever, malaise/fatigue and weight loss.   HENT:  Negative for congestion, ear discharge, ear pain, hearing loss, nosebleeds, sinus pain, sore throat and tinnitus.    Eyes:  Negative for blurred vision, double vision, photophobia, pain, discharge and redness.   Respiratory:  Positive for cough, sputum production and shortness of breath. Negative for hemoptysis, wheezing and stridor.    Cardiovascular:  Negative for chest pain, palpitations, orthopnea, claudication, leg swelling and PND.   Gastrointestinal:  Negative for abdominal pain, blood in stool, constipation, diarrhea, heartburn, melena, nausea and vomiting.   Genitourinary:  Negative for dysuria, flank pain, frequency, hematuria and urgency.   Musculoskeletal:  Positive for joint pain (in hand). Negative for back pain, falls, myalgias and neck pain.   Skin:  Negative for itching and rash.   Neurological:  Negative for dizziness, tingling, tremors, sensory change, speech change, focal weakness, seizures, loss of consciousness, weakness and headaches.   Endo/Heme/Allergies:  Negative for environmental allergies and polydipsia. Bruises/bleeds easily.   Psychiatric/Behavioral:  Negative for depression, hallucinations, memory loss, substance abuse and suicidal ideas. The patient is not nervous/anxious and does not have insomnia.    Vitals  /78   Pulse 87   Temp 98.3 °F (36.8 °C)   Resp 20   Ht 5' 2" (1.575 m)   Wt 61 kg (134 lb 7.7 oz)   LMP  (LMP Unknown)   SpO2 99%   BMI 24.60 kg/m²   Physical Exam  Vitals and nursing note reviewed. "   Constitutional:       General: She is not in acute distress.     Appearance: She is well-developed. She is not diaphoretic.   HENT:      Head: Normocephalic and atraumatic.      Nose: Rhinorrhea present.      Mouth/Throat:      Mouth: Mucous membranes are moist.      Pharynx: No oropharyngeal exudate.   Eyes:      General: No scleral icterus.     Conjunctiva/sclera: Conjunctivae normal.      Pupils: Pupils are equal, round, and reactive to light.   Neck:      Thyroid: No thyromegaly.      Vascular: No JVD.      Trachea: No tracheal deviation.   Cardiovascular:      Rate and Rhythm: Normal rate and regular rhythm.      Pulses: Normal pulses.      Heart sounds: Normal heart sounds. No murmur heard.    No friction rub. No gallop.   Pulmonary:      Effort: Pulmonary effort is normal. No respiratory distress.      Breath sounds: Rales (fine dry crackles bibasilar) present. No wheezing.   Abdominal:      General: Abdomen is flat. Bowel sounds are normal. There is no distension.      Palpations: Abdomen is soft.      Tenderness: There is no abdominal tenderness.   Musculoskeletal:         General: No deformity. Normal range of motion.      Cervical back: Normal range of motion and neck supple.   Skin:     General: Skin is warm and dry.      Capillary Refill: Capillary refill takes less than 2 seconds.      Coloration: Skin is not pale.      Findings: No bruising, erythema or rash.   Neurological:      General: No focal deficit present.      Mental Status: She is alert and oriented to person, place, and time. Mental status is at baseline.      Cranial Nerves: No cranial nerve deficit.   Psychiatric:         Mood and Affect: Mood normal.         Behavior: Behavior normal.         Thought Content: Thought content normal.         Judgment: Judgment normal.       Labs:  Hospital Outpatient Visit on 12/08/2022   Component Date Value    Pre FVC 12/08/2022 1.86     PRE FEV5 12/08/2022 1.45     Pre FEV1 12/08/2022 1.64     Pre  FEV1 FVC 12/08/2022 88.32     Pre FEF 25 75 12/08/2022 2.93     Pre PEF 12/08/2022 4.67     Pre  12/08/2022 5.20     Pre DLCO 12/08/2022 8.62 (L)     DLCO ADJ PRE 12/08/2022 8.54 (L)     DLCOVA PRE 12/08/2022 3.61     DLVAAdj PRE 12/08/2022 3.58     VA PRE 12/08/2022 2.39 (L)     IVC PRE 12/08/2022 1.87     Pre TLC 12/08/2022 2.88 (L)     VC PRE 12/08/2022 1.87     PRE IC 12/08/2022 1.42     Pre FRC PL 12/08/2022 1.46 (L)     Pre ERV 12/08/2022 0.45     Pre RV 12/08/2022 1.01 (L)     RVTLC PRE 12/08/2022 35.00     Raw PRE 12/08/2022 5.67 (H)     sGaw PRE 12/08/2022 0.09 (L)     Pre VTG 12/08/2022 1.48     FVC Ref 12/08/2022 2.36     FVC LLN 12/08/2022 1.72     FVC Pre Ref 12/08/2022 78.9     FEV05 REF 12/08/2022 1.66     FEV05 LLN 12/08/2022 0.81     PRE FEV05 REF 12/08/2022 87.5     FEV1 Ref 12/08/2022 1.91     FEV1 LLN 12/08/2022 1.37     FEV1 Pre Ref 12/08/2022 86.2     FEV1 FVC Ref 12/08/2022 81     FEV1 FVC LLN 12/08/2022 70     FEV1 FVC Pre Ref 12/08/2022 108.9     FEF 25 75 Ref 12/08/2022 1.83     FEF 25 75 LLN 12/08/2022 0.91     FEF 25 75 Pre Ref 12/08/2022 160.3     PEF Ref 12/08/2022 5.57     PEF LLN 12/08/2022 4.09     PEF Pre Ref 12/08/2022 83.9     TLC Ref 12/08/2022 4.60     TLC LLN 12/08/2022 3.62     TLC ULN 12/08/2022 5.59     TLC Pre Ref 12/08/2022 62.6     VC Ref 12/08/2022 2.36     VC LLN 12/08/2022 1.72     VC ULN 12/08/2022 3.04     VC Pre Ref 12/08/2022 79.4     REF IC 12/08/2022 1.79     LLN IC 12/08/2022 -88653.21     ULN IC 12/08/2022 82138.79     PRE REF IC 12/08/2022 79.4     FRCpleth Ref 12/08/2022 2.59     FRCpleth LLN 12/08/2022 1.77     FRC PLETH ULN 12/08/2022 3.42     FRCpleth PreRef 12/08/2022 56.4     ERV Ref 12/08/2022 0.69     ERV LLN 12/08/2022 -88948.31     ERV ULN 12/08/2022 12458.69     ERV Pre Ref 12/08/2022 66.1     RV Ref 12/08/2022 1.91     RV LLN 12/08/2022 1.33     RV ULN 12/08/2022 2.48     RV Pre Ref 12/08/2022 52.9     RVTLC Ref 12/08/2022 41     RVTLC LLN  12/08/2022 32     RV TLC ULN 12/08/2022 51     RVTLC Pre Ref 12/08/2022 84.5     Raw Ref 12/08/2022 3.06     Raw Pre Ref 12/08/2022 185.4     sGaw Ref 12/08/2022 0.10     sGaw Pre Ref 12/08/2022 91.3     DLCO Single Breath Ref 12/08/2022 20.63     DLCO Single Breath LLN 12/08/2022 14.89     DLCO SINGLEBREATH ULN 12/08/2022 26.36     DLCO Single Breath Pre R* 12/08/2022 41.8     DLCOc Single Breath Ref 12/08/2022 20.63     DLCOc Single Breath LLN 12/08/2022 14.89     DLCOC SINGLEBREATH ULN 12/08/2022 26.36     DLCOc Single Breath Pre * 12/08/2022 41.4     DLCOVA Ref 12/08/2022 4.48     DLCOVA LLN 12/08/2022 2.91     DLCOVA ULN 12/08/2022 6.06     DLCOVA Pre Ref 12/08/2022 80.6     DLCOc SBVA Ref 12/08/2022 4.48     DLCOc SBVA LLN 12/08/2022 2.91     DLCOCSBVA ULN 12/08/2022 6.06     DLCOc SBVA Pre Ref 12/08/2022 79.9     VA Single Breath Ref 12/08/2022 4.45     VA Single Breath LLN 12/08/2022 4.45     VA SINGLEBREATH ULN 12/08/2022 4.45     VA Single Breath Pre Ref 12/08/2022 53.6     IVC Single Breath Ref 12/08/2022 2.36     IVC Single Breath LLN 12/08/2022 1.72     IVC SINGLEBREATH ULN 12/08/2022 3.04     IVC Single Breath Pre Ref 12/08/2022 79.4    Patient Outreach on 12/05/2022   Component Date Value    Cologuard Result 10/05/2021 negative        Pulmonary Function Tests 12/8/2022 9/12/2022 6/7/2022   FVC 1.86 1.82 2.18   FEV1 1.64 1.62 1.9   TLC (liters) 2.88 - 2.79   DLCO (ml/mmHg sec) 8.62 - 8.17   FVC% 78 77 92   FEV1% 86 85 99   FEF 25-75 2.93 2.69 3.24   FEF 25-75% 160 146 175   TLC% 63 - 61   RV 1.01 - 0.61   RV% 53 - 32   DLCO% 42 - 40     6MW 12/8/2022 9/12/2022 8/5/2022 6/7/2022   6MWT Status completed without stopping completed without stopping completed without stopping completed without stopping   Patient Reported Dyspnea Dyspnea No complaints Dyspnea   Was O2 used? No No - -   6MW Distance walked (feet) 1332 1300 1034 1210   Distance walked (meters) 405.99 396.24 315.16 368.81   Did patient stop? No  No No No   Oxygen Saturation 99 98 96 98   Supplemental Oxygen Room Air Room Air Room Air Room Air   Heart Rate 81 89 82 89   Blood Pressure 145/78 120/74 106/65 129/70   Rigoberto Dyspnea Rating  moderate light somewhat heavy nothing at all   Oxygen Saturation 98 95 91 94   Supplemental Oxygen Room Air Room Air Room Air Room Air   Heart Rate 106 113 110 116   Blood Pressure 129/66 131/77 118/79 132/80   Rigoberto Dyspnea Rating  somewhat heavy somewhat heavy very heavy light   Recovery Time (seconds) 123 72 180 115   Oxygen Saturation 98 98 96 98   Supplemental Oxygen Room Air Room Air Room Air Room Air   Heart Rate 94 97 86 93       Imaging:  Results for orders placed during the hospital encounter of 07/27/22    CT Chest Without Contrast    Narrative  EXAMINATION:  CT CHEST WITHOUT CONTRAST    CLINICAL HISTORY:  Interstitial lung disease; Cough, unspecified    TECHNIQUE:  Low dose axial images, sagittal and coronal reformations were obtained from the thoracic inlet to the lung bases.  Intravenous contrast was not administered.    COMPARISON:  CT thorax 06/28/2021    FINDINGS:  Base of Neck: Imaged portions of the base of the neck are grossly unremarkable.    Aorta: 3-branch vessel configuration of a left-sided type 3 aortic arch.  No hyperdense crescent sign, aneurysmal degeneration, periaortic fat stranding or periaortic fluid.    Heart: Heart is normal in size.  No significant pericardial thickening. No appreciable coronary artery calcifications.    Pulmonary vasculature: Pulmonary arteries are not enlarged and distribute normally.  There are four pulmonary veins.    Axilla/Alexandra/Mediastinum: Prominent mediastinal lymph nodes however, no rachael axillary or mediastinal lymphadenopathy.  Evaluation of hilar lymph nodes is limited without IV contrast.    Airways: Trachea and mainstem bronchi are patent.  Symmetric mild central bronchiectasis present.    Lungs/Pleura: Significant interval progression of previously noted  subpleural predominant reticular and ground-glass airspace opacities associated with bilateral lower lobe volume loss, architectural distortion, symmetric mild central bronchiectasis and apicobasal gradient.  No pleural effusions.  No pneumothorax.    Esophagus: Small hiatal hernia, not significantly changed.  Otherwise, normal in course and caliber however, evaluation is limited given the lack of oral contrast.    Soft tissues: Imaged soft tissues are grossly unremarkable.    Osseous structures: Diffuse osseous demineralization and mild multilevel degenerative changes of the imaged spine.  No acute displaced fracture, dislocation or suspicious lytic/blastic osseous lesion.    Upper Abdomen: Imaged portions of the upper abdomen are grossly unremarkable.    Impression  1. Significant interval progression of previously noted subpleural predominant reticular and ground-glass airspace opacities associated with bilateral lower lobe volume loss, architectural distortion, symmetric mild central bronchiectasis and apicobasal gradient suggestive of UIP pattern of interstitial lung disease which may reflect IPF.  Additional diagnostic considerations include NSIP, asbestosis, fibrotic hypersensitivity pneumonitis connective tissue associated lung disease and drug induced lung disease amongst other etiologies.      Electronically signed by: Braulio Haro  Date:    07/27/2022  Time:    16:11    Cardiodiagnostics:  6/28/2021:  The estimated ejection fraction is 55%.  Normal systolic function.  Normal right ventricular size with normal right ventricular systolic function.  Mild to moderate left atrial enlargement.  Mild right atrial enlargement.  Normal central venous pressure (3 mmHg).  The estimated PA systolic pressure is 18 mmHg.       Assessment:  1. Interstitial pulmonary disease, unspecified    2. Rheumatoid arthritis involving multiple sites with positive rheumatoid factor    3. Cough, unspecified type    4. Hyperglycemia       Plan:   1. Followed for RA-ILD.  FVC and DLCO are stable.  Symptoms improved but continues with cough.  Failed MTX and AZA.  Currently on rituxan infusions; tolerating well.  Will repeat CT chest for baseline since the last one done was when she had acute MTX lung injury.  Follows with ENT; takes daily antihistamine, taken off flonase.  Takes PPI; has had extensive GI evaluation which was all negative.      2. Continue to follow with rheum.  Currently on rituxan.  Planning to follow-up with hand surgeon for L>R tendon pain and reduced range of motion.    3. Cough likely related to pulmonary fibrosis.  Improved from previous but still present.  On mucinex for sputum.  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  No GERD.      4. Will check HgbA1C given long term use of steroids.      5. Follow-up in 4 months with PFTs and 6MWT.  (Pt planning trip to [place] mid Feb 2023; will be [place] of the country for 6-8 weeks)      Barbara Haile D.O.  Pulmonary/Critical Care and Lung Transplantation  [facility]

## 2022-12-08 NOTE — LETTER
December 8, 2022        Ibis Holcomb  [address]  [facility] [address]  Phone: [phone]  Fax: [phone]             [facility] 1st Fl  [address]  [facility] [address]  Phone: [phone]   Patient: Juan Cruz   MR Number: [id number]   YOB: 1955   Date of Visit: 12/8/2022       Dear Dr. Ibis Holcomb    Thank you for referring Juan Cruz to me for evaluation. Attached you will find relevant portions of my assessment and plan of care.    If you have questions, please do not hesitate to call me. I look forward to following Juan Cruz along with you.    Sincerely,    Barbara Haile, DO    Enclosure    If you would like to receive this communication electronically, please contact [email or web address] or [phone] to request [employer] access.    [employer] is a tool which provides read-only access to select patient information with whom you have a relationship. Its easy to use and provides real time access to review your patients record including encounter summaries, notes, results, and demographic information.    If you feel you have received this communication in error or would no longer like to receive these types of communications, please e-mail [email or web address]

## 2022-12-09 ENCOUNTER — HOSPITAL ENCOUNTER (OUTPATIENT)
Dept: RADIOLOGY | Facility: HOSPITAL | Age: 67
Discharge: HOME | End: 2022-12-09
Attending: INTERNAL MEDICINE
Payer: MEDICARE

## 2022-12-09 ENCOUNTER — HOSPITAL ENCOUNTER (OUTPATIENT)
Dept: PULMONOLOGY | Facility: OTHER | Age: 67
Discharge: HOME | End: 2022-12-09
Attending: INTERNAL MEDICINE
Payer: MEDICARE

## 2022-12-09 DIAGNOSIS — J84.9 INTERSTITIAL PULMONARY DISEASE, UNSPECIFIED: ICD-10-CM

## 2022-12-09 PROCEDURE — 71250 CT THORAX DX C-: CPT | Mod: TC,NTX

## 2022-12-09 PROCEDURE — 71250 CT CHEST WITHOUT CONTRAST: ICD-10-PCS | Mod: 26,NTX,, | Performed by: RADIOLOGY

## 2022-12-09 PROCEDURE — G0238 OTH RESP PROC, INDIV: HCPCS | Mod: NTX

## 2022-12-09 PROCEDURE — G0237 THERAPEUTIC PROCD STRG ENDUR: HCPCS | Mod: TXP

## 2022-12-09 PROCEDURE — 71250 CT THORAX DX C-: CPT | Mod: 26,NTX,, | Performed by: RADIOLOGY

## 2022-12-12 ENCOUNTER — TELEPHONE (OUTPATIENT)
Dept: TRANSPLANT | Facility: CLINIC | Age: 67
End: 2022-12-12
Payer: MEDICARE

## 2022-12-12 ENCOUNTER — PATIENT MESSAGE (OUTPATIENT)
Dept: TRANSPLANT | Facility: CLINIC | Age: 67
End: 2022-12-12
Payer: MEDICARE

## 2022-12-12 DIAGNOSIS — J84.9 INTERSTITIAL LUNG DISEASE: Primary | ICD-10-CM

## 2022-12-12 LAB
DLCO ADJ PRE: 8.54 ML/(MIN*MMHG) (ref 14.89–26.36)
DLCO SINGLE BREATH LLN: 14.89
DLCO SINGLE BREATH PRE REF: 41.8 %
DLCO SINGLE BREATH REF: 20.63
DLCOC SBVA LLN: 2.91
DLCOC SBVA PRE REF: 79.9 %
DLCOC SBVA REF: 4.48
DLCOC SINGLE BREATH LLN: 14.89
DLCOC SINGLE BREATH PRE REF: 41.4 %
DLCOC SINGLE BREATH REF: 20.63
DLCOCSBVAULN: 6.06
DLCOCSINGLEBREATHULN: 26.36
DLCOSINGLEBREATHULN: 26.36
DLCOVA LLN: 2.91
DLCOVA PRE REF: 80.6 %
DLCOVA PRE: 3.61 ML/(MIN*MMHG*L) (ref 2.91–6.06)
DLCOVA REF: 4.48
DLCOVAULN: 6.06
DLVAADJ PRE: 3.58 ML/(MIN*MMHG*L) (ref 2.91–6.06)
ERV LLN: -16449.31
ERV PRE REF: 66.1 %
ERV REF: 0.69
ERVULN: ABNORMAL
FEF 25 75 LLN: 0.91
FEF 25 75 PRE REF: 160.3 %
FEF 25 75 REF: 1.83
FEV05 LLN: 0.81
FEV05 REF: 1.66
FEV1 FVC LLN: 70
FEV1 FVC PRE REF: 108.9 %
FEV1 FVC REF: 81
FEV1 LLN: 1.37
FEV1 PRE REF: 86.2 %
FEV1 REF: 1.91
FRCPLETH LLN: 1.77
FRCPLETH PREREF: 56.4 %
FRCPLETH REF: 2.59
FRCPLETHULN: 3.42
FVC LLN: 1.72
FVC PRE REF: 78.9 %
FVC REF: 2.36
IVC PRE: 1.87 L (ref 1.72–3.04)
IVC SINGLE BREATH LLN: 1.72
IVC SINGLE BREATH PRE REF: 79.4 %
IVC SINGLE BREATH REF: 2.36
IVCSINGLEBREATHULN: 3.04
LLN IC: -16448.21
PEF LLN: 4.09
PEF PRE REF: 83.9 %
PEF REF: 5.57
PHYSICIAN COMMENT: ABNORMAL
PRE DLCO: 8.62 ML/(MIN*MMHG) (ref 14.89–26.36)
PRE ERV: 0.45 L (ref -16449.31–16450.69)
PRE FEF 25 75: 2.93 L/S (ref 0.91–3.09)
PRE FET 100: 5.2 SEC
PRE FEV05 REF: 87.5 %
PRE FEV1 FVC: 88.32 % (ref 69.82–90.93)
PRE FEV1: 1.64 L (ref 1.37–2.42)
PRE FEV5: 1.45 L (ref 0.81–2.52)
PRE FRC PL: 1.46 L (ref 1.77–3.42)
PRE FVC: 1.86 L (ref 1.72–3.04)
PRE IC: 1.42 L (ref -16448.21–16451.79)
PRE PEF: 4.67 L/S (ref 4.09–7.05)
PRE REF IC: 79.4 %
PRE RV: 1.01 L (ref 1.33–2.48)
PRE TLC: 2.88 L (ref 3.62–5.59)
PRE VTG: 1.48 L
RAW PRE REF: 185.4 %
RAW PRE: 5.67 CMH2O*S/L (ref 3.06–3.06)
RAW REF: 3.06
REF IC: 1.79
RV LLN: 1.33
RV PRE REF: 52.9 %
RV REF: 1.91
RVTLC LLN: 32
RVTLC PRE REF: 84.5 %
RVTLC PRE: 35 % (ref 31.81–50.99)
RVTLC REF: 41
RVTLCULN: 51
RVULN: 2.48
SGAW PRE REF: 91.3 %
SGAW PRE: 0.09 1/(CMH2O*S) (ref 0.1–0.1)
SGAW REF: 0.1
TLC LLN: 3.62
TLC PRE REF: 62.6 %
TLC REF: 4.6
TLC ULN: 5.59
ULN IC: ABNORMAL
VA PRE: 2.39 L (ref 4.45–4.45)
VA SINGLE BREATH LLN: 4.45
VA SINGLE BREATH PRE REF: 53.6 %
VA SINGLE BREATH REF: 4.45
VASINGLEBREATHULN: 4.45
VC LLN: 1.72
VC PRE REF: 79.4 %
VC PRE: 1.87 L (ref 1.72–3.04)
VC REF: 2.36
VC ULN: 3.04

## 2022-12-12 NOTE — TELEPHONE ENCOUNTER
MICHA consulted to continue pulmonary rehab at [facility]. SW called and spoke to Isrrael, [phone], Isrrael confirmed they did receive orders. They are waiting for authorization, and will contact pt to schedule appt. They will also notify pt if she is denied.    SW left a voicemail for pt stating the above. SW told pt to call with any questions, and to let us know if her insurance does not authorize further sessions. Pt provided SW contact information.    MICHA remains available.      ----- Message from Brenda Eng LCSW sent at 12/12/2022 12:10 PM CST -----  They have the orders and are waiting for insurance approval and will notify pt if she is approved or denied. I will call the patient and let her know, thanks.    Yoko    ----- Message -----  From: Josiane Lind  Sent: 12/12/2022  10:23 AM CST  To: Farhat Stanford, RRT, #    Renewal orders per patient request/Dr. Haile.

## 2022-12-12 NOTE — PROCEDURES
Juan Cruz is a 67 y.o.  female patient, who presents for a 6 minute walk test ordered by DO Lazara.  The diagnosis is Interstitial Lung Disease.  The patient's BMI is 25 kg/m2.  Predicted distance (lower limit of normal) is 337.22 meters.      Test Results:    The test was completed without stopping.  The total time walked was 360 seconds.  During walking, the patient reported:  Dyspnea.  The patient used no assistive devices during testing.     12/08/2022---------Distance: 405.99 meters (1332 feet)     O2 Sat % Supplemental Oxygen Heart Rate Blood Pressure Rigoberto Scale   Pre-exercise  (Resting) 99 % Room Air 81 bpm 145/78 mmHg 3   During Exercise 98 % Room Air 106 bpm 129/66 mmHg 4   Post-exercise  (Recovery) 98 % Room Air  94 bpm       Recovery Time: 123 seconds    Performing nurse/tech: ОЛЕГ Machado      PREVIOUS STUDY:   09/12/2022---------Distance: 396.24 meters (1300 feet)       O2 Sat % Supplemental Oxygen Heart Rate Blood Pressure Rigoberto Scale   Pre-exercise  (Resting) 98 % Room Air 89 bpm 120/74 mmHg 2   During Exercise 95 % Room Air 113 bpm 131/77 mmHg 4   Post-exercise  (Recovery) 98 % Room Air  97 bpm          CLINICAL INTERPRETATION:  Six minute walk distance is 405.99 meters (1332 feet) with somewhat heavy dyspnea.  During exercise, there was no significant desaturation while breathing room air.  Blood pressure remained stable and Heart rate increased significantly with walking.  The patient did not report non-pulmonary symptoms during exercise.  Since the previous study in September 2022, exercise capacity is unchanged.  Based upon age and body mass index, exercise capacity is normal.

## 2022-12-12 NOTE — TELEPHONE ENCOUNTER
----- Message from Barbara Haile DO sent at 12/12/2022  8:49 AM CST -----  No change to follow-up plan.  CT chest looks minimally improved from her flare/MTX reaction in 7/2022 but still remains with significant parenchymal lung disease.  Continue with current plans of care.  Thanks    ----- Message -----  From: Josiane Lind  Sent: 12/9/2022  11:19 AM CST  To: Barbara Haile DO    Please advise on follow up, plan.

## 2022-12-12 NOTE — TELEPHONE ENCOUNTER
Order per Dr. Haile, per patient request. Routed to  team for follow up/pulm rehab CHANDRAKANT Stanford RRT.  ----- Message from Barbara Haile DO sent at 12/12/2022  8:34 AM CST -----  Regarding: RE: New Referral to Pulmonary Rehab  Absolutely!  Raymundo, would you mind placing another referral for her?  Thanks!    ----- Message -----  From: Farhat Stanford RRT  Sent: 12/9/2022   8:42 AM CST  To: Barbara Haile DO  Subject: New Referral to Pulmonary Rehab                  Patient is requesting another round of Pulmonary Rehab. New referral needed. Patient has 2 sessions left on this referral.  Thank you.    Farhat Stanford RRT

## 2022-12-13 ENCOUNTER — HOSPITAL ENCOUNTER (OUTPATIENT)
Dept: PULMONOLOGY | Facility: OTHER | Age: 67
Discharge: HOME | End: 2022-12-13
Attending: INTERNAL MEDICINE
Payer: MEDICARE

## 2022-12-13 PROCEDURE — G0237 THERAPEUTIC PROCD STRG ENDUR: HCPCS | Mod: TXP

## 2022-12-13 PROCEDURE — G0238 OTH RESP PROC, INDIV: HCPCS | Mod: TXP

## 2022-12-19 ENCOUNTER — PATIENT MESSAGE (OUTPATIENT)
Dept: TRANSPLANT | Facility: CLINIC | Age: 67
End: 2022-12-19
Payer: MEDICARE

## 2022-12-19 PROBLEM — Z00.00 PREVENTATIVE HEALTH CARE: Status: RESOLVED | Noted: 2022-09-16 | Resolved: 2022-12-19

## 2022-12-20 ENCOUNTER — OFFICE VISIT (OUTPATIENT)
Dept: PRIMARY CARE CLINIC | Facility: CLINIC | Age: 67
End: 2022-12-20
Payer: MEDICARE

## 2022-12-20 DIAGNOSIS — Z12.31 ENCOUNTER FOR SCREENING MAMMOGRAM FOR MALIGNANT NEOPLASM OF BREAST: ICD-10-CM

## 2022-12-20 DIAGNOSIS — Z00.00 PREVENTATIVE HEALTH CARE: ICD-10-CM

## 2022-12-20 DIAGNOSIS — J18.9 PNEUMONIA OF LEFT LOWER LOBE DUE TO INFECTIOUS ORGANISM: Primary | ICD-10-CM

## 2022-12-20 DIAGNOSIS — M05.79 RHEUMATOID ARTHRITIS INVOLVING MULTIPLE SITES WITH POSITIVE RHEUMATOID FACTOR: ICD-10-CM

## 2022-12-20 DIAGNOSIS — R00.0 SINUS TACHYCARDIA: ICD-10-CM

## 2022-12-20 DIAGNOSIS — J84.9 INTERSTITIAL LUNG DISEASE: ICD-10-CM

## 2022-12-20 PROCEDURE — 99214 PR OFFICE/OUTPT VISIT, EST, LEVL IV, 30-39 MIN: ICD-10-PCS | Mod: 95,NTX,, | Performed by: INTERNAL MEDICINE

## 2022-12-20 PROCEDURE — 99499 RISK ADDL DX/OHS AUDIT: ICD-10-PCS | Mod: 95,TXP,, | Performed by: INTERNAL MEDICINE

## 2022-12-20 PROCEDURE — 99214 OFFICE O/P EST MOD 30 MIN: CPT | Mod: 95,NTX,, | Performed by: INTERNAL MEDICINE

## 2022-12-20 PROCEDURE — 99499 UNLISTED E&M SERVICE: CPT | Mod: 95,TXP,, | Performed by: INTERNAL MEDICINE

## 2022-12-20 RX ORDER — METOPROLOL SUCCINATE 25 MG/1
25 TABLET, EXTENDED RELEASE ORAL DAILY
Qty: 90 TABLET | Refills: 3 | Status: SHIPPED | OUTPATIENT
Start: 2022-12-20 | End: 2024-01-19 | Stop reason: SDUPTHER

## 2022-12-20 NOTE — PROGRESS NOTES
[facility] Progress Note  Audiovisual Telemed  Done in [place]       Chief Complaint     No chief complaint on file.   for follow-up for LLL pneumonia     Subjective:      History of Present Illness:  Juan Cruz is a 67 y.o. female     LLL pneumonia diagnosed by CXR on 11/29/2022: was afebrile but her primary symptom was severe SOB at rest and unable to speak in sentences with coughing with clear sputum but O2 sat >95%; finished 8 days of Levaquin 750mg daily and now feels much better - close to baseline   Pt is now feeling much better after 2 infusions of Rituximab and Levaquin and has restarted exercising again with minimal BAKER.  She is still coughing but now able to speak in sentences with improved ROM and less joint pain and with no BAKER - able to go up and down the stairs.     PFTs: ILD; CT of chest: stable   -back in pulmonary rehab at [facility] biweekly x 1.5 hours  -now on prednisone 10mg daily;  Finished Rituximab x 2 on 11/07/2022 every 6 mo with less joint, SOB, BAKER  -Rheum feels that pt's ILD was due to both MTX and RA  -seeing hand surgeon today for her left ring finger and right thumb nodule   11/15/2022:  ESR 36 (48),  CRP 19 (14), WBC 15.26    Pt would like a referral to a rheumatologist who specializes in management of RA since she would like to know whether her ILD is due to RA alone or due to MTX superimposed on RA.  She would like a Type 3 Pro-Collagen (P111 NP) drawn to confirm that the pulm fibrosis is due to MTX    Sinus Tachycardia:  needs refill metoprolol ER 25mg po daily    Needs screening MMG.       Past Medical History:  Past Medical History:   Diagnosis Date    Chronic sinusitis, unspecified     History of SIADH     Mitral valve prolapse     Osteopenia     Rheumatoid arthritis involving multiple sites        Past Surgical History:  Past Surgical History:   Procedure Laterality Date    BREAST BIOPSY Bilateral     FUNCTIONAL ENDOSCOPIC SINUS SURGERY (FESS)       ORIF WRIST FRACTURE Right        Allergies:  Review of patient's allergies indicates:   Allergen Reactions    Methotrexate Shortness Of Breath    Gluten protein Other (See Comments)     Sensitive - not allergic    Ppd black rubber mix        Home Medications:  Current Outpatient Medications   Medication Sig Dispense Refill    acetaminophen (TYLENOL) 325 MG tablet Take 650 mg by mouth every 6 (six) hours as needed for Pain.      B-complex with vitamin C (Z-BEC OR EQUIV) tablet Take 0.5 tablets by mouth 2 (two) times a day.      calcium citrate-vitamin D3 315-200 mg (CITRACAL+D) 315-200 mg-unit per tablet Take 1 tablet by mouth 2 (two) times daily.      cetirizine (ZYRTEC) 10 MG tablet cetirizine 10 mg tablet   TAKE 1 TABLET BY MOUTH ONCE DAILY      denosumab (PROLIA) 60 mg/mL Syrg Inject 1 mL (60 mg total) into the skin every 6 (six) months. 2 mL 3    diclofenac sodium (VOLTAREN) 1 % Gel Apply 2 g topically 4 (four) times daily as needed.      estradioL (VAGIFEM) 10 mcg Tab Yuvafem 10 mcg vaginal tablet   INSERT 1 TABLET VAGINALLY TWICE A WEEK      fluticasone propionate (FLONASE) 50 mcg/actuation nasal spray 1 spray by Each Nostril route once daily.      fluticasone/vilanterol (BREO ELLIPTA INHL) Inhale 1 puff into the lungs once daily.      folic acid (FOLVITE) 1 MG tablet Take 1 tablet (1 mg total) by mouth once daily. 90 tablet 3    guaiFENesin (MUCINEX) 600 mg 12 hr tablet Take 1,200 mg by mouth 2 (two) times daily.      levoFLOXacin (LEVAQUIN) 750 MG tablet Take 1 tablet (750 mg total) by mouth once daily. 7 tablet 0    magnesium oxide (MAG-OX) 400 mg (241.3 mg magnesium) tablet Take 400 mg by mouth once daily.      metoprolol succinate (TOPROL-XL) 25 MG 24 hr tablet Take 1 tablet (25 mg total) by mouth once daily. 90 tablet 3    pantoprazole (PROTONIX) 40 MG tablet Take 40 mg by mouth once daily.      predniSONE (DELTASONE) 20 MG tablet Take 2 tablets (40 mg total) by mouth once daily. 60 tablet 2     predniSONE (DELTASONE) 5 MG tablet Take 3 tablets (15 mg total) by mouth once daily. 90 tablet 3    sod chlor,sod bicarb/neti pot (NEILMED NASAFLO FRANK) 1 Dose by sinus irrigation route daily as needed.       No current facility-administered medications for this visit.        Family History:  No family history on file.    Social History:  Social History     Tobacco Use    Smoking status: Never    Smokeless tobacco: Never   Substance Use Topics    Alcohol use: No    Drug use: No       Review of Systems:  Pertinent positives and negatives listed in HPI. All other systems are reviewed and are negative.    Health Maintaince :   Health Maintenance Topics with due status: Not Due       Topic Last Completion Date    Colorectal Cancer Screening 10/05/2021    DEXA Scan 05/06/2022    Pneumococcal Vaccines (Age 65+) 09/16/2022    Lipid Panel 09/19/2022           Eye:   Dental:     Immunizations:   Tdap: n/a  Influenza: never .  Pneumovax: UTD   Shingrex x 2: UTD  COVID: needs bivalent booster   Hepatitis C:   Cancer Screening:  PAP: UTD   Mammogram: 11/2021  Colonoscopy: 10/2021  DEXA:  5/2022    The 10-year ASCVD risk score (Yohana GARCIA, et al., 2019) is: 6.4%    Values used to calculate the score:      Age: 67 years      Sex: Female      Is Non- : No      Diabetic: No      Tobacco smoker: No      Systolic Blood Pressure: 132 mmHg      Is BP treated: No      HDL Cholesterol: 73 mg/dL      Total Cholesterol: 238 mg/dL      Objective:   LMP  (LMP Unknown)      There is no height or weight on file to calculate BMI.       Physical Examination:  General: Alert and awake in no apparent distress able to speak in sentences.     Laboratory:      Most Recent Data:  Lab Results   Component Value Date    WBC 15.26 (H) 11/15/2022    HGB 13.7 11/15/2022    HCT 41.0 11/15/2022     11/15/2022    CHOL 238 (H) 09/19/2022    TRIG 107 09/19/2022    HDL 73 09/19/2022    ALT 40 11/15/2022    AST 32 11/15/2022      (L) 11/15/2022    K 4.4 11/15/2022     11/15/2022    BUN 10 11/15/2022    CO2 24 11/15/2022    INR 0.9 09/09/2022              CBC:   WBC   Date Value Ref Range Status   11/15/2022 15.26 (H) 3.90 - 12.70 K/uL Final     Hemoglobin   Date Value Ref Range Status   11/15/2022 13.7 12.0 - 16.0 g/dL Final     Hematocrit   Date Value Ref Range Status   11/15/2022 41.0 37.0 - 48.5 % Final     Platelets   Date Value Ref Range Status   11/15/2022 338 150 - 450 K/uL Final     MCV   Date Value Ref Range Status   11/15/2022 93 82 - 98 fL Final     RDW   Date Value Ref Range Status   11/15/2022 12.3 11.5 - 14.5 % Final     BMP:   Sodium   Date Value Ref Range Status   11/15/2022 135 (L) 136 - 145 mmol/L Final     Potassium   Date Value Ref Range Status   11/15/2022 4.4 3.5 - 5.1 mmol/L Final     Chloride   Date Value Ref Range Status   11/15/2022 102 95 - 110 mmol/L Final     CO2   Date Value Ref Range Status   11/15/2022 24 23 - 29 mmol/L Final     BUN   Date Value Ref Range Status   11/15/2022 10 8 - 23 mg/dL Final     Creatinine   Date Value Ref Range Status   11/15/2022 0.7 0.5 - 1.4 mg/dL Final     Glucose   Date Value Ref Range Status   11/15/2022 105 70 - 110 mg/dL Final     Calcium   Date Value Ref Range Status   11/15/2022 9.3 8.7 - 10.5 mg/dL Final     LFTs:   Total Protein   Date Value Ref Range Status   11/15/2022 6.9 6.0 - 8.4 g/dL Final     Albumin   Date Value Ref Range Status   11/15/2022 3.3 (L) 3.5 - 5.2 g/dL Final     Total Bilirubin   Date Value Ref Range Status   11/15/2022 0.3 0.1 - 1.0 mg/dL Final     Comment:     For infants and newborns, interpretation of results should be based  on gestational age, weight and in agreement with clinical  observations.    Premature Infant recommended reference ranges:  Up to 24 hours.............<8.0 mg/dL  Up to 48 hours............<12.0 mg/dL  3-5 days..................<15.0 mg/dL  6-29 days.................<15.0 mg/dL       AST   Date Value Ref Range Status    11/15/2022 32 10 - 40 U/L Final     Alkaline Phosphatase   Date Value Ref Range Status   11/15/2022 37 (L) 55 - 135 U/L Final     ALT   Date Value Ref Range Status   11/15/2022 40 10 - 44 U/L Final     Coags:   INR   Date Value Ref Range Status   09/09/2022 0.9 0.8 - 1.2 Final     Comment:     Coumadin Therapy:  2.0 - 3.0 for INR for all indicators except mechanical heart valves  and antiphospholipid syndromes which should use 2.5 - 3.5.       FLP:      Lab Results   Component Value Date    CHOL 238 (H) 09/19/2022     Lab Results   Component Value Date    HDL 73 09/19/2022     Lab Results   Component Value Date    LDLCALC 143.6 09/19/2022     Lab Results   Component Value Date    TRIG 107 09/19/2022     Lab Results   Component Value Date    CHOLHDL 30.7 09/19/2022      DM:      LDL Cholesterol   Date Value Ref Range Status   09/19/2022 143.6 63.0 - 159.0 mg/dL Final     Comment:     The National Cholesterol Education Program (NCEP) has set the  following guidelines (reference values) for LDL Cholesterol:  Optimal.......................<130 mg/dL  Borderline High...............130-159 mg/dL  High..........................160-189 mg/dL  Very High.....................>190 mg/dL       Creatinine   Date Value Ref Range Status   11/15/2022 0.7 0.5 - 1.4 mg/dL Final     Thyroid: No results found for: TSH, FREET4, K7NJUZT, R1ZSZTX, THYROIDAB  Anemia: No results found for: IRON, TIBC, FERRITIN, HULDMGZD38, FOLATE  Cardiac: No results found for: TROPONINI, CKTOTAL, CKMB, BNP  Urinalysis:   Color, UA   Date Value Ref Range Status   09/06/2022 Yellow Yellow, Straw, Krystal Final     Specific Gravity, UA   Date Value Ref Range Status   09/06/2022 <=1.005 (A) 1.005 - 1.030 Final     Nitrite, UA   Date Value Ref Range Status   09/06/2022 Negative Negative Final     Ketones, UA   Date Value Ref Range Status   09/06/2022 Negative Negative Final     Urobilinogen, UA   Date Value Ref Range Status   09/06/2022 Negative <2.0 EU/dL Final        Other Laboratory Data:      Other Results:  EKG (my interpretation):     Radiology:  CT Chest Without Contrast  Narrative: EXAMINATION:  CT CHEST WITHOUT CONTRAST    CLINICAL HISTORY:  Interstitial lung disease; Interstitial pulmonary disease, unspecified    TECHNIQUE:  Low dose axial images, sagittal and coronal reformations were obtained from the thoracic inlet to the lung bases. Contrast was not administered.    COMPARISON:  11/29/2022    FINDINGS:  Heart and mediastinal vessels: Normal . No effusion.    Alexandra/Mediastinum: No pathologic ale enlargement.    Lungs/Pleura: Diffuse subpleural reticular opacity and nodular pleural thickening.  Minimal perihilar bronchiectasis.  No evidence of honeycombing.  No focal opacity.  No pleural thickening.    Upper Abdomen: No abnormality of the partially imaged upper abdomen.    Bones: Unremarkable.  Impression: Diffuse nonspecific interstitial lung disease, possible UIP pattern    Electronically signed by: Delonte Gamble Jr  Date:    12/09/2022  Time:    08:56          Assessment/Plan     Juan Cruz is a 67 y.o. female with:  1. Pneumonia of left lower lobe due to infectious organism  Assessment & Plan:  -improving after 8-day of Levaquin 750mg po daily  -cont to hydrate and rest  -resume pulmonary rehab as tolerated      2. Encounter for screening mammogram for malignant neoplasm of breast  -     Mammo Digital Screening Bilat; Future; Expected date: 12/20/2023    3. Rheumatoid arthritis involving multiple sites with positive rheumatoid factor  Overview:  11/15/2022:  ESR 36 (48),  CRP 19 (14), WBC 15.26      Assessment & Plan:  -cont Rituximub infusions every 2 months   -cont to f/u Dr. Holcomb with labs  -cont Prednisone 10mg po daily to be tapered as per Dr. Holcomb      4. Interstitial lung disease  Overview:  PFTs 12/2022: allegedly c/w ILD  CT chest 12/09/2022: Diffuse subpleural reticular opacity and nodular pleural thickening.  Minimal  perihilar bronchiectasis.  No evidence of honeycombing.  No focal opacity.  No pleural thickening.  Dx: Diffuse nonspecific interstitial lung disease, possible UIP pattern    Assessment & Plan:  -cont Rituximab infusions every 6 mo  -f/u Dr. Haile as scheduled  -restart pulmonary rehab at [facility]        5. Sinus tachycardia  Assessment & Plan:  Refill Metoprolol ER 25mg po daily to keep HR 60-80    Orders:  -     metoprolol succinate (TOPROL-XL) 25 MG 24 hr tablet; Take 1 tablet (25 mg total) by mouth once daily.  Dispense: 90 tablet; Refill: 3    6. Preventative health care  Assessment & Plan:  Schedule MMG at [facility]               This includes face to face 39 min time and non-face to face time preparing to see the patient (eg, review of tests), obtaining and/or reviewing separately obtained history, documenting clinical information in the electronic or other health record, independently interpreting results and communicating results to the patient/family/caregiver, or care coordinator.   Code Status:     Gianna Carpenter MD

## 2022-12-20 NOTE — ASSESSMENT & PLAN NOTE
-improving after 8-day of Levaquin 750mg po daily  -cont to hydrate and rest  -resume pulmonary rehab as tolerated

## 2022-12-20 NOTE — ASSESSMENT & PLAN NOTE
-cont Rituximub infusions every 2 months   -cont to f/u Dr. Holcomb with labs  -cont Prednisone 10mg po daily to be tapered as per Dr. Holcomb

## 2022-12-20 NOTE — ASSESSMENT & PLAN NOTE
-cont Rituximab infusions every 6 mo  -f/u Dr. Haile as scheduled  -restart pulmonary rehab at [facility]

## 2022-12-22 ENCOUNTER — PATIENT MESSAGE (OUTPATIENT)
Dept: TRANSPLANT | Facility: CLINIC | Age: 67
End: 2022-12-22
Payer: MEDICARE

## 2022-12-22 ENCOUNTER — PATIENT MESSAGE (OUTPATIENT)
Dept: PRIMARY CARE CLINIC | Facility: CLINIC | Age: 67
End: 2022-12-22
Payer: MEDICARE

## 2022-12-22 ENCOUNTER — HOSPITAL ENCOUNTER (OUTPATIENT)
Dept: PULMONOLOGY | Facility: OTHER | Age: 67
Discharge: HOME | End: 2022-12-22
Attending: INTERNAL MEDICINE
Payer: MEDICARE

## 2022-12-22 ENCOUNTER — PATIENT MESSAGE (OUTPATIENT)
Dept: RHEUMATOLOGY | Facility: CLINIC | Age: 67
End: 2022-12-22
Payer: MEDICARE

## 2022-12-22 VITALS — WEIGHT: 134 LBS | BODY MASS INDEX: 24.66 KG/M2 | HEIGHT: 62 IN

## 2022-12-22 PROCEDURE — 94618 PULMONARY STRESS TESTING: CPT | Mod: TXP

## 2022-12-22 PROCEDURE — G0238 OTH RESP PROC, INDIV: HCPCS | Mod: TXP

## 2022-12-22 PROCEDURE — G0237 THERAPEUTIC PROCD STRG ENDUR: HCPCS | Mod: NTX

## 2022-12-22 NOTE — PROGRESS NOTES
"[facility] ([facility])  Six Minute Walk     SUMMARY     Ordering Provider: Barbara Haile      Performing nurse/tech/RT: KIM Stanford RRT  Diagnosis: Pulmonary Fibrosis  Height: 5' 2" (157.5 cm)  Weight: 60.8 kg (134 lb)  BMI (Calculated): 24.5   Patient Race: [facility] Oxygen Assessment Supplemental O2 Heart   Rate Blood Pressure Rigoberto Dyspnea Scale Rating   Resting 98 % Room Air 78 bpm 118/77 2   Exercise        Minute        1           2           3           4           5           6  95 % Room Air 113 bpm 130/86 4   Recovery        Minute        1           2           3           4 99 % Room Air 83 bpm 115/77 2     Six Minute Walk Summary  6MWT Status: completed without stopping  Patient Reported: No complaints  Distance: 1300 ft  Previous Walk 08/05/2022: 1034 ft     Interpretation:                                                     Predicted Distance Meters (Calculated): 478.65 meters                        "

## 2022-12-27 ENCOUNTER — PATIENT MESSAGE (OUTPATIENT)
Dept: RHEUMATOLOGY | Facility: CLINIC | Age: 67
End: 2022-12-27
Payer: MEDICARE

## 2022-12-29 ENCOUNTER — PATIENT MESSAGE (OUTPATIENT)
Dept: PRIMARY CARE CLINIC | Facility: CLINIC | Age: 67
End: 2022-12-29
Payer: MEDICARE

## 2023-01-01 NOTE — TELEPHONE ENCOUNTER
Addended by: NYDIA MAN on: 2023 08:32 AM     Modules accepted: Orders     Pt notified approval received for cologclarencerd tesing.  Pt acknowledged understanding.

## 2023-01-05 ENCOUNTER — HOSPITAL ENCOUNTER (OUTPATIENT)
Dept: RADIOLOGY | Facility: HOSPITAL | Age: 68
Discharge: HOME | End: 2023-01-05
Attending: INTERNAL MEDICINE
Payer: MEDICARE

## 2023-01-05 DIAGNOSIS — Z12.31 ENCOUNTER FOR SCREENING MAMMOGRAM FOR MALIGNANT NEOPLASM OF BREAST: ICD-10-CM

## 2023-01-05 PROCEDURE — 77067 SCR MAMMO BI INCL CAD: CPT | Mod: 26,NTX,, | Performed by: RADIOLOGY

## 2023-01-05 PROCEDURE — 77067 SCR MAMMO BI INCL CAD: CPT | Mod: TC,TXP

## 2023-01-05 PROCEDURE — 77067 MAMMO DIGITAL SCREENING BILAT WITH TOMO: ICD-10-PCS | Mod: 26,NTX,, | Performed by: RADIOLOGY

## 2023-01-05 PROCEDURE — 77063 MAMMO DIGITAL SCREENING BILAT WITH TOMO: ICD-10-PCS | Mod: 26,NTX,, | Performed by: RADIOLOGY

## 2023-01-05 PROCEDURE — 77063 BREAST TOMOSYNTHESIS BI: CPT | Mod: 26,NTX,, | Performed by: RADIOLOGY

## 2023-01-10 ENCOUNTER — PATIENT MESSAGE (OUTPATIENT)
Dept: TRANSPLANT | Facility: CLINIC | Age: 68
End: 2023-01-10
Payer: MEDICARE

## 2023-01-10 ENCOUNTER — TELEPHONE (OUTPATIENT)
Dept: TRANSPLANT | Facility: CLINIC | Age: 68
End: 2023-01-10
Payer: MEDICARE

## 2023-01-10 DIAGNOSIS — J84.9 INTERSTITIAL LUNG DISEASE: Primary | ICD-10-CM

## 2023-01-10 NOTE — TELEPHONE ENCOUNTER
MICHA contacted [facility] (ph: [phone]) re: status of pt's pulmonary rehab orders. MICHA spoke to Isrrael, who stated authorization was not approved and new orders will be needed. Isrrael reports she sent a message to Dr. Haile requesting new orders. SW to fax new orders once entered.    MICHA contacted pt to relay update. No answer. MICHA left  requesting call back and provided contact information. SW remains available.     Addendum 11:02 - SW contacted by patient via telephone. SW explained authorization was not approved and SW will send new orders as soon as they are entered. Pt verbalized understanding and thanked MICHA for call. SW remains available.       Addendum 13:00 - New orders faxed. Team made aware. SW remains available.          ----- Message from Yolette Olson LCSW sent at 1/10/2023 10:20 AM CST -----  Regarding: FW: Questions  Forwarding. I think Yoko worked on this at some point. Thanks!     ----- Message -----  From: Beatrice Armenta  Sent: 1/10/2023  10:04 AM CST  To: [facility]  Subject: Questions                                        Pt wants to speak with MICHA Gutierrez. They are checking to see if the authorization was approved. Rehab stated they still have no letter.       Ms. Martinez @ [phone]

## 2023-01-10 NOTE — TELEPHONE ENCOUNTER
Order submitted per provider. Note request / auth in patient chart from 12/12/22. Re-entered order. Message sent to requesting staff.    ----- Message from Barbara Haile DO sent at 1/10/2023 10:11 AM CST -----  Regarding: RE: Ambulatory Referral for Pulmonary Rehab  tyler Fonseca to continue pulm rehab.  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  She was discharged in December and would like to continue. Please place a new referral.  Thank you and Happy New Year.

## 2023-01-17 ENCOUNTER — HOSPITAL ENCOUNTER (OUTPATIENT)
Dept: PULMONOLOGY | Facility: OTHER | Age: 68
Discharge: HOME | End: 2023-01-17
Attending: INTERNAL MEDICINE
Payer: MEDICARE

## 2023-01-17 DIAGNOSIS — J84.9 INTERSTITIAL LUNG DISEASE: ICD-10-CM

## 2023-01-17 PROCEDURE — G0237 THERAPEUTIC PROCD STRG ENDUR: HCPCS | Mod: TXP

## 2023-01-17 PROCEDURE — G0238 OTH RESP PROC, INDIV: HCPCS | Mod: NTX

## 2023-01-18 ENCOUNTER — PATIENT MESSAGE (OUTPATIENT)
Dept: RHEUMATOLOGY | Facility: CLINIC | Age: 68
End: 2023-01-18
Payer: MEDICARE

## 2023-01-19 ENCOUNTER — HOSPITAL ENCOUNTER (OUTPATIENT)
Dept: PULMONOLOGY | Facility: OTHER | Age: 68
Discharge: HOME | End: 2023-01-19
Attending: INTERNAL MEDICINE
Payer: MEDICARE

## 2023-01-19 PROCEDURE — G0237 THERAPEUTIC PROCD STRG ENDUR: HCPCS | Mod: TXP

## 2023-01-19 PROCEDURE — G0238 OTH RESP PROC, INDIV: HCPCS | Mod: TXP

## 2023-01-24 ENCOUNTER — HOSPITAL ENCOUNTER (OUTPATIENT)
Dept: PULMONOLOGY | Facility: OTHER | Age: 68
Discharge: HOME | End: 2023-01-24
Attending: INTERNAL MEDICINE
Payer: MEDICARE

## 2023-01-24 PROCEDURE — G0237 THERAPEUTIC PROCD STRG ENDUR: HCPCS | Mod: NTX

## 2023-01-24 PROCEDURE — 94626 PHY/QHP OP PULM RHB W/MNTR: CPT | Mod: TXP

## 2023-01-27 ENCOUNTER — HOSPITAL ENCOUNTER (OUTPATIENT)
Dept: PULMONOLOGY | Facility: OTHER | Age: 68
Discharge: HOME | End: 2023-01-27
Attending: INTERNAL MEDICINE
Payer: MEDICARE

## 2023-01-27 PROCEDURE — G0237 THERAPEUTIC PROCD STRG ENDUR: HCPCS | Mod: TXP

## 2023-01-27 PROCEDURE — 94626 PHY/QHP OP PULM RHB W/MNTR: CPT | Mod: TXP

## 2023-01-30 ENCOUNTER — PATIENT MESSAGE (OUTPATIENT)
Dept: RHEUMATOLOGY | Facility: CLINIC | Age: 68
End: 2023-01-30
Payer: MEDICARE

## 2023-01-30 ENCOUNTER — PATIENT MESSAGE (OUTPATIENT)
Dept: TRANSPLANT | Facility: CLINIC | Age: 68
End: 2023-01-30
Payer: MEDICARE

## 2023-01-30 DIAGNOSIS — U07.1 COVID: Primary | ICD-10-CM

## 2023-01-30 LAB — EXT SARS COV-2 (COVID-19): POSITIVE

## 2023-01-30 NOTE — TELEPHONE ENCOUNTER
Patient called this morning, sent [facility] message indicating that she noticed chills and a fever of 101F today. This is a first time fever, but she does endorse cough and her current prednisone use.    Reviewed with Dr. Haile, she is aware of patient symptoms and recommended she proceed to urgent care for testing.     In the meantime, the patient performed a home covid test (positive), results imaging in patient's chart, and Dr. Haile recommends patient take Paxlovid. She agreed to medication, and is aware that it is available at [facility] near her home. She verbalized understanding to remain at home to quarantine for five days and manage her symptoms, to report any changes or proceed to local ED if necessary, and to follow up with her rheumatology to cancel/reschedule appt this week. Patient is aware she does not need to go to urgent care, due to positive home test results.    Note patient later sent [facility] message stating she researched Paxlovid and found that persons with RA should not take this medication. The  patient is aware per Dr. Haile to quarantine and remain at home unless her status declines, which then she should present to ED. Patient endorsed understanding and will follow up with Dr. Holcomb as well.

## 2023-02-01 ENCOUNTER — OFFICE VISIT (OUTPATIENT)
Dept: RHEUMATOLOGY | Facility: CLINIC | Age: 68
End: 2023-02-01
Payer: MEDICARE

## 2023-02-01 DIAGNOSIS — J84.9 INTERSTITIAL LUNG DISEASE: ICD-10-CM

## 2023-02-01 DIAGNOSIS — M05.79 RHEUMATOID ARTHRITIS INVOLVING MULTIPLE SITES WITH POSITIVE RHEUMATOID FACTOR: Primary | ICD-10-CM

## 2023-02-01 DIAGNOSIS — Z79.60 LONG-TERM USE OF IMMUNOSUPPRESSANT MEDICATION: ICD-10-CM

## 2023-02-01 DIAGNOSIS — Z79.52 LONG TERM (CURRENT) USE OF SYSTEMIC STEROIDS: ICD-10-CM

## 2023-02-01 PROCEDURE — 1160F RVW MEDS BY RX/DR IN RCRD: CPT | Mod: CPTII,95,NTX, | Performed by: INTERNAL MEDICINE

## 2023-02-01 PROCEDURE — 99214 OFFICE O/P EST MOD 30 MIN: CPT | Mod: 95,NTX,, | Performed by: INTERNAL MEDICINE

## 2023-02-01 PROCEDURE — 99214 PR OFFICE/OUTPT VISIT, EST, LEVL IV, 30-39 MIN: ICD-10-PCS | Mod: 95,NTX,, | Performed by: INTERNAL MEDICINE

## 2023-02-01 PROCEDURE — 1160F PR REVIEW ALL MEDS BY PRESCRIBER/CLIN PHARMACIST DOCUMENTED: ICD-10-PCS | Mod: CPTII,95,NTX, | Performed by: INTERNAL MEDICINE

## 2023-02-01 PROCEDURE — 1159F PR MEDICATION LIST DOCUMENTED IN MEDICAL RECORD: ICD-10-PCS | Mod: CPTII,95,NTX, | Performed by: INTERNAL MEDICINE

## 2023-02-01 PROCEDURE — 1159F MED LIST DOCD IN RCRD: CPT | Mod: CPTII,95,NTX, | Performed by: INTERNAL MEDICINE

## 2023-02-01 RX ORDER — PREDNISONE 5 MG/1
10 TABLET ORAL DAILY
Qty: 180 TABLET | Refills: 1 | Status: SHIPPED | OUTPATIENT
Start: 2023-02-01 | End: 2023-10-25

## 2023-02-01 RX ORDER — HEPARIN 100 UNIT/ML
500 SYRINGE INTRAVENOUS
Status: CANCELLED | OUTPATIENT
Start: 2023-02-01

## 2023-02-01 NOTE — PROGRESS NOTES
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     Each patient to whom he or she provides medical services by telemedicine is:  (1) informed of the relationship between the physician and patient and the respective role of any other health care provider with respect to management of the patient; and (2) notified that he or she may decline to receive medical services by telemedicine and may withdraw from such care at any time.    Subjective:     Patient ID: Juan Cruz is a 68 y.o. 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    PCP: Gianna Carpenter MD   Ortho: Minh Iglesias MD  HPI     68 yr old lady seen for the first time on 5/12/22 w sero+CCP+ non erosive RA and a chronic cough with an abnormal CXR & CT chest c/w pulmonary fibrosis (suggestive of UIP). She has been intolerant of MTX & had worsening cough on it. She was on a very short course of azathiopine but stopped it due to concomitant abn LFTs (also on Ofev) &  her request for RTX.  She received RTX 10/24/22 & 11/7/22. She has been on tapering doses of prednisone.  A CXR on 11/9/22 was read by Dr. Koehler as "new increased infiltrate in the right and left lower bases suggestive of pneumonia" in addition to UIP.    She was last seen on 11/1/22.   She returns for TM f/u as she is " "currently w Covid 19 (Jan 30 by home testing-cough usually dry with some white phlegm, chills, T 101; SaO2 between 94-99%; -120; no body aches, runny nose, headache etc.). She was begun on Paxlovid 300 mg tablets: 2 tablets bid x 5 days by Dr. Haile on 1/30/23.  She has "massive diarrhea" on the Paxlovid.  Covid wise still with cough productive of white sputum, some chills,no fever, no chest pain, no inc in SOB; also reporting muscle cramping chest wall, abdomen & calves (reported in past as well, but worse now).    We have been tapering her prednisone & she is currently on prednisone 7.5mg/d (for past 17 days) & RTX--1st course ended 11/7/22; due for 2nd course (3rd infusion) ~ 4/24/23    Her RA sxs have subsided. No AM stiffness, joint pain or joint swelling. However, fingers of L hand triggering (except for 4th digit). Has hx of some dry eyes & mild dry mouth. Denies Raynaud's, tight skin, alopecia, oral ulcers, parotid gland swelling, pleurisy, pericarditis, photosensitivity, skin rashes, thromboses, muscle weakness, headaches, paresthesias; LBP, thyroid issues;   1 miscarriage 4-6 weeks in 1997; has 1 daughter Csection;  Has FHx of RA & myelodysplasia (mom).    Has been getting Pulmonary Rehab at [facility]--interrupted by Covid. and feels her joints feel better due to the exercise.    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    PMH  She was seen again on 5/31/22 at which time MTX was added to her prednisone for her joint pain, but she had intolerance to it (headaches, fatigue, anorexia, dysgeusia, sleep issues) but stayed on it until ~ 7/26 when she developed worsening cough and abnormal CT chest and it was stopped. CT Chest on 7/27 showed significant interval progression of subpleural predominant " reticular and ground-glass airspace opacities associated with bilateral lower lobe volume loss, architectural distortion, symmetric mild central bronchiectasis and apicobasal gradient suggestive of UIP pattern of ILD which may reflect IPF.  Additional diagnostic considerations include NSIP, asbestosis, fibrotic hypersensitivity pneumonitis connective tissue associated lung disease and drug induced lung disease amongst other etiologies.    She was seen by Dr. Haile on 7/28 at which time due to progression of cough & SOB (& worsening CT chest) prednisone 40 mg/d was begun for presumed RA-ILD. Patient was to continue her antibiotic (copious sputum; cultures NTD) & Trelegy which was begun by AI for some PFT reversibility. The plan was to reassess & if there has been a response to add MMF. Ofev also to be added (has not gotten it yet).    5/31/22  She has contacted us several times since her initial visit c/o generalized weakness & pain, tinnitus; peeing a lot; hip & knee pain; leg edema and was asked to come in to see us again, but she presents prior to her w/u and appointment to see Dr. Haile on 6/7/22.    Today she has multiple complaints and feels that she is in severe pain and unable to function. She has AM stiffness lasting 4 hrs. She has difficulty getting in and out of bed, dressing herself, washing herself, etc. She is fatigued & is unable to sleep but denies depression & anxiety. She now volunteers she had been seen by Dr.Luis Bee in the past ~ 2007 for similar sxs & no autoimmune/rheum dx was made.     Today, she feels she has developed joint swelling since her last visit, especially in some MCPs, but also c/o edema which improved after she stopped taking NSAIDs. She continue to c/o chronic neck pain.    She is currently taking prednisone 10 mg/d. She states she has bilateral shoulder pain & is unable to get OOB in AM. No GCA sxs. She is stiff x 4 hrs.     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  These appear to be incidental & unexpected findings as patient has very little joint swelling.  Patient feels that myalgias began after her last Covid vaccine.    In February got severe pain in R shoulder that responded to CSI but then developed excruciating spasms neck & around both shoulders. Labs gotten by Orthopedic (Dr. Iglesias);   In Jan 27,2022 had FESS for sinus surgery & needed bad infection and required more antibiotics. Developed rash on cheeks on levaquin. Same rash came back now again.    In March had only shoulder pain & pronating L wrist.  Now R knee, L ankle pain & swelling & R foot 3rd & 4th toes even at rest.  Also weak all over  Baclofen helps her spasms & stiffness    AM stiffness x 3-4 hrs (since ~ February, 2022);     .  Denies Raynaud's, tight skin, alopecia, oral ulcers, parotid gland swelling, pleurisy, pericarditis, photosensitivity, skin rashes, thromboses, muscle weakness; fevers, headaches, conjunctivitis, chronic or bloody diarrhea, vaginal/urethral D/C; paresthesias; LBP, thyroid issues;   1 miscarriage 4-6 weeks in 1997; has 1 daughter Csection;   Chronic cough x 1.5 yr after swallowing--fatigues & drains her  Has dry eyes > mouth  Has BAKER & st w eating comes & goes;   Saw Dr. Siegel & told no obstructive pulmonary disease but restrictive.  Has FHx of RA & myelodysplasia (mom)    Started prednisone 10 mg qod ~ end of March, 2022; Helped pain   But had adverse effects on higher steroids in past.  In 1999 NYLA was on decadron but developed myopathy & psychosis on it.  .    TMJ on L x 1 2/22 lasted 1-2days  Neck since 2/22 L>R  B shoulders L>R  Elbows no  Wrists L>R (flexi carpi ulnaris)  MCPs no but only 2nd R MCP  PIPs & DIP  Hip: no  GT no  Knee: R 1 month ago; since Monday both; just hurt; hurt all the time.  Ankles: L 1 month R since Monday --only swelling  Toes 3 & 4th on R; L of  Both  heels hurt  Achilles bilateral.    Current Outpatient Medications   Medication Sig Dispense Refill    acetaminophen (TYLENOL) 325 MG tablet Take 650 mg by mouth every 6 (six) hours as needed for Pain.      B-complex with vitamin C (Z-BEC OR EQUIV) tablet Take 0.5 tablets by mouth 2 (two) times a day.      calcium citrate-vitamin D3 315-200 mg (CITRACAL+D) 315-200 mg-unit per tablet Take 1 tablet by mouth 2 (two) times daily.      cetirizine (ZYRTEC) 10 MG tablet cetirizine 10 mg tablet   TAKE 1 TABLET BY MOUTH ONCE DAILY      denosumab (PROLIA) 60 mg/mL Syrg Inject 1 mL (60 mg total) into the skin every 6 (six) months. 2 mL 3    diclofenac sodium (VOLTAREN) 1 % Gel Apply 2 g topically 4 (four) times daily as needed.      estradioL (VAGIFEM) 10 mcg Tab Yuvafem 10 mcg vaginal tablet   INSERT 1 TABLET VAGINALLY TWICE A WEEK      fluticasone/vilanterol (BREO ELLIPTA INHL) Inhale 1 puff into the lungs once daily.      folic acid (FOLVITE) 1 MG tablet Take 1 tablet (1 mg total) by mouth once daily. 90 tablet 3    guaiFENesin (MUCINEX) 600 mg 12 hr tablet Take 1,200 mg by mouth 2 (two) times daily.      magnesium oxide (MAG-OX) 400 mg (241.3 mg magnesium) tablet Take 400 mg by mouth once daily.      metoprolol succinate (TOPROL-XL) 25 MG 24 hr tablet Take 1 tablet (25 mg total) by mouth once daily. 90 tablet 3    nirmatrelvir-ritonavir 300 mg (150 mg x 2)-100 mg copackaged tablets (EUA) Take 3 tablets by mouth 2 (two) times daily for 5 days. Each dose contains 2 nirmatrelvir (pink tablets) and 1 ritonavir (white tablet). Take all 3 tablets together 30 tablet 0    pantoprazole (PROTONIX) 40 MG tablet Take 40 mg by mouth once daily.      predniSONE (DELTASONE) 20 MG tablet Take 2 tablets (40 mg total) by mouth once daily. 60 tablet 2    sod chlor,sod bicarb/neti pot (NEILMED NASAFLO FRANK) 1 Dose by sinus irrigation route daily as needed.       No current facility-administered medications for this visit.   Off folic acid &  flonase.           Review of patient's allergies indicates:   Allergen Reactions    Methotrexate Shortness Of Breath    Gluten protein Other (See Comments)     Sensitive - not allergic    Ppd black rubber mix        Review of Systems   Constitutional:  Positive for chills. Negative for fatigue, fever and unexpected weight change.   HENT:  Positive for hearing loss (R loss of hearing; following SIADH) and tinnitus. Negative for mouth sores, postnasal drip, sinus pain and trouble swallowing.    Eyes:  Positive for redness (R>L).   Respiratory:  Positive for cough (worse since Covid on 1/30/23). Negative for chest tightness and shortness of breath (better).    Cardiovascular: Negative.  Negative for chest pain and palpitations (Has had MVP on metoprolol).   Gastrointestinal:  Positive for diarrhea (attributed to Paxlovid). Negative for constipation.   Endocrine: Positive for cold intolerance.   Genitourinary:  Positive for enuresis. Negative for dysuria and genital sores.        Incontinence   Musculoskeletal:  Negative for arthralgias, back pain, joint swelling, myalgias, neck pain and neck stiffness.   Skin:  Negative for rash.   Neurological: Negative.  Negative for dizziness, seizures, syncope, numbness and headaches.   Hematological:  Does not bruise/bleed easily.   Psychiatric/Behavioral:  Positive for sleep disturbance (attributed in part to steroids.). Negative for dysphoric mood. The patient is not nervous/anxious.         Feels prednisone has made her irritable.     Past Medical History:   Diagnosis Date    Chronic sinusitis, unspecified     History of SIADH     Mitral valve prolapse     Osteopenia     Rheumatoid arthritis involving multiple sites        Past Surgical History:   Procedure Laterality Date    BREAST BIOPSY Bilateral     FUNCTIONAL ENDOSCOPIC SINUS SURGERY (FESS)      ORIF WRIST FRACTURE Right        FH:  M: dx 83 w RA; passed away on 85; also had some intestinal problems.  Had  myelodysplasia.  1 S hx of chronic myalgia & gets bedridden 2-3 days.  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  Hands no triggering visible.        PE below from 22  /78  Was 132 lb 7.9 oz on 22  Physical Exam   Constitutional: She is oriented to person, place, and time. normal appearance. No distress.   Not coughing this time   HENT:   Head: Normocephalic and atraumatic.   Mouth/Throat: Oropharynx is clear and moist. No oropharyngeal exudate.   No facial rashes  Parotids not enlarged     Eyes: Pupils are equal, round, and reactive to light. Conjunctivae are normal. Right eye exhibits no discharge. Left eye exhibits no discharge. No scleral icterus.   Neck: No JVD present. No tracheal deviation present. No thyromegaly present.   Cardiovascular: Regular rhythm and normal heart sounds. Tachycardia present. Exam reveals no gallop and no friction rub.   No murmur heard.  Pulmonary/Chest: Effort normal. No respiratory distress. She has no wheezes. She has no rales. She exhibits no tenderness.   Pulmonary Comments: Scattered post basilar rales;  Breath sounds diminished at bases.  Abdominal: Soft. Bowel sounds are normal. She exhibits no distension and no mass. There is no splenomegaly or hepatomegaly. There is no abdominal tenderness. There is no rebound and no guarding.   Musculoskeletal:         General: No tenderness. Normal range of motion.      Cervical back: Neck supple.      Comments: No SHT anywhere  Adequate ROM all joints.  No metatarsalgia        Lymphadenopathy:     She has no cervical adenopathy.   Neurological: She is alert and oriented to person, place, and time.  She has normal reflexes. No cranial nerve deficit. Gait normal.   Proximal & distal upper extremity strength appropriate.  LE strength probably 4+ (limited by some muscle pain)   Skin: Skin is warm and dry. No rash noted. She is not diaphoretic.   Psychiatric: Mood, memory, affect, judgment and thought content normal.   Vitals reviewed.      11/15/22: ESR 36 (20) CRP 19.2; CBC WC 15.26; CMP Na 135; alb 3.3  10/10/22: CBC WC 15.30; CMP glu 112; alb 3.1;   10/3/22: ESR 48 (20); CRP 14.4  8/10/22: ESR 25 (36); CRP 0.5; CMP alb 3.1;   7/19/22: ESR 80 (20); CRP 45.2; CBC Ht 36.1; CMP glu 117; alb 3.1;   5/17/22: CBC Hg 11.6; Ht 34.3; CMP Na 135; alb 3.3; TPMT normal metabolizer; CPK 13; pre-DMARDs ok;   5/12/22: ESR 74 (36); CRP 37.2; UA ok; ; .2; ROBBIE/C3/C4/QIG wnl or neg; IgM aCLA 16.70 (12.49);   3/14/22: ESR 50 (30) CRP 2; ROBBIE neg; RF 74 (<6) CCP >250  11/30/21: RF<14;     12/9/22: CT chest: diffuse subpleural reticular opacity and nodular pleural thickening; minimal perihilar bronchiectasis.; no honeycombing or focal opacity or pleural thickening; diffuse nonspecific interstitial lung disease, possible UIP pattern  11/29/22: CXR: personally viewed: consistent with chronic interstitial lung disease.  9/29/22: ECHO: mild CHAPINCITO; mild TVS; PA press 31;   7/28/22: CT chest:  significant interval progression of previously noted subpleural predominant reticular and ground-glass airspace opacities associated with bilateral lower lobe volume loss, architectural distortion, symmetric mild central bronchiectasis and apicobasal gradient suggestive of UIP pattern of interstitial lung disease which may reflect IPF.  Additional diagnostic considerations include NSIP, asbestosis, fibrotic hypersensitivity pneumonitis connective tissue associated lung disease and drug induced lung disease amongst other etiologies.  5/12/22: Bilateral hands: personally viewed:mild STS dorsal MCPs bilaterally; min OA otherwise; remote healed  distal radius fx w hardware.  5/12/22: Arthritis survey: personally viewed:  Mild OA Cspine, T spine; tibial spines; hands (Lwrist fx) & feet.  5/12/22: CXR: personally viewed: bibasilar coarse interstitial reticulated opacities c/w PF  6/28/21: CT chest:  predominant LL patchy increased attenuation and reticulation within the periphery of the bilateral lower lobes c/w sequela of atelectasis; r/o early ILD; no bronchiectasis.  No honeycombing.  No significant ground-glass attenuation. Small sliding-type hiatal hernia.    10/15/20: DXA: TLS -2.7; TFN -2.2;  TTH -1.8; FRAX 18.9%;/3.5%  Assessment:   Sero+(173) CCP+(1162.7) non erosive RA              Mild SHT MCPs--resolved on mod dose pred              AM stiffness x3-4 hrs now varies up to 2hr.              FHx: RA & myelodysplasia   On prednisone 20 mg/d   S/P MTX ~ 5/31 - 7/26/22 w multiple intolerance exs   Brief azathioprine   RTX 10/24/22 & 11/7/22   On Ofev ~ 6 weeks stopped due to abn LFTs (together w MTX)           Chronic cough/SOB/ intermittent BAKER/abn CXR & CT c/w ILD              CT chest w ILD              CXR: blake coarse interstitial reticular opacities c/w PF              Rx prednisone started 40 mg to taper--now down to 7.5 mg/d   Ofev w abn LFTs     Central sensitivity syndrome   Muscle aches & pains since childhood.   Some response to gluten restriction   CSI = 48 = hi moderate     Joint pain/muscle spasms/myalgia--multiple sites   Worse post Covid vaccine   Worse w Covid              RA/OA/CSS                          Bilateral hip pain                          Bilateral shoulder                          L wrist                          R knee                          L ankle                          R foot                          Cervical spine: mild DDD & spondylolistesis                                      Some response to baclofen (spasm & stiffness)                Osteoporosis handled by PCP              S/P R wrist fx 3/2019 (ORIF)                 S/P rx w alendronate x 9 yrs--was on holiday >5 yrs   Denosumab last 9/16/22 (Dr. 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  Stay on prednisone till after Covid.  Decrease dose in 5 days (=3 weeks on 7.5 mg/d) to 7.5 alt w 5 mg x 2 wks then 5 mg/d thereafter  Contact us after prednisone down to 5 mg to let us know how she is feeling.  Prednisone 5 mg tablets sent to Rx.  Labs ~ 2/15/23 (Elena)& q 3 months.   Denosumab due after 3/16/23 (Dr. Carpenter)  RTC ~ 3 months or prn    CC: DO Gianna James MD

## 2023-02-01 NOTE — PATIENT INSTRUCTIONS
Stay on prednisone 7.5 mg/d for 5 more days then decrease to 7.5 mg alternating w 5 mg every other day x 2 weeks. If feeling stable then drop to 5 mg/d thereafter and contact us after ~ 2 weeks on this dose for further adjustments.    RTX has been ordered for ~ 4/24/23.    As soon as able begin gradual exercise & Pulmonary Rehab.    Please note: Magnesium supplements may cause diarrhea. Please check with whoever prescribed them.

## 2023-02-07 ENCOUNTER — LAB VISIT (OUTPATIENT)
Dept: LAB | Facility: HOSPITAL | Age: 68
End: 2023-02-07
Attending: INTERNAL MEDICINE
Payer: MEDICARE

## 2023-02-07 DIAGNOSIS — M05.79 RHEUMATOID ARTHRITIS INVOLVING MULTIPLE SITES WITH POSITIVE RHEUMATOID FACTOR: ICD-10-CM

## 2023-02-07 DIAGNOSIS — Z79.52 LONG TERM (CURRENT) USE OF SYSTEMIC STEROIDS: ICD-10-CM

## 2023-02-07 DIAGNOSIS — Z79.60 LONG-TERM USE OF IMMUNOSUPPRESSANT MEDICATION: ICD-10-CM

## 2023-02-07 LAB
ALBUMIN SERPL BCP-MCNC: 3.5 G/DL (ref 3.5–5.2)
ALP SERPL-CCNC: 36 U/L (ref 55–135)
ALT SERPL W/O P-5'-P-CCNC: 36 U/L (ref 10–44)
ANION GAP SERPL CALC-SCNC: 6 MMOL/L (ref 8–16)
AST SERPL-CCNC: 28 U/L (ref 10–40)
BASOPHILS # BLD AUTO: 0.03 K/UL (ref 0–0.2)
BASOPHILS NFR BLD: 0.3 % (ref 0–1.9)
BILIRUB SERPL-MCNC: 0.4 MG/DL (ref 0.1–1)
BUN SERPL-MCNC: 9 MG/DL (ref 8–23)
CALCIUM SERPL-MCNC: 9.1 MG/DL (ref 8.7–10.5)
CHLORIDE SERPL-SCNC: 104 MMOL/L (ref 95–110)
CO2 SERPL-SCNC: 29 MMOL/L (ref 23–29)
CREAT SERPL-MCNC: 0.7 MG/DL (ref 0.5–1.4)
CRP SERPL-MCNC: 2.3 MG/L (ref 0–8.2)
DIFFERENTIAL METHOD: ABNORMAL
EOSINOPHIL # BLD AUTO: 0.2 K/UL (ref 0–0.5)
EOSINOPHIL NFR BLD: 1.4 % (ref 0–8)
ERYTHROCYTE [DISTWIDTH] IN BLOOD BY AUTOMATED COUNT: 12.4 % (ref 11.5–14.5)
ERYTHROCYTE [SEDIMENTATION RATE] IN BLOOD BY WESTERGREN METHOD: 22 MM/HR (ref 0–20)
EST. GFR  (NO RACE VARIABLE): >60 ML/MIN/1.73 M^2
GLUCOSE SERPL-MCNC: 93 MG/DL (ref 70–110)
HCT VFR BLD AUTO: 39.5 % (ref 37–48.5)
HGB BLD-MCNC: 13.3 G/DL (ref 12–16)
IMM GRANULOCYTES # BLD AUTO: 0.12 K/UL (ref 0–0.04)
IMM GRANULOCYTES NFR BLD AUTO: 1.1 % (ref 0–0.5)
LYMPHOCYTES # BLD AUTO: 2.7 K/UL (ref 1–4.8)
LYMPHOCYTES NFR BLD: 24.2 % (ref 18–48)
MCH RBC QN AUTO: 30.5 PG (ref 27–31)
MCHC RBC AUTO-ENTMCNC: 33.7 G/DL (ref 32–36)
MCV RBC AUTO: 91 FL (ref 82–98)
MONOCYTES # BLD AUTO: 1.1 K/UL (ref 0.3–1)
MONOCYTES NFR BLD: 9.9 % (ref 4–15)
NEUTROPHILS # BLD AUTO: 7 K/UL (ref 1.8–7.7)
NEUTROPHILS NFR BLD: 63.1 % (ref 38–73)
NRBC BLD-RTO: 0 /100 WBC
PLATELET # BLD AUTO: 356 K/UL (ref 150–450)
PMV BLD AUTO: 9.6 FL (ref 9.2–12.9)
POTASSIUM SERPL-SCNC: 3.9 MMOL/L (ref 3.5–5.1)
PROT SERPL-MCNC: 6.8 G/DL (ref 6–8.4)
RBC # BLD AUTO: 4.36 M/UL (ref 4–5.4)
SODIUM SERPL-SCNC: 139 MMOL/L (ref 136–145)
WBC # BLD AUTO: 11.15 K/UL (ref 3.9–12.7)

## 2023-02-07 PROCEDURE — 36415 COLL VENOUS BLD VENIPUNCTURE: CPT | Mod: TXP | Performed by: INTERNAL MEDICINE

## 2023-02-07 PROCEDURE — 80053 COMPREHEN METABOLIC PANEL: CPT | Mod: TXP | Performed by: INTERNAL MEDICINE

## 2023-02-07 PROCEDURE — 85025 COMPLETE CBC W/AUTO DIFF WBC: CPT | Mod: TXP | Performed by: INTERNAL MEDICINE

## 2023-02-07 PROCEDURE — 85652 RBC SED RATE AUTOMATED: CPT | Mod: TXP | Performed by: INTERNAL MEDICINE

## 2023-02-07 PROCEDURE — 86140 C-REACTIVE PROTEIN: CPT | Mod: TXP | Performed by: INTERNAL MEDICINE

## 2023-02-10 ENCOUNTER — HOSPITAL ENCOUNTER (EMERGENCY)
Facility: HOSPITAL | Age: 68
Discharge: HOME | End: 2023-02-10
Attending: EMERGENCY MEDICINE
Payer: MEDICARE

## 2023-02-10 VITALS
SYSTOLIC BLOOD PRESSURE: 110 MMHG | TEMPERATURE: 99 F | DIASTOLIC BLOOD PRESSURE: 59 MMHG | HEIGHT: 62 IN | HEART RATE: 98 BPM | WEIGHT: 134 LBS | BODY MASS INDEX: 24.66 KG/M2 | RESPIRATION RATE: 20 BRPM | OXYGEN SATURATION: 100 %

## 2023-02-10 DIAGNOSIS — J18.9 PNEUMONIA DUE TO INFECTIOUS ORGANISM, UNSPECIFIED LATERALITY, UNSPECIFIED PART OF LUNG: Primary | ICD-10-CM

## 2023-02-10 DIAGNOSIS — U07.1 COVID-19: ICD-10-CM

## 2023-02-10 DIAGNOSIS — R00.0 TACHYCARDIA: ICD-10-CM

## 2023-02-10 DIAGNOSIS — J84.9 INTERSTITIAL LUNG DISEASE: ICD-10-CM

## 2023-02-10 LAB
ALBUMIN SERPL BCP-MCNC: 3.5 G/DL (ref 3.5–5.2)
ALLENS TEST: NORMAL
ALP SERPL-CCNC: 38 U/L (ref 55–135)
ALT SERPL W/O P-5'-P-CCNC: 32 U/L (ref 10–44)
ANION GAP SERPL CALC-SCNC: 8 MMOL/L (ref 8–16)
AST SERPL-CCNC: 31 U/L (ref 10–40)
BASOPHILS # BLD AUTO: 0.04 K/UL (ref 0–0.2)
BASOPHILS NFR BLD: 0.4 % (ref 0–1.9)
BILIRUB SERPL-MCNC: 0.3 MG/DL (ref 0.1–1)
BILIRUB UR QL STRIP: NEGATIVE
BNP SERPL-MCNC: <10 PG/ML (ref 0–99)
BUN SERPL-MCNC: 8 MG/DL (ref 8–23)
CALCIUM SERPL-MCNC: 8.7 MG/DL (ref 8.7–10.5)
CHLORIDE SERPL-SCNC: 104 MMOL/L (ref 95–110)
CLARITY UR: CLEAR
CO2 SERPL-SCNC: 25 MMOL/L (ref 23–29)
COLOR UR: YELLOW
CREAT SERPL-MCNC: 0.8 MG/DL (ref 0.5–1.4)
CTP QC/QA: YES
CTP QC/QA: YES
D DIMER PPP IA.FEU-MCNC: 0.37 MG/L FEU
DIFFERENTIAL METHOD: ABNORMAL
EOSINOPHIL # BLD AUTO: 0.1 K/UL (ref 0–0.5)
EOSINOPHIL NFR BLD: 0.5 % (ref 0–8)
ERYTHROCYTE [DISTWIDTH] IN BLOOD BY AUTOMATED COUNT: 12.4 % (ref 11.5–14.5)
EST. GFR  (NO RACE VARIABLE): >60 ML/MIN/1.73 M^2
GLUCOSE SERPL-MCNC: 102 MG/DL (ref 70–110)
GLUCOSE UR QL STRIP: NEGATIVE
HCT VFR BLD AUTO: 41.2 % (ref 37–48.5)
HGB BLD-MCNC: 14 G/DL (ref 12–16)
HGB UR QL STRIP: NEGATIVE
IMM GRANULOCYTES # BLD AUTO: 0.11 K/UL (ref 0–0.04)
IMM GRANULOCYTES NFR BLD AUTO: 1.1 % (ref 0–0.5)
KETONES UR QL STRIP: NEGATIVE
LACTATE SERPL-SCNC: 0.9 MMOL/L (ref 0.5–2.2)
LDH SERPL L TO P-CCNC: 0.75 MMOL/L (ref 0.5–2.2)
LEUKOCYTE ESTERASE UR QL STRIP: NEGATIVE
LYMPHOCYTES # BLD AUTO: 1 K/UL (ref 1–4.8)
LYMPHOCYTES NFR BLD: 9.9 % (ref 18–48)
MCH RBC QN AUTO: 30.6 PG (ref 27–31)
MCHC RBC AUTO-ENTMCNC: 34 G/DL (ref 32–36)
MCV RBC AUTO: 90 FL (ref 82–98)
MONOCYTES # BLD AUTO: 1 K/UL (ref 0.3–1)
MONOCYTES NFR BLD: 9.3 % (ref 4–15)
NEUTROPHILS # BLD AUTO: 8 K/UL (ref 1.8–7.7)
NEUTROPHILS NFR BLD: 78.8 % (ref 38–73)
NITRITE UR QL STRIP: NEGATIVE
NRBC BLD-RTO: 0 /100 WBC
PH UR STRIP: 7 [PH] (ref 5–8)
PLATELET # BLD AUTO: 305 K/UL (ref 150–450)
PMV BLD AUTO: 9.3 FL (ref 9.2–12.9)
POC MOLECULAR INFLUENZA A AGN: NEGATIVE
POC MOLECULAR INFLUENZA B AGN: NEGATIVE
POTASSIUM SERPL-SCNC: 4.3 MMOL/L (ref 3.5–5.1)
PROCALCITONIN SERPL IA-MCNC: 0.03 NG/ML
PROT SERPL-MCNC: 7.1 G/DL (ref 6–8.4)
PROT UR QL STRIP: NEGATIVE
RBC # BLD AUTO: 4.57 M/UL (ref 4–5.4)
SAMPLE: NORMAL
SARS-COV-2 RDRP RESP QL NAA+PROBE: POSITIVE
SITE: NORMAL
SODIUM SERPL-SCNC: 137 MMOL/L (ref 136–145)
SP GR UR STRIP: <1.005 (ref 1–1.03)
TROPONIN I SERPL DL<=0.01 NG/ML-MCNC: <0.006 NG/ML (ref 0–0.03)
URN SPEC COLLECT METH UR: ABNORMAL
UROBILINOGEN UR STRIP-ACNC: NEGATIVE EU/DL
WBC # BLD AUTO: 10.19 K/UL (ref 3.9–12.7)

## 2023-02-10 PROCEDURE — 96366 THER/PROPH/DIAG IV INF ADDON: CPT | Mod: NTX

## 2023-02-10 PROCEDURE — 87502 INFLUENZA DNA AMP PROBE: CPT | Mod: NTX

## 2023-02-10 PROCEDURE — 83880 ASSAY OF NATRIURETIC PEPTIDE: CPT | Mod: NTX | Performed by: EMERGENCY MEDICINE

## 2023-02-10 PROCEDURE — 99285 EMERGENCY DEPT VISIT HI MDM: CPT | Mod: 25,NTX

## 2023-02-10 PROCEDURE — 85025 COMPLETE CBC W/AUTO DIFF WBC: CPT | Mod: NTX | Performed by: EMERGENCY MEDICINE

## 2023-02-10 PROCEDURE — 25000242 PHARM REV CODE 250 ALT 637 W/ HCPCS: Mod: NTX | Performed by: EMERGENCY MEDICINE

## 2023-02-10 PROCEDURE — 93010 EKG 12-LEAD: ICD-10-PCS | Mod: NTX,,, | Performed by: INTERNAL MEDICINE

## 2023-02-10 PROCEDURE — 87040 BLOOD CULTURE FOR BACTERIA: CPT | Mod: 59,NTX | Performed by: EMERGENCY MEDICINE

## 2023-02-10 PROCEDURE — 94760 N-INVAS EAR/PLS OXIMETRY 1: CPT | Mod: NTX

## 2023-02-10 PROCEDURE — 83605 ASSAY OF LACTIC ACID: CPT | Mod: NTX

## 2023-02-10 PROCEDURE — 85379 FIBRIN DEGRADATION QUANT: CPT | Mod: NTX | Performed by: EMERGENCY MEDICINE

## 2023-02-10 PROCEDURE — 84484 ASSAY OF TROPONIN QUANT: CPT | Mod: NTX | Performed by: EMERGENCY MEDICINE

## 2023-02-10 PROCEDURE — 80053 COMPREHEN METABOLIC PANEL: CPT | Mod: NTX | Performed by: EMERGENCY MEDICINE

## 2023-02-10 PROCEDURE — 87635 SARS-COV-2 COVID-19 AMP PRB: CPT | Mod: NTX | Performed by: EMERGENCY MEDICINE

## 2023-02-10 PROCEDURE — 83605 ASSAY OF LACTIC ACID: CPT | Mod: 91,NTX | Performed by: EMERGENCY MEDICINE

## 2023-02-10 PROCEDURE — 84145 PROCALCITONIN (PCT): CPT | Mod: NTX | Performed by: EMERGENCY MEDICINE

## 2023-02-10 PROCEDURE — 96361 HYDRATE IV INFUSION ADD-ON: CPT | Mod: NTX

## 2023-02-10 PROCEDURE — 93005 ELECTROCARDIOGRAM TRACING: CPT | Mod: NTX

## 2023-02-10 PROCEDURE — 99900035 HC TECH TIME PER 15 MIN (STAT): Mod: NTX

## 2023-02-10 PROCEDURE — 63600175 PHARM REV CODE 636 W HCPCS: Mod: NTX | Performed by: EMERGENCY MEDICINE

## 2023-02-10 PROCEDURE — 96365 THER/PROPH/DIAG IV INF INIT: CPT | Mod: NTX

## 2023-02-10 PROCEDURE — 93010 ELECTROCARDIOGRAM REPORT: CPT | Mod: NTX,,, | Performed by: INTERNAL MEDICINE

## 2023-02-10 PROCEDURE — 25000003 PHARM REV CODE 250: Mod: NTX | Performed by: EMERGENCY MEDICINE

## 2023-02-10 PROCEDURE — 94640 AIRWAY INHALATION TREATMENT: CPT | Mod: NTX

## 2023-02-10 PROCEDURE — 81003 URINALYSIS AUTO W/O SCOPE: CPT | Mod: NTX | Performed by: EMERGENCY MEDICINE

## 2023-02-10 RX ORDER — LEVOFLOXACIN 750 MG/1
750 TABLET ORAL DAILY
Qty: 5 TABLET | Refills: 0 | Status: SHIPPED | OUTPATIENT
Start: 2023-02-11 | End: 2023-02-16

## 2023-02-10 RX ORDER — ALBUTEROL SULFATE 2.5 MG/.5ML
2.5 SOLUTION RESPIRATORY (INHALATION)
Status: COMPLETED | OUTPATIENT
Start: 2023-02-10 | End: 2023-02-10

## 2023-02-10 RX ORDER — LEVOFLOXACIN 5 MG/ML
750 INJECTION, SOLUTION INTRAVENOUS
Status: COMPLETED | OUTPATIENT
Start: 2023-02-10 | End: 2023-02-10

## 2023-02-10 RX ORDER — IPRATROPIUM BROMIDE 0.5 MG/2.5ML
0.5 SOLUTION RESPIRATORY (INHALATION)
Status: COMPLETED | OUTPATIENT
Start: 2023-02-10 | End: 2023-02-10

## 2023-02-10 RX ORDER — IPRATROPIUM BROMIDE AND ALBUTEROL SULFATE 2.5; .5 MG/3ML; MG/3ML
3 SOLUTION RESPIRATORY (INHALATION)
Status: DISCONTINUED | OUTPATIENT
Start: 2023-02-10 | End: 2023-02-10 | Stop reason: CLARIF

## 2023-02-10 RX ADMIN — ALBUTEROL SULFATE 2.5 MG: 2.5 SOLUTION RESPIRATORY (INHALATION) at 04:02

## 2023-02-10 RX ADMIN — LEVOFLOXACIN 750 MG: 750 INJECTION, SOLUTION INTRAVENOUS at 04:02

## 2023-02-10 RX ADMIN — SODIUM CHLORIDE 1824 ML: 9 INJECTION, SOLUTION INTRAVENOUS at 03:02

## 2023-02-10 RX ADMIN — IPRATROPIUM BROMIDE 0.5 MG: 0.5 SOLUTION RESPIRATORY (INHALATION) at 04:02

## 2023-02-10 NOTE — DISCHARGE INSTRUCTIONS
Thank you for coming to our Emergency Department today. It is important to remember that some problems are difficult to diagnose and may not be found during your Emergency Department visit. Be sure to follow up with your primary care doctor and review all labs/imaging/tests that were performed during this visit with them. Some labs/tests may be outside of the normal range and require non-emergent follow-up and further investigation to help diagnose/exclude/prevent complications or other medical conditions.    If you do not have a primary care doctor, you may contact the one listed on your discharge paperwork or you may also call the [facility] Appointment Desk at [phone] to schedule an appointment and establish care with one. It is important to your health that you have a primary care doctor.    Medicaid Escalation Line:   [phone] - Please contact this number if you are having difficulty getting follow up with a Primary Care Provider or Speciality Provider.     Please take all medications as directed. All medications may potentially have side-effects and it is impossible to predict which medications may give you side-effects or what side-effects (if any) they will give you.. If you feel that you are having a negative effect or side-effect of any medication you should immediately stop taking them and seek medical attention. If you feel that you are having a life-threatening reaction call [phone].    Return to the ER with any questions/concerns, new/concerning symptoms, worsening or failure to improve.     Do not drive, swim, climb to height, take a bath or make any important decisions for 24 hours if you have received any pain medications, sedatives or mood altering drugs during your ER visit.        BELOW THIS LINE ONLY APPLIES IF YOU HAVE A COVID TEST PENDING OR IF YOU HAVE BEEN DIAGNOSED WITH COVID:  Please access [facility] to review the results of your test. Until the results of your COVID test  return, you should isolate yourself so as not to potentially spread illness to others.   If your COVID test returns positive, you should isolate yourself so as not to spread illness to others. After five full days, if you are feeling better and you have not had fever for 24 hours, you can return to your typical daily activities, but you must wear a mask around others for an additional 5 days.   If your COVID test returns negative and you are either unvaccinated or more than six months out from your two-dose vaccine and are not yet boosted, you should still quarantine for 5 full days followed by strict mask use for an additional 5 full days.   If your COVID test returns negative and you have received your 2-dose initial vaccine as well as a booster, you should continue strict mask use for 10 full days after the exposure.  For all those exposed, best practice includes a test at day 5 after the exposure. This can be a home test or a test through one of the many testing centers throughout our community.   Masking is always advised to limit the spread of COVID. Cdc.gov is an excellent site to obtain the latest up to date recommendations regarding COVID and COVID testing.     CDC Testing and Quarantine Guidelines for patients with exposure to a known-positive COVID-19 person:  A close exposure is defined as anyone who has had an exposure (masked or unmasked) to a known COVID -19 positive person within 6 feet of someone for a cumulative total of 15 minutes or more over a 24-hour period.   Vaccinated and/or if you recently had a positive covid test within 90 days do NOT need to quarantine after contact with someone who had COVID-19 unless you develop symptoms.   Fully vaccinated people who have not had a positive test within 90 days, should get tested 3-5 days after their exposure, even if they don't have symptoms and wear a mask indoors in public for 14 days following exposure or until their test result is  negative.      Unvaccinated and/or NOT had a positive test within 90 days and meet close exposure  You are required by CDC guidelines to quarantine for at least 5 days from time of exposure followed by 5 days of strict masking. It is recommended, but not required to test after 5 days, unless you develop symptoms, in which case you should test at that time.  If you get tested after 5 days and your test is positive, your 5 day period of isolation starts the day of the positive test.    If your exposure does not meet the above definition, you can return to your normal daily activities to include social distancing, wearing a mask and frequent handwashing.      Here is a link to guidance from the CDC:  https://www.cdc.gov/media/releases/2021/s1227-isolation-quarantine-guidance.html      [facility]  Health Testing Sites:  https://ldh.la.gov/page/3934      [facility] website with testing locations and guidance:  [email or web address]

## 2023-02-10 NOTE — ED PROVIDER NOTES
Encounter Date: 2/10/2023       History     Chief Complaint   Patient presents with    Cough     Pt reports cough and body aches x1 day. Reports diagnosed with covid and treated last week. Got better and started feeling sick again yesterday. Hx of interstitial lung disease     68-year-old female with history of rheumatoid arthritis, interstitial lung disease, mitral valve prolapse presents the ED with cough, body aches, fever, shortness of breath.  She endorses a cough, productive of increased sputum amount but it is still white/clear.  Patient reports she had a home positive COVID test on January 30th.  She contact her her doctor and was prescribed Paxlovid which she completed.  She was feeling improved until yesterday.  Developed the above symptoms.  She reports she has ups and Downs with regards to her shortness of breath and I LD.  Her next dose of rituximab is in April, last dose was in November.  She does use Breo inhaler, she was prescribed Ventolin during her COVID.  She does go to pulmonary rehab but has been unable to go for the past 2 weeks due to COVID.  She is currently on prednisone, alternating between 7.5-5 mg every other day.  She denies any chest pain, abdominal pain, vomiting.  The patient is an anesthesiologist.    The history is provided by the patient.   Review of patient's allergies indicates:   Allergen Reactions    Methotrexate Shortness Of Breath    Gluten protein Other (See Comments)     Sensitive - not allergic    Ppd black rubber mix      Past Medical History:   Diagnosis Date    Chronic sinusitis, unspecified     History of SIADH     Mitral valve prolapse     Osteopenia     Rheumatoid arthritis involving multiple sites      Past Surgical History:   Procedure Laterality Date    BREAST BIOPSY Bilateral     FUNCTIONAL ENDOSCOPIC SINUS SURGERY (FESS)      ORIF WRIST FRACTURE Right      No family history on file.  Social History     Tobacco Use    Smoking status: Never    Smokeless tobacco:  Never   Substance Use Topics    Alcohol use: No    Drug use: No     Review of Systems   Constitutional:  Positive for chills and fever.   HENT:  Negative for congestion and sore throat.    Eyes:  Negative for visual disturbance.   Respiratory:  Positive for cough and shortness of breath.    Cardiovascular:  Negative for chest pain.   Gastrointestinal:  Negative for abdominal pain, nausea and vomiting.   Genitourinary:  Negative for dysuria.   Musculoskeletal:  Negative for arthralgias.   Skin:  Negative for rash.   Allergic/Immunologic: Positive for immunocompromised state.   Psychiatric/Behavioral:  Negative for decreased concentration.      Physical Exam     Initial Vitals [02/10/23 1333]   BP Pulse Resp Temp SpO2   125/75 (!) 123 (!) 26 99.4 °F (37.4 °C) 98 %      MAP       --         Physical Exam    Nursing note and vitals reviewed.  Constitutional: She appears well-developed and well-nourished. She is not diaphoretic. No distress.   HENT:   Head: Normocephalic and atraumatic.   Eyes: Conjunctivae are normal.   Neck: Neck supple.   Cardiovascular:  Regular rhythm.   Tachycardia present.         Pulmonary/Chest:   Tachypnea, patient appears short of breath while talking, she is able to speak in complete sentences.  Bibasilar rales.   Abdominal: Abdomen is soft. Bowel sounds are normal. There is no abdominal tenderness.   Musculoskeletal:      Cervical back: Neck supple.     Neurological: She is alert and oriented to person, place, and time. GCS score is 15. GCS eye subscore is 4. GCS verbal subscore is 5. GCS motor subscore is 6.   Skin: Skin is warm and dry.   Psychiatric: She has a normal mood and affect.       ED Course   Procedures  Labs Reviewed   SARS-COV-2 RDRP GENE - Abnormal; Notable for the following components:       Result Value    POC Rapid COVID Positive (*)     All other components within normal limits   CULTURE, BLOOD   CULTURE, BLOOD   CBC W/ AUTO DIFFERENTIAL   COMPREHENSIVE METABOLIC PANEL    LACTIC ACID, PLASMA   URINALYSIS, REFLEX TO URINE CULTURE   B-TYPE NATRIURETIC PEPTIDE   TROPONIN I   PROCALCITONIN   D DIMER, QUANTITATIVE   POCT INFLUENZA A/B MOLECULAR          Imaging Results              X-Ray Chest PA And Lateral (Final result)  Result time 02/10/23 13:55:26      Final result by Vincenzo Hurtado MD (02/10/23 13:55:26)                   Impression:      See above      Electronically signed by: Vincenzo Hurtado MD  Date:    02/10/2023  Time:    13:55               Narrative:    EXAMINATION:  XR CHEST PA AND LATERAL    CLINICAL HISTORY:  Sepsis, unspecified organism    TECHNIQUE:  PA and lateral views of the chest were performed.    COMPARISON:  N 12/09/2022 CT chest one    FINDINGS:  Heart size normal.  Diffuse interstitial lung disease similar to the previous study.  No significant airspace consolidation or pleural effusion identified                                       Medications   sodium chloride 0.9% bolus 1,824 mL 1,824 mL (has no administration in time range)   levoFLOXacin 750 mg/150 mL IVPB 750 mg (has no administration in time range)     Medical Decision Making:   Initial Assessment:   68-year-old female with RA, I LD presents to the emergency department with cough, shortness of breath, fever and chills.  Diagnosed with COVID January 30th, completed Paxlovid, felt improved, symptoms started yesterday.  She endorses cough with increased sputum production.  Uses Breo Ellipta, Ventolin.  On exam, the patient is tachycardic, tachypneic, appears short of breath.  O2 sats on room air 98%.  Bibasilar rales.  Differential includes but not limited to bacterial pneumonia, viral illness, I LD.  New onset CHF, PE felt less likely.  Workup initiated with labs including cardiac enzymes, chest x-ray, will check ambulatory pulse ox.  ED Management:  This patient does have evidence of infective focus  My overall impression is pneumonia.  Source: Respiratory  Antibiotics given- Antibiotics (72h ago,  onward)   Levaquin     Latest lactate reviewed-  WNL  Organ dysfunction indicated by N/A    Fluid challenge: 30 ml/kg    Post- resuscitation assessment Yes Perfusion exam was performed within 6 hours of presentation after bolus shows Adequate tissue perfusion assessed by non-invasive monitoring       Will Not start Pressors  Source control achieved by: Antibiotics             ED Course as of 02/10/23 1849   Fri Feb 10, 2023   1640 Patient feeling improved.  O2 sats improved after DuoNeb.  No leukocytosis, lactic acid within normal limits, troponin, BNP, D-dimer all negative.  Discussed treatment with p.o. Levaquin, advised to take Ventolin inhaler q.4 hours if needed for cough or shortness of breath.  Advised to follow-up with pulmonologist or primary care physician next week.  Reviewed strict return precautions.  Patient states she understands and agrees with plan. [LH]      ED Course User Index  [LH] Montserrat Gutierrez MD                 Clinical Impression:   Final diagnoses:  [A41.9] Sepsis  [R00.0] Tachycardia               Montserrat Gutierrez MD  02/10/23 2725

## 2023-02-10 NOTE — ED TRIAGE NOTES
Pt reports cough and body aches x1 day. Reports diagnosed with covid and treated last week. Got better and started feeling sick again yesterday. Hx of interstitial lung disease

## 2023-02-13 ENCOUNTER — DOCUMENTATION ONLY (OUTPATIENT)
Dept: TRANSPLANT | Facility: CLINIC | Age: 68
End: 2023-02-13
Payer: MEDICARE

## 2023-02-13 ENCOUNTER — PATIENT MESSAGE (OUTPATIENT)
Dept: TRANSPLANT | Facility: CLINIC | Age: 68
End: 2023-02-13
Payer: MEDICARE

## 2023-02-13 ENCOUNTER — TELEPHONE (OUTPATIENT)
Dept: TRANSPLANT | Facility: CLINIC | Age: 68
End: 2023-02-13
Payer: MEDICARE

## 2023-02-13 DIAGNOSIS — J84.9 INTERSTITIAL LUNG DISEASE: Primary | ICD-10-CM

## 2023-02-13 NOTE — PROGRESS NOTES
Entered per imaging/home result of rapid covid test (imaging is included in patient chart). Routed to Dr. Haile for review.

## 2023-02-13 NOTE — PROGRESS NOTES
Order for cxr per Dr. Haile, patient notified via [facility] message. Plan visit Thursday, 2/16. Initial positive covid date 1/30/23.

## 2023-02-14 LAB
BACTERIA BLD CULT: NORMAL
BACTERIA BLD CULT: NORMAL

## 2023-02-15 ENCOUNTER — TELEPHONE (OUTPATIENT)
Dept: TRANSPLANT | Facility: CLINIC | Age: 68
End: 2023-02-15
Payer: MEDICARE

## 2023-02-16 ENCOUNTER — OFFICE VISIT (OUTPATIENT)
Dept: TRANSPLANT | Facility: CLINIC | Age: 68
End: 2023-02-16
Payer: MEDICARE

## 2023-02-16 ENCOUNTER — HOSPITAL ENCOUNTER (OUTPATIENT)
Dept: RADIOLOGY | Facility: HOSPITAL | Age: 68
Discharge: HOME | End: 2023-02-16
Attending: INTERNAL MEDICINE
Payer: MEDICARE

## 2023-02-16 DIAGNOSIS — K21.9 GASTROESOPHAGEAL REFLUX DISEASE WITHOUT ESOPHAGITIS: ICD-10-CM

## 2023-02-16 DIAGNOSIS — U07.1 COVID: ICD-10-CM

## 2023-02-16 DIAGNOSIS — J84.9 INTERSTITIAL LUNG DISEASE: ICD-10-CM

## 2023-02-16 DIAGNOSIS — R05.9 COUGH, UNSPECIFIED TYPE: ICD-10-CM

## 2023-02-16 DIAGNOSIS — J84.9 INTERSTITIAL LUNG DISEASE: Primary | ICD-10-CM

## 2023-02-16 DIAGNOSIS — M05.79 RHEUMATOID ARTHRITIS INVOLVING MULTIPLE SITES WITH POSITIVE RHEUMATOID FACTOR: ICD-10-CM

## 2023-02-16 PROCEDURE — 1160F PR REVIEW ALL MEDS BY PRESCRIBER/CLIN PHARMACIST DOCUMENTED: ICD-10-PCS | Mod: CPTII,NTX,S$GLB, | Performed by: INTERNAL MEDICINE

## 2023-02-16 PROCEDURE — 1160F RVW MEDS BY RX/DR IN RCRD: CPT | Mod: CPTII,NTX,S$GLB, | Performed by: INTERNAL MEDICINE

## 2023-02-16 PROCEDURE — 71046 XR CHEST PA AND LATERAL: ICD-10-PCS | Mod: 26,NTX,, | Performed by: RADIOLOGY

## 2023-02-16 PROCEDURE — 1159F MED LIST DOCD IN RCRD: CPT | Mod: CPTII,NTX,S$GLB, | Performed by: INTERNAL MEDICINE

## 2023-02-16 PROCEDURE — 99999 PR PBB SHADOW E&M-EST. PATIENT-LVL I: ICD-10-PCS | Mod: PBBFAC,TXP,, | Performed by: INTERNAL MEDICINE

## 2023-02-16 PROCEDURE — 71046 X-RAY EXAM CHEST 2 VIEWS: CPT | Mod: 26,NTX,, | Performed by: RADIOLOGY

## 2023-02-16 PROCEDURE — 99214 PR OFFICE/OUTPT VISIT, EST, LEVL IV, 30-39 MIN: ICD-10-PCS | Mod: NTX,S$GLB,, | Performed by: INTERNAL MEDICINE

## 2023-02-16 PROCEDURE — 1159F PR MEDICATION LIST DOCUMENTED IN MEDICAL RECORD: ICD-10-PCS | Mod: CPTII,NTX,S$GLB, | Performed by: INTERNAL MEDICINE

## 2023-02-16 PROCEDURE — 99214 OFFICE O/P EST MOD 30 MIN: CPT | Mod: NTX,S$GLB,, | Performed by: INTERNAL MEDICINE

## 2023-02-16 PROCEDURE — 71046 X-RAY EXAM CHEST 2 VIEWS: CPT | Mod: TC,TXP

## 2023-02-16 PROCEDURE — 99999 PR PBB SHADOW E&M-EST. PATIENT-LVL I: CPT | Mod: PBBFAC,TXP,, | Performed by: INTERNAL MEDICINE

## 2023-02-16 NOTE — PROGRESS NOTES
ADVANCED LUNG DISEASE FOLLOW-UP                                                                                                                                          Reason for Visit:  RA-ILD    Referring Physician: Ibis Holcomb    History of Present Illness: Juan Cruz is a 68 y.o. female who is on 0L of oxygen.  She is on no assisted ventilation.  Her New York Heart Association Class is III and a Karnofsky score of 60% - Requires occasional assistance but is able to care for needs. She is not diabetic.     She presents today for follow-up of RA-ILD.  Currently on rituxan therapy after having MTX induced lung toxicity and failing azathioprine therapy.  She had her rituxan infusions beginning of November 2022.  She tolerated well with no side effects.  Around Thanksgiving, she had increased cough with sputum productions.  Was given Levaquin for possible PNA.  She states that her cough is somewhat improved.  She still has sputum production.  Follows with ENT; taken off flonase.  She continues to take zyrtec.  Takes a PPI; has had a full GI work-up which was unrevealing.  Is having tendon pain on there L>R hand.  She follows with a hand surgeon.  Was previously taking OFEV which had to be discontinued for drug induced liver injury.      Since her last visit, she contracted COVID.  She had cough and symptoms resolved with paxlovid.  She then developed fevers and increasing fatigue around 10 days post COVID positive test and again tested positive for COVID.  She was seen in the ED and treated with levaquin.  She currently continues to have a cough occasionally productive of sputum.  Denies any sinus congestion; previously on nasal steroid which was discontinued by ENT.  She is scheduled to follow with Rheum in April and plans to re-dose rituxan.    Past Medical History:   Diagnosis Date    Chronic sinusitis, unspecified     History of SIADH     Mitral valve prolapse     Osteopenia     Rheumatoid  arthritis involving multiple sites        Past Surgical History:   Procedure Laterality Date    BREAST BIOPSY Bilateral     FUNCTIONAL ENDOSCOPIC SINUS SURGERY (FESS)      ORIF WRIST FRACTURE Right        Allergies: Methotrexate, Gluten protein, and Ppd black rubber mix    Current Outpatient Medications   Medication Sig    acetaminophen (TYLENOL) 325 MG tablet Take 650 mg by mouth every 6 (six) hours as needed for Pain.    B-complex with vitamin C (Z-BEC OR EQUIV) tablet Take 0.5 tablets by mouth 2 (two) times a day.    calcium citrate-vitamin D3 315-200 mg (CITRACAL+D) 315-200 mg-unit per tablet Take 1 tablet by mouth 2 (two) times daily.    cetirizine (ZYRTEC) 10 MG tablet cetirizine 10 mg tablet   TAKE 1 TABLET BY MOUTH ONCE DAILY    denosumab (PROLIA) 60 mg/mL Syrg Inject 1 mL (60 mg total) into the skin every 6 (six) months.    diclofenac sodium (VOLTAREN) 1 % Gel Apply 2 g topically 4 (four) times daily as needed.    estradioL (VAGIFEM) 10 mcg Tab Yuvafem 10 mcg vaginal tablet   INSERT 1 TABLET VAGINALLY TWICE A WEEK    fluticasone/vilanterol (BREO ELLIPTA INHL) Inhale 1 puff into the lungs once daily.    guaiFENesin (MUCINEX) 600 mg 12 hr tablet Take 1,200 mg by mouth 2 (two) times daily.    levoFLOXacin (LEVAQUIN) 750 MG tablet Take 1 tablet (750 mg total) by mouth once daily. for 5 days    magnesium oxide (MAG-OX) 400 mg (241.3 mg magnesium) tablet Take 400 mg by mouth once daily.    metoprolol succinate (TOPROL-XL) 25 MG 24 hr tablet Take 1 tablet (25 mg total) by mouth once daily.    pantoprazole (PROTONIX) 40 MG tablet Take 40 mg by mouth once daily.    predniSONE (DELTASONE) 5 MG tablet Take 2 tablets (10 mg total) by mouth once daily.    sod chlor,sod bicarb/neti pot (NEILMED NASAFLO FRANK) 1 Dose by sinus irrigation route daily as needed.     No current facility-administered medications for this visit.       Immunization History   Administered Date(s) Administered    COVID-19, MRNA, LN-S, PF (Pfizer)  (Purple Cap) 12/31/2020, 01/25/2021    Pneumococcal Polysaccharide - 23 Valent 09/16/2022    Yellow Fever 08/31/2017    Zoster Recombinant 01/18/2020, 08/19/2020     Family History:  History reviewed. No pertinent family history.  Social History     Substance and Sexual Activity   Alcohol Use No      Social History     Substance and Sexual Activity   Drug Use No      Social History     Socioeconomic History    Marital status:    Tobacco Use    Smoking status: Never    Smokeless tobacco: Never   Substance and Sexual Activity    Alcohol use: No    Drug use: No    Sexual activity: Not Currently     Review of Systems   Constitutional:  Negative for chills, diaphoresis, fever, malaise/fatigue and weight loss.   HENT:  Negative for congestion, ear discharge, ear pain, hearing loss, nosebleeds, sinus pain, sore throat and tinnitus.    Eyes:  Negative for blurred vision, double vision, photophobia, pain, discharge and redness.   Respiratory:  Positive for cough, sputum production and shortness of breath. Negative for hemoptysis, wheezing and stridor.    Cardiovascular:  Negative for chest pain, palpitations, orthopnea, claudication, leg swelling and PND.   Gastrointestinal:  Negative for abdominal pain, blood in stool, constipation, diarrhea, heartburn, melena, nausea and vomiting.   Genitourinary:  Negative for dysuria, flank pain, frequency, hematuria and urgency.   Musculoskeletal:  Positive for joint pain (in hand). Negative for back pain, falls, myalgias and neck pain.   Skin:  Negative for itching and rash.   Neurological:  Negative for dizziness, tingling, tremors, sensory change, speech change, focal weakness, seizures, loss of consciousness, weakness and headaches.   Endo/Heme/Allergies:  Negative for environmental allergies and polydipsia. Bruises/bleeds easily.   Psychiatric/Behavioral:  Negative for depression, hallucinations, memory loss, substance abuse and suicidal ideas. The patient is not  nervous/anxious and does not have insomnia.    Vitals  LMP  (LMP Unknown)  None due to COVID positive  Physical Exam  Vitals and nursing note reviewed.   Constitutional:       General: She is not in acute distress.     Appearance: She is well-developed. She is not diaphoretic.   HENT:      Head: Normocephalic and atraumatic.      Nose: Rhinorrhea present.      Mouth/Throat:      Mouth: Mucous membranes are moist.      Pharynx: No oropharyngeal exudate.   Eyes:      General: No scleral icterus.     Conjunctiva/sclera: Conjunctivae normal.      Pupils: Pupils are equal, round, and reactive to light.   Neck:      Thyroid: No thyromegaly.      Vascular: No JVD.      Trachea: No tracheal deviation.   Cardiovascular:      Rate and Rhythm: Normal rate and regular rhythm.      Pulses: Normal pulses.      Heart sounds: Normal heart sounds. No murmur heard.    No friction rub. No gallop.   Pulmonary:      Effort: Pulmonary effort is normal. No respiratory distress.      Breath sounds: Rales (fine dry crackles bibasilar) present. No wheezing.   Abdominal:      General: Abdomen is flat. Bowel sounds are normal. There is no distension.      Palpations: Abdomen is soft.      Tenderness: There is no abdominal tenderness.   Musculoskeletal:         General: No deformity. Normal range of motion.      Cervical back: Normal range of motion and neck supple.   Skin:     General: Skin is warm and dry.      Capillary Refill: Capillary refill takes less than 2 seconds.      Coloration: Skin is not pale.      Findings: No bruising, erythema or rash.   Neurological:      General: No focal deficit present.      Mental Status: She is alert and oriented to person, place, and time. Mental status is at baseline.      Cranial Nerves: No cranial nerve deficit.   Psychiatric:         Mood and Affect: Mood normal.         Behavior: Behavior normal.         Thought Content: Thought content normal.         Judgment: Judgment normal.        Labs:  Documentation Only on 02/13/2023   Component Date Value    EXT SARS COV-2 (COVID-19) 01/30/2023 positive    Admission on 02/10/2023, Discharged on 02/10/2023   Component Date Value    POC Rapid COVID 02/10/2023 Positive (A)      Acceptab* 02/10/2023 Yes     POC Molecular Influenza * 02/10/2023 Negative     POC Molecular Influenza * 02/10/2023 Negative      Acceptab* 02/10/2023 Yes     Blood Culture, Routine 02/10/2023 No Growth after 4 days.     Blood Culture, Routine 02/10/2023 No Growth after 4 days.     WBC 02/10/2023 10.19     RBC 02/10/2023 4.57     Hemoglobin 02/10/2023 14.0     Hematocrit 02/10/2023 41.2     MCV 02/10/2023 90     MCH 02/10/2023 30.6     MCHC 02/10/2023 34.0     RDW 02/10/2023 12.4     Platelets 02/10/2023 305     MPV 02/10/2023 9.3     Immature Granulocytes 02/10/2023 1.1 (H)     Gran # (ANC) 02/10/2023 8.0 (H)     Immature Grans (Abs) 02/10/2023 0.11 (H)     Lymph # 02/10/2023 1.0     Mono # 02/10/2023 1.0     Eos # 02/10/2023 0.1     Baso # 02/10/2023 0.04     nRBC 02/10/2023 0     Gran % 02/10/2023 78.8 (H)     Lymph % 02/10/2023 9.9 (L)     Mono % 02/10/2023 9.3     Eosinophil % 02/10/2023 0.5     Basophil % 02/10/2023 0.4     Differential Method 02/10/2023 Automated     Sodium 02/10/2023 137     Potassium 02/10/2023 4.3     Chloride 02/10/2023 104     CO2 02/10/2023 25     Glucose 02/10/2023 102     BUN 02/10/2023 8     Creatinine 02/10/2023 0.8     Calcium 02/10/2023 8.7     Total Protein 02/10/2023 7.1     Albumin 02/10/2023 3.5     Total Bilirubin 02/10/2023 0.3     Alkaline Phosphatase 02/10/2023 38 (L)     AST 02/10/2023 31     ALT 02/10/2023 32     Anion Gap 02/10/2023 8     eGFR 02/10/2023 >60     Lactate (Lactic Acid) 02/10/2023 0.9     Specimen UA 02/10/2023 Urine, Clean Catch     Color, UA 02/10/2023 Yellow     Appearance, UA 02/10/2023 Clear     pH, UA 02/10/2023 7.0     Specific Gravity, UA 02/10/2023 <1.005 (A)     Protein, UA  02/10/2023 Negative     Glucose, UA 02/10/2023 Negative     Ketones, UA 02/10/2023 Negative     Bilirubin (UA) 02/10/2023 Negative     Occult Blood UA 02/10/2023 Negative     Nitrite, UA 02/10/2023 Negative     Urobilinogen, UA 02/10/2023 Negative     Leukocytes, UA 02/10/2023 Negative     BNP 02/10/2023 <10     Troponin I 02/10/2023 <0.006     Procalcitonin 02/10/2023 0.03     D-Dimer 02/10/2023 0.37     POC Lactate 02/10/2023 0.75     Sample 02/10/2023 VENOUS     Site 02/10/2023 Other     Allens Test 02/10/2023 N/A        Pulmonary Function Tests 12/8/2022 9/12/2022 6/7/2022   FVC 1.86 1.82 2.18   FEV1 1.64 1.62 1.9   TLC (liters) 2.88 - 2.79   DLCO (ml/mmHg sec) 8.62 - 8.17   FVC% 78 77 92   FEV1% 86 85 99   FEF 25-75 2.93 2.69 3.24   FEF 25-75% 160 146 175   TLC% 63 - 61   RV 1.01 - 0.61   RV% 53 - 32   DLCO% 42 - 40     6MW 12/22/2022 12/8/2022 9/12/2022 8/5/2022 6/7/2022   6MWT Status completed without stopping completed without stopping completed without stopping completed without stopping completed without stopping   Patient Reported No complaints Dyspnea Dyspnea No complaints Dyspnea   Was O2 used? No No No - -   6MW Distance walked (feet) 1300 1332 1300 1034 1210   Distance walked (meters) 396.24 405.99 396.24 315.16 368.81   Did patient stop? No No No No No   Oxygen Saturation 98 99 98 96 98   Supplemental Oxygen Room Air Room Air Room Air Room Air Room Air   Heart Rate 78 81 89 82 89   Blood Pressure 118/77 145/78 120/74 106/65 129/70   Rigoberto Dyspnea Rating  light moderate light somewhat heavy nothing at all   Oxygen Saturation 95 98 95 91 94   Supplemental Oxygen Room Air Room Air Room Air Room Air Room Air   Heart Rate 113 106 113 110 116   Blood Pressure 130/86 129/66 131/77 118/79 132/80   Rigoberto Dyspnea Rating  somewhat heavy somewhat heavy somewhat heavy very heavy light   Recovery Time (seconds) 180 123 72 180 115   Oxygen Saturation 99 98 98 96 98   Supplemental Oxygen Room Air Room Air Room Air  Room Air Room Air   Heart Rate 83 94 97 86 93       Imaging:  Results for orders placed during the hospital encounter of 07/27/22    CT Chest Without Contrast    Narrative  EXAMINATION:  CT CHEST WITHOUT CONTRAST    CLINICAL HISTORY:  Interstitial lung disease; Cough, unspecified    TECHNIQUE:  Low dose axial images, sagittal and coronal reformations were obtained from the thoracic inlet to the lung bases.  Intravenous contrast was not administered.    COMPARISON:  CT thorax 06/28/2021    FINDINGS:  Base of Neck: Imaged portions of the base of the neck are grossly unremarkable.    Aorta: 3-branch vessel configuration of a left-sided type 3 aortic arch.  No hyperdense crescent sign, aneurysmal degeneration, periaortic fat stranding or periaortic fluid.    Heart: Heart is normal in size.  No significant pericardial thickening. No appreciable coronary artery calcifications.    Pulmonary vasculature: Pulmonary arteries are not enlarged and distribute normally.  There are four pulmonary veins.    Axilla/Alexandra/Mediastinum: Prominent mediastinal lymph nodes however, no rachael axillary or mediastinal lymphadenopathy.  Evaluation of hilar lymph nodes is limited without IV contrast.    Airways: Trachea and mainstem bronchi are patent.  Symmetric mild central bronchiectasis present.    Lungs/Pleura: Significant interval progression of previously noted subpleural predominant reticular and ground-glass airspace opacities associated with bilateral lower lobe volume loss, architectural distortion, symmetric mild central bronchiectasis and apicobasal gradient.  No pleural effusions.  No pneumothorax.    Esophagus: Small hiatal hernia, not significantly changed.  Otherwise, normal in course and caliber however, evaluation is limited given the lack of oral contrast.    Soft tissues: Imaged soft tissues are grossly unremarkable.    Osseous structures: Diffuse osseous demineralization and mild multilevel degenerative changes of the  imaged spine.  No acute displaced fracture, dislocation or suspicious lytic/blastic osseous lesion.    Upper Abdomen: Imaged portions of the upper abdomen are grossly unremarkable.    Impression  1. Significant interval progression of previously noted subpleural predominant reticular and ground-glass airspace opacities associated with bilateral lower lobe volume loss, architectural distortion, symmetric mild central bronchiectasis and apicobasal gradient suggestive of UIP pattern of interstitial lung disease which may reflect IPF.  Additional diagnostic considerations include NSIP, asbestosis, fibrotic hypersensitivity pneumonitis connective tissue associated lung disease and drug induced lung disease amongst other etiologies.      Electronically signed by: Braulio Haro  Date:    07/27/2022  Time:    16:11    Cardiodiagnostics:  6/28/2021:  The estimated ejection fraction is 55%.  Normal systolic function.  Normal right ventricular size with normal right ventricular systolic function.  Mild to moderate left atrial enlargement.  Mild right atrial enlargement.  Normal central venous pressure (3 mmHg).  The estimated PA systolic pressure is 18 mmHg.       Assessment:  1. Interstitial lung disease    2. Rheumatoid arthritis involving multiple sites with positive rheumatoid factor    3. COVID    4. Cough, unspecified type    5. Gastroesophageal reflux disease without esophagitis      Plan:   1. Followed for RA-ILD.  FVC and DLCO are stable.  Symptoms improved but continues with cough.  Failed MTX and AZA.  Currently on rituxan infusions; tolerating well.  Will repeat CT chest for baseline since the last one done was when she had acute MTX lung injury.  Follows with ENT; takes daily antihistamine, taken off flonase.  Takes PPI; has had extensive GI evaluation which was all negative.  No PFTs today.  Symptoms likely worse due to recent COVID infection.  Continue current medication plan.      2. Follow-up with Rheum.   Currently on prednisone and rituxan.    3. COVID pos 1/30/2023.  S/p paxlovid.  Likely rebound with worsening symptoms.  Continue current supportive care.    4. Continue with PPI.    5. Follow-up in May 2023 with PFTs and 6MWT.    Barbara Haile D.O.  Pulmonary/Critical Care and Lung Transplantation  [facility]

## 2023-02-16 NOTE — LETTER
February 16, 2023        Ibis Holcomb  [address]  [facility] [address]  Phone: [phone]  Fax: [phone]             [facility] Trans[facility] 1st Fl  [address]  [facility] [address]  Phone: [phone]   Patient: Juan Cruz   MR Number: [id number]   YOB: 1955   Date of Visit: 2/16/2023       Dear Dr. Ibis Holcomb    Thank you for referring Juan Cruz to me for evaluation. Attached you will find relevant portions of my assessment and plan of care.    If you have questions, please do not hesitate to call me. I look forward to following Juan Cruz along with you.    Sincerely,    Barbara Halie, DO    Enclosure    If you would like to receive this communication electronically, please contact [email or web address] or [phone] to request Pharmal[employer] Link access.    Pharmal[employer] Link is a tool which provides read-only access to select patient information with whom you have a relationship. Its easy to use and provides real time access to review your patients record including encounter summaries, notes, results, and demographic information.    If you feel you have received this communication in error or would no longer like to receive these types of communications, please e-mail [email or web address]

## 2023-02-22 ENCOUNTER — PATIENT MESSAGE (OUTPATIENT)
Dept: RHEUMATOLOGY | Facility: CLINIC | Age: 68
End: 2023-02-22
Payer: MEDICARE

## 2023-02-23 ENCOUNTER — HOSPITAL ENCOUNTER (OUTPATIENT)
Dept: PULMONOLOGY | Facility: OTHER | Age: 68
Discharge: HOME | End: 2023-02-23
Attending: INTERNAL MEDICINE
Payer: MEDICARE

## 2023-02-23 ENCOUNTER — PATIENT MESSAGE (OUTPATIENT)
Dept: RHEUMATOLOGY | Facility: CLINIC | Age: 68
End: 2023-02-23
Payer: MEDICARE

## 2023-02-23 PROCEDURE — G0237 THERAPEUTIC PROCD STRG ENDUR: HCPCS | Mod: TXP

## 2023-02-23 PROCEDURE — G0238 OTH RESP PROC, INDIV: HCPCS | Mod: NTX

## 2023-02-27 ENCOUNTER — PATIENT MESSAGE (OUTPATIENT)
Dept: PRIMARY CARE CLINIC | Facility: CLINIC | Age: 68
End: 2023-02-27
Payer: MEDICARE

## 2023-02-27 DIAGNOSIS — L85.3 XEROSIS OF SKIN: Primary | ICD-10-CM

## 2023-02-27 RX ORDER — AMMONIUM LACTATE 12 G/100G
1 CREAM TOPICAL 2 TIMES DAILY
Qty: 140 G | Refills: 3 | Status: SHIPPED | OUTPATIENT
Start: 2023-02-27 | End: 2023-09-14 | Stop reason: ALTCHOICE

## 2023-02-28 ENCOUNTER — HOSPITAL ENCOUNTER (OUTPATIENT)
Dept: PULMONOLOGY | Facility: OTHER | Age: 68
Discharge: HOME | End: 2023-02-28
Attending: INTERNAL MEDICINE
Payer: MEDICARE

## 2023-02-28 PROCEDURE — G0238 OTH RESP PROC, INDIV: HCPCS | Mod: TXP

## 2023-02-28 PROCEDURE — G0237 THERAPEUTIC PROCD STRG ENDUR: HCPCS | Mod: NTX

## 2023-03-02 ENCOUNTER — HOSPITAL ENCOUNTER (OUTPATIENT)
Dept: PULMONOLOGY | Facility: OTHER | Age: 68
Discharge: HOME | End: 2023-03-02
Attending: INTERNAL MEDICINE
Payer: MEDICARE

## 2023-03-02 PROCEDURE — G0237 THERAPEUTIC PROCD STRG ENDUR: HCPCS | Mod: NTX

## 2023-03-02 PROCEDURE — G0238 OTH RESP PROC, INDIV: HCPCS | Mod: NTX

## 2023-03-03 ENCOUNTER — PATIENT MESSAGE (OUTPATIENT)
Dept: PRIMARY CARE CLINIC | Facility: CLINIC | Age: 68
End: 2023-03-03
Payer: MEDICARE

## 2023-03-03 DIAGNOSIS — R23.4 SCALY SKIN: Primary | ICD-10-CM

## 2023-03-03 DIAGNOSIS — M05.79 RHEUMATOID ARTHRITIS INVOLVING MULTIPLE SITES WITH POSITIVE RHEUMATOID FACTOR: ICD-10-CM

## 2023-03-03 DIAGNOSIS — J84.9 INTERSTITIAL LUNG DISEASE: Primary | ICD-10-CM

## 2023-03-07 ENCOUNTER — HOSPITAL ENCOUNTER (OUTPATIENT)
Dept: PULMONOLOGY | Facility: OTHER | Age: 68
Discharge: HOME | End: 2023-03-07
Attending: INTERNAL MEDICINE
Payer: MEDICARE

## 2023-03-07 PROCEDURE — G0238 OTH RESP PROC, INDIV: HCPCS | Mod: TXP

## 2023-03-07 PROCEDURE — G0237 THERAPEUTIC PROCD STRG ENDUR: HCPCS | Mod: TXP

## 2023-03-09 ENCOUNTER — PATIENT MESSAGE (OUTPATIENT)
Dept: RHEUMATOLOGY | Facility: CLINIC | Age: 68
End: 2023-03-09
Payer: MEDICARE

## 2023-03-09 ENCOUNTER — TELEPHONE (OUTPATIENT)
Dept: RHEUMATOLOGY | Facility: CLINIC | Age: 68
End: 2023-03-09
Payer: MEDICARE

## 2023-03-09 ENCOUNTER — OFFICE VISIT (OUTPATIENT)
Dept: PRIMARY CARE CLINIC | Facility: CLINIC | Age: 68
End: 2023-03-09
Payer: MEDICARE

## 2023-03-09 VITALS
SYSTOLIC BLOOD PRESSURE: 110 MMHG | HEART RATE: 90 BPM | HEIGHT: 62 IN | BODY MASS INDEX: 24.87 KG/M2 | WEIGHT: 135.13 LBS | OXYGEN SATURATION: 98 % | DIASTOLIC BLOOD PRESSURE: 74 MMHG

## 2023-03-09 DIAGNOSIS — M05.79 RHEUMATOID ARTHRITIS INVOLVING MULTIPLE SITES WITH POSITIVE RHEUMATOID FACTOR: ICD-10-CM

## 2023-03-09 DIAGNOSIS — M81.8 OTHER OSTEOPOROSIS WITHOUT CURRENT PATHOLOGICAL FRACTURE: ICD-10-CM

## 2023-03-09 DIAGNOSIS — R21 SKIN RASH: ICD-10-CM

## 2023-03-09 DIAGNOSIS — Z00.00 PREVENTATIVE HEALTH CARE: ICD-10-CM

## 2023-03-09 DIAGNOSIS — J84.9 INTERSTITIAL LUNG DISEASE: ICD-10-CM

## 2023-03-09 DIAGNOSIS — Z79.60 LONG-TERM USE OF IMMUNOSUPPRESSANT MEDICATION: ICD-10-CM

## 2023-03-09 PROBLEM — T14.8XXA BRUISE: Status: RESOLVED | Noted: 2022-09-12 | Resolved: 2023-03-09

## 2023-03-09 PROBLEM — J18.9 LLL PNEUMONIA: Status: RESOLVED | Noted: 2022-12-20 | Resolved: 2023-03-09

## 2023-03-09 PROBLEM — R74.8 ELEVATED LIVER ENZYMES: Status: RESOLVED | Noted: 2022-09-12 | Resolved: 2023-03-09

## 2023-03-09 PROCEDURE — 99214 OFFICE O/P EST MOD 30 MIN: CPT | Mod: NTX,S$GLB,, | Performed by: INTERNAL MEDICINE

## 2023-03-09 PROCEDURE — 3074F PR MOST RECENT SYSTOLIC BLOOD PRESSURE < 130 MM HG: ICD-10-PCS | Mod: CPTII,NTX,S$GLB, | Performed by: INTERNAL MEDICINE

## 2023-03-09 PROCEDURE — 99214 PR OFFICE/OUTPT VISIT, EST, LEVL IV, 30-39 MIN: ICD-10-PCS | Mod: NTX,S$GLB,, | Performed by: INTERNAL MEDICINE

## 2023-03-09 PROCEDURE — 3008F PR BODY MASS INDEX (BMI) DOCUMENTED: ICD-10-PCS | Mod: CPTII,NTX,S$GLB, | Performed by: INTERNAL MEDICINE

## 2023-03-09 PROCEDURE — 99999 PR PBB SHADOW E&M-EST. PATIENT-LVL III: CPT | Mod: PBBFAC,TXP,, | Performed by: INTERNAL MEDICINE

## 2023-03-09 PROCEDURE — 1101F PR PT FALLS ASSESS DOC 0-1 FALLS W/OUT INJ PAST YR: ICD-10-PCS | Mod: CPTII,NTX,S$GLB, | Performed by: INTERNAL MEDICINE

## 2023-03-09 PROCEDURE — 3074F SYST BP LT 130 MM HG: CPT | Mod: CPTII,NTX,S$GLB, | Performed by: INTERNAL MEDICINE

## 2023-03-09 PROCEDURE — 1159F MED LIST DOCD IN RCRD: CPT | Mod: CPTII,NTX,S$GLB, | Performed by: INTERNAL MEDICINE

## 2023-03-09 PROCEDURE — 3078F DIAST BP <80 MM HG: CPT | Mod: CPTII,NTX,S$GLB, | Performed by: INTERNAL MEDICINE

## 2023-03-09 PROCEDURE — 3288F PR FALLS RISK ASSESSMENT DOCUMENTED: ICD-10-PCS | Mod: CPTII,NTX,S$GLB, | Performed by: INTERNAL MEDICINE

## 2023-03-09 PROCEDURE — 1126F PR PAIN SEVERITY QUANTIFIED, NO PAIN PRESENT: ICD-10-PCS | Mod: CPTII,NTX,S$GLB, | Performed by: INTERNAL MEDICINE

## 2023-03-09 PROCEDURE — 3288F FALL RISK ASSESSMENT DOCD: CPT | Mod: CPTII,NTX,S$GLB, | Performed by: INTERNAL MEDICINE

## 2023-03-09 PROCEDURE — 1159F PR MEDICATION LIST DOCUMENTED IN MEDICAL RECORD: ICD-10-PCS | Mod: CPTII,NTX,S$GLB, | Performed by: INTERNAL MEDICINE

## 2023-03-09 PROCEDURE — 1101F PT FALLS ASSESS-DOCD LE1/YR: CPT | Mod: CPTII,NTX,S$GLB, | Performed by: INTERNAL MEDICINE

## 2023-03-09 PROCEDURE — 3008F BODY MASS INDEX DOCD: CPT | Mod: CPTII,NTX,S$GLB, | Performed by: INTERNAL MEDICINE

## 2023-03-09 PROCEDURE — 1126F AMNT PAIN NOTED NONE PRSNT: CPT | Mod: CPTII,NTX,S$GLB, | Performed by: INTERNAL MEDICINE

## 2023-03-09 PROCEDURE — 99999 PR PBB SHADOW E&M-EST. PATIENT-LVL III: ICD-10-PCS | Mod: PBBFAC,TXP,, | Performed by: INTERNAL MEDICINE

## 2023-03-09 PROCEDURE — 3078F PR MOST RECENT DIASTOLIC BLOOD PRESSURE < 80 MM HG: ICD-10-PCS | Mod: CPTII,NTX,S$GLB, | Performed by: INTERNAL MEDICINE

## 2023-03-09 NOTE — ASSESSMENT & PLAN NOTE
Pt to make appt with Allergy/Immunology   Will try to contact Derm depending on which derm accepts for [facility]

## 2023-03-09 NOTE — PROGRESS NOTES
[facility] Progress Note      Chief Complaint     Rash (Skin has been peeling for two weeks) and Dry Eye       Subjective:      History of Present Illness:  Juan Cruz is a 68 y.o. female     C/o dry, flaky, non-pruritic rash x 2 wks on bilateral ventral wrists and distal forearms and plotchy red rash on anterior thighs that started after taking Paxlovid x 2 last on 2/10/2023 for rebound after initially + in Jan 2023 for recent COVID infection  -rash is worsened with Lac hydrin 12% without itching but improved with Edith-cream   -Her friend who is a rheumatologist suggested high-dose Vit A and Vit E x 3 wks    Osteoporosis by DEXA on 5/2022: due for Prolia this month    Vit D injection in 2/2023 with level 61 in 9/2022    RA with ILD:   -PFTs  and 6MWT scheduled in April 17, 2023; still using Breo and resumed pulm rehab x 3 sessions; endurance improved; uses albuterol prn; improved cough; uses flutter valve; no longer on med for ILD due to transaminitis; RA improved with 3rd Rituximab in April 24, 2023  -CXR 2/2023 stable with interstitial fibrotic pulm disease (due to +COVID)  -now able to climb stairs, speak in sentences, walks without BAKER  -now weaned down to Prednisone 5mg daily  -has appt with Dr. Haile in 5/2023    Past Medical History:  Past Medical History:   Diagnosis Date    Bruise 9/12/2022    Chronic sinusitis, unspecified     Elevated liver enzymes 9/12/2022    History of SIADH     LLL pneumonia 12/20/2022    Mitral valve prolapse     Osteopenia     Rheumatoid arthritis involving multiple sites        Past Surgical History:  Past Surgical History:   Procedure Laterality Date    BREAST BIOPSY Bilateral     FUNCTIONAL ENDOSCOPIC SINUS SURGERY (FESS)      ORIF WRIST FRACTURE Right        Allergies:  Review of patient's allergies indicates:   Allergen Reactions    Methotrexate Shortness Of Breath    Gluten protein Other (See Comments)     Sensitive - not allergic    Ppd  black rubber mix        Home Medications:  Current Outpatient Medications   Medication Sig Dispense Refill    acetaminophen (TYLENOL) 325 MG tablet Take 650 mg by mouth every 6 (six) hours as needed for Pain.      ammonium lactate 12 % Crea Apply 1 g topically 2 (two) times daily. 140 g 3    B-complex with vitamin C (Z-BEC OR EQUIV) tablet Take 0.5 tablets by mouth 2 (two) times a day.      calcium citrate-vitamin D3 315-200 mg (CITRACAL+D) 315-200 mg-unit per tablet Take 1 tablet by mouth 2 (two) times daily.      cetirizine (ZYRTEC) 10 MG tablet cetirizine 10 mg tablet   TAKE 1 TABLET BY MOUTH ONCE DAILY      denosumab (PROLIA) 60 mg/mL Syrg Inject 1 mL (60 mg total) into the skin every 6 (six) months. 2 mL 3    diclofenac sodium (VOLTAREN) 1 % Gel Apply 2 g topically 4 (four) times daily as needed.      estradioL (VAGIFEM) 10 mcg Tab Yuvafem 10 mcg vaginal tablet   INSERT 1 TABLET VAGINALLY TWICE A WEEK      fluticasone/vilanterol (BREO ELLIPTA INHL) Inhale 1 puff into the lungs once daily.      guaiFENesin (MUCINEX) 600 mg 12 hr tablet Take 1,200 mg by mouth 2 (two) times daily.      magnesium oxide (MAG-OX) 400 mg (241.3 mg magnesium) tablet Take 400 mg by mouth once daily.      metoprolol succinate (TOPROL-XL) 25 MG 24 hr tablet Take 1 tablet (25 mg total) by mouth once daily. 90 tablet 3    pantoprazole (PROTONIX) 40 MG tablet Take 40 mg by mouth once daily.      predniSONE (DELTASONE) 5 MG tablet Take 2 tablets (10 mg total) by mouth once daily. 180 tablet 1    sod chlor,sod bicarb/neti pot (NEILMED NASAFLO FRANK) 1 Dose by sinus irrigation route daily as needed.       No current facility-administered medications for this visit.        Family History:  No family history on file.    Social History:  Social History     Tobacco Use    Smoking status: Never    Smokeless tobacco: Never   Substance Use Topics    Alcohol use: No    Drug use: No       Review of Systems:  Pertinent positives and negatives listed in  "HPI. All other systems are reviewed and are negative.    Health Maintaince :   Health Maintenance Topics with due status: Not Due       Topic Last Completion Date    Colorectal Cancer Screening 10/05/2021    DEXA Scan 05/06/2022    Pneumococcal Vaccines (Age 65+) 09/16/2022    Lipid Panel 09/19/2022    Mammogram 01/05/2023           Eye:   Dental:     Immunizations:   Tdap: n/a.  Influenza: refuses.now   Pneumovax: UTD  Shingrex x 2: UTD  COVID: needs booster in 3 mo since got COVID in 2/2023  Hepatitis C:   Cancer Screening:  PAP: UTD   Mammogram: UTD 1/2023  Colonoscopy: Cologard 10/2021 -repeat 10/2024  DEXA:  5/2022 osteoporosis hips and osteopenia   LDCT: 12/2022; diffuse nonspecific ILD     The 10-year ASCVD risk score (Yohana GARCIA, et al., 2019) is: 5.1%    Values used to calculate the score:      Age: 68 years      Sex: Female      Is Non- : No      Diabetic: No      Tobacco smoker: No      Systolic Blood Pressure: 110 mmHg      Is BP treated: No      HDL Cholesterol: 73 mg/dL      Total Cholesterol: 238 mg/dL      Objective:   /74 (BP Location: Left arm, Patient Position: Sitting, BP Method: Medium (Manual))   Pulse 90   Ht 5' 2" (1.575 m)   Wt 61.3 kg (135 lb 2.3 oz)   LMP  (LMP Unknown)   SpO2 98%   BMI 24.72 kg/m²      Body mass index is 24.72 kg/m².       Physical Examination:  General: Alert and awake in no apparent distress  HEENT: Normocephalic and atraumatic; Tms WNL  Eyes:  PERRL; EOMi with anicteric sclera and clear conjunctivae  Mouth:  Oropharynx clear and without exudate; moist mucous membranes  Neck:   Cervical nodes not enlarged; supple; no bruits  Cardio:  Regular rate and rhythm with normal S1 and S2; no murmurs or rubs  Resp:  CTAB; respirations unlabored; right lower lung with decreased BS with intermitted squeaks  Abdom: Soft, NTND with normoactive bowel sounds; negative HSM  Extrem: Warm and well-perfused with no clubbing, cyanosis or edema  Skin:  No " rashes, lesions, or color changes; dry flaky skin on ventral wrists   Pulses:  2+ and symmetric distally  Neuro:  AAOx3; cooperative and pleasant with no focal deficits    Laboratory:      Most Recent Data:  Lab Results   Component Value Date    WBC 10.19 02/10/2023    HGB 14.0 02/10/2023    HCT 41.2 02/10/2023     02/10/2023    CHOL 238 (H) 09/19/2022    TRIG 107 09/19/2022    HDL 73 09/19/2022    ALT 32 02/10/2023    AST 31 02/10/2023     02/10/2023    K 4.3 02/10/2023     02/10/2023    BUN 8 02/10/2023    CO2 25 02/10/2023    INR 0.9 09/09/2022    HGBA1C 5.6 12/22/2022              CBC:   WBC   Date Value Ref Range Status   02/10/2023 10.19 3.90 - 12.70 K/uL Final     Hemoglobin   Date Value Ref Range Status   02/10/2023 14.0 12.0 - 16.0 g/dL Final     Hematocrit   Date Value Ref Range Status   02/10/2023 41.2 37.0 - 48.5 % Final     Platelets   Date Value Ref Range Status   02/10/2023 305 150 - 450 K/uL Final     MCV   Date Value Ref Range Status   02/10/2023 90 82 - 98 fL Final     RDW   Date Value Ref Range Status   02/10/2023 12.4 11.5 - 14.5 % Final     BMP:   Sodium   Date Value Ref Range Status   02/10/2023 137 136 - 145 mmol/L Final     Potassium   Date Value Ref Range Status   02/10/2023 4.3 3.5 - 5.1 mmol/L Final     Comment:     Specimen slightly hemolyzed     Chloride   Date Value Ref Range Status   02/10/2023 104 95 - 110 mmol/L Final     CO2   Date Value Ref Range Status   02/10/2023 25 23 - 29 mmol/L Final     BUN   Date Value Ref Range Status   02/10/2023 8 8 - 23 mg/dL Final     Creatinine   Date Value Ref Range Status   02/10/2023 0.8 0.5 - 1.4 mg/dL Final     Glucose   Date Value Ref Range Status   02/10/2023 102 70 - 110 mg/dL Final     Calcium   Date Value Ref Range Status   02/10/2023 8.7 8.7 - 10.5 mg/dL Final     LFTs:   Total Protein   Date Value Ref Range Status   02/10/2023 7.1 6.0 - 8.4 g/dL Final     Albumin   Date Value Ref Range Status   02/10/2023 3.5 3.5 - 5.2  g/dL Final     Total Bilirubin   Date Value Ref Range Status   02/10/2023 0.3 0.1 - 1.0 mg/dL Final     Comment:     For infants and newborns, interpretation of results should be based  on gestational age, weight and in agreement with clinical  observations.    Premature Infant recommended reference ranges:  Up to 24 hours.............<8.0 mg/dL  Up to 48 hours............<12.0 mg/dL  3-5 days..................<15.0 mg/dL  6-29 days.................<15.0 mg/dL       AST   Date Value Ref Range Status   02/10/2023 31 10 - 40 U/L Final     Alkaline Phosphatase   Date Value Ref Range Status   02/10/2023 38 (L) 55 - 135 U/L Final     ALT   Date Value Ref Range Status   02/10/2023 32 10 - 44 U/L Final     Coags:   INR   Date Value Ref Range Status   09/09/2022 0.9 0.8 - 1.2 Final     Comment:     Coumadin Therapy:  2.0 - 3.0 for INR for all indicators except mechanical heart valves  and antiphospholipid syndromes which should use 2.5 - 3.5.       FLP:      Lab Results   Component Value Date    CHOL 238 (H) 09/19/2022     Lab Results   Component Value Date    HDL 73 09/19/2022     Lab Results   Component Value Date    LDLCALC 143.6 09/19/2022     Lab Results   Component Value Date    TRIG 107 09/19/2022     Lab Results   Component Value Date    CHOLHDL 30.7 09/19/2022      DM:      Hemoglobin A1C   Date Value Ref Range Status   12/22/2022 5.6 4.0 - 5.6 % Final     Comment:     ADA Screening Guidelines:  5.7-6.4%  Consistent with prediabetes  >or=6.5%  Consistent with diabetes    High levels of fetal hemoglobin interfere with the HbA1C  assay. Heterozygous hemoglobin variants (HbS, HgC, etc)do  not significantly interfere with this assay.   However, presence of multiple variants may affect accuracy.       LDL Cholesterol   Date Value Ref Range Status   09/19/2022 143.6 63.0 - 159.0 mg/dL Final     Comment:     The National Cholesterol Education Program (NCEP) has set the  following guidelines (reference values) for LDL  Cholesterol:  Optimal.......................<130 mg/dL  Borderline High...............130-159 mg/dL  High..........................160-189 mg/dL  Very High.....................>190 mg/dL       Creatinine   Date Value Ref Range Status   02/10/2023 0.8 0.5 - 1.4 mg/dL Final     Thyroid: No results found for: TSH, FREET4, Z8LEAAQ, E0QHSFC, THYROIDAB  Anemia: No results found for: IRON, TIBC, FERRITIN, TGNQLHYU30, FOLATE  Cardiac:   Troponin I   Date Value Ref Range Status   02/10/2023 <0.006 0.000 - 0.026 ng/mL Final     Comment:     The reference interval for Troponin I represents the 99th percentile   cutoff   for our facility and is consistent with 3rd generation assay   performance.       BNP   Date Value Ref Range Status   02/10/2023 <10 0 - 99 pg/mL Final     Comment:     Values of less than 100 pg/ml are consistent with non-CHF populations.     Urinalysis:   Color, UA   Date Value Ref Range Status   02/10/2023 Yellow Yellow, Straw, Krystal Final     Specific Gravity, UA   Date Value Ref Range Status   02/10/2023 <1.005 (A) 1.005 - 1.030 Final     Nitrite, UA   Date Value Ref Range Status   02/10/2023 Negative Negative Final     Ketones, UA   Date Value Ref Range Status   02/10/2023 Negative Negative Final     Urobilinogen, UA   Date Value Ref Range Status   02/10/2023 Negative <2.0 EU/dL Final       Other Results:  EKG (my interpretation):     Radiology:  X-Ray Chest PA And Lateral  Narrative: EXAMINATION:  XR CHEST PA AND LATERAL    CLINICAL HISTORY:  Interstitial pulmonary disease, unspecified    TECHNIQUE:  PA and lateral views of the chest were performed.    COMPARISON:  02/10/2023    FINDINGS:  Chronic interstitial fibrotic changes diffusely involve lungs particularly anterior segment left upper lobe and basilar segments lower lobes with peribronchial cuffing and air bronchograms.  Impression: Stable chest with chronic interstitial fibrotic pulmonary disease changes.    Electronically signed by: Moises  MD Bree  Date:    02/16/2023  Time:    08:58          Assessment/Plan     Juan Cruz is a 68 y.o. female with:  1. Other osteoporosis without current pathological fracture  Overview:  Indications: History of Fracture (Adult), Family history of osteoporosis, Post-menopause, , Height Loss   Comparison: DEXA scan 7/3/2019 - done 5/2022  Treatments: Exercises 3 or more times a week, Vitamin D supplementation, Calcium supplementation    A DEXA scan was performed on the lumbar spine and both hips.    The average trabecular bone mineral density of the lumbar spine from L2-L4 was 0.880 g/cm2. This represents a T-score of -2.7 and a Z-score of  -0.9. Previous T score of -2.8 on 7/3/2019    The average trabecular bone mineral density measured at the proximal femurs was 0.778 g/cm2. This represents a T-score of -1.8 and Z-score of -0.4. Previous T score of -1.7 on 7/3/2019    FRAX Result (10 year probability of fracture):  Major osteoporotic fracture: 11%  Hip fracture: 2.1%        Assessment & Plan:  -cont Prolia in 3/2023 and every 6 mo  -cont Vit D and weight-bearing exercises      2. Interstitial lung disease  Overview:  PFTs 12/2022: allegedly c/w ILD  CT chest 12/09/2022: Diffuse subpleural reticular opacity and nodular pleural thickening.  Minimal perihilar bronchiectasis.  No evidence of honeycombing.  No focal opacity.  No pleural thickening.  Dx: Diffuse nonspecific interstitial lung disease, possible UIP pattern    Assessment & Plan:  Get PFTs and 6MWT on 4/17/2023  Cont pulmonary rehab   Cont Rituximab every 6 mo with next dose in 4/24/2023  F/u pulmonary in 5/2023 with Dr. Haile      3. Rheumatoid arthritis involving multiple sites with positive rheumatoid factor  Overview:  11/15/2022:  ESR 36 (48),  CRP 19 (14), WBC 15.26      Assessment & Plan:  CRP and ESR improving in 2/2023 on Rituximab with next dose due in 4/2023 and every 6 mo  -cont f/u Dr. Holcomb with labs ordered      4. Skin  rash  Assessment & Plan:  Pt to make appt with Allergy/Immunology   Will try to contact Derm depending on which derm accepts for [facility]       5. Long-term use of immunosuppressant medication  Assessment & Plan:  Pneumovax/prevar UTD   -will repeat in 5 years with next dose due 9/2027      6. Preventative health care  Assessment & Plan:  Get COVID booster in 5/2023               I spent a total of 45 minutes on the day of the visit.This includes face to face time and non-face to face time preparing to see the patient (eg, review of tests), obtaining and/or reviewing separately obtained history, documenting clinical information in the electronic or other health record, independently interpreting results and communicating results to the patient/family/caregiver, or care coordinator.   Code Status:     Gianna Carpenter MD

## 2023-03-09 NOTE — ASSESSMENT & PLAN NOTE
Get PFTs and 6MWT on 4/17/2023  Cont pulmonary rehab   Cont Rituximab every 6 mo with next dose in 4/24/2023  F/u pulmonary in 5/2023 with Dr. Haile

## 2023-03-09 NOTE — ASSESSMENT & PLAN NOTE
CRP and ESR improving in 2/2023 on Rituximab with next dose due in 4/2023 and every 6 mo  -cont f/u Dr. Holcomb with labs ordered

## 2023-03-16 ENCOUNTER — PATIENT MESSAGE (OUTPATIENT)
Dept: RHEUMATOLOGY | Facility: CLINIC | Age: 68
End: 2023-03-16
Payer: MEDICARE

## 2023-03-16 ENCOUNTER — PATIENT MESSAGE (OUTPATIENT)
Dept: TRANSPLANT | Facility: CLINIC | Age: 68
End: 2023-03-16
Payer: MEDICARE

## 2023-03-21 ENCOUNTER — HOSPITAL ENCOUNTER (OUTPATIENT)
Dept: PULMONOLOGY | Facility: OTHER | Age: 68
Discharge: HOME | End: 2023-03-21
Attending: INTERNAL MEDICINE
Payer: MEDICARE

## 2023-03-21 PROCEDURE — G0238 OTH RESP PROC, INDIV: HCPCS | Mod: TXP

## 2023-03-21 PROCEDURE — G0237 THERAPEUTIC PROCD STRG ENDUR: HCPCS | Mod: NTX

## 2023-03-23 ENCOUNTER — TELEPHONE (OUTPATIENT)
Dept: RHEUMATOLOGY | Facility: CLINIC | Age: 68
End: 2023-03-23
Payer: MEDICARE

## 2023-03-23 ENCOUNTER — HOSPITAL ENCOUNTER (OUTPATIENT)
Dept: PULMONOLOGY | Facility: OTHER | Age: 68
Discharge: HOME | End: 2023-03-23
Attending: INTERNAL MEDICINE
Payer: MEDICARE

## 2023-03-23 PROCEDURE — G0237 THERAPEUTIC PROCD STRG ENDUR: HCPCS | Mod: TXP

## 2023-03-23 PROCEDURE — G0238 OTH RESP PROC, INDIV: HCPCS | Mod: TXP

## 2023-03-27 ENCOUNTER — PATIENT MESSAGE (OUTPATIENT)
Dept: RHEUMATOLOGY | Facility: CLINIC | Age: 68
End: 2023-03-27
Payer: MEDICARE

## 2023-03-27 PROBLEM — Z00.00 PREVENTATIVE HEALTH CARE: Status: RESOLVED | Noted: 2022-09-16 | Resolved: 2023-03-27

## 2023-03-28 ENCOUNTER — HOSPITAL ENCOUNTER (OUTPATIENT)
Dept: PULMONOLOGY | Facility: OTHER | Age: 68
Discharge: HOME | End: 2023-03-28
Attending: INTERNAL MEDICINE
Payer: MEDICARE

## 2023-03-28 PROCEDURE — G0237 THERAPEUTIC PROCD STRG ENDUR: HCPCS | Mod: TXP

## 2023-03-28 PROCEDURE — G0238 OTH RESP PROC, INDIV: HCPCS | Mod: TXP

## 2023-03-30 ENCOUNTER — HOSPITAL ENCOUNTER (OUTPATIENT)
Dept: PULMONOLOGY | Facility: OTHER | Age: 68
Discharge: HOME | End: 2023-03-30
Attending: INTERNAL MEDICINE
Payer: MEDICARE

## 2023-03-30 PROCEDURE — G0238 OTH RESP PROC, INDIV: HCPCS | Mod: TXP

## 2023-03-30 PROCEDURE — G0237 THERAPEUTIC PROCD STRG ENDUR: HCPCS | Mod: NTX

## 2023-04-04 ENCOUNTER — HOSPITAL ENCOUNTER (OUTPATIENT)
Dept: PULMONOLOGY | Facility: OTHER | Age: 68
Discharge: HOME | End: 2023-04-04
Attending: INTERNAL MEDICINE
Payer: MEDICARE

## 2023-04-04 PROCEDURE — G0238 OTH RESP PROC, INDIV: HCPCS | Mod: NTX

## 2023-04-04 PROCEDURE — G0237 THERAPEUTIC PROCD STRG ENDUR: HCPCS | Mod: TXP

## 2023-04-06 ENCOUNTER — HOSPITAL ENCOUNTER (OUTPATIENT)
Dept: PULMONOLOGY | Facility: OTHER | Age: 68
Discharge: HOME | End: 2023-04-06
Attending: INTERNAL MEDICINE
Payer: MEDICARE

## 2023-04-06 PROCEDURE — G0238 OTH RESP PROC, INDIV: HCPCS | Mod: TXP

## 2023-04-06 PROCEDURE — G0237 THERAPEUTIC PROCD STRG ENDUR: HCPCS | Mod: TXP

## 2023-04-11 ENCOUNTER — HOSPITAL ENCOUNTER (OUTPATIENT)
Dept: PULMONOLOGY | Facility: OTHER | Age: 68
Discharge: HOME | End: 2023-04-11
Attending: INTERNAL MEDICINE
Payer: MEDICARE

## 2023-04-11 PROCEDURE — G0237 THERAPEUTIC PROCD STRG ENDUR: HCPCS | Mod: NTX

## 2023-04-11 PROCEDURE — G0238 OTH RESP PROC, INDIV: HCPCS | Mod: TXP

## 2023-04-12 ENCOUNTER — PES CALL (OUTPATIENT)
Dept: ADMINISTRATIVE | Facility: CLINIC | Age: 68
End: 2023-04-12
Payer: MEDICARE

## 2023-04-13 ENCOUNTER — HOSPITAL ENCOUNTER (OUTPATIENT)
Dept: PULMONOLOGY | Facility: OTHER | Age: 68
Discharge: HOME | End: 2023-04-13
Attending: INTERNAL MEDICINE
Payer: MEDICARE

## 2023-04-13 ENCOUNTER — DOCUMENTATION ONLY (OUTPATIENT)
Dept: TRANSPLANT | Facility: CLINIC | Age: 68
End: 2023-04-13
Payer: MEDICARE

## 2023-04-13 PROCEDURE — G0237 THERAPEUTIC PROCD STRG ENDUR: HCPCS | Mod: NTX

## 2023-04-13 PROCEDURE — G0238 OTH RESP PROC, INDIV: HCPCS | Mod: TXP

## 2023-04-17 ENCOUNTER — HOSPITAL ENCOUNTER (OUTPATIENT)
Dept: PULMONOLOGY | Facility: CLINIC | Age: 68
Discharge: HOME | End: 2023-04-17
Payer: MEDICARE

## 2023-04-17 ENCOUNTER — OFFICE VISIT (OUTPATIENT)
Dept: TRANSPLANT | Facility: CLINIC | Age: 68
End: 2023-04-17
Payer: MEDICARE

## 2023-04-17 VITALS
OXYGEN SATURATION: 100 % | BODY MASS INDEX: 25.15 KG/M2 | HEART RATE: 88 BPM | WEIGHT: 136.69 LBS | DIASTOLIC BLOOD PRESSURE: 64 MMHG | SYSTOLIC BLOOD PRESSURE: 130 MMHG | HEIGHT: 62 IN | RESPIRATION RATE: 20 BRPM | TEMPERATURE: 99 F

## 2023-04-17 VITALS — WEIGHT: 138 LBS | BODY MASS INDEX: 25.4 KG/M2 | HEIGHT: 62 IN

## 2023-04-17 DIAGNOSIS — J84.9 INTERSTITIAL LUNG DISEASE: ICD-10-CM

## 2023-04-17 DIAGNOSIS — M05.79 RHEUMATOID ARTHRITIS INVOLVING MULTIPLE SITES WITH POSITIVE RHEUMATOID FACTOR: ICD-10-CM

## 2023-04-17 DIAGNOSIS — J84.9 INTERSTITIAL LUNG DISEASE: Primary | ICD-10-CM

## 2023-04-17 DIAGNOSIS — K21.9 GASTROESOPHAGEAL REFLUX DISEASE WITHOUT ESOPHAGITIS: ICD-10-CM

## 2023-04-17 LAB
DLCO SINGLE BREATH LLN: 14.75
DLCO SINGLE BREATH PRE REF: 45 %
DLCO SINGLE BREATH REF: 20.48
DLCOC SBVA LLN: 2.88
DLCOC SBVA REF: 4.45
DLCOC SINGLE BREATH LLN: 14.75
DLCOC SINGLE BREATH REF: 20.48
DLCOCSBVAULN: 6.02
DLCOCSINGLEBREATHULN: 26.21
DLCOSINGLEBREATHULN: 26.21
DLCOVA LLN: 2.88
DLCOVA PRE REF: 83.9 %
DLCOVA PRE: 3.73 ML/(MIN*MMHG*L) (ref 2.88–6.02)
DLCOVA REF: 4.45
DLCOVAULN: 6.02
ERV LLN: -16449.33
ERV PRE REF: 91.6 %
ERV REF: 0.67
ERVULN: ABNORMAL
FEF 25 75 LLN: 0.9
FEF 25 75 PRE REF: 159.5 %
FEF 25 75 REF: 1.81
FEV05 LLN: 0.79
FEV05 REF: 1.65
FEV1 FVC LLN: 70
FEV1 FVC PRE REF: 107.7 %
FEV1 FVC REF: 81
FEV1 LLN: 1.36
FEV1 PRE REF: 92.7 %
FEV1 REF: 1.9
FRCPLETH LLN: 1.77
FRCPLETH PREREF: 59.3 %
FRCPLETH REF: 2.59
FRCPLETHULN: 3.42
FVC LLN: 1.71
FVC PRE REF: 85.8 %
FVC REF: 2.35
IVC PRE: 1.97 L (ref 1.71–3.03)
IVC SINGLE BREATH LLN: 1.71
IVC SINGLE BREATH PRE REF: 83.7 %
IVC SINGLE BREATH REF: 2.35
IVCSINGLEBREATHULN: 3.03
LLN IC: -16448.22
PEF LLN: 4.06
PEF PRE REF: 81.7 %
PEF REF: 5.54
PHYSICIAN COMMENT: ABNORMAL
PRE DLCO: 9.22 ML/(MIN*MMHG) (ref 14.75–26.21)
PRE ERV: 0.62 L (ref -16449.33–16450.67)
PRE FEF 25 75: 2.9 L/S (ref 0.9–3.07)
PRE FET 100: 6.07 SEC
PRE FEV05 REF: 93.8 %
PRE FEV1 FVC: 87.28 % (ref 69.73–90.93)
PRE FEV1: 1.76 L (ref 1.36–2.41)
PRE FEV5: 1.55 L (ref 0.79–2.5)
PRE FRC PL: 1.54 L (ref 1.77–3.42)
PRE FVC: 2.02 L (ref 1.71–3.03)
PRE IC: 1.4 L (ref -16448.22–16451.78)
PRE PEF: 4.53 L/S (ref 4.06–7.02)
PRE REF IC: 78.7 %
PRE RV: 0.92 L (ref 1.35–2.5)
PRE TLC: 2.94 L (ref 3.62–5.59)
PRE VTG: 1.54 L
RAW PRE REF: 198.5 %
RAW PRE: 6.07 CMH2O*S/L (ref 3.06–3.06)
RAW REF: 3.06
REF IC: 1.78
RV LLN: 1.35
RV PRE REF: 47.9 %
RV REF: 1.92
RVTLC LLN: 32
RVTLC PRE REF: 75.2 %
RVTLC PRE: 31.38 % (ref 32.15–51.33)
RVTLC REF: 42
RVTLCULN: 51
RVULN: 2.5
SGAW PRE REF: 86.4 %
SGAW PRE: 0.09 1/(CMH2O*S) (ref 0.1–0.1)
SGAW REF: 0.1
TLC LLN: 3.62
TLC PRE REF: 63.8 %
TLC REF: 4.6
TLC ULN: 5.59
ULN IC: ABNORMAL
VA PRE: 2.47 L (ref 4.45–4.45)
VA SINGLE BREATH LLN: 4.45
VA SINGLE BREATH PRE REF: 55.5 %
VA SINGLE BREATH REF: 4.45
VASINGLEBREATHULN: 4.45
VC LLN: 1.71
VC PRE REF: 85.8 %
VC PRE: 2.02 L (ref 1.71–3.03)
VC REF: 2.35
VC ULN: 3.03

## 2023-04-17 PROCEDURE — 1101F PT FALLS ASSESS-DOCD LE1/YR: CPT | Mod: CPTII,NTX,S$GLB, | Performed by: INTERNAL MEDICINE

## 2023-04-17 PROCEDURE — 1126F AMNT PAIN NOTED NONE PRSNT: CPT | Mod: CPTII,NTX,S$GLB, | Performed by: INTERNAL MEDICINE

## 2023-04-17 PROCEDURE — 1160F RVW MEDS BY RX/DR IN RCRD: CPT | Mod: CPTII,NTX,S$GLB, | Performed by: INTERNAL MEDICINE

## 2023-04-17 PROCEDURE — 3008F PR BODY MASS INDEX (BMI) DOCUMENTED: ICD-10-PCS | Mod: CPTII,NTX,S$GLB, | Performed by: INTERNAL MEDICINE

## 2023-04-17 PROCEDURE — 3288F PR FALLS RISK ASSESSMENT DOCUMENTED: ICD-10-PCS | Mod: CPTII,NTX,S$GLB, | Performed by: INTERNAL MEDICINE

## 2023-04-17 PROCEDURE — 94726 PLETHYSMOGRAPHY LUNG VOLUMES: CPT | Mod: NTX,S$GLB,, | Performed by: INTERNAL MEDICINE

## 2023-04-17 PROCEDURE — 94618 PULMONARY STRESS TESTING: ICD-10-PCS | Mod: NTX,S$GLB,, | Performed by: INTERNAL MEDICINE

## 2023-04-17 PROCEDURE — 94618 PULMONARY STRESS TESTING: CPT | Mod: NTX,S$GLB,, | Performed by: INTERNAL MEDICINE

## 2023-04-17 PROCEDURE — 99999 PR PBB SHADOW E&M-EST. PATIENT-LVL III: ICD-10-PCS | Mod: PBBFAC,TXP,, | Performed by: INTERNAL MEDICINE

## 2023-04-17 PROCEDURE — 94729 PR C02/MEMBANE DIFFUSE CAPACITY: ICD-10-PCS | Mod: NTX,S$GLB,, | Performed by: INTERNAL MEDICINE

## 2023-04-17 PROCEDURE — 1159F PR MEDICATION LIST DOCUMENTED IN MEDICAL RECORD: ICD-10-PCS | Mod: CPTII,NTX,S$GLB, | Performed by: INTERNAL MEDICINE

## 2023-04-17 PROCEDURE — 1101F PR PT FALLS ASSESS DOC 0-1 FALLS W/OUT INJ PAST YR: ICD-10-PCS | Mod: CPTII,NTX,S$GLB, | Performed by: INTERNAL MEDICINE

## 2023-04-17 PROCEDURE — 3008F BODY MASS INDEX DOCD: CPT | Mod: CPTII,NTX,S$GLB, | Performed by: INTERNAL MEDICINE

## 2023-04-17 PROCEDURE — 94726 PULM FUNCT TST PLETHYSMOGRAP: ICD-10-PCS | Mod: NTX,S$GLB,, | Performed by: INTERNAL MEDICINE

## 2023-04-17 PROCEDURE — 3288F FALL RISK ASSESSMENT DOCD: CPT | Mod: CPTII,NTX,S$GLB, | Performed by: INTERNAL MEDICINE

## 2023-04-17 PROCEDURE — 94729 DIFFUSING CAPACITY: CPT | Mod: NTX,S$GLB,, | Performed by: INTERNAL MEDICINE

## 2023-04-17 PROCEDURE — 99214 PR OFFICE/OUTPT VISIT, EST, LEVL IV, 30-39 MIN: ICD-10-PCS | Mod: NTX,S$GLB,, | Performed by: INTERNAL MEDICINE

## 2023-04-17 PROCEDURE — 1126F PR PAIN SEVERITY QUANTIFIED, NO PAIN PRESENT: ICD-10-PCS | Mod: CPTII,NTX,S$GLB, | Performed by: INTERNAL MEDICINE

## 2023-04-17 PROCEDURE — 1159F MED LIST DOCD IN RCRD: CPT | Mod: CPTII,NTX,S$GLB, | Performed by: INTERNAL MEDICINE

## 2023-04-17 PROCEDURE — 99214 OFFICE O/P EST MOD 30 MIN: CPT | Mod: NTX,S$GLB,, | Performed by: INTERNAL MEDICINE

## 2023-04-17 PROCEDURE — 99999 PR PBB SHADOW E&M-EST. PATIENT-LVL III: CPT | Mod: PBBFAC,TXP,, | Performed by: INTERNAL MEDICINE

## 2023-04-17 PROCEDURE — 3075F PR MOST RECENT SYSTOLIC BLOOD PRESS GE 130-139MM HG: ICD-10-PCS | Mod: CPTII,NTX,S$GLB, | Performed by: INTERNAL MEDICINE

## 2023-04-17 PROCEDURE — 1160F PR REVIEW ALL MEDS BY PRESCRIBER/CLIN PHARMACIST DOCUMENTED: ICD-10-PCS | Mod: CPTII,NTX,S$GLB, | Performed by: INTERNAL MEDICINE

## 2023-04-17 PROCEDURE — 94010 BREATHING CAPACITY TEST: CPT | Mod: NTX,S$GLB,, | Performed by: INTERNAL MEDICINE

## 2023-04-17 PROCEDURE — 94010 BREATHING CAPACITY TEST: ICD-10-PCS | Mod: NTX,S$GLB,, | Performed by: INTERNAL MEDICINE

## 2023-04-17 PROCEDURE — 3078F PR MOST RECENT DIASTOLIC BLOOD PRESSURE < 80 MM HG: ICD-10-PCS | Mod: CPTII,NTX,S$GLB, | Performed by: INTERNAL MEDICINE

## 2023-04-17 PROCEDURE — 3078F DIAST BP <80 MM HG: CPT | Mod: CPTII,NTX,S$GLB, | Performed by: INTERNAL MEDICINE

## 2023-04-17 PROCEDURE — 3075F SYST BP GE 130 - 139MM HG: CPT | Mod: CPTII,NTX,S$GLB, | Performed by: INTERNAL MEDICINE

## 2023-04-17 NOTE — PROCEDURES
Juan Cruz is a 68 y.o.  female patient, who presents for a 6 minute walk test ordered by DO Lazara.  The diagnosis is Interstitial Lung Disease.  The patient's BMI is 25.2 kg/m2.  Predicted distance (lower limit of normal) is 330.14 meters.      Test Results:    The test was completed without stopping.  The total time walked was 360 seconds.  During walking, the patient reported:  Dyspnea.  The patient used no assistive devices during testing.     04/17/2023---------Distance: 457.2 meters (1500 feet)     O2 Sat % Supplemental Oxygen Heart Rate Blood Pressure Rigoberto Scale   Pre-exercise  (Resting) 98 % Room Air 85 bpm 113/64 mmHg 0   During Exercise 94 % Room Air 125 bpm 132/67 mmHg 4   Post-exercise  (Recovery) 98 % Room Air  106 bpm       Recovery Time: 149 seconds    Performing nurse/tech: ОЛЕГ Wagoner      PREVIOUS STUDY:   12/08/2022---------Distance: 405.99 meters (1332 feet)       O2 Sat % Supplemental Oxygen Heart Rate Blood Pressure Rigoberto Scale   Pre-exercise  (Resting) 99 % Room Air 81 bpm 145/78 mmHg 3   During Exercise 98 % Room Air 106 bpm 129/66 mmHg 4   Post-exercise  (Recovery) 98 % Room Air  94 bpm           CLINICAL INTERPRETATION:  Six minute walk distance is 457.2 meters (1500 feet) with somewhat heavy dyspnea.  During exercise, there was desaturation while breathing room air.  Blood pressure remained stable and Heart rate increased significantly with walking.  The patient did not report non-pulmonary symptoms during exercise.  Since the previous study in December 2022, exercise capacity may be somewhat improved.  Based upon age and body mass index, exercise capacity is normal.

## 2023-04-17 NOTE — PROGRESS NOTES
ADVANCED LUNG DISEASE FOLLOW-UP                                                                                                                                          Reason for Visit:  RA-ILD    Referring Physician: Ibis Holcomb    History of Present Illness: Juan Cruz is a 68 y.o. female who is on 0L of oxygen.  She is on no assisted ventilation.  Her New York Heart Association Class is III and a Karnofsky score of 60% - Requires occasional assistance but is able to care for needs. She is not diabetic.     She presents today for follow-up of RA-ILD.  Currently on rituxan.  Lung toxicity with MTX, intolerance of aza, and hepatotoxicity to OFEV.  Overall, doing very well.  Cough improved following COVID and at baseline.  Dyspnea improved.  Notes conversational dyspnea.  Seasonal allergies controlled with regards to rhinitis symptoms.  Still has facial rash.      Past Medical History:   Diagnosis Date    Bruise 9/12/2022    Chronic sinusitis, unspecified     Elevated liver enzymes 9/12/2022    History of SIADH     LLL pneumonia 12/20/2022    Mitral valve prolapse     Osteopenia     Rheumatoid arthritis involving multiple sites        Past Surgical History:   Procedure Laterality Date    BREAST BIOPSY Bilateral     FUNCTIONAL ENDOSCOPIC SINUS SURGERY (FESS)      ORIF WRIST FRACTURE Right        Allergies: Methotrexate, Gluten protein, and Ppd black rubber mix    Current Outpatient Medications   Medication Sig    acetaminophen (TYLENOL) 325 MG tablet Take 650 mg by mouth every 6 (six) hours as needed for Pain.    ammonium lactate 12 % Crea Apply 1 g topically 2 (two) times daily.    calcium citrate-vitamin D3 315-200 mg (CITRACAL+D) 315-200 mg-unit per tablet Take 1 tablet by mouth 2 (two) times daily.    cetirizine (ZYRTEC) 10 MG tablet cetirizine 10 mg tablet   TAKE 1 TABLET BY MOUTH ONCE DAILY    denosumab (PROLIA) 60 mg/mL Syrg Inject 1 mL (60 mg total) into the skin every 6 (six) months.     diclofenac sodium (VOLTAREN) 1 % Gel Apply 2 g topically 4 (four) times daily as needed.    estradioL (VAGIFEM) 10 mcg Tab Yuvafem 10 mcg vaginal tablet   INSERT 1 TABLET VAGINALLY TWICE A WEEK    fluticasone/vilanterol (BREO ELLIPTA INHL) Inhale 1 puff into the lungs once daily.    guaiFENesin (MUCINEX) 600 mg 12 hr tablet Take 1,200 mg by mouth 2 (two) times daily.    magnesium oxide (MAG-OX) 400 mg (241.3 mg magnesium) tablet Take 400 mg by mouth once daily.    metoprolol succinate (TOPROL-XL) 25 MG 24 hr tablet Take 1 tablet (25 mg total) by mouth once daily.    pantoprazole (PROTONIX) 40 MG tablet Take 40 mg by mouth once daily.    predniSONE (DELTASONE) 5 MG tablet Take 2 tablets (10 mg total) by mouth once daily.    sod chlor,sod bicarb/neti pot (NEILMED NASAFLO FRANK) 1 Dose by sinus irrigation route daily as needed.    B-complex with vitamin C (Z-BEC OR EQUIV) tablet Take 0.5 tablets by mouth 2 (two) times a day.     No current facility-administered medications for this visit.       Immunization History   Administered Date(s) Administered    COVID-19, MRNA, LN-S, PF (Pfizer) (Purple Cap) 12/31/2020, 01/25/2021    Pneumococcal Polysaccharide - 23 Valent 09/16/2022    Yellow Fever 08/31/2017    Zoster Recombinant 01/18/2020, 08/19/2020     Family History:  History reviewed. No pertinent family history.  Social History     Substance and Sexual Activity   Alcohol Use No      Social History     Substance and Sexual Activity   Drug Use No      Social History     Socioeconomic History    Marital status:    Tobacco Use    Smoking status: Never    Smokeless tobacco: Never   Substance and Sexual Activity    Alcohol use: No    Drug use: No    Sexual activity: Not Currently     Review of Systems   Constitutional:  Negative for chills, diaphoresis, fever, malaise/fatigue and weight loss.   HENT:  Negative for congestion, ear discharge, ear pain, hearing loss, nosebleeds, sinus pain, sore throat and tinnitus.   "  Eyes:  Negative for blurred vision, double vision, photophobia, pain, discharge and redness.   Respiratory:  Positive for cough (stable) and shortness of breath (conversational dyspnea). Negative for hemoptysis, sputum production, wheezing and stridor.    Cardiovascular:  Negative for chest pain, palpitations, orthopnea, claudication, leg swelling and PND.   Gastrointestinal:  Negative for abdominal pain, blood in stool, constipation, diarrhea, heartburn, melena, nausea and vomiting.   Genitourinary:  Negative for dysuria, flank pain, frequency, hematuria and urgency.   Musculoskeletal:  Negative for back pain, falls, joint pain, myalgias and neck pain.   Skin:  Negative for itching and rash.   Neurological:  Negative for dizziness, tingling, tremors, sensory change, speech change, focal weakness, seizures, loss of consciousness, weakness and headaches.   Endo/Heme/Allergies:  Negative for environmental allergies and polydipsia. Bruises/bleeds easily.   Psychiatric/Behavioral:  Negative for depression, hallucinations, memory loss, substance abuse and suicidal ideas. The patient is not nervous/anxious and does not have insomnia.    Vitals  /64   Pulse 88   Temp 98.6 °F (37 °C) (Oral)   Resp 20   Ht 5' 2" (1.575 m)   Wt 62 kg (136 lb 11 oz)   LMP  (LMP Unknown)   SpO2 100%   BMI 25.00 kg/m²  None due to COVID positive  Physical Exam  Vitals and nursing note reviewed.   Constitutional:       General: She is not in acute distress.     Appearance: She is well-developed. She is not diaphoretic.   HENT:      Head: Normocephalic and atraumatic.      Nose: No rhinorrhea.      Mouth/Throat:      Mouth: Mucous membranes are moist.      Pharynx: No oropharyngeal exudate.   Eyes:      General: No scleral icterus.     Conjunctiva/sclera: Conjunctivae normal.      Pupils: Pupils are equal, round, and reactive to light.   Neck:      Thyroid: No thyromegaly.      Vascular: No JVD.      Trachea: No tracheal deviation. "   Cardiovascular:      Rate and Rhythm: Normal rate and regular rhythm.      Pulses: Normal pulses.      Heart sounds: Normal heart sounds. No murmur heard.    No friction rub. No gallop.   Pulmonary:      Effort: Pulmonary effort is normal. No respiratory distress.      Breath sounds: Rales (fine dry crackles bibasilar) present. No wheezing.   Abdominal:      General: Abdomen is flat. Bowel sounds are normal. There is no distension.      Palpations: Abdomen is soft.      Tenderness: There is no abdominal tenderness.   Musculoskeletal:         General: No deformity. Normal range of motion.      Cervical back: Normal range of motion and neck supple.   Skin:     General: Skin is warm and dry.      Capillary Refill: Capillary refill takes less than 2 seconds.      Coloration: Skin is not pale.      Findings: No bruising, erythema or rash.   Neurological:      General: No focal deficit present.      Mental Status: She is alert and oriented to person, place, and time. Mental status is at baseline.      Cranial Nerves: No cranial nerve deficit.   Psychiatric:         Mood and Affect: Mood normal.         Behavior: Behavior normal.         Thought Content: Thought content normal.         Judgment: Judgment normal.       Labs:  Hospital Outpatient Visit on 04/17/2023   Component Date Value    Pre FVC 04/17/2023 2.02     PRE FEV5 04/17/2023 1.55     Pre FEV1 04/17/2023 1.76     Pre FEV1 FVC 04/17/2023 87.28     Pre FEF 25 75 04/17/2023 2.90     Pre PEF 04/17/2023 4.53     Pre  04/17/2023 6.07     Pre DLCO 04/17/2023 9.22 (L)     DLCOVA PRE 04/17/2023 3.73     VA PRE 04/17/2023 2.47 (L)     IVC PRE 04/17/2023 1.97     Pre TLC 04/17/2023 2.94 (L)     VC PRE 04/17/2023 2.02     PRE IC 04/17/2023 1.40     Pre FRC PL 04/17/2023 1.54 (L)     Pre ERV 04/17/2023 0.62     Pre RV 04/17/2023 0.92 (L)     RVTLC PRE 04/17/2023 31.38 (L)     Raw PRE 04/17/2023 6.07 (H)     sGaw PRE 04/17/2023 0.09 (L)     Pre VTG 04/17/2023 1.54      FVC Ref 04/17/2023 2.35     FVC LLN 04/17/2023 1.71     FVC Pre Ref 04/17/2023 85.8     FEV05 REF 04/17/2023 1.65     FEV05 LLN 04/17/2023 0.79     PRE FEV05 REF 04/17/2023 93.8     FEV1 Ref 04/17/2023 1.90     FEV1 LLN 04/17/2023 1.36     FEV1 Pre Ref 04/17/2023 92.7     FEV1 FVC Ref 04/17/2023 81     FEV1 FVC LLN 04/17/2023 70     FEV1 FVC Pre Ref 04/17/2023 107.7     FEF 25 75 Ref 04/17/2023 1.81     FEF 25 75 LLN 04/17/2023 0.90     FEF 25 75 Pre Ref 04/17/2023 159.5     PEF Ref 04/17/2023 5.54     PEF LLN 04/17/2023 4.06     PEF Pre Ref 04/17/2023 81.7     TLC Ref 04/17/2023 4.60     TLC LLN 04/17/2023 3.62     TLC ULN 04/17/2023 5.59     TLC Pre Ref 04/17/2023 63.8     VC Ref 04/17/2023 2.35     VC LLN 04/17/2023 1.71     VC ULN 04/17/2023 3.03     VC Pre Ref 04/17/2023 85.8     REF IC 04/17/2023 1.78     LLN IC 04/17/2023 -39195.22     ULN IC 04/17/2023 57170.78     PRE REF IC 04/17/2023 78.7     FRCpleth Ref 04/17/2023 2.59     FRCpleth LLN 04/17/2023 1.77     FRC PLETH ULN 04/17/2023 3.42     FRCpleth PreRef 04/17/2023 59.3     ERV Ref 04/17/2023 0.67     ERV LLN 04/17/2023 -77404.33     ERV ULN 04/17/2023 66580.67     ERV Pre Ref 04/17/2023 91.6     RV Ref 04/17/2023 1.92     RV LLN 04/17/2023 1.35     RV ULN 04/17/2023 2.50     RV Pre Ref 04/17/2023 47.9     RVTLC Ref 04/17/2023 42     RVTLC LLN 04/17/2023 32     RV TLC ULN 04/17/2023 51     RVTLC Pre Ref 04/17/2023 75.2     Raw Ref 04/17/2023 3.06     Raw Pre Ref 04/17/2023 198.5     sGaw Ref 04/17/2023 0.10     sGaw Pre Ref 04/17/2023 86.4     DLCO Single Breath Ref 04/17/2023 20.48     DLCO Single Breath LLN 04/17/2023 14.75     DLCO SINGLEBREATH ULN 04/17/2023 26.21     DLCO Single Breath Pre R* 04/17/2023 45.0     DLCOc Single Breath Ref 04/17/2023 20.48     DLCOc Single Breath LLN 04/17/2023 14.75     DLCOC SINGLEBREATH ULN 04/17/2023 26.21     DLCOVA Ref 04/17/2023 4.45     DLCOVA LLN 04/17/2023 2.88     DLCOVA ULN 04/17/2023 6.02     DLCOVA Pre  Ref 04/17/2023 83.9     DLCOc SBVA Ref 04/17/2023 4.45     DLCOc SBVA LLN 04/17/2023 2.88     DLCOCSBVA ULN 04/17/2023 6.02     VA Single Breath Ref 04/17/2023 4.45     VA Single Breath LLN 04/17/2023 4.45     VA SINGLEBREATH ULN 04/17/2023 4.45     VA Single Breath Pre Ref 04/17/2023 55.5     IVC Single Breath Ref 04/17/2023 2.35     IVC Single Breath LLN 04/17/2023 1.71     IVC SINGLEBREATH ULN 04/17/2023 3.03     IVC Single Breath Pre Ref 04/17/2023 83.7        Pulmonary Function Tests 4/17/2023 12/8/2022 9/12/2022 6/7/2022   FVC 2.02 1.86 1.82 2.18   FEV1 1.76 1.64 1.62 1.9   TLC (liters) 2.94 2.88 - 2.79   DLCO (ml/mmHg sec) 9.22 8.62 - 8.17   FVC% 86 78 77 92   FEV1% 93 86 85 99   FEF 25-75 2.9 2.93 2.69 3.24   FEF 25-75% 159 160 146 175   TLC% 63 63 - 61   RV 0.92 1.01 - 0.61   RV% 48 53 - 32   DLCO% 45 42 - 40     6MW 4/17/2023 12/22/2022 12/8/2022 9/12/2022 8/5/2022 6/7/2022   6MWT Status completed without stopping completed without stopping completed without stopping completed without stopping completed without stopping completed without stopping   Patient Reported Dyspnea No complaints Dyspnea Dyspnea No complaints Dyspnea   Was O2 used? No No No No - -   6MW Distance walked (feet) 1500 1300 1332 1300 1034 1210   Distance walked (meters) 457.2 396.24 405.99 396.24 315.16 368.81   Did patient stop? No No No No No No   Oxygen Saturation 98 98 99 98 96 98   Supplemental Oxygen Room Air Room Air Room Air Room Air Room Air Room Air   Heart Rate 85 78 81 89 82 89   Blood Pressure 113/64 118/77 145/78 120/74 106/65 129/70   Rigoberto Dyspnea Rating  nothing at all light moderate light somewhat heavy nothing at all   Oxygen Saturation 94 95 98 95 91 94   Supplemental Oxygen Room Air Room Air Room Air Room Air Room Air Room Air   Heart Rate 125 113 106 113 110 116   Blood Pressure 132/67 130/86 129/66 131/77 118/79 132/80   Rigoberto Dyspnea Rating  somewhat heavy somewhat heavy somewhat heavy somewhat heavy very heavy  light   Recovery Time (seconds) 149 180 123 72 180 115   Oxygen Saturation 98 99 98 98 96 98   Supplemental Oxygen Room Air Room Air Room Air Room Air Room Air Room Air   Heart Rate 106 83 94 97 86 93       Imaging:  Results for orders placed during the hospital encounter of 07/27/22    CT Chest Without Contrast    Narrative  EXAMINATION:  CT CHEST WITHOUT CONTRAST    CLINICAL HISTORY:  Interstitial lung disease; Cough, unspecified    TECHNIQUE:  Low dose axial images, sagittal and coronal reformations were obtained from the thoracic inlet to the lung bases.  Intravenous contrast was not administered.    COMPARISON:  CT thorax 06/28/2021    FINDINGS:  Base of Neck: Imaged portions of the base of the neck are grossly unremarkable.    Aorta: 3-branch vessel configuration of a left-sided type 3 aortic arch.  No hyperdense crescent sign, aneurysmal degeneration, periaortic fat stranding or periaortic fluid.    Heart: Heart is normal in size.  No significant pericardial thickening. No appreciable coronary artery calcifications.    Pulmonary vasculature: Pulmonary arteries are not enlarged and distribute normally.  There are four pulmonary veins.    Axilla/Alexandra/Mediastinum: Prominent mediastinal lymph nodes however, no rachael axillary or mediastinal lymphadenopathy.  Evaluation of hilar lymph nodes is limited without IV contrast.    Airways: Trachea and mainstem bronchi are patent.  Symmetric mild central bronchiectasis present.    Lungs/Pleura: Significant interval progression of previously noted subpleural predominant reticular and ground-glass airspace opacities associated with bilateral lower lobe volume loss, architectural distortion, symmetric mild central bronchiectasis and apicobasal gradient.  No pleural effusions.  No pneumothorax.    Esophagus: Small hiatal hernia, not significantly changed.  Otherwise, normal in course and caliber however, evaluation is limited given the lack of oral contrast.    Soft tissues:  Imaged soft tissues are grossly unremarkable.    Osseous structures: Diffuse osseous demineralization and mild multilevel degenerative changes of the imaged spine.  No acute displaced fracture, dislocation or suspicious lytic/blastic osseous lesion.    Upper Abdomen: Imaged portions of the upper abdomen are grossly unremarkable.    Impression  1. Significant interval progression of previously noted subpleural predominant reticular and ground-glass airspace opacities associated with bilateral lower lobe volume loss, architectural distortion, symmetric mild central bronchiectasis and apicobasal gradient suggestive of UIP pattern of interstitial lung disease which may reflect IPF.  Additional diagnostic considerations include NSIP, asbestosis, fibrotic hypersensitivity pneumonitis connective tissue associated lung disease and drug induced lung disease amongst other etiologies.      Electronically signed by: Braulio Haro  Date:    07/27/2022  Time:    16:11    Cardiodiagnostics:  6/28/2021:  The estimated ejection fraction is 55%.  Normal systolic function.  Normal right ventricular size with normal right ventricular systolic function.  Mild to moderate left atrial enlargement.  Mild right atrial enlargement.  Normal central venous pressure (3 mmHg).  The estimated PA systolic pressure is 18 mmHg.       Assessment:  1. Interstitial lung disease    2. Rheumatoid arthritis involving multiple sites with positive rheumatoid factor    3. Gastroesophageal reflux disease without esophagitis      Plan:   1. Followed for RA-ILD.  FVC and DLCO are stable; improved from previous.  Symptoms improved but continues with cough.  Failed MTX and AZA.  Currently on rituxan infusions; tolerating well.  Follows with ENT/derm/allergy; takes daily antihistamine, taken off flonase.  Takes PPI; has had extensive GI evaluation which was all negative.      2. Follow-up with Rheum.  Currently on prednisone and rituxan.    3. Continue with  PPI.    4. Follow-up in 6 months with PFTs    Barbara Haile D.O.  Pulmonary/Critical Care and Lung Transplantation  [facility]

## 2023-04-17 NOTE — LETTER
April 17, 2023        Ibis Holcomb  [address]  [facility] [address]  Phone: [phone]  Fax: [phone]             [facility]  [address]  [facility] [address]  Phone: [phone]   Patient: Juan Cruz   MR Number: [id number]   YOB: 1955   Date of Visit: 4/17/2023       Dear Dr. Ibis Holcomb    Thank you for referring Juan Cruz to me for evaluation. Attached you will find relevant portions of my assessment and plan of care.    If you have questions, please do not hesitate to call me. I look forward to following Juan Cruz along with you.    Sincerely,    Barbara Haile, DO    Enclosure    If you would like to receive this communication electronically, please contact [email or web address] or [phone] to request [employer] access.    [employer] is a tool which provides read-only access to select patient information with whom you have a relationship. Its easy to use and provides real time access to review your patients record including encounter summaries, notes, results, and demographic information.    If you feel you have received this communication in error or would no longer like to receive these types of communications, please e-mail [email or web address]

## 2023-04-18 ENCOUNTER — HOSPITAL ENCOUNTER (OUTPATIENT)
Dept: PULMONOLOGY | Facility: OTHER | Age: 68
Discharge: HOME | End: 2023-04-18
Attending: INTERNAL MEDICINE
Payer: MEDICARE

## 2023-04-18 PROCEDURE — G0238 OTH RESP PROC, INDIV: HCPCS | Mod: NTX

## 2023-04-18 PROCEDURE — G0237 THERAPEUTIC PROCD STRG ENDUR: HCPCS | Mod: NTX

## 2023-04-20 ENCOUNTER — HOSPITAL ENCOUNTER (OUTPATIENT)
Dept: PULMONOLOGY | Facility: OTHER | Age: 68
Discharge: HOME | End: 2023-04-20
Attending: INTERNAL MEDICINE
Payer: MEDICARE

## 2023-04-20 PROCEDURE — G0238 OTH RESP PROC, INDIV: HCPCS | Mod: TXP

## 2023-04-20 PROCEDURE — G0237 THERAPEUTIC PROCD STRG ENDUR: HCPCS | Mod: TXP

## 2023-04-24 ENCOUNTER — PATIENT MESSAGE (OUTPATIENT)
Dept: RHEUMATOLOGY | Facility: CLINIC | Age: 68
End: 2023-04-24
Payer: MEDICARE

## 2023-04-24 RX ORDER — CETIRIZINE HYDROCHLORIDE 10 MG/1
10 TABLET ORAL DAILY
Qty: 30 TABLET | Refills: 5 | Status: SHIPPED | OUTPATIENT
Start: 2023-04-24 | End: 2023-10-25

## 2023-04-25 ENCOUNTER — HOSPITAL ENCOUNTER (OUTPATIENT)
Dept: PULMONOLOGY | Facility: OTHER | Age: 68
Discharge: HOME | End: 2023-04-25
Attending: INTERNAL MEDICINE
Payer: MEDICARE

## 2023-04-25 PROCEDURE — G0237 THERAPEUTIC PROCD STRG ENDUR: HCPCS | Mod: NTX

## 2023-04-25 PROCEDURE — G0238 OTH RESP PROC, INDIV: HCPCS | Mod: TXP

## 2023-04-26 ENCOUNTER — INFUSION (OUTPATIENT)
Dept: INFUSION THERAPY | Facility: HOSPITAL | Age: 68
End: 2023-04-26
Payer: MEDICARE

## 2023-04-26 ENCOUNTER — PATIENT MESSAGE (OUTPATIENT)
Dept: RHEUMATOLOGY | Facility: CLINIC | Age: 68
End: 2023-04-26
Payer: MEDICARE

## 2023-04-26 VITALS
TEMPERATURE: 98 F | HEIGHT: 62 IN | RESPIRATION RATE: 18 BRPM | OXYGEN SATURATION: 100 % | WEIGHT: 136.69 LBS | SYSTOLIC BLOOD PRESSURE: 105 MMHG | BODY MASS INDEX: 25.15 KG/M2 | HEART RATE: 62 BPM | DIASTOLIC BLOOD PRESSURE: 60 MMHG

## 2023-04-26 DIAGNOSIS — M05.79 RHEUMATOID ARTHRITIS INVOLVING MULTIPLE SITES WITH POSITIVE RHEUMATOID FACTOR: ICD-10-CM

## 2023-04-26 DIAGNOSIS — J84.10 PULMONARY FIBROSIS: Primary | ICD-10-CM

## 2023-04-26 DIAGNOSIS — J84.9 INTERSTITIAL LUNG DISEASE: ICD-10-CM

## 2023-04-26 PROCEDURE — 96415 CHEMO IV INFUSION ADDL HR: CPT

## 2023-04-26 PROCEDURE — 96367 TX/PROPH/DG ADDL SEQ IV INF: CPT

## 2023-04-26 PROCEDURE — 25000003 PHARM REV CODE 250: Performed by: INTERNAL MEDICINE

## 2023-04-26 PROCEDURE — 96375 TX/PRO/DX INJ NEW DRUG ADDON: CPT

## 2023-04-26 PROCEDURE — 63600175 PHARM REV CODE 636 W HCPCS: Performed by: INTERNAL MEDICINE

## 2023-04-26 PROCEDURE — 96413 CHEMO IV INFUSION 1 HR: CPT

## 2023-04-26 RX ORDER — ACETAMINOPHEN 325 MG/1
650 TABLET ORAL
Status: COMPLETED | OUTPATIENT
Start: 2023-04-26 | End: 2023-04-26

## 2023-04-26 RX ORDER — HEPARIN 100 UNIT/ML
500 SYRINGE INTRAVENOUS
Status: CANCELLED | OUTPATIENT
Start: 2023-05-04

## 2023-04-26 RX ORDER — FAMOTIDINE 10 MG/ML
20 INJECTION INTRAVENOUS
Status: COMPLETED | OUTPATIENT
Start: 2023-04-26 | End: 2023-04-26

## 2023-04-26 RX ORDER — METHYLPREDNISOLONE SOD SUCC 125 MG
100 VIAL (EA) INJECTION ONCE
Status: COMPLETED | OUTPATIENT
Start: 2023-04-26 | End: 2023-04-26

## 2023-04-26 RX ADMIN — DIPHENHYDRAMINE HYDROCHLORIDE 25 MG: 50 INJECTION, SOLUTION INTRAMUSCULAR; INTRAVENOUS at 08:04

## 2023-04-26 RX ADMIN — FAMOTIDINE 20 MG: 10 INJECTION INTRAVENOUS at 08:04

## 2023-04-26 RX ADMIN — METHYLPREDNISOLONE SODIUM SUCCINATE 100 MG: 125 INJECTION, POWDER, FOR SOLUTION INTRAMUSCULAR; INTRAVENOUS at 08:04

## 2023-04-26 RX ADMIN — ACETAMINOPHEN 650 MG: 325 TABLET ORAL at 08:04

## 2023-04-26 RX ADMIN — RITUXIMAB-ABBS 1000 MG: 10 INJECTION, SOLUTION INTRAVENOUS at 08:04

## 2023-04-26 NOTE — PLAN OF CARE
Pt admitted for Tx # 4 Truxmia. Labs reviewed from 2/10, assessment performed. Pt tolerated treatment well. PIV removed and Pt discharged with  .

## 2023-05-04 ENCOUNTER — HOSPITAL ENCOUNTER (OUTPATIENT)
Dept: PULMONOLOGY | Facility: OTHER | Age: 68
Discharge: HOME | End: 2023-05-04
Attending: INTERNAL MEDICINE
Payer: MEDICARE

## 2023-05-04 PROCEDURE — G0237 THERAPEUTIC PROCD STRG ENDUR: HCPCS | Mod: NTX

## 2023-05-04 PROCEDURE — 94626 PHY/QHP OP PULM RHB W/MNTR: CPT | Mod: TXP

## 2023-05-05 ENCOUNTER — HOSPITAL ENCOUNTER (OUTPATIENT)
Dept: PULMONOLOGY | Facility: OTHER | Age: 68
Discharge: HOME | End: 2023-05-05
Attending: INTERNAL MEDICINE
Payer: MEDICARE

## 2023-05-05 PROCEDURE — G0237 THERAPEUTIC PROCD STRG ENDUR: HCPCS | Mod: NTX

## 2023-05-05 PROCEDURE — G0238 OTH RESP PROC, INDIV: HCPCS | Mod: NTX

## 2023-05-09 ENCOUNTER — HOSPITAL ENCOUNTER (OUTPATIENT)
Dept: PULMONOLOGY | Facility: OTHER | Age: 68
Discharge: HOME | End: 2023-05-09
Attending: INTERNAL MEDICINE
Payer: MEDICARE

## 2023-05-09 PROCEDURE — G0238 OTH RESP PROC, INDIV: HCPCS | Mod: TXP

## 2023-05-09 PROCEDURE — G0237 THERAPEUTIC PROCD STRG ENDUR: HCPCS | Mod: TXP

## 2023-05-10 ENCOUNTER — INFUSION (OUTPATIENT)
Dept: INFUSION THERAPY | Facility: HOSPITAL | Age: 68
End: 2023-05-10
Attending: INTERNAL MEDICINE
Payer: MEDICARE

## 2023-05-10 ENCOUNTER — TELEPHONE (OUTPATIENT)
Dept: RHEUMATOLOGY | Facility: CLINIC | Age: 68
End: 2023-05-10
Payer: MEDICARE

## 2023-05-10 VITALS
RESPIRATION RATE: 18 BRPM | OXYGEN SATURATION: 100 % | BODY MASS INDEX: 24.99 KG/M2 | WEIGHT: 135.81 LBS | TEMPERATURE: 98 F | SYSTOLIC BLOOD PRESSURE: 90 MMHG | HEART RATE: 62 BPM | HEIGHT: 62 IN | DIASTOLIC BLOOD PRESSURE: 60 MMHG

## 2023-05-10 DIAGNOSIS — M05.79 RHEUMATOID ARTHRITIS INVOLVING MULTIPLE SITES WITH POSITIVE RHEUMATOID FACTOR: ICD-10-CM

## 2023-05-10 DIAGNOSIS — J84.9 INTERSTITIAL LUNG DISEASE: ICD-10-CM

## 2023-05-10 DIAGNOSIS — J84.10 PULMONARY FIBROSIS: Primary | ICD-10-CM

## 2023-05-10 PROCEDURE — 96366 THER/PROPH/DIAG IV INF ADDON: CPT

## 2023-05-10 PROCEDURE — 25000003 PHARM REV CODE 250: Performed by: INTERNAL MEDICINE

## 2023-05-10 PROCEDURE — 96365 THER/PROPH/DIAG IV INF INIT: CPT

## 2023-05-10 PROCEDURE — 63600175 PHARM REV CODE 636 W HCPCS: Performed by: INTERNAL MEDICINE

## 2023-05-10 PROCEDURE — 96367 TX/PROPH/DG ADDL SEQ IV INF: CPT

## 2023-05-10 PROCEDURE — 96415 CHEMO IV INFUSION ADDL HR: CPT

## 2023-05-10 PROCEDURE — 96375 TX/PRO/DX INJ NEW DRUG ADDON: CPT

## 2023-05-10 RX ORDER — FAMOTIDINE 10 MG/ML
20 INJECTION INTRAVENOUS
Status: CANCELLED | OUTPATIENT
Start: 2023-05-10

## 2023-05-10 RX ORDER — ACETAMINOPHEN 325 MG/1
650 TABLET ORAL
Status: COMPLETED | OUTPATIENT
Start: 2023-05-10 | End: 2023-05-10

## 2023-05-10 RX ORDER — HEPARIN 100 UNIT/ML
500 SYRINGE INTRAVENOUS
Status: CANCELLED | OUTPATIENT
Start: 2023-05-10

## 2023-05-10 RX ORDER — FAMOTIDINE 10 MG/ML
20 INJECTION INTRAVENOUS
Status: CANCELLED | OUTPATIENT
Start: 2023-05-24

## 2023-05-10 RX ORDER — ACETAMINOPHEN 325 MG/1
650 TABLET ORAL
Status: CANCELLED | OUTPATIENT
Start: 2023-05-24

## 2023-05-10 RX ORDER — METHYLPREDNISOLONE SOD SUCC 125 MG
100 VIAL (EA) INJECTION
Status: DISCONTINUED | OUTPATIENT
Start: 2023-05-10 | End: 2023-05-10 | Stop reason: HOSPADM

## 2023-05-10 RX ORDER — ACETAMINOPHEN 325 MG/1
650 TABLET ORAL
Status: CANCELLED | OUTPATIENT
Start: 2023-05-10

## 2023-05-10 RX ORDER — FAMOTIDINE 10 MG/ML
20 INJECTION INTRAVENOUS
Status: DISCONTINUED | OUTPATIENT
Start: 2023-05-10 | End: 2023-05-10 | Stop reason: HOSPADM

## 2023-05-10 RX ADMIN — SODIUM CHLORIDE: 9 INJECTION, SOLUTION INTRAVENOUS at 07:05

## 2023-05-10 RX ADMIN — ACETAMINOPHEN 650 MG: 325 TABLET ORAL at 07:05

## 2023-05-10 RX ADMIN — DIPHENHYDRAMINE HYDROCHLORIDE 25 MG: 50 INJECTION, SOLUTION INTRAMUSCULAR; INTRAVENOUS at 07:05

## 2023-05-10 RX ADMIN — RITUXIMAB-ABBS 1000 MG: 10 INJECTION, SOLUTION INTRAVENOUS at 08:05

## 2023-05-10 NOTE — PLAN OF CARE
Pt admitted for Treatment #5 Rituxan. Pt tolerated well. (NOTE- Had to reach out to Dr Abraham, she did not place pre-meds in plan, took a verbal order and gave her [place] [facility]  Ph# to assist with future order placement ) Pt tolerated well. PIV ( Pt a difficult IV start) removed and Pt discharged home .

## 2023-05-12 ENCOUNTER — HOSPITAL ENCOUNTER (OUTPATIENT)
Dept: PULMONOLOGY | Facility: OTHER | Age: 68
Discharge: HOME | End: 2023-05-12
Attending: INTERNAL MEDICINE
Payer: MEDICARE

## 2023-05-16 ENCOUNTER — HOSPITAL ENCOUNTER (OUTPATIENT)
Dept: PULMONOLOGY | Facility: OTHER | Age: 68
Discharge: HOME | End: 2023-05-16
Attending: INTERNAL MEDICINE
Payer: MEDICARE

## 2023-05-16 PROCEDURE — G0238 OTH RESP PROC, INDIV: HCPCS | Mod: NTX

## 2023-05-16 PROCEDURE — G0237 THERAPEUTIC PROCD STRG ENDUR: HCPCS | Mod: TXP

## 2023-05-17 ENCOUNTER — PATIENT MESSAGE (OUTPATIENT)
Dept: RHEUMATOLOGY | Facility: CLINIC | Age: 68
End: 2023-05-17
Payer: MEDICARE

## 2023-05-18 ENCOUNTER — HOSPITAL ENCOUNTER (OUTPATIENT)
Dept: PULMONOLOGY | Facility: OTHER | Age: 68
Discharge: HOME | End: 2023-05-18
Attending: INTERNAL MEDICINE
Payer: MEDICARE

## 2023-05-18 PROCEDURE — 27000221 HC OXYGEN, UP TO 24 HOURS: Mod: NTX

## 2023-05-18 PROCEDURE — G0238 OTH RESP PROC, INDIV: HCPCS | Mod: NTX

## 2023-05-18 PROCEDURE — G0237 THERAPEUTIC PROCD STRG ENDUR: HCPCS | Mod: NTX

## 2023-05-23 ENCOUNTER — HOSPITAL ENCOUNTER (OUTPATIENT)
Dept: PULMONOLOGY | Facility: OTHER | Age: 68
Discharge: HOME | End: 2023-05-23
Attending: INTERNAL MEDICINE
Payer: MEDICARE

## 2023-05-23 PROCEDURE — G0238 OTH RESP PROC, INDIV: HCPCS | Mod: NTX

## 2023-05-23 PROCEDURE — G0237 THERAPEUTIC PROCD STRG ENDUR: HCPCS | Mod: NTX

## 2023-05-25 ENCOUNTER — HOSPITAL ENCOUNTER (OUTPATIENT)
Dept: PULMONOLOGY | Facility: OTHER | Age: 68
Discharge: HOME | End: 2023-05-25
Attending: INTERNAL MEDICINE
Payer: MEDICARE

## 2023-05-25 PROCEDURE — G0237 THERAPEUTIC PROCD STRG ENDUR: HCPCS | Mod: TXP

## 2023-05-25 PROCEDURE — G0238 OTH RESP PROC, INDIV: HCPCS | Mod: NTX

## 2023-05-25 NOTE — PROGRESS NOTES
"  Subjective:     Patient ID: Juan Cruz is a 68 y.o. 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    PCP: Gianna Carpenter MD   Ortho: Minh Iglesias MD  Pulmonary: Barbara Haile, DO;   Derm: Susu Wright MD  HPI     68 yr old lady seen for the first time on 5/12/22 w sero+CCP+ non erosive RA and a chronic cough with an abnormal CXR & CT chest c/w pulmonary fibrosis (suggestive of UIP). She has been intolerant of MTX & had worsening cough on it. She was on a very short course of azathiopine but stopped it due to concomitant abn LFTs (also on Ofev) &  her request for RTX.  She received 1st course of RTX 10/24/22 & 11/7/22 & has been getting it since. She has been on tapering doses of prednisone. A CXR on 11/9/22 was read by Dr. Koehler as "new increased infiltrate in the right and left lower bases suggestive of pneumonia" in addition to UIP.    She was last seen on  2/1/23 by TM (Covid at that time).    Her main issues today have to do with skin rashes for which she has seen dermatology and has had biopsy.    Some look like eczema; some like contact dermatitis; she was concerned about dermatomyositis as she had some involvement around her eyes. 1 yr ago MSA abs were negative.  Feels facial rashes occurred after prolia (mild after first injection), more pronounced after 2nd.  Also got skin rashes after Paxlovid,.  However she did have rashes back in December.    RA wise c/o L wrist pain & L ankle pain. She has not seen swelling.  We have tapered her prednisone to 2.5 mg/d but due to pain in these areas took an extra dose yesterday. Has not seen any response.   Overall however, RA sxs have subsided. No AM stiffness, joint pain or joint swelling.  Has hx of some dry eyes & mild dry mouth. Denies Raynaud's, tight skin, alopecia, oral ulcers, parotid gland swelling, pleurisy, pericarditis, photosensitivity, skin rashes, " thromboses, muscle weakness, headaches, paresthesias; LBP, thyroid issues;   1 miscarriage 4-6 weeks in 1997; has 1 daughter Csection;  Has FHx of RA & myelodysplasia (mom).    Has been getting [facility] at [facility]--interrupted by Covid. and feels her joints feel better due to the exercise.    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    PMH  She was seen again on 5/31/22 at which time MTX was added to her prednisone for her joint pain, but she had intolerance to it (headaches, fatigue, anorexia, dysgeusia, sleep issues) but stayed on it until ~ 7/26 when she developed worsening cough and abnormal CT chest and it was stopped. CT Chest on 7/27 showed significant interval progression of subpleural predominant reticular and ground-glass airspace opacities associated with bilateral lower lobe volume loss, architectural distortion, symmetric mild central bronchiectasis and apicobasal gradient suggestive of UIP pattern of ILD which may reflect IPF.  Additional diagnostic considerations include NSIP, asbestosis, fibrotic hypersensitivity pneumonitis connective tissue associated lung disease and drug induced lung disease amongst other etiologies.    She was seen by Dr. Haile on 7/28 at which time due to progression of cough & SOB (& worsening CT chest) prednisone 40 mg/d was begun for presumed RA-ILD. Patient was to continue her antibiotic (copious sputum; cultures NTD) & Trelegy which was begun by  for some PFT reversibility. The plan was to reassess & if there has been a response to add MMF. Ofev also to be added (has not gotten it yet).    5/31/22  She has contacted us several times since her initial visit c/o generalized weakness & pain, tinnitus; peeing a lot; hip & knee pain; leg edema and was asked to  come in to see us again, but she presents prior to her w/u and appointment to see Dr. Haile on 6/7/22.    Today she has multiple complaints and feels that she is in severe pain and unable to function. She has AM stiffness lasting 4 hrs. She has difficulty getting in and out of bed, dressing herself, washing herself, etc. She is fatigued & is unable to sleep but denies depression & anxiety. She now volunteers she had been seen by Dr.Luis Bee in the past ~ 2007 for similar sxs & no autoimmune/rheum dx was made.     Today, she feels she has developed joint swelling since her last visit, especially in some MCPs, but also c/o edema which improved after she stopped taking NSAIDs. She continue to c/o chronic neck pain.    She is currently taking prednisone 10 mg/d. She states she has bilateral shoulder pain & is unable to get OOB in AM. No GCA sxs. She is stiff x 4 hrs.     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  These appear to be incidental & unexpected findings as patient has very little joint swelling.  Patient feels that myalgias began after her last Covid vaccine.    In February got severe pain in R shoulder that responded to CSI but then developed excruciating spasms neck & around both shoulders. Labs gotten by Orthopedic (Dr. Iglesias);   In Jan 27,2022 had FESS for sinus surgery & needed bad infection and required more antibiotics. Developed rash on cheeks on levaquin. Same rash came back now again.    In March had only shoulder pain & pronating L wrist.  Now R knee, L ankle pain & swelling & R foot 3rd & 4th toes even at rest.  Also weak all over  Baclofen helps her spasms & stiffness    AM stiffness x 3-4 hrs (since ~ February, 2022);     .  Denies Raynaud's, tight skin, alopecia, oral ulcers, parotid gland swelling, pleurisy, pericarditis, photosensitivity, skin rashes, thromboses, muscle weakness; fevers,  headaches, conjunctivitis, chronic or bloody diarrhea, vaginal/urethral D/C; paresthesias; LBP, thyroid issues;   1 miscarriage 4-6 weeks in 1997; has 1 daughter Csection;   Chronic cough x 1.5 yr after swallowing--fatigues & drains her  Has dry eyes > mouth  Has BAKER & st w eating comes & goes;   Saw Dr. Siegel & told no obstructive pulmonary disease but restrictive.  Has FHx of RA & myelodysplasia (mom)    Started prednisone 10 mg qod ~ end of March, 2022; Helped pain   But had adverse effects on higher steroids in past.  In 1999 NYLA was on decadron but developed myopathy & psychosis on it.  .    TMJ on L x 1 2/22 lasted 1-2days  Neck since 2/22 L>R  B shoulders L>R  Elbows no  Wrists L>R (flexi carpi ulnaris)  MCPs no but only 2nd R MCP  PIPs & DIP  Hip: no  GT no  Knee: R 1 month ago; since Monday both; just hurt; hurt all the time.  Ankles: L 1 month R since Monday --only swelling  Toes 3 & 4th on R; L of  Both heels hurt  Achilles bilateral.    Current Outpatient Medications   Medication Sig Dispense Refill    acetaminophen (TYLENOL) 325 MG tablet Take 650 mg by mouth every 6 (six) hours as needed for Pain.      ammonium lactate 12 % Crea Apply 1 g topically 2 (two) times daily. 140 g 3    B-complex with vitamin C (Z-BEC OR EQUIV) tablet Take 0.5 tablets by mouth 2 (two) times a day.      calcium citrate-vitamin D3 315-200 mg (CITRACAL+D) 315-200 mg-unit per tablet Take 1 tablet by mouth 2 (two) times daily.      cetirizine (ZYRTEC) 10 MG tablet Take 1 tablet (10 mg total) by mouth once daily. 30 tablet 5    denosumab (PROLIA) 60 mg/mL Syrg Inject 1 mL (60 mg total) into the skin every 6 (six) months. 2 mL 3    diclofenac sodium (VOLTAREN) 1 % Gel Apply 2 g topically 4 (four) times daily as needed.      estradioL (VAGIFEM) 10 mcg Tab Yuvafem 10 mcg vaginal tablet   INSERT 1 TABLET VAGINALLY TWICE A WEEK      fluticasone/vilanterol (BREO ELLIPTA INHL) Inhale 1 puff into the lungs once daily.      guaiFENesin  (MUCINEX) 600 mg 12 hr tablet Take 1,200 mg by mouth 2 (two) times daily.      magnesium oxide (MAG-OX) 400 mg (241.3 mg magnesium) tablet Take 400 mg by mouth once daily.      metoprolol succinate (TOPROL-XL) 25 MG 24 hr tablet Take 1 tablet (25 mg total) by mouth once daily. 90 tablet 3    pantoprazole (PROTONIX) 40 MG tablet Take 40 mg by mouth once daily.      predniSONE (DELTASONE) 5 MG tablet Take 2 tablets (10 mg total) by mouth once daily. 180 tablet 1    sod chlor,sod bicarb/neti pot (NEILMED NASAFLO FRANK) 1 Dose by sinus irrigation route daily as needed.       No current facility-administered medications for this visit.   Off folic acid & flonase.       Review of patient's allergies indicates:   Allergen Reactions    Methotrexate Shortness Of Breath    Gluten protein Other (See Comments)     Sensitive - not allergic    Ppd black rubber mix        Review of Systems   Constitutional:  Negative for chills, fatigue, fever and unexpected weight change.   HENT:  Positive for hearing loss (R loss of hearing; following SIADH) and tinnitus. Negative for mouth sores, postnasal drip, sinus pain and trouble swallowing.    Eyes:  Negative for redness.   Respiratory:  Positive for cough (better) and shortness of breath (better). Negative for chest tightness.    Cardiovascular: Negative.  Negative for chest pain and palpitations (Has had MVP on metoprolol).   Gastrointestinal:  Positive for diarrhea (attributed to Paxlovid). Negative for constipation.   Endocrine: Positive for cold intolerance.   Genitourinary:  Positive for enuresis. Negative for dysuria and genital sores.        Incontinence   Musculoskeletal:  Negative for arthralgias, back pain, joint swelling, myalgias, neck pain and neck stiffness.   Skin:  Negative for rash.   Neurological: Negative.  Negative for dizziness, seizures, syncope, numbness and headaches.   Hematological:  Does not bruise/bleed easily.   Psychiatric/Behavioral:  Positive for sleep  "disturbance (attributed in part to steroids.). Negative for dysphoric mood. The patient is not nervous/anxious.         In past feels prednisone made her irritable.     Past Medical History:   Diagnosis Date    Bruise 2022    Chronic sinusitis, unspecified     Elevated liver enzymes 2022    History of SIADH     LLL pneumonia 2022    Mitral valve prolapse     Osteopenia     Rheumatoid arthritis involving multiple sites        Past Surgical History:   Procedure Laterality Date    BREAST BIOPSY Bilateral     FUNCTIONAL ENDOSCOPIC SINUS SURGERY (FESS)      ORIF WRIST FRACTURE Right        FH:  M: dx 83 w RA; passed away on 85; also had some intestinal problems.  Had myelodysplasia.  1 S hx of chronic myalgia & gets bedridden 2-3 days.  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 normal appearance. No distress.   Not coughing this time   HENT:   Head: Normocephalic and atraumatic.   Mouth/Throat: Oropharynx is clear and moist. No oropharyngeal exudate.   No facial rashes  Parotids not enlarged     Eyes: Pupils are equal, round, and reactive to light. Conjunctivae are normal. Right eye exhibits no discharge. Left eye exhibits no discharge. No scleral icterus.   Neck: No JVD present. No tracheal deviation present. No thyromegaly present.   Cardiovascular: Regular rhythm and normal heart sounds. Exam reveals no gallop and no friction rub.   No murmur heard.  Pulmonary/Chest: Effort normal. No respiratory distress. She has no wheezes. She has no rales. She exhibits " no tenderness.   Abdominal: Soft. Bowel sounds are normal. She exhibits no distension and no mass. There is no splenomegaly or hepatomegaly. There is no abdominal tenderness. There is no rebound and no guarding.   Musculoskeletal:         General: No tenderness. Normal range of motion.      Cervical back: Neck supple.      Comments: No SHT anywhere except for minimal puffiness ulnar aspect L wrist wo TTP.  Adequate ROM all joints.  +/_ L metatarsalgia        Lymphadenopathy:     She has no cervical adenopathy.   Neurological: She is alert and oriented to person, place, and time. She has normal reflexes. No cranial nerve deficit. Gait normal.   Proximal & distal upper extremity strength appropriate.  LE strength probably 4+ (limited by some muscle pain)   Skin: Skin is warm and dry. Rash noted. She is not diaphoretic.   See photos  Resolving rashes   Psychiatric: Mood, memory, affect and thought content normal.   Vitals reviewed.         5/30/23 & above          Photos provided by patient: 12/2022 5/5/23: CBC ok; CMP ok; CPK ok; Zn low at 53 (60);   11/15/22: ESR 36 (20) CRP 19.2; CBC WC 15.26; CMP Na 135; alb 3.3  10/10/22: CBC WC 15.30; CMP glu 112; alb 3.1;   10/3/22: ESR 48 (20); CRP 14.4  8/10/22: ESR 25 (36); CRP 0.5; CMP alb 3.1;   7/19/22: ESR 80 (20); CRP 45.2; CBC Ht 36.1; CMP glu 117; alb 3.1;   5/17/22: CBC Hg 11.6; Ht 34.3; CMP Na 135; alb 3.3; TPMT normal metabolizer; CPK 13; pre-DMARDs ok;   5/12/22: ESR 74 (36); CRP 37.2; UA ok; ; .2; ROBBIE/C3/C4/QIG wnl or neg; IgM aCLA 16.70 (12.49);   3/14/22: ESR 50 (30) CRP 2; ROBBIE neg; RF 74 (<6) CCP >250  11/30/21: RF<14;     12/9/22: CT chest: diffuse subpleural reticular opacity and nodular pleural thickening; minimal perihilar bronchiectasis.; no honeycombing or focal opacity or pleural thickening; diffuse nonspecific interstitial lung disease, possible UIP pattern  11/29/22: CXR: personally viewed: consistent with chronic interstitial  lung disease.  9/29/22: ECHO: mild CHAPINCITO; mild TVS; PA press 31;   7/28/22: CT chest:  significant interval progression of previously noted subpleural predominant reticular and ground-glass airspace opacities associated with bilateral lower lobe volume loss, architectural distortion, symmetric mild central bronchiectasis and apicobasal gradient suggestive of UIP pattern of interstitial lung disease which may reflect IPF.  Additional diagnostic considerations include NSIP, asbestosis, fibrotic hypersensitivity pneumonitis connective tissue associated lung disease and drug induced lung disease amongst other etiologies.  5/12/22: Bilateral hands: personally viewed:mild STS dorsal MCPs bilaterally; min OA otherwise; remote healed distal radius fx w hardware.  5/12/22: Arthritis survey: personally viewed:  Mild OA Cspine, T spine; tibial spines; hands (Lwrist fx) & feet.  5/12/22: CXR: personally viewed: bibasilar coarse interstitial reticulated opacities c/w PF  6/28/21: CT chest:  predominant LL patchy increased attenuation and reticulation within the periphery of the bilateral lower lobes c/w sequela of atelectasis; r/o early ILD; no bronchiectasis.  No honeycombing.  No significant ground-glass attenuation. Small sliding-type hiatal hernia.    10/15/20: DXA: TLS -2.7; TFN -2.2;  TTH -1.8; FRAX 18.9%;/3.5%  Assessment:   Sero+(173) CCP+(1162.7) non erosive RA--now w L wrist pain/swelling.               Mild SHT MCPs--resolved on mod dose pred              AM stiffness x3-4 hrs now varies up to 2hr.              FHx: RA & myelodysplasia   On prednisone 20 mg/d   S/P MTX ~ 5/31 - 7/26/22 w multiple intolerance exs   Brief azathioprine   RTX 10/24/22 & 11/7/22 last 4/26/23 & 5/10/23   On Ofev ~ 6 weeks stopped due to abn LFTs (together w MTX)           Chronic cough/SOB/ intermittent BAKER/abn CXR & CT c/w ILD              CT chest w ILD              CXR: blake coarse interstitial reticular opacities c/w PF              Rx  prednisone started 40 mg to taper--now down to 2.5 mg/d   Ofev w abn LFTs     Dermatitis   Skin peeling   Eczema/contact dermatitis/allergic to prolia?    Central sensitivity syndrome   Muscle aches & pains since childhood.   Some response to gluten restriction   CSI = 47 = hi moderate     Joint pain/muscle spasms/myalgia--multiple sites   Worse post Covid vaccine   Worse w Covid              RA/OA/CSS                          Bilateral hip pain                          Bilateral shoulder                          L wrist                          R knee                          L ankle                          R foot                          Cervical spine: mild DDD & spondylolistesis                                      Some response to baclofen (spasm & stiffness)                Osteoporosis handled by PCP              S/P R wrist fx 3/2019 (ORIF)                S/P rx w alendronate x 9 yrs--was on holiday >5 yrs   Denosumab last 3/30/23 (Dr. Carpenter)    Facial rash wi 2 days of injection.    Possibly some erythema following 1st dose.              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  Discussion about rashes. Probably different causes.  F/U w dermatology for rashes.  Some suggestions made  Next RTX-end of 10/2023; may need pre-infusion orders

## 2023-05-26 ENCOUNTER — PES CALL (OUTPATIENT)
Dept: ADMINISTRATIVE | Facility: CLINIC | Age: 68
End: 2023-05-26
Payer: MEDICARE

## 2023-05-30 ENCOUNTER — HOSPITAL ENCOUNTER (OUTPATIENT)
Dept: RADIOLOGY | Facility: HOSPITAL | Age: 68
Discharge: HOME | End: 2023-05-30
Attending: INTERNAL MEDICINE
Payer: MEDICARE

## 2023-05-30 ENCOUNTER — OFFICE VISIT (OUTPATIENT)
Dept: RHEUMATOLOGY | Facility: CLINIC | Age: 68
End: 2023-05-30
Payer: MEDICARE

## 2023-05-30 ENCOUNTER — PATIENT MESSAGE (OUTPATIENT)
Dept: RHEUMATOLOGY | Facility: CLINIC | Age: 68
End: 2023-05-30

## 2023-05-30 VITALS
SYSTOLIC BLOOD PRESSURE: 132 MMHG | BODY MASS INDEX: 25.76 KG/M2 | HEART RATE: 73 BPM | DIASTOLIC BLOOD PRESSURE: 80 MMHG | HEIGHT: 62 IN | WEIGHT: 140 LBS

## 2023-05-30 DIAGNOSIS — Z79.52 LONG TERM (CURRENT) USE OF SYSTEMIC STEROIDS: ICD-10-CM

## 2023-05-30 DIAGNOSIS — M81.0 OSTEOPOROSIS, UNSPECIFIED OSTEOPOROSIS TYPE, UNSPECIFIED PATHOLOGICAL FRACTURE PRESENCE: ICD-10-CM

## 2023-05-30 DIAGNOSIS — Z79.60 LONG-TERM USE OF IMMUNOSUPPRESSANT MEDICATION: ICD-10-CM

## 2023-05-30 DIAGNOSIS — M05.79 RHEUMATOID ARTHRITIS INVOLVING MULTIPLE SITES WITH POSITIVE RHEUMATOID FACTOR: Primary | ICD-10-CM

## 2023-05-30 DIAGNOSIS — J84.9 INTERSTITIAL LUNG DISEASE: ICD-10-CM

## 2023-05-30 DIAGNOSIS — M05.79 RHEUMATOID ARTHRITIS INVOLVING MULTIPLE SITES WITH POSITIVE RHEUMATOID FACTOR: ICD-10-CM

## 2023-05-30 DIAGNOSIS — L30.9 DERMATITIS: ICD-10-CM

## 2023-05-30 PROCEDURE — 3008F PR BODY MASS INDEX (BMI) DOCUMENTED: ICD-10-PCS | Mod: CPTII,NTX,S$GLB, | Performed by: INTERNAL MEDICINE

## 2023-05-30 PROCEDURE — 3008F BODY MASS INDEX DOCD: CPT | Mod: CPTII,NTX,S$GLB, | Performed by: INTERNAL MEDICINE

## 2023-05-30 PROCEDURE — 73130 XR HAND COMPLETE 3 VIEWS BILATERAL: ICD-10-PCS | Mod: 26,50,NTX, | Performed by: RADIOLOGY

## 2023-05-30 PROCEDURE — 99999 PR PBB SHADOW E&M-EST. PATIENT-LVL V: CPT | Mod: PBBFAC,TXP,, | Performed by: INTERNAL MEDICINE

## 2023-05-30 PROCEDURE — 1159F PR MEDICATION LIST DOCUMENTED IN MEDICAL RECORD: ICD-10-PCS | Mod: CPTII,NTX,S$GLB, | Performed by: INTERNAL MEDICINE

## 2023-05-30 PROCEDURE — 73130 X-RAY EXAM OF HAND: CPT | Mod: TC,50,TXP

## 2023-05-30 PROCEDURE — 99214 PR OFFICE/OUTPT VISIT, EST, LEVL IV, 30-39 MIN: ICD-10-PCS | Mod: NTX,S$GLB,, | Performed by: INTERNAL MEDICINE

## 2023-05-30 PROCEDURE — 3075F PR MOST RECENT SYSTOLIC BLOOD PRESS GE 130-139MM HG: ICD-10-PCS | Mod: CPTII,NTX,S$GLB, | Performed by: INTERNAL MEDICINE

## 2023-05-30 PROCEDURE — 73630 X-RAY EXAM OF FOOT: CPT | Mod: 26,50,TXP, | Performed by: RADIOLOGY

## 2023-05-30 PROCEDURE — 99999 PR PBB SHADOW E&M-EST. PATIENT-LVL V: ICD-10-PCS | Mod: PBBFAC,TXP,, | Performed by: INTERNAL MEDICINE

## 2023-05-30 PROCEDURE — 1125F AMNT PAIN NOTED PAIN PRSNT: CPT | Mod: CPTII,NTX,S$GLB, | Performed by: INTERNAL MEDICINE

## 2023-05-30 PROCEDURE — 1159F MED LIST DOCD IN RCRD: CPT | Mod: CPTII,NTX,S$GLB, | Performed by: INTERNAL MEDICINE

## 2023-05-30 PROCEDURE — 99214 OFFICE O/P EST MOD 30 MIN: CPT | Mod: NTX,S$GLB,, | Performed by: INTERNAL MEDICINE

## 2023-05-30 PROCEDURE — 73630 XR FOOT COMPLETE 3 VIEW BILATERAL: ICD-10-PCS | Mod: 26,50,TXP, | Performed by: RADIOLOGY

## 2023-05-30 PROCEDURE — 73630 X-RAY EXAM OF FOOT: CPT | Mod: TC,50,TXP

## 2023-05-30 PROCEDURE — 3079F DIAST BP 80-89 MM HG: CPT | Mod: CPTII,NTX,S$GLB, | Performed by: INTERNAL MEDICINE

## 2023-05-30 PROCEDURE — 73130 X-RAY EXAM OF HAND: CPT | Mod: 26,50,NTX, | Performed by: RADIOLOGY

## 2023-05-30 PROCEDURE — 3079F PR MOST RECENT DIASTOLIC BLOOD PRESSURE 80-89 MM HG: ICD-10-PCS | Mod: CPTII,NTX,S$GLB, | Performed by: INTERNAL MEDICINE

## 2023-05-30 PROCEDURE — 1125F PR PAIN SEVERITY QUANTIFIED, PAIN PRESENT: ICD-10-PCS | Mod: CPTII,NTX,S$GLB, | Performed by: INTERNAL MEDICINE

## 2023-05-30 PROCEDURE — 3075F SYST BP GE 130 - 139MM HG: CPT | Mod: CPTII,NTX,S$GLB, | Performed by: INTERNAL MEDICINE

## 2023-05-30 ASSESSMENT — ROUTINE ASSESSMENT OF PATIENT INDEX DATA (RAPID3)
MDHAQ FUNCTION SCORE: 0.3
AM STIFFNESS SCORE: 0, NO
TOTAL RAPID3 SCORE: 2.33
PSYCHOLOGICAL DISTRESS SCORE: 1.1
PAIN SCORE: 3
FATIGUE SCORE: 0
PATIENT GLOBAL ASSESSMENT SCORE: 3

## 2023-05-30 NOTE — PATIENT INSTRUCTIONS
Try to stay on prednisone 2.5 mg without increasing dose.    You may want to try Sarna Lotion for itching.

## 2023-05-30 NOTE — PROGRESS NOTES
Rapid3 Question Responses and Scores 5/29/2023   MDHAQ Score 0.3   Psychologic Score 1.1   Pain Score 3   When you awakened in the morning OVER THE LAST WEEK, did you feel stiff? No   If Yes, please indicate the number of hours until you are as limber as you will be for the day -   Fatigue Score 0   Global Health Score 3   RAPID3 Score 2.33    Answers submitted by the patient for this visit:  Rheumatology Questionnaire (Submitted on 5/29/2023)  fever: No  eye redness: No  mouth sores: No  headaches: No  shortness of breath: No  chest pain: No  trouble swallowing: No  diarrhea: No  constipation: No  unexpected weight change: No  genital sore: No  dysuria: No  During the last 3 days, have you had a skin rash?: Yes  Bruises or bleeds easily: No  cough: Yes

## 2023-06-01 ENCOUNTER — HOSPITAL ENCOUNTER (OUTPATIENT)
Dept: PULMONOLOGY | Facility: OTHER | Age: 68
Discharge: HOME | End: 2023-06-01
Attending: INTERNAL MEDICINE
Payer: MEDICARE

## 2023-06-01 PROCEDURE — G0238 OTH RESP PROC, INDIV: HCPCS | Mod: TXP

## 2023-06-01 PROCEDURE — G0237 THERAPEUTIC PROCD STRG ENDUR: HCPCS | Mod: TXP

## 2023-06-06 ENCOUNTER — HOSPITAL ENCOUNTER (OUTPATIENT)
Dept: PULMONOLOGY | Facility: OTHER | Age: 68
Discharge: HOME | End: 2023-06-06
Attending: INTERNAL MEDICINE
Payer: MEDICARE

## 2023-06-06 PROCEDURE — G0238 OTH RESP PROC, INDIV: HCPCS | Mod: TXP

## 2023-06-06 PROCEDURE — G0237 THERAPEUTIC PROCD STRG ENDUR: HCPCS | Mod: TXP

## 2023-06-08 ENCOUNTER — HOSPITAL ENCOUNTER (OUTPATIENT)
Dept: PULMONOLOGY | Facility: OTHER | Age: 68
Discharge: HOME | End: 2023-06-08
Attending: INTERNAL MEDICINE
Payer: MEDICARE

## 2023-06-08 PROCEDURE — G0237 THERAPEUTIC PROCD STRG ENDUR: HCPCS | Mod: TXP

## 2023-06-08 PROCEDURE — G0238 OTH RESP PROC, INDIV: HCPCS | Mod: NTX

## 2023-06-13 ENCOUNTER — HOSPITAL ENCOUNTER (OUTPATIENT)
Dept: PULMONOLOGY | Facility: OTHER | Age: 68
Discharge: HOME | End: 2023-06-13
Attending: INTERNAL MEDICINE
Payer: MEDICARE

## 2023-06-13 PROCEDURE — G0237 THERAPEUTIC PROCD STRG ENDUR: HCPCS | Mod: TXP

## 2023-06-13 PROCEDURE — G0238 OTH RESP PROC, INDIV: HCPCS | Mod: NTX

## 2023-06-15 ENCOUNTER — HOSPITAL ENCOUNTER (OUTPATIENT)
Dept: PULMONOLOGY | Facility: OTHER | Age: 68
Discharge: HOME | End: 2023-06-15
Attending: INTERNAL MEDICINE
Payer: MEDICARE

## 2023-06-15 PROCEDURE — G0238 OTH RESP PROC, INDIV: HCPCS | Mod: TXP

## 2023-06-15 PROCEDURE — G0237 THERAPEUTIC PROCD STRG ENDUR: HCPCS | Mod: NTX

## 2023-06-19 ENCOUNTER — PATIENT MESSAGE (OUTPATIENT)
Dept: RHEUMATOLOGY | Facility: CLINIC | Age: 68
End: 2023-06-19
Payer: MEDICARE

## 2023-06-20 ENCOUNTER — HOSPITAL ENCOUNTER (OUTPATIENT)
Dept: PULMONOLOGY | Facility: OTHER | Age: 68
Discharge: HOME | End: 2023-06-20
Attending: INTERNAL MEDICINE
Payer: MEDICARE

## 2023-06-20 PROCEDURE — G0237 THERAPEUTIC PROCD STRG ENDUR: HCPCS | Mod: NTX

## 2023-06-20 PROCEDURE — G0238 OTH RESP PROC, INDIV: HCPCS | Mod: TXP

## 2023-06-22 ENCOUNTER — HOSPITAL ENCOUNTER (OUTPATIENT)
Dept: PULMONOLOGY | Facility: OTHER | Age: 68
Discharge: HOME | End: 2023-06-22
Attending: INTERNAL MEDICINE
Payer: MEDICARE

## 2023-06-22 PROCEDURE — G0238 OTH RESP PROC, INDIV: HCPCS | Mod: TXP

## 2023-06-22 PROCEDURE — G0237 THERAPEUTIC PROCD STRG ENDUR: HCPCS | Mod: TXP

## 2023-07-11 ENCOUNTER — TELEPHONE (OUTPATIENT)
Dept: ADMINISTRATIVE | Facility: CLINIC | Age: 68
End: 2023-07-11
Payer: MEDICARE

## 2023-07-18 ENCOUNTER — HOSPITAL ENCOUNTER (OUTPATIENT)
Dept: PULMONOLOGY | Facility: OTHER | Age: 68
Discharge: HOME | End: 2023-07-18
Attending: INTERNAL MEDICINE
Payer: MEDICARE

## 2023-07-18 PROCEDURE — G0237 THERAPEUTIC PROCD STRG ENDUR: HCPCS | Mod: NTX

## 2023-07-19 ENCOUNTER — TELEPHONE (OUTPATIENT)
Dept: TRANSPLANT | Facility: CLINIC | Age: 68
End: 2023-07-19
Payer: MEDICARE

## 2023-07-19 DIAGNOSIS — M05.79 RHEUMATOID ARTHRITIS INVOLVING MULTIPLE SITES WITH POSITIVE RHEUMATOID FACTOR: ICD-10-CM

## 2023-07-19 DIAGNOSIS — J84.9 INTERSTITIAL LUNG DISEASE: ICD-10-CM

## 2023-07-19 DIAGNOSIS — J84.9 INTERSTITIAL PULMONARY DISEASE, UNSPECIFIED: Primary | ICD-10-CM

## 2023-07-19 NOTE — TELEPHONE ENCOUNTER
Received communication, patient is requesting to continue pulmonary rehab, order entered per provider.

## 2023-07-20 ENCOUNTER — HOSPITAL ENCOUNTER (OUTPATIENT)
Dept: PULMONOLOGY | Facility: OTHER | Age: 68
Discharge: HOME | End: 2023-07-20
Attending: INTERNAL MEDICINE
Payer: MEDICARE

## 2023-07-20 VITALS — HEIGHT: 62 IN | WEIGHT: 136 LBS | BODY MASS INDEX: 25.03 KG/M2

## 2023-07-20 DIAGNOSIS — J84.9 ILD (INTERSTITIAL LUNG DISEASE): Primary | ICD-10-CM

## 2023-07-20 DIAGNOSIS — J84.9 INTERSTITIAL LUNG DISEASE: ICD-10-CM

## 2023-07-20 DIAGNOSIS — J84.9 INTERSTITIAL PULMONARY DISEASE, UNSPECIFIED: ICD-10-CM

## 2023-07-20 DIAGNOSIS — M05.79 RHEUMATOID ARTHRITIS INVOLVING MULTIPLE SITES WITH POSITIVE RHEUMATOID FACTOR: ICD-10-CM

## 2023-07-20 PROCEDURE — 94618 PULMONARY STRESS TESTING: CPT | Mod: NTX

## 2023-07-20 PROCEDURE — G0238 OTH RESP PROC, INDIV: HCPCS | Mod: NTX

## 2023-07-20 PROCEDURE — G0237 THERAPEUTIC PROCD STRG ENDUR: HCPCS | Mod: TXP

## 2023-07-20 NOTE — PROGRESS NOTES
"[facility] ([place])  Six Minute Walk     SUMMARY     Ordering Provider: Barbara Haile      Performing nurse/tech/RT: KIM Stanford RRT  Diagnosis: Interstitial Lung Disease  Height: 5' 2" (157.5 cm)  Weight: 61.7 kg (136 lb)  BMI (Calculated): 24.9   Patient Race: [facility] Oxygen Assessment Supplemental O2 Heart   Rate Blood Pressure Rigoberto Dyspnea Scale Rating   Resting 98 % Room Air 95 bpm 109/62 2   Exercise        Minute        1           2           3           4           5           6  94 %   126 bpm 114/62 3   Recovery        Minute        1           2           3           4 97 % Room Air 101 bpm 109/77 2     Six Minute Walk Summary  6MWT Status: completed without stopping  Patient Reported: No complaints  Distance: 1500 ft  Previous Distance 4/17/2023: 1500 ft     Interpretation:                                                     Predicted Distance Meters (Calculated): 470.8 meters                        "

## 2023-08-10 ENCOUNTER — HOSPITAL ENCOUNTER (OUTPATIENT)
Dept: PULMONOLOGY | Facility: OTHER | Age: 68
Discharge: HOME | End: 2023-08-10
Attending: INTERNAL MEDICINE
Payer: MEDICARE

## 2023-08-10 PROCEDURE — G0237 THERAPEUTIC PROCD STRG ENDUR: HCPCS | Mod: NTX

## 2023-08-15 ENCOUNTER — HOSPITAL ENCOUNTER (OUTPATIENT)
Dept: PULMONOLOGY | Facility: OTHER | Age: 68
Discharge: HOME | End: 2023-08-15
Attending: INTERNAL MEDICINE
Payer: MEDICARE

## 2023-08-15 PROCEDURE — G0237 THERAPEUTIC PROCD STRG ENDUR: HCPCS | Mod: NTX

## 2023-08-17 ENCOUNTER — HOSPITAL ENCOUNTER (OUTPATIENT)
Dept: PULMONOLOGY | Facility: OTHER | Age: 68
Discharge: HOME | End: 2023-08-17
Attending: INTERNAL MEDICINE
Payer: MEDICARE

## 2023-08-17 PROCEDURE — G0237 THERAPEUTIC PROCD STRG ENDUR: HCPCS | Mod: TXP

## 2023-08-22 ENCOUNTER — HOSPITAL ENCOUNTER (OUTPATIENT)
Dept: PULMONOLOGY | Facility: OTHER | Age: 68
Discharge: HOME | End: 2023-08-22
Attending: INTERNAL MEDICINE
Payer: MEDICARE

## 2023-08-22 PROCEDURE — G0237 THERAPEUTIC PROCD STRG ENDUR: HCPCS | Mod: TXP

## 2023-08-23 ENCOUNTER — PATIENT MESSAGE (OUTPATIENT)
Dept: RHEUMATOLOGY | Facility: CLINIC | Age: 68
End: 2023-08-23
Payer: MEDICARE

## 2023-08-23 ENCOUNTER — PATIENT MESSAGE (OUTPATIENT)
Dept: PRIMARY CARE CLINIC | Facility: CLINIC | Age: 68
End: 2023-08-23
Payer: MEDICARE

## 2023-08-23 DIAGNOSIS — M81.8 OTHER OSTEOPOROSIS WITHOUT CURRENT PATHOLOGICAL FRACTURE: ICD-10-CM

## 2023-08-23 DIAGNOSIS — M81.8 OTHER OSTEOPOROSIS WITHOUT CURRENT PATHOLOGICAL FRACTURE: Primary | ICD-10-CM

## 2023-08-23 RX ORDER — ESTRADIOL 10 UG/1
INSERT VAGINAL
Qty: 45 TABLET | Refills: 5 | OUTPATIENT
Start: 2023-08-23 | End: 2023-09-14 | Stop reason: SDUPTHER

## 2023-08-23 RX ORDER — ESTRADIOL 10 UG/1
INSERT VAGINAL
Qty: 45 TABLET | Refills: 5 | Status: SHIPPED | OUTPATIENT
Start: 2023-08-23 | End: 2023-08-23 | Stop reason: SDUPTHER

## 2023-08-23 NOTE — TELEPHONE ENCOUNTER
Prescription   Pt requesting a new prescription   Prescription have been pended below if applicable

## 2023-08-24 ENCOUNTER — HOSPITAL ENCOUNTER (OUTPATIENT)
Dept: PULMONOLOGY | Facility: OTHER | Age: 68
Discharge: HOME | End: 2023-08-24
Attending: INTERNAL MEDICINE
Payer: MEDICARE

## 2023-08-24 PROCEDURE — G0238 OTH RESP PROC, INDIV: HCPCS | Mod: TXP

## 2023-08-24 PROCEDURE — G0237 THERAPEUTIC PROCD STRG ENDUR: HCPCS | Mod: TXP

## 2023-08-24 PROCEDURE — 27000221 HC OXYGEN, UP TO 24 HOURS: Mod: NTX

## 2023-08-27 DIAGNOSIS — R22.32 MASS OF ARM, LEFT: Primary | ICD-10-CM

## 2023-08-29 ENCOUNTER — HOSPITAL ENCOUNTER (OUTPATIENT)
Dept: PULMONOLOGY | Facility: OTHER | Age: 68
Discharge: HOME | End: 2023-08-29
Attending: INTERNAL MEDICINE
Payer: MEDICARE

## 2023-08-29 PROCEDURE — G0237 THERAPEUTIC PROCD STRG ENDUR: HCPCS | Mod: NTX

## 2023-08-31 ENCOUNTER — HOSPITAL ENCOUNTER (OUTPATIENT)
Dept: PULMONOLOGY | Facility: OTHER | Age: 68
Discharge: HOME | End: 2023-08-31
Attending: INTERNAL MEDICINE
Payer: MEDICARE

## 2023-08-31 PROCEDURE — G0237 THERAPEUTIC PROCD STRG ENDUR: HCPCS | Mod: TXP

## 2023-08-31 PROCEDURE — G0238 OTH RESP PROC, INDIV: HCPCS | Mod: NTX

## 2023-09-05 ENCOUNTER — HOSPITAL ENCOUNTER (OUTPATIENT)
Dept: PULMONOLOGY | Facility: OTHER | Age: 68
Discharge: HOME | End: 2023-09-05
Attending: INTERNAL MEDICINE
Payer: MEDICARE

## 2023-09-05 PROCEDURE — G0238 OTH RESP PROC, INDIV: HCPCS | Mod: TXP

## 2023-09-05 PROCEDURE — G0237 THERAPEUTIC PROCD STRG ENDUR: HCPCS | Mod: NTX

## 2023-09-07 ENCOUNTER — HOSPITAL ENCOUNTER (OUTPATIENT)
Dept: PULMONOLOGY | Facility: OTHER | Age: 68
Discharge: HOME | End: 2023-09-07
Attending: INTERNAL MEDICINE
Payer: MEDICARE

## 2023-09-07 PROCEDURE — G0238 OTH RESP PROC, INDIV: HCPCS | Mod: TXP

## 2023-09-07 PROCEDURE — G0237 THERAPEUTIC PROCD STRG ENDUR: HCPCS | Mod: TXP

## 2023-09-11 ENCOUNTER — TELEPHONE (OUTPATIENT)
Dept: TRANSPLANT | Facility: CLINIC | Age: 68
End: 2023-09-11
Payer: MEDICARE

## 2023-09-11 ENCOUNTER — TELEPHONE (OUTPATIENT)
Dept: RHEUMATOLOGY | Facility: CLINIC | Age: 68
End: 2023-09-11
Payer: MEDICARE

## 2023-09-11 ENCOUNTER — PATIENT MESSAGE (OUTPATIENT)
Dept: RHEUMATOLOGY | Facility: CLINIC | Age: 68
End: 2023-09-11
Payer: MEDICARE

## 2023-09-11 RX ORDER — HEPARIN 100 UNIT/ML
500 SYRINGE INTRAVENOUS
Status: CANCELLED | OUTPATIENT
Start: 2023-09-13

## 2023-09-11 RX ORDER — FAMOTIDINE 10 MG/ML
20 INJECTION INTRAVENOUS
Status: CANCELLED | OUTPATIENT
Start: 2023-09-13

## 2023-09-11 RX ORDER — ACETAMINOPHEN 325 MG/1
650 TABLET ORAL
Status: CANCELLED | OUTPATIENT
Start: 2023-09-13

## 2023-09-11 NOTE — TELEPHONE ENCOUNTER
Spoke with patient, she reports she is awaiting contact from Infusion department. Message routed to wrong department. Patient reports no needs at this time from [facility] clinic.  ----- Message from Barbara Castillo sent at 9/11/2023  3:28 PM CDT -----  Regarding: Appt  Contact: Pt [phone]            Appt Type:   Infusion     Date/Time Preference: In Nov needs to be 6 months apart     Treating Provider:    Caller Name:Ashlee Cruz       Contact Prefer: [phone]    Comments/Notes:   Pt called to schedule appt for infusion

## 2023-09-11 NOTE — TELEPHONE ENCOUNTER
2 infusions; 1st dose due ~ November 10, 2023 & next one 2 weeks later.     ----- Message from Shannon Dejesus RN sent at 9/11/2023  2:26 PM CDT -----  When in October does she need her rituxan infusions?  Maintenance or 2 infusions?

## 2023-09-12 ENCOUNTER — HOSPITAL ENCOUNTER (OUTPATIENT)
Dept: PULMONOLOGY | Facility: OTHER | Age: 68
Discharge: HOME | End: 2023-09-12
Attending: INTERNAL MEDICINE
Payer: MEDICARE

## 2023-09-12 PROCEDURE — G0238 OTH RESP PROC, INDIV: HCPCS | Mod: NTX

## 2023-09-12 PROCEDURE — G0237 THERAPEUTIC PROCD STRG ENDUR: HCPCS | Mod: TXP

## 2023-09-14 ENCOUNTER — PATIENT MESSAGE (OUTPATIENT)
Dept: PRIMARY CARE CLINIC | Facility: CLINIC | Age: 68
End: 2023-09-14

## 2023-09-14 ENCOUNTER — HOSPITAL ENCOUNTER (OUTPATIENT)
Dept: PULMONOLOGY | Facility: OTHER | Age: 68
Discharge: HOME | End: 2023-09-14
Payer: MEDICARE

## 2023-09-14 ENCOUNTER — OFFICE VISIT (OUTPATIENT)
Dept: PRIMARY CARE CLINIC | Facility: CLINIC | Age: 68
End: 2023-09-14
Payer: MEDICARE

## 2023-09-14 VITALS
TEMPERATURE: 98 F | WEIGHT: 138.88 LBS | RESPIRATION RATE: 18 BRPM | HEART RATE: 70 BPM | HEIGHT: 62 IN | SYSTOLIC BLOOD PRESSURE: 114 MMHG | BODY MASS INDEX: 25.55 KG/M2 | OXYGEN SATURATION: 99 % | DIASTOLIC BLOOD PRESSURE: 72 MMHG

## 2023-09-14 DIAGNOSIS — M81.8 OTHER OSTEOPOROSIS WITHOUT CURRENT PATHOLOGICAL FRACTURE: ICD-10-CM

## 2023-09-14 DIAGNOSIS — R06.09 DYSPNEA ON EXERTION: ICD-10-CM

## 2023-09-14 DIAGNOSIS — R00.0 SINUS TACHYCARDIA: ICD-10-CM

## 2023-09-14 DIAGNOSIS — K21.9 GASTROESOPHAGEAL REFLUX DISEASE WITHOUT ESOPHAGITIS: ICD-10-CM

## 2023-09-14 DIAGNOSIS — M05.79 RHEUMATOID ARTHRITIS INVOLVING MULTIPLE SITES WITH POSITIVE RHEUMATOID FACTOR: ICD-10-CM

## 2023-09-14 DIAGNOSIS — H04.123 DRY EYES: ICD-10-CM

## 2023-09-14 DIAGNOSIS — Z79.60 LONG-TERM USE OF IMMUNOSUPPRESSANT MEDICATION: ICD-10-CM

## 2023-09-14 DIAGNOSIS — Z00.00 PREVENTATIVE HEALTH CARE: Primary | ICD-10-CM

## 2023-09-14 DIAGNOSIS — M08.80 ENTHESITIS RELATED ARTHRITIS: ICD-10-CM

## 2023-09-14 DIAGNOSIS — N95.1 VAGINAL DRYNESS, MENOPAUSAL: ICD-10-CM

## 2023-09-14 DIAGNOSIS — J84.9 INTERSTITIAL LUNG DISEASE: ICD-10-CM

## 2023-09-14 PROBLEM — R21 SKIN RASH: Status: RESOLVED | Noted: 2023-03-09 | Resolved: 2023-09-14

## 2023-09-14 PROBLEM — R23.3 PETECHIAE: Status: RESOLVED | Noted: 2022-09-12 | Resolved: 2023-09-14

## 2023-09-14 PROCEDURE — 3288F FALL RISK ASSESSMENT DOCD: CPT | Mod: CPTII,NTX,S$GLB, | Performed by: INTERNAL MEDICINE

## 2023-09-14 PROCEDURE — 3074F SYST BP LT 130 MM HG: CPT | Mod: CPTII,NTX,S$GLB, | Performed by: INTERNAL MEDICINE

## 2023-09-14 PROCEDURE — 3008F BODY MASS INDEX DOCD: CPT | Mod: CPTII,NTX,S$GLB, | Performed by: INTERNAL MEDICINE

## 2023-09-14 PROCEDURE — 3078F PR MOST RECENT DIASTOLIC BLOOD PRESSURE < 80 MM HG: ICD-10-PCS | Mod: CPTII,NTX,S$GLB, | Performed by: INTERNAL MEDICINE

## 2023-09-14 PROCEDURE — 1159F MED LIST DOCD IN RCRD: CPT | Mod: CPTII,NTX,S$GLB, | Performed by: INTERNAL MEDICINE

## 2023-09-14 PROCEDURE — 3078F DIAST BP <80 MM HG: CPT | Mod: CPTII,NTX,S$GLB, | Performed by: INTERNAL MEDICINE

## 2023-09-14 PROCEDURE — 99999 PR PBB SHADOW E&M-EST. PATIENT-LVL V: ICD-10-PCS | Mod: PBBFAC,TXP,, | Performed by: INTERNAL MEDICINE

## 2023-09-14 PROCEDURE — 3008F PR BODY MASS INDEX (BMI) DOCUMENTED: ICD-10-PCS | Mod: CPTII,NTX,S$GLB, | Performed by: INTERNAL MEDICINE

## 2023-09-14 PROCEDURE — 3074F PR MOST RECENT SYSTOLIC BLOOD PRESSURE < 130 MM HG: ICD-10-PCS | Mod: CPTII,NTX,S$GLB, | Performed by: INTERNAL MEDICINE

## 2023-09-14 PROCEDURE — 1159F PR MEDICATION LIST DOCUMENTED IN MEDICAL RECORD: ICD-10-PCS | Mod: CPTII,NTX,S$GLB, | Performed by: INTERNAL MEDICINE

## 2023-09-14 PROCEDURE — G0237 THERAPEUTIC PROCD STRG ENDUR: HCPCS | Mod: NTX

## 2023-09-14 PROCEDURE — 1125F PR PAIN SEVERITY QUANTIFIED, PAIN PRESENT: ICD-10-PCS | Mod: CPTII,NTX,S$GLB, | Performed by: INTERNAL MEDICINE

## 2023-09-14 PROCEDURE — 1101F PT FALLS ASSESS-DOCD LE1/YR: CPT | Mod: CPTII,NTX,S$GLB, | Performed by: INTERNAL MEDICINE

## 2023-09-14 PROCEDURE — G0238 OTH RESP PROC, INDIV: HCPCS | Mod: NTX

## 2023-09-14 PROCEDURE — 1101F PR PT FALLS ASSESS DOC 0-1 FALLS W/OUT INJ PAST YR: ICD-10-PCS | Mod: CPTII,NTX,S$GLB, | Performed by: INTERNAL MEDICINE

## 2023-09-14 PROCEDURE — 99397 PR PREVENTIVE VISIT,EST,65 & OVER: ICD-10-PCS | Mod: GZ,NTX,S$GLB, | Performed by: INTERNAL MEDICINE

## 2023-09-14 PROCEDURE — 3288F PR FALLS RISK ASSESSMENT DOCUMENTED: ICD-10-PCS | Mod: CPTII,NTX,S$GLB, | Performed by: INTERNAL MEDICINE

## 2023-09-14 PROCEDURE — 99397 PER PM REEVAL EST PAT 65+ YR: CPT | Mod: GZ,NTX,S$GLB, | Performed by: INTERNAL MEDICINE

## 2023-09-14 PROCEDURE — 1125F AMNT PAIN NOTED PAIN PRSNT: CPT | Mod: CPTII,NTX,S$GLB, | Performed by: INTERNAL MEDICINE

## 2023-09-14 PROCEDURE — 99999 PR PBB SHADOW E&M-EST. PATIENT-LVL V: CPT | Mod: PBBFAC,TXP,, | Performed by: INTERNAL MEDICINE

## 2023-09-14 RX ORDER — ESTRADIOL 10 UG/1
INSERT VAGINAL
Qty: 45 TABLET | Refills: 5 | Status: CANCELLED | OUTPATIENT
Start: 2023-09-14

## 2023-09-14 RX ORDER — ESTRADIOL 10 UG/1
INSERT VAGINAL
Qty: 45 TABLET | Refills: 5 | Status: SHIPPED | OUTPATIENT
Start: 2023-09-14 | End: 2023-09-18 | Stop reason: SDUPTHER

## 2023-09-14 NOTE — PROGRESS NOTES
[facility] Progress Note      Chief Complaint     Annual Exam       Subjective:      History of Present Illness:  Juan Cruz is a 68 y.o. female     S/p COVID infection in 2/7/2023 and took Paxlovid with relapse and rash on face subsequent to completing the Paxlovid s/p bx c/w drug reaction    ILD: goes to cardiopulmonary rehab biweekly with excellent O2 sats;  f/u Dr. Haile, pulm - needs CT chest 12/2023 and repeat PFTs  ESR 22, CRP 2.3  45 min - 1 hour of stretching and yoga without BAKER  Currently using Breo 1 inh daily;  had second cycle of Rituximab with no more joint pain and now with enthesitis of wrists and achilles;   Walks elevation 7 on treadmill x 5 min; now feels better with less BAKER but still has dry cough    Osteoporosis: had Prolia at end of March 2023 with possible reactive allergic reaction with erythematous facial rash s/p bx confirming it was an allergic reaction    A/I: saw Dr. Carmen Varner for skin bx of face, forearms, thighs; referred to derm for bx which possibly was due to Paxlovid; used camphor and coconut oil which cleared her skin within 3 days    RA/ILD: stable on Prednisone 2.5mg po daily and Rituximab infusions every 6 mo (5/2023 then 11/2023)     Enthesitis of left wrist, ankles with left LE weakness and decreased dexterity of left fingers: sees Dr. Minh Iglesias, ortho,and will be starting PT at [facility];     Vaginal dryness: requests refill of Vagifem 10mg biweekly    Dry eyes: using Systane with some relief but has upcoming appt with ophthalmology in 10/2023  Past Medical History:  Past Medical History:   Diagnosis Date    Bruise 9/12/2022    Chronic sinusitis, unspecified     Elevated liver enzymes 9/12/2022    History of SIADH     Interstitial pulmonary disease, unspecified 7/20/2023    LLL pneumonia 12/20/2022    Mitral valve prolapse     Osteopenia     Petechiae 9/12/2022    Pulmonary fibrosis 9/12/2022    Rheumatoid arthritis involving multiple  sites     Skin rash 3/9/2023       Past Surgical History:  Past Surgical History:   Procedure Laterality Date    BREAST BIOPSY Bilateral     FUNCTIONAL ENDOSCOPIC SINUS SURGERY (FESS)      ORIF WRIST FRACTURE Right        Allergies:  Review of patient's allergies indicates:   Allergen Reactions    Methotrexate Shortness Of Breath    Gluten protein Other (See Comments)     Sensitive - not allergic    Ppd black rubber mix        Home Medications:  Current Outpatient Medications   Medication Sig Dispense Refill    acetaminophen (TYLENOL) 325 MG tablet Take 650 mg by mouth every 6 (six) hours as needed for Pain.      B-complex with vitamin C (Z-BEC OR EQUIV) tablet Take 0.5 tablets by mouth 2 (two) times a day.      calcium citrate-vitamin D3 315-200 mg (CITRACAL+D) 315-200 mg-unit per tablet Take 1 tablet by mouth 2 (two) times daily.      diclofenac sodium (VOLTAREN) 1 % Gel Apply 2 g topically 4 (four) times daily as needed.      fluticasone/vilanterol (BREO ELLIPTA INHL) Inhale 1 puff into the lungs once daily.      magnesium oxide (MAG-OX) 400 mg (241.3 mg magnesium) tablet Take 400 mg by mouth once daily.      metoprolol succinate (TOPROL-XL) 25 MG 24 hr tablet Take 1 tablet (25 mg total) by mouth once daily. 90 tablet 3    sod chlor,sod bicarb/neti pot (NEILMED NASAFLO FRANK) 1 Dose by sinus irrigation route daily as needed.      cetirizine (ZYRTEC) 10 MG tablet Take 1 tablet (10 mg total) by mouth once daily. 30 tablet 5    estradioL (VAGIFEM) 10 mcg Tab Yuvafem 10 mcg vaginal tablet   INSERT 1 TABLET VAGINALLY TWICE A WEEK 45 tablet 5    guaiFENesin (MUCINEX) 600 mg 12 hr tablet Take 1,200 mg by mouth 2 (two) times daily.      predniSONE (DELTASONE) 5 MG tablet Take 2 tablets (10 mg total) by mouth once daily. 180 tablet 1     No current facility-administered medications for this visit.        Family History:  No family history on file.    Social History:  Social History     Tobacco Use    Smoking status: Never  "   Smokeless tobacco: Never   Substance Use Topics    Alcohol use: No    Drug use: No       Review of Systems:  Pertinent positives and negatives listed in HPI. All other systems are reviewed and are negative.    Health Maintaince :   Health Maintenance Topics with due status: Not Due       Topic Last Completion Date    Colorectal Cancer Screening 10/05/2021    DEXA Scan 05/06/2022    Lipid Panel 09/19/2022    Hemoglobin A1c (Diabetic Prevention Screening) 12/22/2022    Mammogram 01/05/2023           Eye: UTD in 10/2023  Dental: UTD    Immunizations:   Cologuard 10/2021 negative  CT chest 12/2022    The 10-year ASCVD risk score (Yohana GARCIA, et al., 2019) is: 5.4%    Values used to calculate the score:      Age: 68 years      Sex: Female      Is Non- : No      Diabetic: No      Tobacco smoker: No      Systolic Blood Pressure: 114 mmHg      Is BP treated: No      HDL Cholesterol: 73 mg/dL      Total Cholesterol: 238 mg/dL      Objective:   /72 (BP Location: Left arm, Patient Position: Sitting, BP Method: Small (Manual))   Pulse 70   Temp 98.2 °F (36.8 °C) (Oral)   Resp 18   Ht 5' 2" (1.575 m)   Wt 63 kg (138 lb 14.2 oz)   LMP  (LMP Unknown)   SpO2 99%   BMI 25.40 kg/m²      Body mass index is 25.4 kg/m².       Physical Examination:  General: Alert and awake in no apparent distress  HEENT: Normocephalic and atraumatic; Tms WNL  Eyes:  PERRL; EOMi with anicteric sclera and clear conjunctivae  Mouth:  Oropharynx clear and without exudate; moist mucous membranes  Neck:   Cervical nodes not enlarged; supple; no bruits  Cardio:  Regular rate and rhythm with normal S1 and S2; no murmurs or rubs  Resp:  CTAB; respirations unlabored; no wheezes, crackles or rhonchi; intermittent dry cough throughout entire visit with O2 sat 99% on RA  Abdom: Soft, NTND with normoactive bowel sounds; negative HSM  Extrem: Warm and well-perfused with no clubbing, cyanosis or edema; left hand: opposition WNL; " decrease grasp; left hip flexor 4/5;  hips are not symmetrical and the right leg appears longer  Skin:  No rashes, lesions, or color changes  Pulses:  2+ and symmetric distally  Neuro:  AAOx3; cooperative and pleasant with no focal deficits    Laboratory:      Most Recent Data:  Lab Results   Component Value Date    WBC 10.19 02/10/2023    HGB 14.0 02/10/2023    HCT 41.2 02/10/2023     02/10/2023    CHOL 238 (H) 09/19/2022    TRIG 107 09/19/2022    HDL 73 09/19/2022    ALT 32 02/10/2023    AST 31 02/10/2023     02/10/2023    K 4.3 02/10/2023     02/10/2023    BUN 8 02/10/2023    CO2 25 02/10/2023    INR 0.9 09/09/2022    HGBA1C 5.6 12/22/2022              CBC:   WBC   Date Value Ref Range Status   02/10/2023 10.19 3.90 - 12.70 K/uL Final     Hemoglobin   Date Value Ref Range Status   02/10/2023 14.0 12.0 - 16.0 g/dL Final     Hematocrit   Date Value Ref Range Status   02/10/2023 41.2 37.0 - 48.5 % Final     Platelets   Date Value Ref Range Status   02/10/2023 305 150 - 450 K/uL Final     MCV   Date Value Ref Range Status   02/10/2023 90 82 - 98 fL Final     RDW   Date Value Ref Range Status   02/10/2023 12.4 11.5 - 14.5 % Final     BMP:   Sodium   Date Value Ref Range Status   02/10/2023 137 136 - 145 mmol/L Final     Potassium   Date Value Ref Range Status   02/10/2023 4.3 3.5 - 5.1 mmol/L Final     Comment:     Specimen slightly hemolyzed     Chloride   Date Value Ref Range Status   02/10/2023 104 95 - 110 mmol/L Final     CO2   Date Value Ref Range Status   02/10/2023 25 23 - 29 mmol/L Final     BUN   Date Value Ref Range Status   02/10/2023 8 8 - 23 mg/dL Final     Creatinine   Date Value Ref Range Status   02/10/2023 0.8 0.5 - 1.4 mg/dL Final     Glucose   Date Value Ref Range Status   02/10/2023 102 70 - 110 mg/dL Final     Calcium   Date Value Ref Range Status   02/10/2023 8.7 8.7 - 10.5 mg/dL Final     LFTs:   Total Protein   Date Value Ref Range Status   02/10/2023 7.1 6.0 - 8.4 g/dL  Final     Albumin   Date Value Ref Range Status   02/10/2023 3.5 3.5 - 5.2 g/dL Final     Total Bilirubin   Date Value Ref Range Status   02/10/2023 0.3 0.1 - 1.0 mg/dL Final     Comment:     For infants and newborns, interpretation of results should be based  on gestational age, weight and in agreement with clinical  observations.    Premature Infant recommended reference ranges:  Up to 24 hours.............<8.0 mg/dL  Up to 48 hours............<12.0 mg/dL  3-5 days..................<15.0 mg/dL  6-29 days.................<15.0 mg/dL       AST   Date Value Ref Range Status   02/10/2023 31 10 - 40 U/L Final     Alkaline Phosphatase   Date Value Ref Range Status   02/10/2023 38 (L) 55 - 135 U/L Final     ALT   Date Value Ref Range Status   02/10/2023 32 10 - 44 U/L Final     Coags:   INR   Date Value Ref Range Status   09/09/2022 0.9 0.8 - 1.2 Final     Comment:     Coumadin Therapy:  2.0 - 3.0 for INR for all indicators except mechanical heart valves  and antiphospholipid syndromes which should use 2.5 - 3.5.       FLP:      Lab Results   Component Value Date    CHOL 238 (H) 09/19/2022     Lab Results   Component Value Date    HDL 73 09/19/2022     Lab Results   Component Value Date    LDLCALC 143.6 09/19/2022     Lab Results   Component Value Date    TRIG 107 09/19/2022     Lab Results   Component Value Date    CHOLHDL 30.7 09/19/2022      DM:      Hemoglobin A1C   Date Value Ref Range Status   12/22/2022 5.6 4.0 - 5.6 % Final     Comment:     ADA Screening Guidelines:  5.7-6.4%  Consistent with prediabetes  >or=6.5%  Consistent with diabetes    High levels of fetal hemoglobin interfere with the HbA1C  assay. Heterozygous hemoglobin variants (HbS, HgC, etc)do  not significantly interfere with this assay.   However, presence of multiple variants may affect accuracy.       LDL Cholesterol   Date Value Ref Range Status   09/19/2022 143.6 63.0 - 159.0 mg/dL Final     Comment:     The National Cholesterol Education  "Program (NCEP) has set the  following guidelines (reference values) for LDL Cholesterol:  Optimal.......................<130 mg/dL  Borderline High...............130-159 mg/dL  High..........................160-189 mg/dL  Very High.....................>190 mg/dL       Creatinine   Date Value Ref Range Status   02/10/2023 0.8 0.5 - 1.4 mg/dL Final     Thyroid: No results found for: "TSH", "FREET4", "W9TTDPR", "S6OJEZW", "THYROIDAB"  Anemia: No results found for: "IRON", "TIBC", "FERRITIN", "PQHAKAGN88", "FOLATE"  Cardiac:   Troponin I   Date Value Ref Range Status   02/10/2023 <0.006 0.000 - 0.026 ng/mL Final     Comment:     The reference interval for Troponin I represents the 99th percentile   cutoff   for our facility and is consistent with 3rd generation assay   performance.       BNP   Date Value Ref Range Status   02/10/2023 <10 0 - 99 pg/mL Final     Comment:     Values of less than 100 pg/ml are consistent with non-CHF populations.     Urinalysis:   Color, UA   Date Value Ref Range Status   02/10/2023 Yellow Yellow, Straw, Krystal Final     Specific Gravity, UA   Date Value Ref Range Status   02/10/2023 <1.005 (A) 1.005 - 1.030 Final     Nitrite, UA   Date Value Ref Range Status   02/10/2023 Negative Negative Final     Ketones, UA   Date Value Ref Range Status   02/10/2023 Negative Negative Final     Urobilinogen, UA   Date Value Ref Range Status   02/10/2023 Negative <2.0 EU/dL Final       Other Results:  EKG (my interpretation):     Radiology:  X-Ray Hand Complete Bilateral  Narrative: EXAMINATION:  XR HAND COMPLETE 3 VIEWS BILATERAL    CLINICAL HISTORY:  . Rheumatoid arthritis with rheumatoid factor of multiple sites without organ or systems involvement    TECHNIQUE:  PA, lateral, and oblique views of both hands were performed.    COMPARISON:  Two thousand twenty-two    FINDINGS:  Post ORIF changes distal right radius, nonunion fracture ulnar styloid process, early DJD 1st metacarpal-carpal distal navicular " carpal and IP joints with juxta-articular calcification at right 3rd dorsal D IP joint.    Left hand early DJD 1st metacarpal-carpal, distal navicular carpal, IP joints with juxta-articular remote calcification medial 2nd D IP joint.  Impression: No inflammatory or erosive arthritis changes confirmed.    Electronically signed by: Moises Giron MD  Date:    05/30/2023  Time:    13:25  X-Ray Foot Complete Bilateral  Narrative: EXAMINATION:  XR FOOT COMPLETE 3 VIEW BILATERAL    CLINICAL HISTORY:  Rheumatoid arthritis with rheumatoid factor of multiple sites without organ or systems involvement    TECHNIQUE:  AP, lateral, and oblique views of both feet were performed.    COMPARISON:  None    FINDINGS:  Osteopenia, mild DJD 1st MTP joints, soft tissue prominence at 5th MTP joint, heel spurs small and small spurs at Achilles tendon insertion site posterior calcaneus.  Small remote ossicle medial dorsal base right 1st meta tarsal.  Impression: No erosive inflammatory arthritis changes confirmed.  Additional findings above.    Electronically signed by: Moises Giron MD  Date:    05/30/2023  Time:    13:21          Assessment/Plan     Juan Cruz is a 68 y.o. female with:  1. Preventative health care  Assessment & Plan:  Get COVID and Flu vaccines   [facility]        2. Other osteoporosis without current pathological fracture  Overview:  Indications: History of Fracture (Adult), Family history of osteoporosis, Post-menopause, , Height Loss   Comparison: DEXA scan 7/3/2019 - done 5/2022  Treatments: Exercises 3 or more times a week, Vitamin D supplementation, Calcium supplementation    A DEXA scan was performed on the lumbar spine and both hips.    The average trabecular bone mineral density of the lumbar spine from L2-L4 was 0.880 g/cm2. This represents a T-score of -2.7 and a Z-score of  -0.9. Previous T score of -2.8 on 7/3/2019    The average trabecular bone mineral density  measured at the proximal femurs was 0.778 g/cm2. This represents a T-score of -1.8 and Z-score of -0.4. Previous T score of -1.7 on 7/3/2019    FRAX Result (10 year probability of fracture):  Major osteoporotic fracture: 11%  Hip fracture: 2.1%        Assessment & Plan:  Did not tolerate Prolia - rash; did not tolerate fosamax  Prefers to cont weight-bearing and Vit D       3. Long-term use of immunosuppressant medication  Assessment & Plan:  Gets Rituximab infusions every 6 mo 11/2023 (last in 5/2023)   Needs Prevnar 20 due to immunosupression and received Pneumovax 23 on 9/16/2023      4. Dry eyes  Assessment & Plan:  Keep appt with eye MD in 10/2023  Cont Systane qid       5. Sinus tachycardia  Assessment & Plan:  Stable on Metoprolol ER 25mg daily with HR 70s at rest      6. Gastroesophageal reflux disease without esophagitis  Assessment & Plan:  Minimize use of PPI and change to Pepcid       7. Vaginal dryness, menopausal  Assessment & Plan:  Refill Vagifem 10mg po biweekly    Orders:  -     estradioL (VAGIFEM) 10 mcg Tab; Yuvafem 10 mcg vaginal tablet   INSERT 1 TABLET VAGINALLY TWICE A WEEK  Dispense: 45 tablet; Refill: 5    8. Dyspnea on exertion  Assessment & Plan:  Markedly improved with cardiopulm rehab biweekly  Cont yoga, treadmill, stretching exercises, climbing up and down stairs       9. Interstitial lung disease  Overview:  4/2022 Spirometry is normal. Lung volumes show mild-moderate restriction is present. Airway mechanics are abnormal showing increased airway resistance and decreased conductance. DLCO is moderately decreased    CT chest 12/09/2022: Diffuse subpleural reticular opacity and nodular pleural thickening.  Minimal perihilar bronchiectasis.  No evidence of honeycombing.  No focal opacity.  No pleural thickening.  Dx: Diffuse nonspecific interstitial lung disease, possible UIP pattern    Had Lung toxicity with MTX, intolerance of aza, and hepatotoxicity to OFEV.      Assessment &  Plan:  F/u Dr. Haile with repeat annual CT of chest  Cont Rituximab infusions every 6 mo with next in 11/2023  Cont Breo 1 inh daily      10. Rheumatoid arthritis involving multiple sites with positive rheumatoid factor  Overview:  11/15/2022:  ESR 36 (48),  CRP 19 (14), WBC 15.26  2/2023: ESR 22, CRP 2.3      Assessment & Plan:  Inflammatory markers improved on Rituximab every 6 mo and prednisone 2.5mg po daily   -pt now with enthesitis       11. Enthesitis related arthritis  Assessment & Plan:  F/u Dr. Iglesias  Cont PT at [facility] spent a total of 60 minutes on the day of the visit.This includes face to face time and non-face to face time preparing to see the patient (eg, review of tests), obtaining and/or reviewing separately obtained history, documenting clinical information in the electronic or other health record, independently interpreting results and communicating results to the patient/family/caregiver, or care coordinator.   Code Status:     Gianna Carpenter MD

## 2023-09-14 NOTE — ASSESSMENT & PLAN NOTE
Did not tolerate Prolia - rash; did not tolerate fosamax  Prefers to cont weight-bearing and Vit D

## 2023-09-14 NOTE — ASSESSMENT & PLAN NOTE
Gets Rituximab infusions every 6 mo 11/2023 (last in 5/2023)   Needs Prevnar 20 due to immunosupression and received Pneumovax 23 on 9/16/2023

## 2023-09-15 ENCOUNTER — TELEPHONE (OUTPATIENT)
Dept: PRIMARY CARE CLINIC | Facility: CLINIC | Age: 68
End: 2023-09-15
Payer: MEDICARE

## 2023-09-15 DIAGNOSIS — N95.1 VAGINAL DRYNESS, MENOPAUSAL: ICD-10-CM

## 2023-09-15 NOTE — TELEPHONE ENCOUNTER
----- Message from Irene Valentin sent at 9/15/2023 10:53 AM CDT -----  Contact: self [phone]  Requesting an RX refill or new RX.  Is this a refill or new RX: refill  RX name and strength (copy/paste from chart):  estradioL (VAGIFEM) 10 mcg Tab  Is this a 30 day or 90 day RX:   Pharmacy name and phone # (copy/paste from chart):    [facility] [address] [place], [place] - [address]  [address] [place] [address]  Phone: [phone] Fax: [phone]    The doctors have asked that we provide their patients with the following 2 reminders -- prescription refills can take up to 72 hours, and a friendly reminder that in the future you can use your [facility] account to request refills: yes    Per pt pharm state did not receive request. Pt also requesting lab order for quantiferon gold test    Please call and advise

## 2023-09-15 NOTE — ASSESSMENT & PLAN NOTE
F/u Dr. Haile with repeat annual CT of chest  Cont Rituximab infusions every 6 mo with next in 11/2023  Cont Breo 1 inh daily

## 2023-09-15 NOTE — ASSESSMENT & PLAN NOTE
Markedly improved with cardiopulm rehab biweekly  Cont yoga, treadmill, stretching exercises, climbing up and down stairs

## 2023-09-18 DIAGNOSIS — N95.1 VAGINAL DRYNESS, MENOPAUSAL: ICD-10-CM

## 2023-09-18 RX ORDER — ESTRADIOL 10 UG/1
INSERT VAGINAL
Qty: 45 TABLET | Refills: 5 | OUTPATIENT
Start: 2023-09-18 | End: 2023-09-20 | Stop reason: SDUPTHER

## 2023-09-18 NOTE — TELEPHONE ENCOUNTER
----- Message from Leonila Wagner sent at 9/18/2023 10:02 AM CDT -----  Contact: [phone] @ patient  Requesting an RX refill or new RX.estradioL (VAGIFEM) 10 mcg Tab  Is this a refill or new RX: refill  RX name and strength (copy/paste from chart):    Is this a 30 day or 90 day RX: [facility] - [place], [place] - [address]  Pharmacy name and phone # (copy/paste from chart):    The doctors have asked that we provide their patients with the following 2 reminders -- prescription refills can take up to 72 hours, and a friendly reminder that in the future you can use your [facility] account to request refills:

## 2023-09-19 ENCOUNTER — HOSPITAL ENCOUNTER (OUTPATIENT)
Dept: PULMONOLOGY | Facility: OTHER | Age: 68
Discharge: HOME | End: 2023-09-19
Payer: MEDICARE

## 2023-09-19 ENCOUNTER — TELEPHONE (OUTPATIENT)
Dept: RHEUMATOLOGY | Facility: CLINIC | Age: 68
End: 2023-09-19
Payer: MEDICARE

## 2023-09-19 PROCEDURE — G0238 OTH RESP PROC, INDIV: HCPCS | Mod: TXP

## 2023-09-19 PROCEDURE — G0237 THERAPEUTIC PROCD STRG ENDUR: HCPCS | Mod: TXP

## 2023-09-20 ENCOUNTER — TELEPHONE (OUTPATIENT)
Dept: PRIMARY CARE CLINIC | Facility: CLINIC | Age: 68
End: 2023-09-20
Payer: MEDICARE

## 2023-09-20 DIAGNOSIS — Z00.00 PREVENTATIVE HEALTH CARE: Primary | ICD-10-CM

## 2023-09-20 DIAGNOSIS — N95.1 VAGINAL DRYNESS, MENOPAUSAL: ICD-10-CM

## 2023-09-20 RX ORDER — ESTRADIOL 10 UG/1
INSERT VAGINAL
Qty: 45 TABLET | Refills: 5 | Status: SHIPPED | OUTPATIENT
Start: 2023-09-20 | End: 2023-09-23 | Stop reason: SDUPTHER

## 2023-09-21 ENCOUNTER — HOSPITAL ENCOUNTER (OUTPATIENT)
Dept: PULMONOLOGY | Facility: OTHER | Age: 68
Discharge: HOME | End: 2023-09-21
Payer: MEDICARE

## 2023-09-21 ENCOUNTER — LAB VISIT (OUTPATIENT)
Dept: LAB | Facility: HOSPITAL | Age: 68
End: 2023-09-21
Attending: INTERNAL MEDICINE
Payer: MEDICARE

## 2023-09-21 DIAGNOSIS — Z00.00 PREVENTATIVE HEALTH CARE: ICD-10-CM

## 2023-09-21 DIAGNOSIS — N95.1 VAGINAL DRYNESS, MENOPAUSAL: ICD-10-CM

## 2023-09-21 PROCEDURE — 86480 TB TEST CELL IMMUN MEASURE: CPT | Mod: TXP | Performed by: INTERNAL MEDICINE

## 2023-09-21 PROCEDURE — G0237 THERAPEUTIC PROCD STRG ENDUR: HCPCS | Mod: TXP

## 2023-09-21 PROCEDURE — G0238 OTH RESP PROC, INDIV: HCPCS | Mod: NTX

## 2023-09-21 RX ORDER — ESTRADIOL 10 UG/1
INSERT VAGINAL
Qty: 45 TABLET | Refills: 5 | OUTPATIENT
Start: 2023-09-21

## 2023-09-21 NOTE — TELEPHONE ENCOUNTER
Pt was unable to  medication  Pharmacy told her that they did not receive the prescription  Prescription has been pended below if applicable

## 2023-09-21 NOTE — TELEPHONE ENCOUNTER
----- Message from Roybn Choi sent at 9/21/2023 11:23 AM CDT -----  Contact: [facility]   Requesting an RX refill or new RX.  Is this a refill or new RX: new  RX name and strength (copy/paste from chart):  estradioL (VAGIFEM) 10 mcg Tab  Is this a 30 day or 90 day RX:   Pharmacy name and phone # (copy/paste from chart):    [facility] [address] [place], [place] - [address] [place] [address]  Phone: [phone] Fax: [phone]

## 2023-09-23 DIAGNOSIS — N95.1 VAGINAL DRYNESS, MENOPAUSAL: Primary | ICD-10-CM

## 2023-09-23 LAB
GAMMA INTERFERON BACKGROUND BLD IA-ACNC: 0.02 IU/ML
M TB IFN-G CD4+ BCKGRND COR BLD-ACNC: -0 IU/ML
M TB IFN-G CD4+ BCKGRND COR BLD-ACNC: 0.01 IU/ML
MITOGEN IGNF BCKGRD COR BLD-ACNC: 2.27 IU/ML
TB GOLD PLUS: NEGATIVE

## 2023-09-23 RX ORDER — ESTRADIOL 10 UG/1
INSERT VAGINAL
Qty: 45 TABLET | Refills: 5 | OUTPATIENT
Start: 2023-09-23

## 2023-09-26 ENCOUNTER — HOSPITAL ENCOUNTER (OUTPATIENT)
Dept: PULMONOLOGY | Facility: OTHER | Age: 68
Discharge: HOME | End: 2023-09-26
Payer: MEDICARE

## 2023-09-26 PROCEDURE — G0238 OTH RESP PROC, INDIV: HCPCS | Mod: NTX

## 2023-09-26 PROCEDURE — G0237 THERAPEUTIC PROCD STRG ENDUR: HCPCS | Mod: TXP

## 2023-10-05 DIAGNOSIS — J84.9 INTERSTITIAL LUNG DISEASE: Primary | ICD-10-CM

## 2023-10-05 DIAGNOSIS — M05.79 RHEUMATOID ARTHRITIS INVOLVING MULTIPLE SITES WITH POSITIVE RHEUMATOID FACTOR: ICD-10-CM

## 2023-10-10 ENCOUNTER — HOSPITAL ENCOUNTER (OUTPATIENT)
Dept: PULMONOLOGY | Facility: OTHER | Age: 68
Discharge: HOME | End: 2023-10-10
Payer: MEDICARE

## 2023-10-10 PROCEDURE — G0238 OTH RESP PROC, INDIV: HCPCS | Mod: NTX

## 2023-10-10 PROCEDURE — G0237 THERAPEUTIC PROCD STRG ENDUR: HCPCS | Mod: TXP

## 2023-10-12 ENCOUNTER — HOSPITAL ENCOUNTER (OUTPATIENT)
Dept: PULMONOLOGY | Facility: OTHER | Age: 68
Discharge: HOME | End: 2023-10-12
Payer: MEDICARE

## 2023-10-12 PROCEDURE — G0237 THERAPEUTIC PROCD STRG ENDUR: HCPCS | Mod: NTX

## 2023-10-17 ENCOUNTER — HOSPITAL ENCOUNTER (OUTPATIENT)
Dept: PULMONOLOGY | Facility: OTHER | Age: 68
Discharge: HOME | End: 2023-10-17
Payer: MEDICARE

## 2023-10-17 PROCEDURE — G0237 THERAPEUTIC PROCD STRG ENDUR: HCPCS | Mod: TXP

## 2023-10-17 PROCEDURE — G0238 OTH RESP PROC, INDIV: HCPCS | Mod: NTX

## 2023-10-24 ENCOUNTER — TELEPHONE (OUTPATIENT)
Dept: PRIMARY CARE CLINIC | Facility: CLINIC | Age: 68
End: 2023-10-24
Payer: MEDICARE

## 2023-10-25 ENCOUNTER — HOSPITAL ENCOUNTER (OUTPATIENT)
Dept: PULMONOLOGY | Facility: CLINIC | Age: 68
Discharge: HOME | End: 2023-10-25
Payer: MEDICARE

## 2023-10-25 ENCOUNTER — OFFICE VISIT (OUTPATIENT)
Dept: TRANSPLANT | Facility: CLINIC | Age: 68
End: 2023-10-25
Payer: MEDICARE

## 2023-10-25 VITALS
SYSTOLIC BLOOD PRESSURE: 116 MMHG | DIASTOLIC BLOOD PRESSURE: 71 MMHG | HEIGHT: 62 IN | HEART RATE: 78 BPM | TEMPERATURE: 99 F | BODY MASS INDEX: 25.68 KG/M2 | WEIGHT: 139.56 LBS | OXYGEN SATURATION: 99 % | RESPIRATION RATE: 18 BRPM

## 2023-10-25 DIAGNOSIS — M05.79 RHEUMATOID ARTHRITIS INVOLVING MULTIPLE SITES WITH POSITIVE RHEUMATOID FACTOR: ICD-10-CM

## 2023-10-25 DIAGNOSIS — J84.9 INTERSTITIAL LUNG DISEASE: ICD-10-CM

## 2023-10-25 DIAGNOSIS — K21.9 GASTROESOPHAGEAL REFLUX DISEASE WITHOUT ESOPHAGITIS: ICD-10-CM

## 2023-10-25 DIAGNOSIS — J30.2 SEASONAL ALLERGIC RHINITIS, UNSPECIFIED TRIGGER: ICD-10-CM

## 2023-10-25 DIAGNOSIS — J84.9 ILD (INTERSTITIAL LUNG DISEASE): Primary | ICD-10-CM

## 2023-10-25 PROCEDURE — 3078F PR MOST RECENT DIASTOLIC BLOOD PRESSURE < 80 MM HG: ICD-10-PCS | Mod: CPTII,NTX,S$GLB, | Performed by: INTERNAL MEDICINE

## 2023-10-25 PROCEDURE — 94729 PR C02/MEMBANE DIFFUSE CAPACITY: ICD-10-PCS | Mod: NTX,S$GLB,, | Performed by: INTERNAL MEDICINE

## 2023-10-25 PROCEDURE — 3074F PR MOST RECENT SYSTOLIC BLOOD PRESSURE < 130 MM HG: ICD-10-PCS | Mod: CPTII,NTX,S$GLB, | Performed by: INTERNAL MEDICINE

## 2023-10-25 PROCEDURE — 1159F PR MEDICATION LIST DOCUMENTED IN MEDICAL RECORD: ICD-10-PCS | Mod: CPTII,NTX,S$GLB, | Performed by: INTERNAL MEDICINE

## 2023-10-25 PROCEDURE — 1160F RVW MEDS BY RX/DR IN RCRD: CPT | Mod: CPTII,NTX,S$GLB, | Performed by: INTERNAL MEDICINE

## 2023-10-25 PROCEDURE — 94727 GAS DIL/WSHOT DETER LNG VOL: CPT | Mod: NTX,S$GLB,, | Performed by: INTERNAL MEDICINE

## 2023-10-25 PROCEDURE — 3288F PR FALLS RISK ASSESSMENT DOCUMENTED: ICD-10-PCS | Mod: CPTII,NTX,S$GLB, | Performed by: INTERNAL MEDICINE

## 2023-10-25 PROCEDURE — 3074F SYST BP LT 130 MM HG: CPT | Mod: CPTII,NTX,S$GLB, | Performed by: INTERNAL MEDICINE

## 2023-10-25 PROCEDURE — 1126F PR PAIN SEVERITY QUANTIFIED, NO PAIN PRESENT: ICD-10-PCS | Mod: CPTII,NTX,S$GLB, | Performed by: INTERNAL MEDICINE

## 2023-10-25 PROCEDURE — 99214 OFFICE O/P EST MOD 30 MIN: CPT | Mod: NTX,S$GLB,, | Performed by: INTERNAL MEDICINE

## 2023-10-25 PROCEDURE — 1126F AMNT PAIN NOTED NONE PRSNT: CPT | Mod: CPTII,NTX,S$GLB, | Performed by: INTERNAL MEDICINE

## 2023-10-25 PROCEDURE — 99999 PR PBB SHADOW E&M-EST. PATIENT-LVL IV: ICD-10-PCS | Mod: PBBFAC,TXP,, | Performed by: INTERNAL MEDICINE

## 2023-10-25 PROCEDURE — 94010 BREATHING CAPACITY TEST: CPT | Mod: NTX,S$GLB,, | Performed by: INTERNAL MEDICINE

## 2023-10-25 PROCEDURE — 3008F BODY MASS INDEX DOCD: CPT | Mod: CPTII,NTX,S$GLB, | Performed by: INTERNAL MEDICINE

## 2023-10-25 PROCEDURE — 94010 BREATHING CAPACITY TEST: ICD-10-PCS | Mod: NTX,S$GLB,, | Performed by: INTERNAL MEDICINE

## 2023-10-25 PROCEDURE — 99999 PR PBB SHADOW E&M-EST. PATIENT-LVL IV: CPT | Mod: PBBFAC,TXP,, | Performed by: INTERNAL MEDICINE

## 2023-10-25 PROCEDURE — 94727 PR PULM FUNCTION TEST BY GAS: ICD-10-PCS | Mod: NTX,S$GLB,, | Performed by: INTERNAL MEDICINE

## 2023-10-25 PROCEDURE — 1101F PR PT FALLS ASSESS DOC 0-1 FALLS W/OUT INJ PAST YR: ICD-10-PCS | Mod: CPTII,NTX,S$GLB, | Performed by: INTERNAL MEDICINE

## 2023-10-25 PROCEDURE — 3008F PR BODY MASS INDEX (BMI) DOCUMENTED: ICD-10-PCS | Mod: CPTII,NTX,S$GLB, | Performed by: INTERNAL MEDICINE

## 2023-10-25 PROCEDURE — 94729 DIFFUSING CAPACITY: CPT | Mod: NTX,S$GLB,, | Performed by: INTERNAL MEDICINE

## 2023-10-25 PROCEDURE — 3288F FALL RISK ASSESSMENT DOCD: CPT | Mod: CPTII,NTX,S$GLB, | Performed by: INTERNAL MEDICINE

## 2023-10-25 PROCEDURE — 1160F PR REVIEW ALL MEDS BY PRESCRIBER/CLIN PHARMACIST DOCUMENTED: ICD-10-PCS | Mod: CPTII,NTX,S$GLB, | Performed by: INTERNAL MEDICINE

## 2023-10-25 PROCEDURE — 99214 PR OFFICE/OUTPT VISIT, EST, LEVL IV, 30-39 MIN: ICD-10-PCS | Mod: NTX,S$GLB,, | Performed by: INTERNAL MEDICINE

## 2023-10-25 PROCEDURE — 1159F MED LIST DOCD IN RCRD: CPT | Mod: CPTII,NTX,S$GLB, | Performed by: INTERNAL MEDICINE

## 2023-10-25 PROCEDURE — 3078F DIAST BP <80 MM HG: CPT | Mod: CPTII,NTX,S$GLB, | Performed by: INTERNAL MEDICINE

## 2023-10-25 PROCEDURE — 1101F PT FALLS ASSESS-DOCD LE1/YR: CPT | Mod: CPTII,NTX,S$GLB, | Performed by: INTERNAL MEDICINE

## 2023-10-25 RX ORDER — CYCLOSPORINE 0.5 MG/ML
1 EMULSION OPHTHALMIC DAILY
COMMUNITY
Start: 2023-10-21

## 2023-10-25 NOTE — LETTER
October 26, 2023        Ibis Holcomb  [address]  [facility] [address]  Phone: [phone]  Fax: [phone]             [facility] Trans[facility] 1st Fl  [address]  [facility] [address]  Phone: [phone]   Patient: Juan Cruz   MR Number: [id number]   YOB: 1955   Date of Visit: 10/25/2023       Dear Dr. Ibis Holcomb    Thank you for referring Juan Cruz to me for evaluation. Attached you will find relevant portions of my assessment and plan of care.    If you have questions, please do not hesitate to call me. I look forward to following Juan Cruz along with you.    Sincerely,    Barbara Haile, DO    Enclosure    If you would like to receive this communication electronically, please contact [email or web address] or [phone] to request [employer] Link access.    [employer] Link is a tool which provides read-only access to select patient information with whom you have a relationship. Its easy to use and provides real time access to review your patients record including encounter summaries, notes, results, and demographic information.    If you feel you have received this communication in error or would no longer like to receive these types of communications, please e-mail [email or web address]

## 2023-10-26 ENCOUNTER — HOSPITAL ENCOUNTER (OUTPATIENT)
Dept: PULMONOLOGY | Facility: OTHER | Age: 68
Discharge: HOME | End: 2023-10-26
Payer: MEDICARE

## 2023-10-26 PROCEDURE — G0238 OTH RESP PROC, INDIV: HCPCS | Mod: TXP

## 2023-10-26 PROCEDURE — G0237 THERAPEUTIC PROCD STRG ENDUR: HCPCS | Mod: NTX

## 2023-10-26 NOTE — PROGRESS NOTES
ADVANCED LUNG DISEASE FOLLOW-UP                                                                                                                                          Reason for Visit:  RA-ILD    Referring Physician: Ibis Holcomb    History of Present Illness: Juan Cruz is a 68 y.o. female who is on 0L of oxygen.  She is on no assisted ventilation.  Her New [place] Heart Association Class is II and a Karnofsky score of 80% - Normal activity with effort: some symptoms of disease. She is not diabetic.     She presents today for follow-up of RA-ILD.  Currently on rituxan.  Lung toxicity with MTX, intolerance of aza, and hepatotoxicity to OFEV.  Overall, doing very well.  Cough improved following COVID and at baseline.  Dyspnea improved.  Notes conversational dyspnea but states that it is better than previous.  Seasonal allergies controlled with regards to rhinitis symptoms.  Had a previous rash which was thought to be related to a drug reaction.  Has since resolved.  She feels well and is planning for a 2 month trip to [place] [place] at the beginning of the year.  She is active in exercise and does an online breathing exercise course which she feels has made a big difference.  Overall, doing very well.        Past Medical History:   Diagnosis Date    Bruise 9/12/2022    Chronic sinusitis, unspecified     Elevated liver enzymes 9/12/2022    History of SIADH     Interstitial pulmonary disease, unspecified 7/20/2023    LLL pneumonia 12/20/2022    Mitral valve prolapse     Osteopenia     Petechiae 9/12/2022    Pulmonary fibrosis 9/12/2022    Rheumatoid arthritis involving multiple sites     Skin rash 3/9/2023       Past Surgical History:   Procedure Laterality Date    BREAST BIOPSY Bilateral     FUNCTIONAL ENDOSCOPIC SINUS SURGERY (FESS)      ORIF WRIST FRACTURE Right        Allergies: Methotrexate, Gluten protein, Paxlovid [nirmatrelvir-ritonavir], Ppd black rubber mix, and Prolia [denosumab]    Current Outpatient  Medications   Medication Sig    B-complex with vitamin C (Z-BEC OR EQUIV) tablet Take 1 tablet by mouth 2 (two) times a day.    calcium citrate-vitamin D3 315-200 mg (CITRACAL+D) 315-200 mg-unit per tablet Take 1 tablet by mouth 2 (two) times daily.    cetirizine (ZYRTEC) 10 MG tablet Take 1 tablet (10 mg total) by mouth once daily.    estradioL (VAGIFEM) 10 mcg Tab Yuvafem 10 mcg vaginal tablet   INSERT 1 TABLET VAGINALLY TWICE A WEEK    guaiFENesin (MUCINEX) 600 mg 12 hr tablet Take 1,200 mg by mouth 2 (two) times daily.    magnesium oxide (MAG-OX) 400 mg (241.3 mg magnesium) tablet Take 400 mg by mouth once daily.    metoprolol succinate (TOPROL-XL) 25 MG 24 hr tablet Take 1 tablet (25 mg total) by mouth once daily.    predniSONE (DELTASONE) 5 MG tablet Take 2 tablets (10 mg total) by mouth once daily. (Patient taking differently: Take 2.5 mg by mouth once daily.)    RESTASIS 0.05 % ophthalmic emulsion Place 1 drop into both eyes once daily. Has not started    sod chlor,sod bicarb/neti pot (NEILMED NASAFLO FRANK) 1 Dose by sinus irrigation route daily as needed.    acetaminophen (TYLENOL) 325 MG tablet Take 650 mg by mouth every 6 (six) hours as needed for Pain.    diclofenac sodium (VOLTAREN) 1 % Gel Apply 2 g topically 4 (four) times daily as needed.    fluticasone/vilanterol (BREO ELLIPTA INHL) Inhale 1 puff into the lungs once daily.     No current facility-administered medications for this visit.       Immunization History   Administered Date(s) Administered    COVID-19, MRNA, LN-S, PF (Pfizer) (Purple Cap) 12/31/2020, 01/25/2021    Pneumococcal Conjugate - 20 Valent 09/21/2023    Pneumococcal Polysaccharide - 23 Valent 09/16/2022    Yellow Fever 08/31/2017    Zoster Recombinant 01/18/2020, 08/19/2020     Family History:  History reviewed. No pertinent family history.  Social History     Substance and Sexual Activity   Alcohol Use No      Social History     Substance and Sexual Activity   Drug Use No     "  Social History     Socioeconomic History    Marital status:    Tobacco Use    Smoking status: Never    Smokeless tobacco: Never   Substance and Sexual Activity    Alcohol use: No    Drug use: No    Sexual activity: Not Currently     Review of Systems   Constitutional:  Negative for chills, diaphoresis, fever, malaise/fatigue and weight loss.   HENT:  Negative for congestion, ear discharge, ear pain, hearing loss, nosebleeds, sinus pain, sore throat and tinnitus.    Eyes:  Negative for blurred vision, double vision, photophobia, pain, discharge and redness.   Respiratory:  Positive for cough (stable) and shortness of breath (conversational dyspnea; improving). Negative for hemoptysis, sputum production, wheezing and stridor.    Cardiovascular:  Negative for chest pain, palpitations, orthopnea, claudication, leg swelling and PND.   Gastrointestinal:  Negative for abdominal pain, blood in stool, constipation, diarrhea, heartburn, melena, nausea and vomiting.   Genitourinary:  Negative for dysuria, flank pain, frequency, hematuria and urgency.   Musculoskeletal:  Negative for back pain, falls, joint pain, myalgias and neck pain.   Skin:  Negative for itching and rash.   Neurological:  Negative for dizziness, tingling, tremors, sensory change, speech change, focal weakness, seizures, loss of consciousness, weakness and headaches.   Endo/Heme/Allergies:  Negative for environmental allergies and polydipsia. Bruises/bleeds easily.   Psychiatric/Behavioral:  Negative for depression, hallucinations, memory loss, substance abuse and suicidal ideas. The patient is not nervous/anxious and does not have insomnia.      Vitals  /71 (BP Location: Right arm, Patient Position: Sitting, BP Method: Medium (Automatic))   Pulse 78   Temp 98.5 °F (36.9 °C) (Oral)   Resp 18   Ht 5' 2" (1.575 m)   Wt 63.3 kg (139 lb 8.8 oz)   LMP  (LMP Unknown)   SpO2 99% Comment: room air  BMI 25.52 kg/m²   Physical Exam  Vitals and " nursing note reviewed.   Constitutional:       General: She is not in acute distress.     Appearance: She is well-developed. She is not diaphoretic.   HENT:      Head: Normocephalic and atraumatic.      Nose: No rhinorrhea.      Mouth/Throat:      Mouth: Mucous membranes are moist.      Pharynx: No oropharyngeal exudate.   Eyes:      General: No scleral icterus.     Conjunctiva/sclera: Conjunctivae normal.      Pupils: Pupils are equal, round, and reactive to light.   Neck:      Thyroid: No thyromegaly.      Vascular: No JVD.      Trachea: No tracheal deviation.   Cardiovascular:      Rate and Rhythm: Normal rate and regular rhythm.      Pulses: Normal pulses.      Heart sounds: Normal heart sounds. No murmur heard.     No friction rub. No gallop.   Pulmonary:      Effort: Pulmonary effort is normal. No respiratory distress.      Breath sounds: Rales (fine dry crackles bibasilar) present. No wheezing.   Abdominal:      General: Abdomen is flat. Bowel sounds are normal. There is no distension.      Palpations: Abdomen is soft.      Tenderness: There is no abdominal tenderness.   Musculoskeletal:         General: No deformity. Normal range of motion.      Cervical back: Normal range of motion and neck supple.   Skin:     General: Skin is warm and dry.      Capillary Refill: Capillary refill takes less than 2 seconds.      Coloration: Skin is not pale.      Findings: No bruising, erythema or rash.   Neurological:      General: No focal deficit present.      Mental Status: She is alert and oriented to person, place, and time. Mental status is at baseline.      Cranial Nerves: No cranial nerve deficit.   Psychiatric:         Mood and Affect: Mood normal.         Behavior: Behavior normal.         Thought Content: Thought content normal.         Judgment: Judgment normal.         Labs:  No visits with results within 7 Day(s) from this visit.   Latest known visit with results is:   Lab Visit on 09/21/2023   Component Date  Value    NIL 09/21/2023 0.78635     TB1 - Nil 09/21/2023 -0.004     TB2 - Nil 09/21/2023 0.012     Mitogen - Nil 09/21/2023 2.273     TB Gold Plus 09/21/2023 Negative            10/25/2023     1:41 PM 4/17/2023     1:19 PM 12/8/2022    10:24 AM 9/12/2022     6:00 PM 6/7/2022    11:54 AM   Pulmonary Function Tests   FVC 2.16 liters 2.02 liters 1.86 liters 1.82 liters 2.18 liters   FEV1 1.85 liters 1.76 liters 1.64 liters 1.62 liters 1.9 liters   TLC (liters) 2.77 liters 2.94 liters 2.88 liters  2.79 liters   DLCO (ml/mmHg sec) 8.32 ml/mmHg sec 9.22 ml/mmHg sec 8.62 ml/mmHg sec  8.17 ml/mmHg sec   FVC% 92 86 78 77 92   FEV1% 98 93 86 85 99   FEF 25-75 3 2.9 2.93 2.69 3.24   FEF 25-75% 166 159 160 146 175   TLC% 60 63 63  61   RV 0.6 0.92 1.01  0.61   RV% 31 48 53  32   DLCO% 41 45 42  40         7/20/2023     9:33 AM 4/17/2023    11:38 AM 12/22/2022    10:05 AM 12/8/2022     9:08 AM 9/12/2022    11:43 AM 8/5/2022    10:29 AM 6/7/2022     8:30 AM   6MW   6MWT Status completed without stopping completed without stopping completed without stopping completed without stopping completed without stopping completed without stopping completed without stopping   Patient Reported No complaints Dyspnea No complaints Dyspnea Dyspnea No complaints Dyspnea   Was O2 used? No No No No No     6MW Distance walked (feet) 1500 feet 1500 feet 1300 feet 1332 feet 1300 feet 1034 feet 1210 feet   Distance walked (meters) 457.2 meters 457.2 meters 396.24 meters 405.99 meters 396.24 meters 315.16 meters 368.81 meters   Did patient stop?  No No No No No No   Oxygen Saturation 98 % 98 % 98 % 99 % 98 % 96 % 98 %   Supplemental Oxygen Room Air Room Air Room Air Room Air Room Air Room Air Room Air   Heart Rate 95 bpm 85 bpm 78 bpm 81 bpm 89 bpm 82 bpm 89 bpm   Blood Pressure 109/62 113/64 118/77 145/78 120/74 106/65 129/70   Rigoberto Dyspnea Rating  light nothing at all light moderate light somewhat heavy nothing at all   Oxygen Saturation 94 % 94 % 95  % 98 % 95 % 91 % 94 %   Supplemental Oxygen  Room Air Room Air Room Air Room Air Room Air Room Air   Heart Rate 126 bpm 125 bpm 113 bpm 106 bpm 113 bpm 110 bpm 116 bpm   Blood Pressure 114/62 132/67 130/86 129/66 131/77 118/79 132/80   Rigoberto Dyspnea Rating  moderate somewhat heavy somewhat heavy somewhat heavy somewhat heavy very heavy light   Recovery Time (seconds) 180 seconds 149 seconds 180 seconds 123 seconds 72 seconds 180 seconds 115 seconds   Oxygen Saturation 97 % 98 % 99 % 98 % 98 % 96 % 98 %   Supplemental Oxygen Room Air Room Air Room Air Room Air Room Air Room Air Room Air   Heart Rate 101 bpm 106 bpm 83 bpm 94 bpm 97 bpm 86 bpm 93 bpm       Imaging:  Results for orders placed during the hospital encounter of 07/27/22    CT Chest Without Contrast    Narrative  EXAMINATION:  CT CHEST WITHOUT CONTRAST    CLINICAL HISTORY:  Interstitial lung disease; Cough, unspecified    TECHNIQUE:  Low dose axial images, sagittal and coronal reformations were obtained from the thoracic inlet to the lung bases.  Intravenous contrast was not administered.    COMPARISON:  CT thorax 06/28/2021    FINDINGS:  Base of Neck: Imaged portions of the base of the neck are grossly unremarkable.    Aorta: 3-branch vessel configuration of a left-sided type 3 aortic arch.  No hyperdense crescent sign, aneurysmal degeneration, periaortic fat stranding or periaortic fluid.    Heart: Heart is normal in size.  No significant pericardial thickening. No appreciable coronary artery calcifications.    Pulmonary vasculature: Pulmonary arteries are not enlarged and distribute normally.  There are four pulmonary veins.    Axilla/Alexandra/Mediastinum: Prominent mediastinal lymph nodes however, no rachael axillary or mediastinal lymphadenopathy.  Evaluation of hilar lymph nodes is limited without IV contrast.    Airways: Trachea and mainstem bronchi are patent.  Symmetric mild central bronchiectasis present.    Lungs/Pleura: Significant interval  progression of previously noted subpleural predominant reticular and ground-glass airspace opacities associated with bilateral lower lobe volume loss, architectural distortion, symmetric mild central bronchiectasis and apicobasal gradient.  No pleural effusions.  No pneumothorax.    Esophagus: Small hiatal hernia, not significantly changed.  Otherwise, normal in course and caliber however, evaluation is limited given the lack of oral contrast.    Soft tissues: Imaged soft tissues are grossly unremarkable.    Osseous structures: Diffuse osseous demineralization and mild multilevel degenerative changes of the imaged spine.  No acute displaced fracture, dislocation or suspicious lytic/blastic osseous lesion.    Upper Abdomen: Imaged portions of the upper abdomen are grossly unremarkable.    Impression  1. Significant interval progression of previously noted subpleural predominant reticular and ground-glass airspace opacities associated with bilateral lower lobe volume loss, architectural distortion, symmetric mild central bronchiectasis and apicobasal gradient suggestive of UIP pattern of interstitial lung disease which may reflect IPF.  Additional diagnostic considerations include NSIP, asbestosis, fibrotic hypersensitivity pneumonitis connective tissue associated lung disease and drug induced lung disease amongst other etiologies.      Electronically signed by: Braulio Haro  Date:    07/27/2022  Time:    16:11    Cardiodiagnostics:  6/28/2021:  The estimated ejection fraction is 55%.  Normal systolic function.  Normal right ventricular size with normal right ventricular systolic function.  Mild to moderate left atrial enlargement.  Mild right atrial enlargement.  Normal central venous pressure (3 mmHg).  The estimated PA systolic pressure is 18 mmHg.       Assessment:  1. ILD (interstitial lung disease)    2. Rheumatoid arthritis involving multiple sites with positive rheumatoid factor    3. Seasonal allergic  rhinitis, unspecified trigger    4. Gastroesophageal reflux disease without esophagitis      Plan:   1. Followed for RA-ILD.  FVC and DLCO are stable.  Failed MTX and AZA.  Currently on rituxan infusions; tolerating well.  Follows with ENT/derm/allergy; takes daily antihistamine, and uses NETI pot regularly.  GI work-up negative.  Not on PPI any longer.  Consider H2 blocker if she develops symptoms or unexplained worsening of her lung function.  Continue with pulmonary rehab or exercise and breathing exercises.  Acute hepatitis 2/2 OFEV so not a candidate for anti fibrotic therapy.        2. Follow-up with Rheum.  Currently on prednisone and rituxan.    3. Continue with allergy regimen.    4. Continue to monitor for symptoms of GERD.      5. Follow-up in 6 months with PFTs and 6MWT (Schedule mid April 2024 or later)    Barbara Haile D.O.  Pulmonary/Critical Care and Lung Transplantation  [facility] [place]

## 2023-11-01 ENCOUNTER — HOSPITAL ENCOUNTER (OUTPATIENT)
Dept: CARDIOLOGY | Facility: HOSPITAL | Age: 68
Discharge: HOME | End: 2023-11-01
Attending: INTERNAL MEDICINE
Payer: MEDICARE

## 2023-11-01 ENCOUNTER — PATIENT MESSAGE (OUTPATIENT)
Dept: TRANSPLANT | Facility: CLINIC | Age: 68
End: 2023-11-01
Payer: MEDICARE

## 2023-11-01 VITALS
BODY MASS INDEX: 25.58 KG/M2 | DIASTOLIC BLOOD PRESSURE: 84 MMHG | HEART RATE: 70 BPM | WEIGHT: 139 LBS | SYSTOLIC BLOOD PRESSURE: 128 MMHG | HEIGHT: 62 IN

## 2023-11-01 DIAGNOSIS — J84.9 ILD (INTERSTITIAL LUNG DISEASE): ICD-10-CM

## 2023-11-01 LAB
ASCENDING AORTA: 2.8 CM
AV INDEX (PROSTH): 0.91
AV MEAN GRADIENT: 3 MMHG
AV PEAK GRADIENT: 7 MMHG
AV VALVE AREA BY VELOCITY RATIO: 2.78 CM²
AV VALVE AREA: 2.9 CM²
AV VELOCITY RATIO: 0.88
BSA FOR ECHO PROCEDURE: 1.66 M2
CV ECHO LV RWT: 0.4 CM
DOP CALC AO PEAK VEL: 1.28 M/S
DOP CALC AO VTI: 21.66 CM
DOP CALC LVOT AREA: 3.2 CM2
DOP CALC LVOT DIAMETER: 2.01 CM
DOP CALC LVOT PEAK VEL: 1.12 M/S
DOP CALC LVOT STROKE VOLUME: 62.76 CM3
DOP CALCLVOT PEAK VEL VTI: 19.79 CM
E WAVE DECELERATION TIME: 297.42 MSEC
E/A RATIO: 0.81
E/E' RATIO: 9.67 M/S
ECHO LV POSTERIOR WALL: 0.7 CM (ref 0.6–1.1)
FRACTIONAL SHORTENING: 47 % (ref 28–44)
INTERVENTRICULAR SEPTUM: 0.8 CM (ref 0.6–1.1)
IVRT: 131.3 MSEC
LA MAJOR: 3.67 CM
LA MINOR: 3.48 CM
LA WIDTH: 2.77 CM
LEFT ATRIUM SIZE: 2.83 CM
LEFT ATRIUM VOLUME INDEX MOD: 10.7 ML/M2
LEFT ATRIUM VOLUME INDEX: 14.5 ML/M2
LEFT ATRIUM VOLUME MOD: 17.55 CM3
LEFT ATRIUM VOLUME: 23.8 CM3
LEFT INTERNAL DIMENSION IN SYSTOLE: 1.85 CM (ref 2.1–4)
LEFT VENTRICLE DIASTOLIC VOLUME INDEX: 29.15 ML/M2
LEFT VENTRICLE DIASTOLIC VOLUME: 47.81 ML
LEFT VENTRICLE MASS INDEX: 42 G/M2
LEFT VENTRICLE SYSTOLIC VOLUME INDEX: 6.3 ML/M2
LEFT VENTRICLE SYSTOLIC VOLUME: 10.39 ML
LEFT VENTRICULAR INTERNAL DIMENSION IN DIASTOLE: 3.5 CM (ref 3.5–6)
LEFT VENTRICULAR MASS: 68.93 G
LV LATERAL E/E' RATIO: 8.29 M/S
LV SEPTAL E/E' RATIO: 11.6 M/S
MV A" WAVE DURATION": 10.28 MSEC
MV PEAK A VEL: 0.72 M/S
MV PEAK E VEL: 0.58 M/S
PISA TR MAX VEL: 2.2 M/S
PULM VEIN S/D RATIO: 2.23
PV PEAK D VEL: 0.26 M/S
PV PEAK S VEL: 0.58 M/S
RA MAJOR: 3.2 CM
RA PRESSURE ESTIMATED: 3 MMHG
RA WIDTH: 2.41 CM
RIGHT VENTRICULAR END-DIASTOLIC DIMENSION: 2.87 CM
RV TB RVSP: 5 MMHG
SINUS: 2.69 CM
STJ: 2.6 CM
TDI LATERAL: 0.07 M/S
TDI SEPTAL: 0.05 M/S
TDI: 0.06 M/S
TR MAX PG: 19 MMHG
TRICUSPID ANNULAR PLANE SYSTOLIC EXCURSION: 1.76 CM
TV REST PULMONARY ARTERY PRESSURE: 22 MMHG
Z-SCORE OF LEFT VENTRICULAR DIMENSION IN END DIASTOLE: -2.77
Z-SCORE OF LEFT VENTRICULAR DIMENSION IN END SYSTOLE: -3.45

## 2023-11-01 PROCEDURE — 93306 TTE W/DOPPLER COMPLETE: CPT | Mod: 26,NTX,, | Performed by: INTERNAL MEDICINE

## 2023-11-01 PROCEDURE — 93306 ECHO (CUPID ONLY): ICD-10-PCS | Mod: 26,NTX,, | Performed by: INTERNAL MEDICINE

## 2023-11-01 PROCEDURE — 93306 TTE W/DOPPLER COMPLETE: CPT | Mod: TXP

## 2023-11-02 ENCOUNTER — HOSPITAL ENCOUNTER (OUTPATIENT)
Dept: PULMONOLOGY | Facility: OTHER | Age: 68
Discharge: HOME | End: 2023-11-02
Payer: MEDICARE

## 2023-11-02 ENCOUNTER — PATIENT MESSAGE (OUTPATIENT)
Dept: TRANSPLANT | Facility: CLINIC | Age: 68
End: 2023-11-02
Payer: MEDICARE

## 2023-11-02 ENCOUNTER — TELEPHONE (OUTPATIENT)
Dept: TRANSPLANT | Facility: CLINIC | Age: 68
End: 2023-11-02
Payer: MEDICARE

## 2023-11-02 ENCOUNTER — TELEPHONE (OUTPATIENT)
Dept: PRIMARY CARE CLINIC | Facility: CLINIC | Age: 68
End: 2023-11-02
Payer: MEDICARE

## 2023-11-02 PROCEDURE — 27000221 HC OXYGEN, UP TO 24 HOURS: Mod: TXP

## 2023-11-02 PROCEDURE — G0238 OTH RESP PROC, INDIV: HCPCS | Mod: NTX

## 2023-11-02 PROCEDURE — G0237 THERAPEUTIC PROCD STRG ENDUR: HCPCS | Mod: TXP

## 2023-11-02 NOTE — TELEPHONE ENCOUNTER
Patient notified via [facility] message.   ----- Message from Barbara Haile DO sent at 11/2/2023  9:01 AM CDT -----  Nothing further from my standpoint.  Wanted to check to see if she has evidence of pulmonary HTN which she does not.  She can review with her cardiologist but report looks very good to me.  Thanks    ----- Message -----  From: Josiane Lind  Sent: 11/1/2023   6:40 PM CDT  To: Barbara Haile DO    Please advise if any further action needed. Patient did report she is established with a cardiologist (in Dr. Zohreh Storm).

## 2023-11-03 DIAGNOSIS — R93.89 ABNORMAL X-RAY: ICD-10-CM

## 2023-11-03 DIAGNOSIS — R05.9 COUGH, UNSPECIFIED TYPE: ICD-10-CM

## 2023-11-03 DIAGNOSIS — J84.9 INTERSTITIAL LUNG DISEASE: ICD-10-CM

## 2023-11-03 RX ORDER — FLUTICASONE FUROATE AND VILANTEROL TRIFENATATE 200; 25 UG/1; UG/1
POWDER RESPIRATORY (INHALATION)
Qty: 180 EACH | Refills: 0 | Status: SHIPPED | OUTPATIENT
Start: 2023-11-03

## 2023-11-04 DIAGNOSIS — J84.9 CHRONIC INTERSTITIAL LUNG DISEASE: Primary | ICD-10-CM

## 2023-11-09 ENCOUNTER — PATIENT MESSAGE (OUTPATIENT)
Dept: RHEUMATOLOGY | Facility: CLINIC | Age: 68
End: 2023-11-09
Payer: MEDICARE

## 2023-11-09 ENCOUNTER — TELEPHONE (OUTPATIENT)
Dept: RHEUMATOLOGY | Facility: CLINIC | Age: 68
End: 2023-11-09
Payer: MEDICARE

## 2023-11-09 RX ORDER — MEPERIDINE HYDROCHLORIDE 50 MG/ML
50 INJECTION, SOLUTION INTRAMUSCULAR; INTRAVENOUS; SUBCUTANEOUS ONCE
Refills: 0 | Status: CANCELLED | OUTPATIENT
Start: 2023-11-09 | End: 2023-11-09

## 2023-11-10 ENCOUNTER — INFUSION (OUTPATIENT)
Dept: INFUSION THERAPY | Facility: HOSPITAL | Age: 68
End: 2023-11-10
Payer: MEDICARE

## 2023-11-10 VITALS
HEART RATE: 71 BPM | HEIGHT: 62 IN | TEMPERATURE: 98 F | WEIGHT: 138.88 LBS | BODY MASS INDEX: 25.55 KG/M2 | OXYGEN SATURATION: 100 % | SYSTOLIC BLOOD PRESSURE: 119 MMHG | DIASTOLIC BLOOD PRESSURE: 68 MMHG | RESPIRATION RATE: 18 BRPM

## 2023-11-10 DIAGNOSIS — J84.9 INTERSTITIAL LUNG DISEASE: ICD-10-CM

## 2023-11-10 DIAGNOSIS — J84.10 PULMONARY FIBROSIS: ICD-10-CM

## 2023-11-10 DIAGNOSIS — M19.90 ARTHRITIS: Primary | ICD-10-CM

## 2023-11-10 DIAGNOSIS — M05.79 RHEUMATOID ARTHRITIS INVOLVING MULTIPLE SITES WITH POSITIVE RHEUMATOID FACTOR: ICD-10-CM

## 2023-11-10 PROCEDURE — 63600175 PHARM REV CODE 636 W HCPCS: Performed by: INTERNAL MEDICINE

## 2023-11-10 PROCEDURE — 96415 CHEMO IV INFUSION ADDL HR: CPT

## 2023-11-10 PROCEDURE — 96375 TX/PRO/DX INJ NEW DRUG ADDON: CPT

## 2023-11-10 PROCEDURE — 96413 CHEMO IV INFUSION 1 HR: CPT

## 2023-11-10 PROCEDURE — 25000003 PHARM REV CODE 250: Performed by: INTERNAL MEDICINE

## 2023-11-10 PROCEDURE — 96367 TX/PROPH/DG ADDL SEQ IV INF: CPT

## 2023-11-10 RX ORDER — MEPERIDINE HYDROCHLORIDE 50 MG/ML
25 INJECTION INTRAMUSCULAR; INTRAVENOUS; SUBCUTANEOUS
Status: CANCELLED
Start: 2023-11-24 | End: 2023-11-25

## 2023-11-10 RX ORDER — ACETAMINOPHEN 325 MG/1
650 TABLET ORAL
Status: COMPLETED | OUTPATIENT
Start: 2023-11-10 | End: 2023-11-10

## 2023-11-10 RX ORDER — HEPARIN 100 UNIT/ML
500 SYRINGE INTRAVENOUS
Status: DISCONTINUED | OUTPATIENT
Start: 2023-11-10 | End: 2023-11-10 | Stop reason: HOSPADM

## 2023-11-10 RX ORDER — MEPERIDINE HYDROCHLORIDE 50 MG/ML
25 INJECTION INTRAMUSCULAR; INTRAVENOUS; SUBCUTANEOUS
Status: DISCONTINUED | OUTPATIENT
Start: 2023-11-10 | End: 2023-11-10 | Stop reason: HOSPADM

## 2023-11-10 RX ORDER — ACETAMINOPHEN 325 MG/1
650 TABLET ORAL
Status: CANCELLED | OUTPATIENT
Start: 2023-11-22

## 2023-11-10 RX ORDER — FAMOTIDINE 10 MG/ML
20 INJECTION INTRAVENOUS
Status: COMPLETED | OUTPATIENT
Start: 2023-11-10 | End: 2023-11-10

## 2023-11-10 RX ORDER — HEPARIN 100 UNIT/ML
500 SYRINGE INTRAVENOUS
Status: CANCELLED | OUTPATIENT
Start: 2023-11-22

## 2023-11-10 RX ORDER — FAMOTIDINE 10 MG/ML
20 INJECTION INTRAVENOUS
Status: CANCELLED | OUTPATIENT
Start: 2023-11-22

## 2023-11-10 RX ADMIN — DEXTROSE 100 MG: 50 INJECTION, SOLUTION INTRAVENOUS at 08:11

## 2023-11-10 RX ADMIN — ACETAMINOPHEN 650 MG: 325 TABLET ORAL at 08:11

## 2023-11-10 RX ADMIN — DIPHENHYDRAMINE HYDROCHLORIDE 25 MG: 50 INJECTION, SOLUTION INTRAMUSCULAR; INTRAVENOUS at 08:11

## 2023-11-10 RX ADMIN — FAMOTIDINE 20 MG: 10 INJECTION, SOLUTION INTRAVENOUS at 08:11

## 2023-11-10 RX ADMIN — RITUXIMAB-ABBS 1000 MG: 10 INJECTION, SOLUTION INTRAVENOUS at 09:11

## 2023-11-10 NOTE — PLAN OF CARE
Pt received Truxima today and tolerated well, without complications. VSS throughout infusion. Educated patient about Truxima (indications, side effects, possible reactions, chemotherapy precautions) and verbalized understanding. PIV positive for blood return, saline locked and removed prior to DC, catheter tip intact. Pt DC with no distress noted, ambulated off of unit, w/ fx, w/o event, pleased. Did have RLS w/ Benadryl, please double infusion time.

## 2023-11-24 ENCOUNTER — INFUSION (OUTPATIENT)
Dept: INFUSION THERAPY | Facility: HOSPITAL | Age: 68
End: 2023-11-24
Payer: MEDICARE

## 2023-11-24 VITALS
DIASTOLIC BLOOD PRESSURE: 68 MMHG | OXYGEN SATURATION: 100 % | RESPIRATION RATE: 18 BRPM | HEIGHT: 62 IN | WEIGHT: 138.88 LBS | SYSTOLIC BLOOD PRESSURE: 116 MMHG | TEMPERATURE: 98 F | HEART RATE: 83 BPM | BODY MASS INDEX: 25.55 KG/M2

## 2023-11-24 DIAGNOSIS — J84.9 INTERSTITIAL LUNG DISEASE: Primary | ICD-10-CM

## 2023-11-24 DIAGNOSIS — J84.10 PULMONARY FIBROSIS: ICD-10-CM

## 2023-11-24 DIAGNOSIS — M05.79 RHEUMATOID ARTHRITIS INVOLVING MULTIPLE SITES WITH POSITIVE RHEUMATOID FACTOR: ICD-10-CM

## 2023-11-24 PROCEDURE — 96413 CHEMO IV INFUSION 1 HR: CPT

## 2023-11-24 PROCEDURE — 96367 TX/PROPH/DG ADDL SEQ IV INF: CPT

## 2023-11-24 PROCEDURE — 63600175 PHARM REV CODE 636 W HCPCS: Performed by: INTERNAL MEDICINE

## 2023-11-24 PROCEDURE — 96375 TX/PRO/DX INJ NEW DRUG ADDON: CPT

## 2023-11-24 PROCEDURE — 96415 CHEMO IV INFUSION ADDL HR: CPT

## 2023-11-24 PROCEDURE — 25000003 PHARM REV CODE 250: Performed by: INTERNAL MEDICINE

## 2023-11-24 RX ORDER — FAMOTIDINE 10 MG/ML
20 INJECTION INTRAVENOUS
Status: CANCELLED | OUTPATIENT
Start: 2023-11-24

## 2023-11-24 RX ORDER — ACETAMINOPHEN 325 MG/1
650 TABLET ORAL
Status: COMPLETED | OUTPATIENT
Start: 2023-11-24 | End: 2023-11-24

## 2023-11-24 RX ORDER — FAMOTIDINE 10 MG/ML
20 INJECTION INTRAVENOUS
Status: COMPLETED | OUTPATIENT
Start: 2023-11-24 | End: 2023-11-24

## 2023-11-24 RX ORDER — HEPARIN 100 UNIT/ML
500 SYRINGE INTRAVENOUS
OUTPATIENT
Start: 2023-11-24

## 2023-11-24 RX ORDER — ACETAMINOPHEN 325 MG/1
650 TABLET ORAL
Status: CANCELLED | OUTPATIENT
Start: 2023-11-24

## 2023-11-24 RX ORDER — MEPERIDINE HYDROCHLORIDE 50 MG/ML
25 INJECTION INTRAMUSCULAR; INTRAVENOUS; SUBCUTANEOUS
Start: 2023-11-24 | End: 2023-11-25

## 2023-11-24 RX ADMIN — DIPHENHYDRAMINE HYDROCHLORIDE 25 MG: 50 INJECTION, SOLUTION INTRAMUSCULAR; INTRAVENOUS at 08:11

## 2023-11-24 RX ADMIN — SODIUM CHLORIDE: 0.9 INJECTION, SOLUTION INTRAVENOUS at 08:11

## 2023-11-24 RX ADMIN — RITUXIMAB-ABBS 1000 MG: 10 INJECTION, SOLUTION INTRAVENOUS at 10:11

## 2023-11-24 RX ADMIN — DEXTROSE 100 MG: 50 INJECTION, SOLUTION INTRAVENOUS at 09:11

## 2023-11-24 RX ADMIN — ACETAMINOPHEN 650 MG: 325 TABLET ORAL at 08:11

## 2023-11-24 RX ADMIN — FAMOTIDINE 20 MG: 10 INJECTION, SOLUTION INTRAVENOUS at 10:11

## 2023-11-24 NOTE — PLAN OF CARE
Pt received Rituximab today and tolerated well, without complications. VSS throughout infusion. Educated patient about Rituximab (indications, side effects, possible reactions, chemotherapy precautions) and verbalized understanding. PIV positive for blood return, saline locked and removed prior to DC, catheter tip intact. Pt DC with no distress noted, ambulated off of unit, w/ fx, w/o event, pleased. Elongated Benadryl infusion time to 40'... still felt jittery and uneasy like RLS.

## 2023-11-29 ENCOUNTER — PATIENT MESSAGE (OUTPATIENT)
Dept: RHEUMATOLOGY | Facility: CLINIC | Age: 68
End: 2023-11-29
Payer: MEDICARE

## 2023-12-04 ENCOUNTER — PATIENT MESSAGE (OUTPATIENT)
Dept: TRANSPLANT | Facility: CLINIC | Age: 68
End: 2023-12-04
Payer: MEDICARE

## 2023-12-10 ENCOUNTER — HOSPITAL ENCOUNTER (EMERGENCY)
Facility: HOSPITAL | Age: 68
Discharge: HOME | End: 2023-12-10
Attending: STUDENT IN AN ORGANIZED HEALTH CARE EDUCATION/TRAINING PROGRAM
Payer: MEDICARE

## 2023-12-10 VITALS
RESPIRATION RATE: 22 BRPM | BODY MASS INDEX: 25.54 KG/M2 | OXYGEN SATURATION: 97 % | TEMPERATURE: 99 F | HEART RATE: 76 BPM | HEIGHT: 62 IN | WEIGHT: 138.81 LBS | DIASTOLIC BLOOD PRESSURE: 60 MMHG | SYSTOLIC BLOOD PRESSURE: 101 MMHG

## 2023-12-10 DIAGNOSIS — R53.1 WEAKNESS: ICD-10-CM

## 2023-12-10 DIAGNOSIS — J10.1 INFLUENZA A: Primary | ICD-10-CM

## 2023-12-10 LAB
ALBUMIN SERPL BCP-MCNC: 3.3 G/DL (ref 3.5–5.2)
ALP SERPL-CCNC: 47 U/L (ref 55–135)
ALT SERPL W/O P-5'-P-CCNC: 17 U/L (ref 10–44)
ANION GAP SERPL CALC-SCNC: 9 MMOL/L (ref 8–16)
AST SERPL-CCNC: 25 U/L (ref 10–40)
BASOPHILS # BLD AUTO: 0.02 K/UL (ref 0–0.2)
BASOPHILS NFR BLD: 0.3 % (ref 0–1.9)
BILIRUB SERPL-MCNC: 0.6 MG/DL (ref 0.1–1)
BILIRUB UR QL STRIP: NEGATIVE
BUN SERPL-MCNC: 7 MG/DL (ref 8–23)
CALCIUM SERPL-MCNC: 8.5 MG/DL (ref 8.7–10.5)
CHLORIDE SERPL-SCNC: 101 MMOL/L (ref 95–110)
CLARITY UR: CLEAR
CO2 SERPL-SCNC: 20 MMOL/L (ref 23–29)
COLOR UR: COLORLESS
CREAT SERPL-MCNC: 0.7 MG/DL (ref 0.5–1.4)
CTP QC/QA: YES
DIFFERENTIAL METHOD: ABNORMAL
EOSINOPHIL # BLD AUTO: 0 K/UL (ref 0–0.5)
EOSINOPHIL NFR BLD: 0.3 % (ref 0–8)
ERYTHROCYTE [DISTWIDTH] IN BLOOD BY AUTOMATED COUNT: 11.6 % (ref 11.5–14.5)
EST. GFR  (NO RACE VARIABLE): >60 ML/MIN/1.73 M^2
GLUCOSE SERPL-MCNC: 106 MG/DL (ref 70–110)
GLUCOSE UR QL STRIP: NEGATIVE
HCT VFR BLD AUTO: 34.6 % (ref 37–48.5)
HGB BLD-MCNC: 12.1 G/DL (ref 12–16)
HGB UR QL STRIP: NEGATIVE
IMM GRANULOCYTES # BLD AUTO: 0.02 K/UL (ref 0–0.04)
IMM GRANULOCYTES NFR BLD AUTO: 0.3 % (ref 0–0.5)
KETONES UR QL STRIP: ABNORMAL
LEUKOCYTE ESTERASE UR QL STRIP: NEGATIVE
LIPASE SERPL-CCNC: 20 U/L (ref 4–60)
LYMPHOCYTES # BLD AUTO: 1.1 K/UL (ref 1–4.8)
LYMPHOCYTES NFR BLD: 14.5 % (ref 18–48)
MCH RBC QN AUTO: 30.3 PG (ref 27–31)
MCHC RBC AUTO-ENTMCNC: 35 G/DL (ref 32–36)
MCV RBC AUTO: 87 FL (ref 82–98)
MOLECULAR STREP A: NEGATIVE
MONOCYTES # BLD AUTO: 1.2 K/UL (ref 0.3–1)
MONOCYTES NFR BLD: 16.4 % (ref 4–15)
NEUTROPHILS # BLD AUTO: 5.2 K/UL (ref 1.8–7.7)
NEUTROPHILS NFR BLD: 68.2 % (ref 38–73)
NITRITE UR QL STRIP: NEGATIVE
NRBC BLD-RTO: 0 /100 WBC
PH UR STRIP: 6 [PH] (ref 5–8)
PLATELET # BLD AUTO: 224 K/UL (ref 150–450)
PMV BLD AUTO: 9.4 FL (ref 9.2–12.9)
POC MOLECULAR INFLUENZA A AGN: POSITIVE
POC MOLECULAR INFLUENZA B AGN: NEGATIVE
POTASSIUM SERPL-SCNC: 3.8 MMOL/L (ref 3.5–5.1)
PROT SERPL-MCNC: 6.3 G/DL (ref 6–8.4)
PROT UR QL STRIP: NEGATIVE
RBC # BLD AUTO: 3.99 M/UL (ref 4–5.4)
SARS-COV-2 RDRP RESP QL NAA+PROBE: NEGATIVE
SODIUM SERPL-SCNC: 130 MMOL/L (ref 136–145)
SP GR UR STRIP: <1.005 (ref 1–1.03)
URN SPEC COLLECT METH UR: ABNORMAL
UROBILINOGEN UR STRIP-ACNC: NEGATIVE EU/DL
WBC # BLD AUTO: 7.54 K/UL (ref 3.9–12.7)

## 2023-12-10 PROCEDURE — 99285 EMERGENCY DEPT VISIT HI MDM: CPT | Mod: 25,NTX

## 2023-12-10 PROCEDURE — 93010 EKG 12-LEAD: ICD-10-PCS | Mod: NTX,,, | Performed by: INTERNAL MEDICINE

## 2023-12-10 PROCEDURE — 87635 SARS-COV-2 COVID-19 AMP PRB: CPT | Mod: NTX | Performed by: EMERGENCY MEDICINE

## 2023-12-10 PROCEDURE — 85025 COMPLETE CBC W/AUTO DIFF WBC: CPT | Mod: NTX | Performed by: STUDENT IN AN ORGANIZED HEALTH CARE EDUCATION/TRAINING PROGRAM

## 2023-12-10 PROCEDURE — 87502 INFLUENZA DNA AMP PROBE: CPT | Mod: NTX

## 2023-12-10 PROCEDURE — 80053 COMPREHEN METABOLIC PANEL: CPT | Mod: NTX | Performed by: STUDENT IN AN ORGANIZED HEALTH CARE EDUCATION/TRAINING PROGRAM

## 2023-12-10 PROCEDURE — 81003 URINALYSIS AUTO W/O SCOPE: CPT | Mod: NTX | Performed by: STUDENT IN AN ORGANIZED HEALTH CARE EDUCATION/TRAINING PROGRAM

## 2023-12-10 PROCEDURE — 87651 STREP A DNA AMP PROBE: CPT | Mod: NTX

## 2023-12-10 PROCEDURE — 96374 THER/PROPH/DIAG INJ IV PUSH: CPT | Mod: NTX

## 2023-12-10 PROCEDURE — 93010 ELECTROCARDIOGRAM REPORT: CPT | Mod: NTX,,, | Performed by: INTERNAL MEDICINE

## 2023-12-10 PROCEDURE — 96361 HYDRATE IV INFUSION ADD-ON: CPT | Mod: NTX

## 2023-12-10 PROCEDURE — 63600175 PHARM REV CODE 636 W HCPCS: Mod: NTX | Performed by: STUDENT IN AN ORGANIZED HEALTH CARE EDUCATION/TRAINING PROGRAM

## 2023-12-10 PROCEDURE — 93005 ELECTROCARDIOGRAM TRACING: CPT | Mod: NTX

## 2023-12-10 PROCEDURE — 25000003 PHARM REV CODE 250: Mod: NTX | Performed by: NURSE PRACTITIONER

## 2023-12-10 PROCEDURE — 83690 ASSAY OF LIPASE: CPT | Mod: NTX | Performed by: STUDENT IN AN ORGANIZED HEALTH CARE EDUCATION/TRAINING PROGRAM

## 2023-12-10 RX ORDER — ONDANSETRON 4 MG/1
4 TABLET, ORALLY DISINTEGRATING ORAL EVERY 6 HOURS PRN
Qty: 12 TABLET | Refills: 0 | Status: SHIPPED | OUTPATIENT
Start: 2023-12-10 | End: 2023-12-13

## 2023-12-10 RX ORDER — ONDANSETRON 2 MG/ML
4 INJECTION INTRAMUSCULAR; INTRAVENOUS
Status: COMPLETED | OUTPATIENT
Start: 2023-12-10 | End: 2023-12-10

## 2023-12-10 RX ORDER — ACETAMINOPHEN 500 MG
1000 TABLET ORAL
Status: COMPLETED | OUTPATIENT
Start: 2023-12-10 | End: 2023-12-10

## 2023-12-10 RX ADMIN — ONDANSETRON 4 MG: 2 INJECTION INTRAMUSCULAR; INTRAVENOUS at 07:12

## 2023-12-10 RX ADMIN — SODIUM CHLORIDE, POTASSIUM CHLORIDE, SODIUM LACTATE AND CALCIUM CHLORIDE 1000 ML: 600; 310; 30; 20 INJECTION, SOLUTION INTRAVENOUS at 07:12

## 2023-12-10 RX ADMIN — ACETAMINOPHEN 1000 MG: 500 TABLET ORAL at 05:12

## 2023-12-10 NOTE — ED TRIAGE NOTES
Pt presents to ED with complaint of flu like symptoms x2 weeks. Pt complains of cough, fever, generalized weakness, lightheadedness, HA, sore throat, cough and pain to LLQ. Pt states that she vomited earlier today. Pt denies chest pain or Sob.

## 2023-12-11 NOTE — DISCHARGE INSTRUCTIONS
Thank you for coming to our Emergency Department today. It is important to remember that some problems or medical conditions are difficult to diagnose and may not be found during your Emergency Department visit.     Be sure to follow up with your primary care doctor and review all labs/imaging/tests that were performed during your ER visit with them. Some labs/tests may be outside of the normal range and require non-emergent follow-up and further investigation to help diagnose/exclude/prevent complications or other potentially serious medical conditions that were not addressed during your ER visit.    If you do not have a primary care doctor, you may contact the one listed on your discharge paperwork or you may also call the [facility] Appointment Desk at [phone] to schedule an appointment and establish care with one. It is important to your health that you have a primary care doctor.    Please take all medications as directed. All medications may potentially have side-effects and it is impossible to predict which medications may give you side-effects or what side-effects (if any) they will give you.. If you feel that you are having a negative effect or side-effect of any medication you should immediately stop taking them and seek medical attention. If you feel that you are having a life-threatening reaction call 911.    Return to the ER with any questions/concerns, new/concerning symptoms, worsening or failure to improve.     Do not drive, swim, climb to height, take a bath, operate heavy machinery, drink alcohol or take potentially sedating medications, sign any legal documents or make any important decisions for 24 hours if you have received any pain medications, sedatives or mood altering drugs during your ER visit or within 24 hours of taking them if they have been prescribed to you.     You can find additional resources for Dentists, hearing aids, durable medical equipment, low cost pharmacies and  other resources at [email or web address].org    BELOW THIS LINE ONLY APPLIES IF YOU HAVE A COVID TEST PENDING OR IF YOU HAVE BEEN DIAGNOSED WITH COVID:  Please access [facility] to review the results of your test. Until the results of your COVID test return, you should isolate yourself so as not to potentially spread illness to others.   If your COVID test returns positive, you should isolate yourself so as not to spread illness to others. After five full days, if you are feeling better and you have not had fever for 24 hours, you can return to your typical daily activities, but you must wear a mask around others for an additional 5 days.   If your COVID test returns negative and you are either unvaccinated or more than six months out from your two-dose vaccine and are not yet boosted, you should still quarantine for 5 full days followed by strict mask use for an additional 5 full days.   If your COVID test returns negative and you have received your 2-dose initial vaccine as well as a booster, you should continue strict mask use for 10 full days after the exposure.  For all those exposed, best practice includes a test at day 5 after the exposure. This can be a home test or a test through one of the many testing centers throughout our community.   Masking is always advised to limit the spread of COVID. Cdc.gov is an excellent site to obtain the latest up to date recommendations regarding COVID and COVID testing.     CDC Testing and Quarantine Guidelines for patients with exposure to a known-positive COVID-19 person:  A close exposure is defined as anyone who has had an exposure (masked or unmasked) to a known COVID -19 positive person within 6 feet of someone for a cumulative total of 15 minutes or more over a 24-hour period.   Vaccinated and/or if you recently had a positive covid test within 90 days do NOT need to quarantine after contact with someone who had COVID-19 unless you develop symptoms.   Fully vaccinated  people who have not had a positive test within 90 days, should get tested 3-5 days after their exposure, even if they don't have symptoms and wear a mask indoors in public for 14 days following exposure or until their test result is negative.      Unvaccinated and/or NOT had a positive test within 90 days and meet close exposure  You are required by CDC guidelines to quarantine for at least 5 days from time of exposure followed by 5 days of strict masking. It is recommended, but not required to test after 5 days, unless you develop symptoms, in which case you should test at that time.  If you get tested after 5 days and your test is positive, your 5 day period of isolation starts the day of the positive test.    If your exposure does not meet the above definition, you can return to your normal daily activities to include social distancing, wearing a mask and frequent handwashing.      Here is a link to guidance from the CDC:  https://www.cdc.gov/media/releases/2021/s1227-isolation-quarantine-guidance.html      [place] Dept Of Health Testing Sites:  https://ldh.la.gov/page/3934      [facility] website with testing locations and guidance:  [email or web address]

## 2023-12-11 NOTE — ED PROVIDER NOTES
Encounter Date: 12/10/2023    SCRIBE #1 NOTE: I, Gary Villegas, am scribing for, and in the presence of,  Rosaura Leonard DO.       History     Chief Complaint   Patient presents with    COVID-19 Concerns     67 yo fem to triage for cough, fever, weakness, lightheadedness, HA, sore throat, and sharp pain to LLQ after vomiting. NAD, AAOx4    Vomiting    Weakness     Juan Cruz is a 68 y.o. female with a PMHx of Pulmonary fibrosis, rheumatoid arthritis, GERD, SIADH, who presents to the ED for evaluation of cough beginning 2 days ago. Patient reports complaints of a cough for 2 days, noting associated fever, chills, nausea, vomiting since yesterday. She also reports decreased appetite, and 2 episodes of LLQ sharp abdominal pain. She endorses attempting treatment with robitussin and mucinex with no immediate relief noted. She also notes previous steroid compliance for 3 years, noting it was discontinued on 12/01/23. She also endorses she underwent 2 infusions, one in November 10th, and one on November 24th. No medications taken PTA. No alleviating or exacerbating factors noted. Denies Cp, SOB, leg swelling, diarrhea, or other associated symptoms. She endorses her  recently returned from a trip from [place] on November 24th with URI-like symptoms.     The history is provided by the patient. No  was used.     Review of patient's allergies indicates:   Allergen Reactions    Methotrexate Shortness Of Breath    Gluten protein Other (See Comments)     Sensitive - not allergic    Paxlovid [nirmatrelvir-ritonavir] Other (See Comments)     Trunk / chest / legs peeling after paxlovid    Ppd black rubber mix     Prolia [denosumab] Other (See Comments)     Rash to arms/trunk     Past Medical History:   Diagnosis Date    Bruise 9/12/2022    Chronic sinusitis, unspecified     Elevated liver enzymes 9/12/2022    History of SIADH     Interstitial pulmonary disease, unspecified 7/20/2023    LLL  pneumonia 12/20/2022    Mitral valve prolapse     Osteopenia     Petechiae 9/12/2022    Pulmonary fibrosis 9/12/2022    Rheumatoid arthritis involving multiple sites     Skin rash 3/9/2023     Past Surgical History:   Procedure Laterality Date    BREAST BIOPSY Bilateral     FUNCTIONAL ENDOSCOPIC SINUS SURGERY (FESS)      ORIF WRIST FRACTURE Right      No family history on file.  Social History     Tobacco Use    Smoking status: Never    Smokeless tobacco: Never   Substance Use Topics    Alcohol use: No    Drug use: No     Review of Systems   Constitutional:  Positive for appetite change, chills and fever.   HENT:  Negative for congestion and trouble swallowing.    Eyes:  Negative for visual disturbance.   Respiratory:  Positive for cough. Negative for shortness of breath.    Cardiovascular:  Negative for chest pain.   Gastrointestinal:  Positive for abdominal pain, nausea and vomiting.   Genitourinary:  Negative for difficulty urinating.   Musculoskeletal:  Negative for joint swelling, neck pain and neck stiffness.   Skin:  Negative for color change.   Neurological:  Positive for dizziness. Negative for headaches.   Psychiatric/Behavioral:  Negative for confusion.        Physical Exam     Initial Vitals [12/10/23 1712]   BP Pulse Resp Temp SpO2   120/62 104 19 (!) 102.2 °F (39 °C) 95 %      MAP       --         Physical Exam    Nursing note and vitals reviewed.  Constitutional: She appears well-developed and well-nourished. She is not diaphoretic. She appears ill.   Febrile    Eyes: EOM are normal. Pupils are equal, round, and reactive to light.   Neck: Neck supple. No thyromegaly present. No JVD present.   Normal range of motion.  Cardiovascular:  Normal rate, regular rhythm, normal heart sounds and intact distal pulses.     Exam reveals no gallop and no friction rub.       No murmur heard.  Pulmonary/Chest: No respiratory distress.   Crackles to bilateral lung bases.    Abdominal: Abdomen is soft. Bowel sounds are  normal. She exhibits no distension. There is abdominal tenderness (mild) in the left lower quadrant. No hernia.   No right CVA tenderness.  No left CVA tenderness. There is no rebound and no guarding.   Musculoskeletal:         General: No tenderness or edema. Normal range of motion.      Cervical back: Normal range of motion and neck supple.     Neurological: She is alert and oriented to person, place, and time. She has normal strength. She displays no atrophy and no tremor. She exhibits normal muscle tone. She displays no seizure activity. Gait normal.   Moves all extremities, follows all commands, no focal neurologic deficits.      Skin: Skin is warm and dry. No rash noted. No erythema.   Psychiatric: She has a normal mood and affect.         ED Course   Procedures  Labs Reviewed   CBC W/ AUTO DIFFERENTIAL - Abnormal; Notable for the following components:       Result Value    RBC 3.99 (*)     Hematocrit 34.6 (*)     Mono # 1.2 (*)     Lymph % 14.5 (*)     Mono % 16.4 (*)     All other components within normal limits   COMPREHENSIVE METABOLIC PANEL - Abnormal; Notable for the following components:    Sodium 130 (*)     CO2 20 (*)     BUN 7 (*)     Calcium 8.5 (*)     Albumin 3.3 (*)     Alkaline Phosphatase 47 (*)     All other components within normal limits   URINALYSIS, REFLEX TO URINE CULTURE - Abnormal; Notable for the following components:    Color, UA Colorless (*)     Specific Gravity, UA <1.005 (*)     Ketones, UA 1+ (*)     All other components within normal limits    Narrative:     Specimen Source->Urine   POCT INFLUENZA A/B MOLECULAR - Abnormal; Notable for the following components:    POC Molecular Influenza A Ag Positive (*)     All other components within normal limits   LIPASE   POCT STREP A MOLECULAR   SARS-COV-2 RDRP GENE     EKG Readings: (Independently Interpreted)   EKG obtained at 6:38 p.m. normal sinus rhythm with a rate of 94 beats per minute, normal axis and intervals, nonspecific T-wave  inversions in leads 3, AVF,.  No obvious reciprocal changes.  T-wave flattening in V1, V5 through V6.  No obvious reciprocal changes.  Short IA interval improved when compared to previous EKG from February 2023.  EKG otherwise grossly unchanged.     ECG Results              EKG 12-lead (Final result)  Result time 12/14/23 23:15:02      Final result by Interface, Lab In [facility] (12/14/23 23:15:02)                   Narrative:    Test Reason : R53.1,    Vent. Rate : 094 BPM     Atrial Rate : 094 BPM     P-R Int : 122 ms          QRS Dur : 076 ms      QT Int : 432 ms       P-R-T Axes : 053 054 043 degrees     QTc Int : 540 ms    Normal sinus rhythm  Nonspecific T wave abnormality  Abnormal ECG  When compared with ECG of 10-FEB-2023 15:46,  Significant changes have occurred  Confirmed by Heavenly Melendez MD ([id number]) on 12/14/2023 11:14:54 PM    Referred By: System System           Confirmed By:Heavenly Melendez MD                                     EKG 12-LEAD (Final result)  Result time 12/12/23 10:19:04      Final result by Unknown User (12/12/23 10:19:04)                                      Imaging Results              X-Ray Chest AP Portable (Final result)  Result time 12/10/23 19:30:36      Final result by Jeaneth Selby MD (12/10/23 19:30:36)                   Impression:      No acute intrathoracic abnormality detected.  Chronic interstitial lung abnormalities.  No significant change.      Electronically signed by: Jeaneth Selby  Date:    12/10/2023  Time:    19:30               Narrative:    EXAMINATION:  AP PORTABLE CHEST    CLINICAL HISTORY:  weakness;    TECHNIQUE:  AP portable chest radiograph was submitted.    COMPARISON:  02/16/2023    FINDINGS:  AP portable chest radiograph demonstrates a cardiac silhouette within normal limits.  There is prominence of the interstitium particularly at the lung bases, which may be chronic.  There is no focal consolidation, pneumothorax, or pleural effusion.  The bones  are diffusely osteopenic.                                       Medications   acetaminophen tablet 1,000 mg (1,000 mg Oral Given 12/10/23 1745)   lactated ringers bolus 1,000 mL (0 mLs Intravenous Stopped 12/10/23 2011)   ondansetron injection 4 mg (4 mg Intravenous Given 12/10/23 1912)     Medical Decision Making   MDM  This is an emergent evaluation of a 68 y.o.  with  Pulmonary fibrosis, rheumatoid arthritis, GERD, SIADH, who presents to the ED for evaluation of cough, fever, chills, dizziness, nausea, and vomiting for 2 days. Initial vitals in the ED  [12/10/23 1712]  BP: 120/62  Pulse: 104  Resp: 19  Temp: (!) 102.2 °F (39 °C)  SpO2: 95 % .     Physical exam noted above. DDx includes but is not limited to Covid, influenza, other viral illness, dehydration, electrolyte abnormality, UTI, gastroenteritis, pneumonia, pleural effusion. Also considered but clinically less likely to be sepsis, dissection, PE. Will obtain labs and imaging including CXR, urinalysis, CBC, CMP, lipase level, POC Strep, Covid, Influenza A/B, EJG 12 lead. Will also provide LR bolus, antiemetics, and acetaminophen as needed. Will continue to monitor and frequently reassess pending results of labs, treatments and final disposition.    Patient is aware of plan and is amenable.     Rosaura Leonard D.O  EMERGENCY MEDICINE  6:59 PM 12/10/2023    UPDATE:  Labs reveal the patient to be flu A positive. Mild hyponatremia with a sodium of 130. Remainder of labs unremarkable. Chest xray shows no acute abnormality with findings consistent with chronic interstitial lung changes. EKG shows no acute STEMI. She was treated with Tylenol, Zofran and IV fluids. On reassessment, the patient symptoms were improved. Blood pressure 101/60, however, patient has had documented similar blood pressures in the past. Discussed Tamiflu with the patient however she deferred at this time. Will discharge with Zofran, instructions for symptomatic treatment at home,  PCP follow-up and ED return precautions. Patient aware and agreeable to the plan.     This chart was completed using dictation software, as a result there may be some transcription errors      Amount and/or Complexity of Data Reviewed  External Data Reviewed: labs and notes.  Labs: ordered. Decision-making details documented in ED Course.  Radiology: ordered and independent interpretation performed. Decision-making details documented in ED Course.    Risk  OTC drugs.  Prescription drug management.            Scribe Attestation:   Scribe #1: I performed the above scribed service and the documentation accurately describes the services I performed. I attest to the accuracy of the note.                          I, Rosaura Leonard DO, personally performed the services described in this documentation. All medical record entries made by the scribe were at my direction and in my presence. I have reviewed the chart and agree that the record reflects my personal performance and is accurate and complete.      Clinical Impression:  Final diagnoses:  [R53.1] Weakness  [J10.1] Influenza A (Primary)          ED Disposition Condition    Discharge Stable          ED Prescriptions       Medication Sig Dispense Start Date End Date Auth. Provider    ondansetron (ZOFRAN-ODT) 4 MG TbDL () Take 1 tablet (4 mg total) by mouth every 6 (six) hours as needed (nausea and vomiting). 12 tablet 12/10/2023 2023 Rosaura Leonard DO          Follow-up Information       Follow up With Specialties Details Why Contact Info    Gianna Carpenter MD Internal Medicine Schedule an appointment as soon as possible for a visit in 3 days Emergency Room Follow-up [address] [place] [address]  [place] [address]  [phone]      [facility] [place] [facility] [place] [facility] Emergency Medicine Go to  If symptoms worsen [address] [facility] [place] [address] [phone]             Rosaura Loenard,  DO  12/20/23 1034

## 2023-12-18 DIAGNOSIS — Z00.00 PREVENTATIVE HEALTH CARE: ICD-10-CM

## 2023-12-18 DIAGNOSIS — M05.79 RHEUMATOID ARTHRITIS INVOLVING MULTIPLE SITES WITH POSITIVE RHEUMATOID FACTOR: ICD-10-CM

## 2023-12-18 DIAGNOSIS — J84.9 CHRONIC INTERSTITIAL LUNG DISEASE: ICD-10-CM

## 2023-12-18 DIAGNOSIS — E87.6 HYPOKALEMIA: Primary | ICD-10-CM

## 2023-12-18 DIAGNOSIS — E78.49 OTHER HYPERLIPIDEMIA: ICD-10-CM

## 2023-12-18 DIAGNOSIS — R73.9 HYPERGLYCEMIA: ICD-10-CM

## 2023-12-19 ENCOUNTER — LAB VISIT (OUTPATIENT)
Dept: LAB | Facility: HOSPITAL | Age: 68
End: 2023-12-19
Attending: INTERNAL MEDICINE
Payer: MEDICARE

## 2023-12-19 ENCOUNTER — TELEPHONE (OUTPATIENT)
Dept: PRIMARY CARE CLINIC | Facility: CLINIC | Age: 68
End: 2023-12-19
Payer: MEDICARE

## 2023-12-19 DIAGNOSIS — R73.9 HYPERGLYCEMIA: ICD-10-CM

## 2023-12-19 DIAGNOSIS — M05.79 RHEUMATOID ARTHRITIS INVOLVING MULTIPLE SITES WITH POSITIVE RHEUMATOID FACTOR: ICD-10-CM

## 2023-12-19 DIAGNOSIS — E87.6 HYPOKALEMIA: ICD-10-CM

## 2023-12-19 DIAGNOSIS — J84.9 CHRONIC INTERSTITIAL LUNG DISEASE: ICD-10-CM

## 2023-12-19 DIAGNOSIS — E78.49 OTHER HYPERLIPIDEMIA: ICD-10-CM

## 2023-12-19 LAB
ANION GAP SERPL CALC-SCNC: 9 MMOL/L (ref 8–16)
BASOPHILS # BLD AUTO: 0.02 K/UL (ref 0–0.2)
BASOPHILS NFR BLD: 0.3 % (ref 0–1.9)
BUN SERPL-MCNC: 5 MG/DL (ref 8–23)
CALCIUM SERPL-MCNC: 9.2 MG/DL (ref 8.7–10.5)
CHLORIDE SERPL-SCNC: 105 MMOL/L (ref 95–110)
CHOLEST SERPL-MCNC: 219 MG/DL (ref 120–199)
CHOLEST/HDLC SERPL: 5.2 {RATIO} (ref 2–5)
CO2 SERPL-SCNC: 25 MMOL/L (ref 23–29)
CREAT SERPL-MCNC: 0.8 MG/DL (ref 0.5–1.4)
CRP SERPL-MCNC: 5 MG/L (ref 0–8.2)
DIFFERENTIAL METHOD: ABNORMAL
EOSINOPHIL # BLD AUTO: 0.3 K/UL (ref 0–0.5)
EOSINOPHIL NFR BLD: 4.5 % (ref 0–8)
ERYTHROCYTE [DISTWIDTH] IN BLOOD BY AUTOMATED COUNT: 11.9 % (ref 11.5–14.5)
ERYTHROCYTE [SEDIMENTATION RATE] IN BLOOD BY WESTERGREN METHOD: 35 MM/HR (ref 0–20)
EST. GFR  (NO RACE VARIABLE): >60 ML/MIN/1.73 M^2
ESTIMATED AVG GLUCOSE: 108 MG/DL (ref 68–131)
GLUCOSE SERPL-MCNC: 104 MG/DL (ref 70–110)
HBA1C MFR BLD: 5.4 % (ref 4–5.6)
HCT VFR BLD AUTO: 38.9 % (ref 37–48.5)
HDLC SERPL-MCNC: 42 MG/DL (ref 40–75)
HDLC SERPL: 19.2 % (ref 20–50)
HGB BLD-MCNC: 12.9 G/DL (ref 12–16)
IMM GRANULOCYTES # BLD AUTO: 0.05 K/UL (ref 0–0.04)
IMM GRANULOCYTES NFR BLD AUTO: 0.7 % (ref 0–0.5)
LDLC SERPL CALC-MCNC: 151 MG/DL (ref 63–159)
LYMPHOCYTES # BLD AUTO: 2.1 K/UL (ref 1–4.8)
LYMPHOCYTES NFR BLD: 30 % (ref 18–48)
MAGNESIUM SERPL-MCNC: 2.2 MG/DL (ref 1.6–2.6)
MCH RBC QN AUTO: 29.9 PG (ref 27–31)
MCHC RBC AUTO-ENTMCNC: 33.2 G/DL (ref 32–36)
MCV RBC AUTO: 90 FL (ref 82–98)
MONOCYTES # BLD AUTO: 0.8 K/UL (ref 0.3–1)
MONOCYTES NFR BLD: 11.5 % (ref 4–15)
NEUTROPHILS # BLD AUTO: 3.7 K/UL (ref 1.8–7.7)
NEUTROPHILS NFR BLD: 53 % (ref 38–73)
NONHDLC SERPL-MCNC: 177 MG/DL
NRBC BLD-RTO: 0 /100 WBC
PLATELET # BLD AUTO: 427 K/UL (ref 150–450)
PMV BLD AUTO: 9.5 FL (ref 9.2–12.9)
POTASSIUM SERPL-SCNC: 4.2 MMOL/L (ref 3.5–5.1)
RBC # BLD AUTO: 4.31 M/UL (ref 4–5.4)
SODIUM SERPL-SCNC: 139 MMOL/L (ref 136–145)
TRIGL SERPL-MCNC: 130 MG/DL (ref 30–150)
WBC # BLD AUTO: 7.04 K/UL (ref 3.9–12.7)

## 2023-12-19 PROCEDURE — 86140 C-REACTIVE PROTEIN: CPT | Mod: TXP | Performed by: INTERNAL MEDICINE

## 2023-12-19 PROCEDURE — 80061 LIPID PANEL: CPT | Mod: NTX | Performed by: INTERNAL MEDICINE

## 2023-12-19 PROCEDURE — 85652 RBC SED RATE AUTOMATED: CPT | Mod: NTX | Performed by: INTERNAL MEDICINE

## 2023-12-19 PROCEDURE — 85025 COMPLETE CBC W/AUTO DIFF WBC: CPT | Mod: TXP | Performed by: INTERNAL MEDICINE

## 2023-12-19 PROCEDURE — 36415 COLL VENOUS BLD VENIPUNCTURE: CPT | Mod: NTX | Performed by: INTERNAL MEDICINE

## 2023-12-19 PROCEDURE — 80048 BASIC METABOLIC PNL TOTAL CA: CPT | Mod: NTX | Performed by: INTERNAL MEDICINE

## 2023-12-19 PROCEDURE — 83735 ASSAY OF MAGNESIUM: CPT | Mod: NTX | Performed by: INTERNAL MEDICINE

## 2023-12-19 PROCEDURE — 83036 HEMOGLOBIN GLYCOSYLATED A1C: CPT | Mod: TXP | Performed by: INTERNAL MEDICINE

## 2023-12-19 NOTE — TELEPHONE ENCOUNTER
----- Message from Gianna Carpenter MD sent at 12/18/2023  1:14 PM CST -----  Regarding: Labs  Please schedule fasting labs - thanks!!

## 2024-01-19 DIAGNOSIS — R00.0 SINUS TACHYCARDIA: ICD-10-CM

## 2024-01-19 RX ORDER — METOPROLOL SUCCINATE 25 MG/1
25 TABLET, EXTENDED RELEASE ORAL
Qty: 90 TABLET | Refills: 3 | Status: SHIPPED | OUTPATIENT
Start: 2024-01-19 | End: 2025-01-18

## 2024-01-19 NOTE — TELEPHONE ENCOUNTER
No care due was identified.  [facility] Embedded Care Due Messages. Reference number: [id number].   1/19/2024 12:47:46 PM CST

## 2024-03-06 DIAGNOSIS — J84.9 INTERSTITIAL LUNG DISEASE: Primary | ICD-10-CM

## 2024-03-06 DIAGNOSIS — M05.79 RHEUMATOID ARTHRITIS INVOLVING MULTIPLE SITES WITH POSITIVE RHEUMATOID FACTOR: ICD-10-CM

## 2024-04-09 ENCOUNTER — PATIENT MESSAGE (OUTPATIENT)
Dept: RHEUMATOLOGY | Facility: CLINIC | Age: 69
End: 2024-04-09
Payer: MEDICARE

## 2024-04-09 DIAGNOSIS — Z79.60 LONG-TERM USE OF IMMUNOSUPPRESSANT MEDICATION: ICD-10-CM

## 2024-04-09 DIAGNOSIS — M05.79 RHEUMATOID ARTHRITIS INVOLVING MULTIPLE SITES WITH POSITIVE RHEUMATOID FACTOR: Primary | ICD-10-CM

## 2024-04-09 DIAGNOSIS — J84.9 INTERSTITIAL LUNG DISEASE: ICD-10-CM

## 2024-04-10 RX ORDER — FAMOTIDINE 10 MG/ML
20 INJECTION INTRAVENOUS
Status: CANCELLED | OUTPATIENT
Start: 2024-04-10

## 2024-04-10 RX ORDER — FAMOTIDINE 10 MG/ML
20 INJECTION INTRAVENOUS
Status: CANCELLED | OUTPATIENT
Start: 2024-04-24

## 2024-04-10 RX ORDER — ACETAMINOPHEN 325 MG/1
650 TABLET ORAL
Status: CANCELLED | OUTPATIENT
Start: 2024-04-10

## 2024-04-10 RX ORDER — ACETAMINOPHEN 325 MG/1
650 TABLET ORAL
Status: CANCELLED | OUTPATIENT
Start: 2024-04-24

## 2024-04-10 RX ORDER — HEPARIN 100 UNIT/ML
500 SYRINGE INTRAVENOUS
Status: CANCELLED | OUTPATIENT
Start: 2024-04-10

## 2024-04-10 RX ORDER — SODIUM CHLORIDE 0.9 % (FLUSH) 0.9 %
10 SYRINGE (ML) INJECTION
Status: CANCELLED | OUTPATIENT
Start: 2024-04-24

## 2024-04-10 RX ORDER — MEPERIDINE HYDROCHLORIDE 50 MG/ML
25 INJECTION INTRAMUSCULAR; INTRAVENOUS; SUBCUTANEOUS
Status: CANCELLED
Start: 2024-04-10 | End: 2024-04-11

## 2024-04-10 RX ORDER — SODIUM CHLORIDE 0.9 % (FLUSH) 0.9 %
10 SYRINGE (ML) INJECTION
Status: CANCELLED | OUTPATIENT
Start: 2024-04-10

## 2024-04-16 ENCOUNTER — LAB VISIT (OUTPATIENT)
Dept: LAB | Facility: HOSPITAL | Age: 69
End: 2024-04-16
Attending: INTERNAL MEDICINE
Payer: MEDICARE

## 2024-04-16 DIAGNOSIS — Z12.31 ENCOUNTER FOR SCREENING MAMMOGRAM FOR MALIGNANT NEOPLASM OF BREAST: Primary | ICD-10-CM

## 2024-04-16 DIAGNOSIS — M05.79 RHEUMATOID ARTHRITIS INVOLVING MULTIPLE SITES WITH POSITIVE RHEUMATOID FACTOR: ICD-10-CM

## 2024-04-16 DIAGNOSIS — J84.9 INTERSTITIAL LUNG DISEASE: ICD-10-CM

## 2024-04-16 DIAGNOSIS — Z79.60 LONG-TERM USE OF IMMUNOSUPPRESSANT MEDICATION: ICD-10-CM

## 2024-04-16 LAB
ALBUMIN SERPL BCP-MCNC: 3.5 G/DL (ref 3.5–5.2)
ALP SERPL-CCNC: 74 U/L (ref 55–135)
ALT SERPL W/O P-5'-P-CCNC: 13 U/L (ref 10–44)
ANION GAP SERPL CALC-SCNC: 6 MMOL/L (ref 8–16)
AST SERPL-CCNC: 18 U/L (ref 10–40)
BASOPHILS # BLD AUTO: 0.02 K/UL (ref 0–0.2)
BASOPHILS NFR BLD: 0.3 % (ref 0–1.9)
BILIRUB SERPL-MCNC: 0.4 MG/DL (ref 0.1–1)
BUN SERPL-MCNC: 10 MG/DL (ref 8–23)
CALCIUM SERPL-MCNC: 9.4 MG/DL (ref 8.7–10.5)
CHLORIDE SERPL-SCNC: 108 MMOL/L (ref 95–110)
CO2 SERPL-SCNC: 25 MMOL/L (ref 23–29)
CREAT SERPL-MCNC: 0.8 MG/DL (ref 0.5–1.4)
CRP SERPL-MCNC: 1.8 MG/L (ref 0–8.2)
DIFFERENTIAL METHOD BLD: NORMAL
EOSINOPHIL # BLD AUTO: 0.2 K/UL (ref 0–0.5)
EOSINOPHIL NFR BLD: 2.3 % (ref 0–8)
ERYTHROCYTE [DISTWIDTH] IN BLOOD BY AUTOMATED COUNT: 12.4 % (ref 11.5–14.5)
ERYTHROCYTE [SEDIMENTATION RATE] IN BLOOD BY WESTERGREN METHOD: 15 MM/HR (ref 0–20)
EST. GFR  (NO RACE VARIABLE): >60 ML/MIN/1.73 M^2
GLUCOSE SERPL-MCNC: 94 MG/DL (ref 70–110)
HBV CORE AB SERPL QL IA: NORMAL
HBV SURFACE AG SERPL QL IA: NORMAL
HCT VFR BLD AUTO: 39.3 % (ref 37–48.5)
HGB BLD-MCNC: 12.8 G/DL (ref 12–16)
IGA SERPL-MCNC: 100 MG/DL (ref 40–350)
IGG SERPL-MCNC: 630 MG/DL (ref 650–1600)
IGM SERPL-MCNC: 44 MG/DL (ref 50–300)
IMM GRANULOCYTES # BLD AUTO: 0.02 K/UL (ref 0–0.04)
IMM GRANULOCYTES NFR BLD AUTO: 0.3 % (ref 0–0.5)
LYMPHOCYTES # BLD AUTO: 2.1 K/UL (ref 1–4.8)
LYMPHOCYTES NFR BLD: 32.8 % (ref 18–48)
MCH RBC QN AUTO: 30 PG (ref 27–31)
MCHC RBC AUTO-ENTMCNC: 32.6 G/DL (ref 32–36)
MCV RBC AUTO: 92 FL (ref 82–98)
MONOCYTES # BLD AUTO: 0.6 K/UL (ref 0.3–1)
MONOCYTES NFR BLD: 9.4 % (ref 4–15)
NEUTROPHILS # BLD AUTO: 3.5 K/UL (ref 1.8–7.7)
NEUTROPHILS NFR BLD: 54.9 % (ref 38–73)
NRBC BLD-RTO: 0 /100 WBC
PLATELET # BLD AUTO: 266 K/UL (ref 150–450)
PMV BLD AUTO: 10 FL (ref 9.2–12.9)
POTASSIUM SERPL-SCNC: 4.3 MMOL/L (ref 3.5–5.1)
PROT SERPL-MCNC: 6.4 G/DL (ref 6–8.4)
RBC # BLD AUTO: 4.27 M/UL (ref 4–5.4)
SODIUM SERPL-SCNC: 139 MMOL/L (ref 136–145)
WBC # BLD AUTO: 6.46 K/UL (ref 3.9–12.7)

## 2024-04-16 PROCEDURE — 86140 C-REACTIVE PROTEIN: CPT | Mod: NTX | Performed by: INTERNAL MEDICINE

## 2024-04-16 PROCEDURE — 86704 HEP B CORE ANTIBODY TOTAL: CPT | Mod: NTX | Performed by: INTERNAL MEDICINE

## 2024-04-16 PROCEDURE — 87340 HEPATITIS B SURFACE AG IA: CPT | Mod: NTX | Performed by: INTERNAL MEDICINE

## 2024-04-16 PROCEDURE — 85025 COMPLETE CBC W/AUTO DIFF WBC: CPT | Mod: NTX | Performed by: INTERNAL MEDICINE

## 2024-04-16 PROCEDURE — 85652 RBC SED RATE AUTOMATED: CPT | Mod: NTX | Performed by: INTERNAL MEDICINE

## 2024-04-16 PROCEDURE — 82784 ASSAY IGA/IGD/IGG/IGM EACH: CPT | Mod: 59,TXP | Performed by: INTERNAL MEDICINE

## 2024-04-16 PROCEDURE — 80053 COMPREHEN METABOLIC PANEL: CPT | Mod: NTX | Performed by: INTERNAL MEDICINE

## 2024-04-16 PROCEDURE — 36415 COLL VENOUS BLD VENIPUNCTURE: CPT | Mod: TXP | Performed by: INTERNAL MEDICINE

## 2024-04-17 ENCOUNTER — TELEPHONE (OUTPATIENT)
Dept: RHEUMATOLOGY | Facility: CLINIC | Age: 69
End: 2024-04-17
Payer: MEDICARE

## 2024-04-17 DIAGNOSIS — M05.79 RHEUMATOID ARTHRITIS INVOLVING MULTIPLE SITES WITH POSITIVE RHEUMATOID FACTOR: ICD-10-CM

## 2024-04-17 DIAGNOSIS — J84.9 INTERSTITIAL LUNG DISEASE: Primary | ICD-10-CM

## 2024-04-17 DIAGNOSIS — J84.10 PULMONARY FIBROSIS: ICD-10-CM

## 2024-04-17 RX ORDER — ACETAMINOPHEN 325 MG/1
650 TABLET ORAL
Status: CANCELLED | OUTPATIENT
Start: 2024-04-24

## 2024-04-17 RX ORDER — ACETAMINOPHEN 325 MG/1
650 TABLET ORAL
Status: DISCONTINUED | OUTPATIENT
Start: 2024-04-17 | End: 2024-05-28

## 2024-04-17 RX ORDER — SODIUM CHLORIDE 0.9 % (FLUSH) 0.9 %
10 SYRINGE (ML) INJECTION
Status: SHIPPED | OUTPATIENT
Start: 2024-04-17

## 2024-04-17 RX ORDER — HEPARIN 100 UNIT/ML
500 SYRINGE INTRAVENOUS
Status: CANCELLED | OUTPATIENT
Start: 2024-04-24

## 2024-04-17 RX ORDER — SODIUM CHLORIDE 0.9 % (FLUSH) 0.9 %
10 SYRINGE (ML) INJECTION
Status: CANCELLED | OUTPATIENT
Start: 2024-04-24

## 2024-04-17 RX ORDER — FAMOTIDINE 10 MG/ML
20 INJECTION INTRAVENOUS
Status: CANCELLED | OUTPATIENT
Start: 2024-04-24

## 2024-04-17 RX ORDER — FAMOTIDINE 10 MG/ML
20 INJECTION INTRAVENOUS
Status: DISCONTINUED | OUTPATIENT
Start: 2024-04-17 | End: 2024-05-28

## 2024-04-17 RX ORDER — HEPARIN 100 UNIT/ML
500 SYRINGE INTRAVENOUS
Status: DISCONTINUED | OUTPATIENT
Start: 2024-04-17 | End: 2024-05-28

## 2024-04-17 RX ORDER — MEPERIDINE HYDROCHLORIDE 50 MG/ML
25 INJECTION INTRAMUSCULAR; INTRAVENOUS; SUBCUTANEOUS
Status: CANCELLED
Start: 2024-04-24 | End: 2024-04-25

## 2024-04-17 RX ORDER — MEPERIDINE HYDROCHLORIDE 50 MG/ML
25 INJECTION INTRAMUSCULAR; INTRAVENOUS; SUBCUTANEOUS
Status: SHIPPED | OUTPATIENT
Start: 2024-04-17 | End: 2024-04-18

## 2024-04-18 ENCOUNTER — HOSPITAL ENCOUNTER (OUTPATIENT)
Dept: RADIOLOGY | Facility: HOSPITAL | Age: 69
Discharge: HOME | End: 2024-04-18
Attending: INTERNAL MEDICINE
Payer: MEDICARE

## 2024-04-18 DIAGNOSIS — Z12.31 ENCOUNTER FOR SCREENING MAMMOGRAM FOR MALIGNANT NEOPLASM OF BREAST: ICD-10-CM

## 2024-04-18 PROCEDURE — 77063 BREAST TOMOSYNTHESIS BI: CPT | Mod: 26,NTX,, | Performed by: RADIOLOGY

## 2024-04-18 PROCEDURE — 77063 BREAST TOMOSYNTHESIS BI: CPT | Mod: TC,NTX

## 2024-04-18 PROCEDURE — 77067 SCR MAMMO BI INCL CAD: CPT | Mod: 26,NTX,, | Performed by: RADIOLOGY

## 2024-04-24 ENCOUNTER — OFFICE VISIT (OUTPATIENT)
Dept: RHEUMATOLOGY | Facility: CLINIC | Age: 69
End: 2024-04-24
Payer: MEDICARE

## 2024-04-24 VITALS
SYSTOLIC BLOOD PRESSURE: 109 MMHG | WEIGHT: 132.94 LBS | BODY MASS INDEX: 24.46 KG/M2 | HEART RATE: 87 BPM | HEIGHT: 62 IN | DIASTOLIC BLOOD PRESSURE: 78 MMHG

## 2024-04-24 DIAGNOSIS — M81.0 OSTEOPOROSIS, UNSPECIFIED OSTEOPOROSIS TYPE, UNSPECIFIED PATHOLOGICAL FRACTURE PRESENCE: ICD-10-CM

## 2024-04-24 DIAGNOSIS — J84.9 INTERSTITIAL LUNG DISEASE: ICD-10-CM

## 2024-04-24 DIAGNOSIS — M05.79 RHEUMATOID ARTHRITIS INVOLVING MULTIPLE SITES WITH POSITIVE RHEUMATOID FACTOR: Primary | ICD-10-CM

## 2024-04-24 DIAGNOSIS — Z79.60 LONG-TERM USE OF IMMUNOSUPPRESSANT MEDICATION: ICD-10-CM

## 2024-04-24 DIAGNOSIS — Z55.9 EDUCATIONAL CIRCUMSTANCE: ICD-10-CM

## 2024-04-24 PROCEDURE — 99999 PR PBB SHADOW E&M-EST. PATIENT-LVL V: CPT | Mod: PBBFAC,TXP,, | Performed by: INTERNAL MEDICINE

## 2024-04-24 PROCEDURE — 1125F AMNT PAIN NOTED PAIN PRSNT: CPT | Mod: CPTII,NTX,S$GLB, | Performed by: INTERNAL MEDICINE

## 2024-04-24 PROCEDURE — 3074F SYST BP LT 130 MM HG: CPT | Mod: CPTII,NTX,S$GLB, | Performed by: INTERNAL MEDICINE

## 2024-04-24 PROCEDURE — 99214 OFFICE O/P EST MOD 30 MIN: CPT | Mod: NTX,S$GLB,, | Performed by: INTERNAL MEDICINE

## 2024-04-24 PROCEDURE — 3078F DIAST BP <80 MM HG: CPT | Mod: CPTII,NTX,S$GLB, | Performed by: INTERNAL MEDICINE

## 2024-04-24 PROCEDURE — 3008F BODY MASS INDEX DOCD: CPT | Mod: CPTII,NTX,S$GLB, | Performed by: INTERNAL MEDICINE

## 2024-04-24 PROCEDURE — 1159F MED LIST DOCD IN RCRD: CPT | Mod: CPTII,NTX,S$GLB, | Performed by: INTERNAL MEDICINE

## 2024-04-24 SDOH — SOCIAL DETERMINANTS OF HEALTH (SDOH): PROBLEMS RELATED TO EDUCATION AND LITERACY, UNSPECIFIED: Z55.9

## 2024-04-24 ASSESSMENT — ROUTINE ASSESSMENT OF PATIENT INDEX DATA (RAPID3)
PSYCHOLOGICAL DISTRESS SCORE: 0
AM STIFFNESS SCORE: 1, YES
PAIN SCORE: 1.5
TOTAL RAPID3 SCORE: 1.28
FATIGUE SCORE: 2.5
WHEN YOU AWAKENED IN THE MORNING OVER THE LAST WEEK, PLEASE INDICATE THE AMOUNT OF TIME IT TAKES UNTIL YOU ARE AS LIMBER AS YOU WILL BE FOR THE DAY: 1
PATIENT GLOBAL ASSESSMENT SCORE: 2
MDHAQ FUNCTION SCORE: 0.1

## 2024-04-24 NOTE — PROGRESS NOTES
4/23/2024     7:11 PM   Rapid3 Question Responses and Scores   MDHAQ Score 0.1   Psychologic Score 0   Pain Score 1.5   When you awakened in the morning OVER THE LAST WEEK, did you feel stiff? Yes   If Yes, please indicate the number of hours until you are as limber as you will be for the day 1   Fatigue Score 2.5   Global Health Score 2   RAPID3 Score 1.28

## 2024-04-24 NOTE — PROGRESS NOTES
Subjective:     Patient ID: Juan Cruz is a 69 y.o. 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    PCP: Gianna Carpenter MD   Ortho: Minh Iglesias MD  Pulmonary: Barbara Haile, DO;   Derm: Susu Wright MD  HPI     69 yr old lady seen for the first time on 5/12/22 w sero+CCP+ non erosive RA and a chronic cough with an abnormal CXR & CT chest c/w pulmonary fibrosis (suggestive of UIP). She has been intolerant of MTX & had worsening cough on it. She was on a very short course of azathiopine but stopped it due to concomitant abn LFTs (also on Ofev) &  her request for RTX.  She received 1st course of RTX 10/24/22 & 11/7/22 & has been getting it since. She has been on tapering doses of prednisone & stopped it in December.    She was last seen on  5/30/23. She returns for f/u and is doing very well both RA wise & pulmonary wise.  Has some arthralgia in her L foot & L volar aspect of her wrist wo much swelling. Saw a hand surgeon less than 6 months ago & had hand imaging that was said to be ok. She feels there might be mild swelling of her ankle.  Arthralgia is w use. No other joint pain or swelling. AM stiffness is well below 1/2 hr although she indicated 1 hr on LEV..   Since her last visit, denosumab was stopped due to rash. OP followed by Dr. Carpenter who decided to manage only w calcium & vitamin D.   She's been off prednisone since 12/2023.     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    5/30/24.  Her main issues today have to do with skin rashes for which she has seen dermatology and has had biopsy.    Some look like eczema; some like contact dermatitis; she was concerned about dermatomyositis as she had some  involvement around her eyes. 1 yr ago MSA abs were negative.  Feels facial rashes occurred after prolia (mild after first injection), more pronounced after 2nd.  Also got skin rashes after Paxlovid,.  However she did have rashes back in December.    RA wise c/o L wrist pain & L ankle pain. She has not seen swelling.  We have tapered her prednisone to 2.5 mg/d but due to pain in these areas took an extra dose yesterday. Has not seen any response.   Overall however, RA sxs have subsided. No AM stiffness, joint pain or joint swelling.  Has hx of some dry eyes & mild dry mouth. Denies Raynaud's, tight skin, alopecia, oral ulcers, parotid gland swelling, pleurisy, pericarditis, photosensitivity, skin rashes, thromboses, muscle weakness, headaches, paresthesias; LBP, thyroid issues;   1 miscarriage 4-6 weeks in 1997; has 1 daughter Csection;  Has FHx of RA & myelodysplasia (mom).    Has been getting Pulmonary Rehab at [facility]--interrupted by Covid. and feels her joints feel better due to the exercise.    PMH  She was seen again on 5/31/22 at which time MTX was added to her prednisone for her joint pain, but she had intolerance to it (headaches, fatigue, anorexia, dysgeusia, sleep issues) but stayed on it until ~ 7/26 when she developed worsening cough and abnormal CT chest and it was stopped. CT Chest on 7/27 showed significant interval progression of subpleural predominant reticular and ground-glass airspace opacities associated with bilateral lower lobe volume loss, architectural distortion, symmetric mild central bronchiectasis and apicobasal gradient suggestive of UIP pattern of ILD which may reflect IPF.  Additional diagnostic considerations include NSIP, asbestosis, fibrotic hypersensitivity pneumonitis connective tissue associated lung disease and drug induced lung disease amongst other etiologies.    She was seen by Dr. Haile on 7/28 at which time due to progression of cough & SOB (& worsening CT chest)  prednisone 40 mg/d was begun for presumed RA-ILD. Patient was to continue her antibiotic (copious sputum; cultures NTD) & Trelegy which was begun by AI for some PFT reversibility. The plan was to reassess & if there has been a response to add MMF. Ofev also to be added (has not gotten it yet).    5/31/22  She has contacted us several times since her initial visit c/o generalized weakness & pain, tinnitus; peeing a lot; hip & knee pain; leg edema and was asked to come in to see us again, but she presents prior to her w/u and appointment to see Dr. Haile on 6/7/22.    Today she has multiple complaints and feels that she is in severe pain and unable to function. She has AM stiffness lasting 4 hrs. She has difficulty getting in and out of bed, dressing herself, washing herself, etc. She is fatigued & is unable to sleep but denies depression & anxiety. She now volunteers she had been seen by Dr.Luis Bee in the past ~ 2007 for similar sxs & no autoimmune/rheum dx was made.     Today, she feels she has developed joint swelling since her last visit, especially in some MCPs, but also c/o edema which improved after she stopped taking NSAIDs. She continue to c/o chronic neck pain.    She is currently taking prednisone 10 mg/d. She states she has bilateral shoulder pain & is unable to get OOB in AM. No GCA sxs. She is stiff x 4 hrs.     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  These appear to be incidental & unexpected findings as patient has very little joint swelling.  Patient feels that myalgias began after her last Covid vaccine.    In February got severe pain in R shoulder that responded to CSI but then developed excruciating spasms neck & around both shoulders. Labs gotten by Orthopedic (Dr. Iglesias);   In Jan 27,2022 had FESS for sinus surgery & needed bad infection and required more antibiotics. Developed rash on cheeks  on levaquin. Same rash came back now again.    In March had only shoulder pain & pronating L wrist.  Now R knee, L ankle pain & swelling & R foot 3rd & 4th toes even at rest.  Also weak all over  Baclofen helps her spasms & stiffness    AM stiffness x 3-4 hrs (since ~ February, 2022);     .  Denies Raynaud's, tight skin, alopecia, oral ulcers, parotid gland swelling, pleurisy, pericarditis, photosensitivity, skin rashes, thromboses, muscle weakness; fevers, headaches, conjunctivitis, chronic or bloody diarrhea, vaginal/urethral D/C; paresthesias; LBP, thyroid issues;   1 miscarriage 4-6 weeks in 1997; has 1 daughter Csection;   Chronic cough x 1.5 yr after swallowing--fatigues & drains her  Has dry eyes > mouth  Has BAKER & st w eating comes & goes;   Saw Dr. Siegel & told no obstructive pulmonary disease but restrictive.  Has FHx of RA & myelodysplasia (mom)    Started prednisone 10 mg qod ~ end of March, 2022; Helped pain   But had adverse effects on higher steroids in past.  In 1999 NYLA was on decadron but developed myopathy & psychosis on it.  .    TMJ on L x 1 2/22 lasted 1-2days  Neck since 2/22 L>R  B shoulders L>R  Elbows no  Wrists L>R (flexi carpi ulnaris)  MCPs no but only 2nd R MCP  PIPs & DIP  Hip: no  GT no  Knee: R 1 month ago; since Monday both; just hurt; hurt all the time.  Ankles: L 1 month R since Monday --only swelling  Toes 3 & 4th on R; L of  Both heels hurt  Achilles bilateral.    Current Outpatient Medications   Medication Sig Dispense Refill    acetaminophen (TYLENOL) 325 MG tablet Take 650 mg by mouth every 6 (six) hours as needed for Pain.      B-complex with vitamin C (Z-BEC OR EQUIV) tablet Take 1 tablet by mouth 2 (two) times a day.      BREO ELLIPTA 200-25 mcg/dose DsDv diskus inhaler INHALE 1 PUFF BY MOUTH ONCE DAILY (CONTROLLER) 180 each 0    calcium citrate-vitamin D3 315-200 mg (CITRACAL+D) 315-200 mg-unit per tablet Take 1 tablet by mouth 2 (two) times daily.      cetirizine  (ZYRTEC) 10 MG tablet Take 1 tablet (10 mg total) by mouth once daily. 30 tablet 5    diclofenac sodium (VOLTAREN) 1 % Gel Apply 2 g topically 4 (four) times daily as needed.      estradioL (VAGIFEM) 10 mcg Tab Yuvafem 10 mcg vaginal tablet   INSERT 1 TABLET VAGINALLY TWICE A WEEK 45 tablet 5    fluticasone/vilanterol (BREO ELLIPTA INHL) Inhale 1 puff into the lungs once daily.      guaiFENesin (MUCINEX) 600 mg 12 hr tablet Take 1,200 mg by mouth 2 (two) times daily.      magnesium oxide (MAG-OX) 400 mg (241.3 mg magnesium) tablet Take 400 mg by mouth once daily.      metoprolol succinate (TOPROL-XL) 25 MG 24 hr tablet Take 1 tablet by mouth once daily 90 tablet 3    predniSONE (DELTASONE) 5 MG tablet Take 2 tablets (10 mg total) by mouth once daily. (Patient taking differently: Take 2.5 mg by mouth once daily.) 180 tablet 1    RESTASIS 0.05 % ophthalmic emulsion Place 1 drop into both eyes once daily. Has not started      sod chlor,sod bicarb/neti pot (NEILMED NASAFLO FRANK) 1 Dose by sinus irrigation route daily as needed.       Current Facility-Administered Medications   Medication Dose Route Frequency Provider Last Rate Last Admin    acetaminophen tablet 650 mg  650 mg Oral 1 time in Clinic/HOD Ibis Holcomb MD        acetaminophen tablet 650 mg  650 mg Oral 1 time in Clinic/HOD Ibis Holcomb MD        diphenhydrAMINE (BENADRYL) 25 mg in sodium chloride 0.9% 50 mL IVPB  25 mg Intravenous 1 time in Clinic/HOD Ibis Holcomb MD        diphenhydrAMINE (BENADRYL) 25 mg in sodium chloride 0.9% 50 mL IVPB  25 mg Intravenous 1 time in Clinic/HOD Ibis Holcomb MD        famotidine (PF) injection 20 mg  20 mg Intravenous 1 time in Clinic/HOD Ibis Holcomb MD        famotidine (PF) injection 20 mg  20 mg Intravenous 1 time in Clinic/HOD Ibis Holcomb MD        heparin, porcine (PF) 100 unit/mL injection flush 500 Units  500 Units Intravenous PRN  Ibis Holcomb MD        methylPREDNISolone sodium succinate (SOLU-MEDROL) 100 mg in dextrose 5 % (D5W) 100 mL IVPB  100 mg Intravenous 1 time in Clinic/HOD Ibis Holcomb MD        methylPREDNISolone sodium succinate (SOLU-MEDROL) 100 mg in dextrose 5 % (D5W) 100 mL IVPB  100 mg Intravenous 1 time in Clinic/[facility] Ibis Holcomb MD        riTUXimab-abbs (TRUXIMA) 1,000 mg in sodium chloride 0.9% 1,000 mL infusion (conc: 1 mg/mL)  1,000 mg Intravenous 1 time in Clinic/[facility] Ibis Holcomb MD        riTUXimab-abbs (TRUXIMA) 1,000 mg in sodium chloride 0.9% 1,000 mL infusion (conc: 1 mg/mL)  1,000 mg Intravenous 1 time in Clinic/[facility] Ibis Holcomb MD        riTUXimab-abbs (TRUXIMA) 1,000 mg in sodium chloride 0.9% 1,000 mL infusion (conc: 1 mg/mL)  1,000 mg Intravenous 1 time in Clinic/[facility] Ibis Holcomb MD        sodium chloride 0.9% 250 mL flush bag   Intravenous 1 time in [facility] Ibis Holcomb MD        sodium chloride 0.9% 250 mL flush bag   Intravenous 1 time in Clinic/[facility] Ibis Holcomb MD        sodium chloride 0.9% flush 10 mL  10 mL Intravenous PRN Ibis Holcomb MD       Off folic acid & flonase.       Review of patient's allergies indicates:   Allergen Reactions    Methotrexate Shortness Of Breath    Gluten protein Other (See Comments)     Sensitive - not allergic    Paxlovid [nirmatrelvir-ritonavir] Other (See Comments)     Trunk / chest / legs peeling after paxlovid    Ppd black rubber mix     Prolia [denosumab] Other (See Comments)     Rash to arms/trunk       Review of Systems   Constitutional:  Negative for chills, fatigue, fever and unexpected weight change.   HENT:  Positive for hearing loss (R loss of hearing; following SIADH) and tinnitus. Negative for mouth sores, postnasal drip, sinus pain and trouble swallowing.    Eyes:  Negative for redness.   Respiratory:  Positive for cough (better) and  shortness of breath (better). Negative for chest tightness.    Cardiovascular: Negative.  Negative for chest pain and palpitations (Has had MVP on metoprolol).   Gastrointestinal:  Positive for diarrhea (attributed to Paxlovid). Negative for constipation.   Endocrine: Positive for cold intolerance.   Genitourinary:  Positive for enuresis. Negative for dysuria and genital sores.        Incontinence   Musculoskeletal:  Negative for arthralgias, back pain, joint swelling, myalgias, neck pain and neck stiffness.   Skin:  Negative for rash.   Neurological: Negative.  Negative for dizziness, seizures, syncope, numbness and headaches.   Hematological:  Does not bruise/bleed easily.   Psychiatric/Behavioral:  Positive for sleep disturbance (attributed in part to steroids.). Negative for dysphoric mood. The patient is not nervous/anxious.         In past feels prednisone made her irritable.       Past Medical History:   Diagnosis Date    Bruise 2022    Chronic sinusitis, unspecified     Elevated liver enzymes 2022    History of SIADH     Interstitial pulmonary disease, unspecified 2023    LLL pneumonia 2022    Mitral valve prolapse     Osteopenia     Petechiae 2022    Pulmonary fibrosis 2022    Rheumatoid arthritis involving multiple sites     Skin rash 3/9/2023       Past Surgical History:   Procedure Laterality Date    BREAST BIOPSY Bilateral     FUNCTIONAL ENDOSCOPIC SINUS SURGERY (FESS)      ORIF WRIST FRACTURE Right        FH:  M: dx 83 w RA; passed away at age 84; also had some intestinal problems.  Had myelodysplasia.  1 S hx of chronic myalgia & gets bedridden 2-3 days.  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    Objective:     /78 (BP  "Location: Left arm)   Pulse 87   Ht 5' 2" (1.575 m)   Wt 60.3 kg (132 lb 15 oz)   LMP  (LMP Unknown)   BMI 24.31 kg/m²     Physical Exam   Constitutional: She is oriented to person, place, and time. normal appearance. No distress.   HENT:   Head: Normocephalic and atraumatic.   Mouth/Throat: Oropharynx is clear and moist. No oropharyngeal exudate.   No facial rashes  Parotids not enlarged     Eyes: Pupils are equal, round, and reactive to light. Conjunctivae are normal. Right eye exhibits no discharge. Left eye exhibits no discharge. No scleral icterus.   Neck: No JVD present. No tracheal deviation present. No thyromegaly present.   Cardiovascular: Regular rhythm and normal heart sounds. Exam reveals no gallop and no friction rub.   No murmur heard.  Pulmonary/Chest: Effort normal. No respiratory distress. She has no wheezes. She has no rales. She exhibits no tenderness.   Abdominal: Soft. Bowel sounds are normal. She exhibits no distension and no mass. There is no splenomegaly or hepatomegaly. There is no abdominal tenderness. There is no rebound and no guarding.   Musculoskeletal:         General: No swelling or tenderness. Normal range of motion.      Cervical back: Neck supple.      Comments: No SHT anywhere   Mild TTP volar aspect L wrist by edge of ulna.  Adequate ROM all joints.  Ankles unremarkable.   No metacarpalgia; No metatarsalgia        Lymphadenopathy:     She has no cervical adenopathy.   Neurological: She is alert and oriented to person, place, and time. She has normal reflexes. No cranial nerve deficit. Gait normal.   Proximal & distal upper extremity strength appropriate.  LE strength probably 4+ (limited by some muscle pain)   Skin: Skin is warm and dry. No rash noted. She is not diaphoretic.   Psychiatric: Mood, memory, affect and thought content normal.   Vitals reviewed.              4/17/24: ESR 15; CRP 1.8; CBC ok; CMP ok;   5/5/23: CBC ok; CMP ok; CPK ok; Zn low at 53 (60);   11/15/22: " ESR 36 (20) CRP 19.2; CBC WC 15.26; CMP Na 135; alb 3.3  10/10/22: CBC WC 15.30; CMP glu 112; alb 3.1;   10/3/22: ESR 48 (20); CRP 14.4  8/10/22: ESR 25 (36); CRP 0.5; CMP alb 3.1;   7/19/22: ESR 80 (20); CRP 45.2; CBC Ht 36.1; CMP glu 117; alb 3.1;   5/17/22: CBC Hg 11.6; Ht 34.3; CMP Na 135; alb 3.3; TPMT normal metabolizer; CPK 13; pre-DMARDs ok;   5/12/22: ESR 74 (36); CRP 37.2; UA ok; ; .2; ROBBIE/C3/C4/QIG wnl or neg; IgM aCLA 16.70 (12.49);   3/14/22: ESR 50 (30) CRP 2; ROBBIE neg; RF 74 (<6) CCP >250  11/30/21: RF<14;     12/9/22: CT chest: diffuse subpleural reticular opacity and nodular pleural thickening; minimal perihilar bronchiectasis.; no honeycombing or focal opacity or pleural thickening; diffuse nonspecific interstitial lung disease, possible UIP pattern  11/29/22: CXR: personally viewed: consistent with chronic interstitial lung disease.  9/29/22: ECHO: mild CHAPINCITO; mild TVS; PA press 31;   7/28/22: CT chest:  significant interval progression of previously noted subpleural predominant reticular and ground-glass airspace opacities associated with bilateral lower lobe volume loss, architectural distortion, symmetric mild central bronchiectasis and apicobasal gradient suggestive of UIP pattern of interstitial lung disease which may reflect IPF.  Additional diagnostic considerations include NSIP, asbestosis, fibrotic hypersensitivity pneumonitis connective tissue associated lung disease and drug induced lung disease amongst other etiologies.  5/12/22: Bilateral hands: personally viewed:mild STS dorsal MCPs bilaterally; min OA otherwise; remote healed distal radius fx w hardware.  5/12/22: Arthritis survey: personally viewed:  Mild OA Cspine, T spine; tibial spines; hands (Lwrist fx) & feet.  5/12/22: CXR: personally viewed: bibasilar coarse interstitial reticulated opacities c/w PF  6/28/21: CT chest:  predominant LL patchy increased attenuation and reticulation within the periphery of the  bilateral lower lobes c/w sequela of atelectasis; r/o early ILD; no bronchiectasis.  No honeycombing.  No significant ground-glass attenuation. Small sliding-type hiatal hernia.    10/15/20: DXA: TLS -2.7; TFN -2.2;  TTH -1.8; FRAX 18.9%;/3.5%  Assessment:   Sero+(173) CCP+(1162.7) non erosive RA              Mild SHT MCPs--resolved on mod dose pred              AM stiffness x3-4 hrs now varies up to 1/2 hr.               FHx: RA & myelodysplasia   S/P prednisone: none since 12/2023   S/P MTX ~ 5/31 - 7/26/22 w multiple intolerance exs   Brief azathioprine   RTX began10/24/22 & 11/7/22 scheduled for 5/10 & 5/24   S/P Ofev ~ 6 weeks stopped due to abn LFTs (together w MTX)           Chronic cough/SOB/ intermittent BAKER/abn CXR & CT c/w ILD--largely gone              CT chest w ILD              CXR: blake coarse interstitial reticular opacities c/w PF              Rx prednisone started 40 mg to taper--now off   Ofev w abn LFTs     S/PDermatitis   Skin peeling   Eczema/contact dermatitis/presumed allergic to prolia    Central sensitivity syndrome   Muscle aches & pains since childhood.   Some response to gluten restriction   CSI = 47 = hi moderate     Joint pain/muscle spasms/myalgia--multiple sites   Worse post Covid vaccine   Worse w Covid              RA/OA/CSS                          Bilateral hip pain                          Bilateral shoulder                          L wrist                          R knee                          L ankle                          R foot                          Cervical spine: mild DDD & spondylolistesis                                      Some response to baclofen (spasm & stiffness)                Osteoporosis handled by PCP              S/P R wrist fx 3/2019 (ORIF)                S/P rx w alendronate x 9 yrs--was on holiday >5 yrs   S/P Denosumab (Dr. Carpenter)-stopped due to rash    Facial rash wi 2 days of injection.    Possibly some erythema following 1st dose.               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  Discussed calcium supplementation through dietary means.  She would like to be followed by Dr. Carpenter for OP.  She has an appointment with Dr. Eng in August.

## 2024-05-02 ENCOUNTER — HOSPITAL ENCOUNTER (OUTPATIENT)
Dept: PULMONOLOGY | Facility: CLINIC | Age: 69
Discharge: HOME | End: 2024-05-02
Payer: MEDICARE

## 2024-05-02 ENCOUNTER — OFFICE VISIT (OUTPATIENT)
Dept: TRANSPLANT | Facility: CLINIC | Age: 69
End: 2024-05-02
Payer: MEDICARE

## 2024-05-02 VITALS
TEMPERATURE: 98 F | DIASTOLIC BLOOD PRESSURE: 72 MMHG | BODY MASS INDEX: 24.42 KG/M2 | OXYGEN SATURATION: 100 % | WEIGHT: 132.69 LBS | HEIGHT: 62 IN | HEART RATE: 72 BPM | SYSTOLIC BLOOD PRESSURE: 111 MMHG

## 2024-05-02 VITALS — BODY MASS INDEX: 24.46 KG/M2 | HEIGHT: 62 IN | WEIGHT: 132.94 LBS

## 2024-05-02 DIAGNOSIS — J84.9 INTERSTITIAL LUNG DISEASE: Primary | ICD-10-CM

## 2024-05-02 DIAGNOSIS — J30.2 SEASONAL ALLERGIC RHINITIS, UNSPECIFIED TRIGGER: ICD-10-CM

## 2024-05-02 DIAGNOSIS — M05.79 RHEUMATOID ARTHRITIS INVOLVING MULTIPLE SITES WITH POSITIVE RHEUMATOID FACTOR: ICD-10-CM

## 2024-05-02 DIAGNOSIS — J84.9 INTERSTITIAL LUNG DISEASE: ICD-10-CM

## 2024-05-02 DIAGNOSIS — J84.9 ILD (INTERSTITIAL LUNG DISEASE): ICD-10-CM

## 2024-05-02 DIAGNOSIS — K21.9 GASTROESOPHAGEAL REFLUX DISEASE WITHOUT ESOPHAGITIS: ICD-10-CM

## 2024-05-02 PROCEDURE — 94010 BREATHING CAPACITY TEST: CPT | Mod: 59,NTX,S$GLB, | Performed by: INTERNAL MEDICINE

## 2024-05-02 PROCEDURE — 3074F SYST BP LT 130 MM HG: CPT | Mod: CPTII,NTX,S$GLB, | Performed by: INTERNAL MEDICINE

## 2024-05-02 PROCEDURE — 99214 OFFICE O/P EST MOD 30 MIN: CPT | Mod: 25,NTX,S$GLB, | Performed by: INTERNAL MEDICINE

## 2024-05-02 PROCEDURE — 94727 GAS DIL/WSHOT DETER LNG VOL: CPT | Mod: NTX,S$GLB,, | Performed by: INTERNAL MEDICINE

## 2024-05-02 PROCEDURE — 3288F FALL RISK ASSESSMENT DOCD: CPT | Mod: CPTII,NTX,S$GLB, | Performed by: INTERNAL MEDICINE

## 2024-05-02 PROCEDURE — 3078F DIAST BP <80 MM HG: CPT | Mod: CPTII,NTX,S$GLB, | Performed by: INTERNAL MEDICINE

## 2024-05-02 PROCEDURE — 94618 PULMONARY STRESS TESTING: CPT | Mod: NTX,S$GLB,, | Performed by: INTERNAL MEDICINE

## 2024-05-02 PROCEDURE — 1101F PT FALLS ASSESS-DOCD LE1/YR: CPT | Mod: CPTII,NTX,S$GLB, | Performed by: INTERNAL MEDICINE

## 2024-05-02 PROCEDURE — 3008F BODY MASS INDEX DOCD: CPT | Mod: CPTII,NTX,S$GLB, | Performed by: INTERNAL MEDICINE

## 2024-05-02 PROCEDURE — 99999 PR PBB SHADOW E&M-EST. PATIENT-LVL III: CPT | Mod: PBBFAC,TXP,, | Performed by: INTERNAL MEDICINE

## 2024-05-02 PROCEDURE — 1126F AMNT PAIN NOTED NONE PRSNT: CPT | Mod: CPTII,NTX,S$GLB, | Performed by: INTERNAL MEDICINE

## 2024-05-02 PROCEDURE — 1159F MED LIST DOCD IN RCRD: CPT | Mod: CPTII,NTX,S$GLB, | Performed by: INTERNAL MEDICINE

## 2024-05-02 PROCEDURE — 1160F RVW MEDS BY RX/DR IN RCRD: CPT | Mod: CPTII,NTX,S$GLB, | Performed by: INTERNAL MEDICINE

## 2024-05-02 PROCEDURE — 94729 DIFFUSING CAPACITY: CPT | Mod: NTX,S$GLB,, | Performed by: INTERNAL MEDICINE

## 2024-05-02 NOTE — PROGRESS NOTES
ADVANCED LUNG DISEASE FOLLOW-UP                                                                                                                                          Reason for Visit:  RA-ILD    Referring Physician: Ibis Holcomb    History of Present Illness: Juan Cruz is a 69 y.o. female who is on 0L of oxygen.  She is on no assisted ventilation.  Her New York Heart Association Class is II and a Karnofsky score of 80% - Normal activity with effort: some symptoms of disease. She is not diabetic.     She presents today for follow-up of RA-ILD.  Currently on rituxan.  Lung toxicity with MTX, intolerance of aza, and hepatotoxicity to OFEV.  Overall, doing very well.  Recently went to [facility], [place], and [place] and tolerated all the travel without difficulty.  Cough minimal.  Dyspnea improved.  Seasonal allergies controlled with regards to rhinitis symptoms.  Had a previous rash which was thought to be related to a drug reaction.  Has since resolved.  Overall, doing very well.        Past Medical History:   Diagnosis Date    Bruise 9/12/2022    Chronic sinusitis, unspecified     Elevated liver enzymes 9/12/2022    History of SIADH     Interstitial pulmonary disease, unspecified 7/20/2023    LLL pneumonia 12/20/2022    Mitral valve prolapse     Osteopenia     Petechiae 9/12/2022    Pulmonary fibrosis 9/12/2022    Rheumatoid arthritis involving multiple sites     Skin rash 3/9/2023       Past Surgical History:   Procedure Laterality Date    BREAST BIOPSY Bilateral     FUNCTIONAL ENDOSCOPIC SINUS SURGERY (FESS)      ORIF WRIST FRACTURE Right        Allergies: Methotrexate, Gluten protein, Paxlovid [nirmatrelvir-ritonavir], Ppd black rubber mix, and Prolia [denosumab]    Current Outpatient Medications   Medication Sig Dispense Refill    B-complex with vitamin C (Z-BEC OR EQUIV) tablet Take 1 tablet by mouth 2 (two) times a day.      BREO ELLIPTA 200-25 mcg/dose DsDv diskus inhaler INHALE 1 PUFF BY  MOUTH ONCE DAILY (CONTROLLER) 180 each 0    calcium citrate-vitamin D3 315-200 mg (CITRACAL+D) 315-200 mg-unit per tablet Take 1 tablet by mouth 2 (two) times daily.      cetirizine (ZYRTEC) 10 MG tablet Take 1 tablet (10 mg total) by mouth once daily. 30 tablet 5    estradioL (VAGIFEM) 10 mcg Tab Yuvafem 10 mcg vaginal tablet   INSERT 1 TABLET VAGINALLY TWICE A WEEK 45 tablet 5    magnesium oxide (MAG-OX) 400 mg (241.3 mg magnesium) tablet Take 400 mg by mouth once daily.      metoprolol succinate (TOPROL-XL) 25 MG 24 hr tablet Take 1 tablet by mouth once daily 90 tablet 3    RESTASIS 0.05 % ophthalmic emulsion Place 1 drop into both eyes once daily. Has not started      sod chlor,sod bicarb/neti pot (NEILMED NASAFLO FRANK) 1 Dose by sinus irrigation route daily as needed.       Current Facility-Administered Medications   Medication Dose Route Frequency Provider Last Rate Last Admin    acetaminophen tablet 650 mg  650 mg Oral 1 time in Clinic/HOD Ibis Holcomb MD        acetaminophen tablet 650 mg  650 mg Oral 1 time in Clinic/HOD Ibis Holcomb MD        diphenhydrAMINE (BENADRYL) 25 mg in sodium chloride 0.9% 50 mL IVPB  25 mg Intravenous 1 time in Clinic/HOD Ibis Holcomb MD        diphenhydrAMINE (BENADRYL) 25 mg in sodium chloride 0.9% 50 mL IVPB  25 mg Intravenous 1 time in Clinic/[facility] Ibis Holcomb MD        famotidine (PF) injection 20 mg  20 mg Intravenous 1 time in Clinic/[facility] Ibis Holcomb MD        famotidine (PF) injection 20 mg  20 mg Intravenous 1 time in Clinic/[facility] Ibis Holcomb MD        heparin, porcine (PF) 100 unit/mL injection flush 500 Units  500 Units Intravenous PRN Ibis Holcomb MD        methylPREDNISolone sodium succinate (SOLU-MEDROL) 100 mg in dextrose 5 % (D5W) 100 mL IVPB  100 mg Intravenous 1 time in Clinic/[facility] Ibis Holcomb MD        methylPREDNISolone sodium succinate (SOLU-MEDROL)  100 mg in dextrose 5 % (D5W) 100 mL IVPB  100 mg Intravenous 1 time in Clinic/HOD Ibis Holcomb MD        riTUXimab-abbs (TRUXIMA) 1,000 mg in sodium chloride 0.9% 1,000 mL infusion (conc: 1 mg/mL)  1,000 mg Intravenous 1 time in Clinic/HOD Ibis Holcomb MD        riTUXimab-abbs (TRUXIMA) 1,000 mg in sodium chloride 0.9% 1,000 mL infusion (conc: 1 mg/mL)  1,000 mg Intravenous 1 time in Clinic/HOD Ibis Holcomb MD        riTUXimab-abbs (TRUXIMA) 1,000 mg in sodium chloride 0.9% 1,000 mL infusion (conc: 1 mg/mL)  1,000 mg Intravenous 1 time in Clinic/[facility] Ibis Holcomb MD        sodium chloride 0.9% 250 mL flush bag   Intravenous 1 time in Clinic/[facility] Ibis Holcomb MD        sodium chloride 0.9% 250 mL flush bag   Intravenous 1 time in Clinic/[facility] Ibis Holcomb MD        sodium chloride 0.9% flush 10 mL  10 mL Intravenous PRN Ibis Holcomb MD           Immunization History   Administered Date(s) Administered    COVID-19, MRNA, LN-S, PF (Pfizer) (Purple Cap) 12/31/2020, 01/25/2021, 08/25/2021    Pneumococcal Conjugate - 20 Valent 09/21/2023    Pneumococcal Polysaccharide - 23 Valent 09/16/2022    Yellow Fever 08/31/2017    Zoster Recombinant 01/18/2020, 08/19/2020     Family History:  No family history on file.  Social History     Substance and Sexual Activity   Alcohol Use No      Social History     Substance and Sexual Activity   Drug Use No      Social History     Socioeconomic History    Marital status:    Tobacco Use    Smoking status: Never     Passive exposure: Never    Smokeless tobacco: Never   Substance and Sexual Activity    Alcohol use: No    Drug use: No    Sexual activity: Not Currently     Social Determinants of Health     Financial Resource Strain: Low Risk  (4/23/2024)    Overall Financial Resource Strain (CARDIA)     Difficulty of Paying Living Expenses: Not hard at all   Food Insecurity: No Food Insecurity  (4/23/2024)    Hunger Vital Sign     Worried About Running Out of Food in the Last Year: Never true     Ran Out of Food in the Last Year: Never true   Transportation Needs: No Transportation Needs (4/23/2024)    PRAPARE - Transportation     Lack of Transportation (Medical): No     Lack of Transportation (Non-Medical): No   Physical Activity: Sufficiently Active (4/23/2024)    Exercise Vital Sign     Days of Exercise per Week: 5 days     Minutes of Exercise per Session: 30 min   Stress: No Stress Concern Present (4/23/2024)    [facility] [employer] [facility] - Occupational Stress Questionnaire     Feeling of Stress : Only a little   Housing Stability: Unknown (4/23/2024)    Housing Stability Vital Sign     Unable to Pay for Housing in the Last Year: No     Review of Systems   Constitutional:  Negative for chills, diaphoresis, fever, malaise/fatigue and weight loss.   HENT:  Negative for congestion, ear discharge, ear pain, hearing loss, nosebleeds, sinus pain, sore throat and tinnitus.    Eyes:  Negative for blurred vision, double vision, photophobia, pain, discharge and redness.   Respiratory:  Positive for cough (stable) and shortness of breath (improved). Negative for hemoptysis, sputum production, wheezing and stridor.    Cardiovascular:  Negative for chest pain, palpitations, orthopnea, claudication, leg swelling and PND.   Gastrointestinal:  Negative for abdominal pain, blood in stool, constipation, diarrhea, heartburn, melena, nausea and vomiting.   Genitourinary:  Negative for dysuria, flank pain, frequency, hematuria and urgency.   Musculoskeletal:  Negative for back pain, falls, joint pain, myalgias and neck pain.   Skin:  Negative for itching and rash.   Neurological:  Negative for dizziness, tingling, tremors, sensory change, speech change, focal weakness, seizures, loss of consciousness, weakness and headaches.   Endo/Heme/Allergies:  Negative for environmental allergies and polydipsia.  "Bruises/bleeds easily.   Psychiatric/Behavioral:  Negative for depression, hallucinations, memory loss, substance abuse and suicidal ideas. The patient is not nervous/anxious and does not have insomnia.      Vitals  /72 (BP Location: Left arm, Patient Position: Sitting, BP Method: Medium (Automatic))   Pulse 72   Temp 98.1 °F (36.7 °C) (Oral)   Ht 5' 2" (1.575 m)   Wt 60.2 kg (132 lb 11.5 oz)   LMP  (LMP Unknown)   SpO2 100% Comment: room air  BMI 24.27 kg/m²   Physical Exam  Vitals and nursing note reviewed.   Constitutional:       General: She is not in acute distress.     Appearance: She is well-developed. She is not diaphoretic.   HENT:      Head: Normocephalic and atraumatic.      Nose: No rhinorrhea.      Mouth/Throat:      Mouth: Mucous membranes are moist.      Pharynx: No oropharyngeal exudate.   Eyes:      General: No scleral icterus.     Conjunctiva/sclera: Conjunctivae normal.      Pupils: Pupils are equal, round, and reactive to light.   Neck:      Thyroid: No thyromegaly.      Vascular: No JVD.      Trachea: No tracheal deviation.   Cardiovascular:      Rate and Rhythm: Normal rate and regular rhythm.      Pulses: Normal pulses.      Heart sounds: Normal heart sounds. No murmur heard.     No friction rub. No gallop.   Pulmonary:      Effort: Pulmonary effort is normal. No respiratory distress.      Breath sounds: Rales (fine dry crackles bibasilar) present. No wheezing.   Abdominal:      General: Abdomen is flat. Bowel sounds are normal. There is no distension.      Palpations: Abdomen is soft.      Tenderness: There is no abdominal tenderness.   Musculoskeletal:         General: No deformity. Normal range of motion.      Cervical back: Normal range of motion and neck supple.   Skin:     General: Skin is warm and dry.      Capillary Refill: Capillary refill takes less than 2 seconds.      Coloration: Skin is not pale.      Findings: No bruising, erythema or rash.   Neurological:      " General: No focal deficit present.      Mental Status: She is alert and oriented to person, place, and time. Mental status is at baseline.      Cranial Nerves: No cranial nerve deficit.   Psychiatric:         Mood and Affect: Mood normal.         Behavior: Behavior normal.         Thought Content: Thought content normal.         Judgment: Judgment normal.         Labs:  No visits with results within 7 Day(s) from this visit.   Latest known visit with results is:   Lab Visit on 04/16/2024   Component Date Value    WBC 04/16/2024 6.46     RBC 04/16/2024 4.27     Hemoglobin 04/16/2024 12.8     Hematocrit 04/16/2024 39.3     MCV 04/16/2024 92     MCH 04/16/2024 30.0     MCHC 04/16/2024 32.6     RDW 04/16/2024 12.4     Platelets 04/16/2024 266     MPV 04/16/2024 10.0     Immature Granulocytes 04/16/2024 0.3     Gran # (ANC) 04/16/2024 3.5     Immature Grans (Abs) 04/16/2024 0.02     Lymph # 04/16/2024 2.1     Mono # 04/16/2024 0.6     Eos # 04/16/2024 0.2     Baso # 04/16/2024 0.02     nRBC 04/16/2024 0     Gran % 04/16/2024 54.9     Lymph % 04/16/2024 32.8     Mono % 04/16/2024 9.4     Eosinophil % 04/16/2024 2.3     Basophil % 04/16/2024 0.3     Differential Method 04/16/2024 Automated     Sodium 04/16/2024 139     Potassium 04/16/2024 4.3     Chloride 04/16/2024 108     CO2 04/16/2024 25     Glucose 04/16/2024 94     BUN 04/16/2024 10     Creatinine 04/16/2024 0.8     Calcium 04/16/2024 9.4     Total Protein 04/16/2024 6.4     Albumin 04/16/2024 3.5     Total Bilirubin 04/16/2024 0.4     Alkaline Phosphatase 04/16/2024 74     AST 04/16/2024 18     ALT 04/16/2024 13     eGFR 04/16/2024 >60     Anion Gap 04/16/2024 6 (L)     CRP 04/16/2024 1.8     Sed Rate 04/16/2024 15     IgG 04/16/2024 630 (L)     IgA 04/16/2024 100     IgM 04/16/2024 44 (L)     Hep B Core Total Ab 04/16/2024 Non-reactive     Hepatitis B Surface Ag 04/16/2024 Non-reactive            5/2/2024     9:54 AM 10/25/2023     1:41 PM 4/17/2023     1:19 PM  12/8/2022    10:24 AM 9/12/2022     6:00 PM 6/7/2022    11:54 AM   Pulmonary Function Tests   FVC 2.08 liters 2.16 liters 2.02 liters 1.86 liters 1.82 liters 2.18 liters   FEV1 1.83 liters 1.85 liters 1.76 liters 1.64 liters 1.62 liters 1.9 liters   TLC (liters) 2.67 liters 2.77 liters 2.94 liters 2.88 liters  2.79 liters   DLCO (ml/mmHg sec) 9.13 ml/mmHg sec 8.32 ml/mmHg sec 9.22 ml/mmHg sec 8.62 ml/mmHg sec  8.17 ml/mmHg sec   FVC% 89 92 86 78 77 92   FEV1% 98 98 93 86 85 99   FEF 25-75 3.13 3 2.9 2.93 2.69 3.24   FEF 25-75% 121 166 159 160 146 175   TLC% 58 60 63 63  61   RV 0.6 0.6 0.92 1.01  0.61   RV% 31 31 48 53  32   DLCO% 45 41 45 42  40         5/2/2024     8:36 AM 7/20/2023     9:33 AM 4/17/2023    11:38 AM 12/22/2022    10:05 AM 12/8/2022     9:08 AM 9/12/2022    11:43 AM 8/5/2022    10:29 AM   6MW   6MWT Status completed without stopping completed without stopping completed without stopping completed without stopping completed without stopping completed without stopping completed without stopping   Patient Reported No complaints No complaints Dyspnea No complaints Dyspnea Dyspnea No complaints   Was O2 used? No No No No No No    6MW Distance walked (feet) 1532 feet 1500 feet 1500 feet 1300 feet 1332 feet 1300 feet 1034 feet   Distance walked (meters) 466.95 meters 457.2 meters 457.2 meters 396.24 meters 405.99 meters 396.24 meters 315.16 meters   Did patient stop? No  No No No No No   Oxygen Saturation 98 % 98 % 98 % 98 % 99 % 98 % 96 %   Supplemental Oxygen Room Air Room Air Room Air Room Air Room Air Room Air Room Air   Heart Rate 85 bpm 95 bpm 85 bpm 78 bpm 81 bpm 89 bpm 82 bpm   Blood Pressure 114/75 109/62 113/64 118/77 145/78 120/74 106/65   Rigoberto Dyspnea Rating  nothing at all light nothing at all light moderate light somewhat heavy   Oxygen Saturation 98 % 94 % 94 % 95 % 98 % 95 % 91 %   Supplemental Oxygen Room Air  Room Air Room Air Room Air Room Air Room Air   Heart Rate 113 bpm 126 bpm 125  bpm 113 bpm 106 bpm 113 bpm 110 bpm   Blood Pressure 118/76 114/62 132/67 130/86 129/66 131/77 118/79   Rigoberto Dyspnea Rating  moderate moderate somewhat heavy somewhat heavy somewhat heavy somewhat heavy very heavy   Recovery Time (seconds) 135 seconds 180 seconds 149 seconds 180 seconds 123 seconds 72 seconds 180 seconds   Oxygen Saturation 98 % 97 % 98 % 99 % 98 % 98 % 96 %   Supplemental Oxygen Room Air Room Air Room Air Room Air Room Air Room Air Room Air   Heart Rate 95 bpm 101 bpm 106 bpm 83 bpm 94 bpm 97 bpm 86 bpm       Imaging:  Results for orders placed during the hospital encounter of 07/27/22    CT Chest Without Contrast    Narrative  EXAMINATION:  CT CHEST WITHOUT CONTRAST    CLINICAL HISTORY:  Interstitial lung disease; Cough, unspecified    TECHNIQUE:  Low dose axial images, sagittal and coronal reformations were obtained from the thoracic inlet to the lung bases.  Intravenous contrast was not administered.    COMPARISON:  CT thorax 06/28/2021    FINDINGS:  Base of Neck: Imaged portions of the base of the neck are grossly unremarkable.    Aorta: 3-branch vessel configuration of a left-sided type 3 aortic arch.  No hyperdense crescent sign, aneurysmal degeneration, periaortic fat stranding or periaortic fluid.    Heart: Heart is normal in size.  No significant pericardial thickening. No appreciable coronary artery calcifications.    Pulmonary vasculature: Pulmonary arteries are not enlarged and distribute normally.  There are four pulmonary veins.    Axilla/Alexandra/Mediastinum: Prominent mediastinal lymph nodes however, no rachael axillary or mediastinal lymphadenopathy.  Evaluation of hilar lymph nodes is limited without IV contrast.    Airways: Trachea and mainstem bronchi are patent.  Symmetric mild central bronchiectasis present.    Lungs/Pleura: Significant interval progression of previously noted subpleural predominant reticular and ground-glass airspace opacities associated with bilateral lower lobe  volume loss, architectural distortion, symmetric mild central bronchiectasis and apicobasal gradient.  No pleural effusions.  No pneumothorax.    Esophagus: Small hiatal hernia, not significantly changed.  Otherwise, normal in course and caliber however, evaluation is limited given the lack of oral contrast.    Soft tissues: Imaged soft tissues are grossly unremarkable.    Osseous structures: Diffuse osseous demineralization and mild multilevel degenerative changes of the imaged spine.  No acute displaced fracture, dislocation or suspicious lytic/blastic osseous lesion.    Upper Abdomen: Imaged portions of the upper abdomen are grossly unremarkable.    Impression  1. Significant interval progression of previously noted subpleural predominant reticular and ground-glass airspace opacities associated with bilateral lower lobe volume loss, architectural distortion, symmetric mild central bronchiectasis and apicobasal gradient suggestive of UIP pattern of interstitial lung disease which may reflect IPF.  Additional diagnostic considerations include NSIP, asbestosis, fibrotic hypersensitivity pneumonitis connective tissue associated lung disease and drug induced lung disease amongst other etiologies.      Electronically signed by: Braulio Haro  Date:    07/27/2022  Time:    16:11    Cardiodiagnostics:  6/28/2021:  The estimated ejection fraction is 55%.  Normal systolic function.  Normal right ventricular size with normal right ventricular systolic function.  Mild to moderate left atrial enlargement.  Mild right atrial enlargement.  Normal central venous pressure (3 mmHg).  The estimated PA systolic pressure is 18 mmHg.       Assessment:  1. Interstitial lung disease    2. Rheumatoid arthritis involving multiple sites with positive rheumatoid factor    3. Seasonal allergic rhinitis, unspecified trigger    4. Gastroesophageal reflux disease without esophagitis        Plan:   1. Followed for RA-ILD.  FVC and DLCO are  stable.  Failed MTX and AZA.  Currently on rituxan infusions; tolerating well.  Follows with ENT/derm/allergy; takes daily antihistamine, and uses NETI pot regularly.  GI work-up negative.  Not on PPI any longer.  Consider H2 blocker if she develops symptoms or unexplained worsening of her lung function.  Continue with pulmonary rehab or exercise and breathing exercises.  Acute hepatitis 2/2 OFEV so not a candidate for anti fibrotic therapy.  CT scan from [facility] reviewed and unchanged from our scans.  Very pleased with how she is doing symptomatically.        2. Follow-up with Rheum.  Currently on rituxan.    3. Continue with allergy regimen.    4. Continue to monitor for symptoms of GERD.      5. Follow-up in 6 months with PFTs and 6MWT     Barbara Haile D.O.  Pulmonary/Critical Care and Lung Transplantation  [facility] [place]

## 2024-05-02 NOTE — PROCEDURES
Juan Cruz is a 69 y.o.  female patient, who presents for a 6 minute walk test ordered by DO Lazara.  The diagnosis is Interstitial Lung Disease.  The patient's BMI is 24.3 kg/m2.  Predicted distance (lower limit of normal) is 329.93 meters.      Test Results:    The test was completed without stopping.  The total time walked was 360 seconds.  During walking, the patient reported:  No complaints. The patient used no assistive devices during testing.     05/02/2024---------Distance: 466.95 meters (1532 feet)     Lap Walk Time O2 Sat % Supplemental Oxygen Heart Rate Blood Pressure Rigoberto Scale   Pre-exercise  (Resting) 0 0 98 % Room Air 85 bpm 114/75 mmHg 0   During Exercise 1 44 sec 99 % Room Air 100 bpm     During Exercise 2 90 sec 98 % Room Air 113 bpm     During Exercise 3 137 sec 97 % Room Air 114 bpm     During Exercise 4 183 sec 98 % Room Air 111 bpm     During Exercise 5 230 sec 99 % Room Air 114 bpm     During Exercise 6 278 sec 96 % Room Air 113 bpm     During Exercise 7 325 sec 95 % Room Air 113 bpm     End of Exercise  360 sec 98 % Room Air 113 bpm 118/76 mmHg 3   Post-exercise  (Recovery)   98 % Room Air  95 bpm       Recovery Time: 135 seconds    Performing nurse/tech: ОЛЕГ Machado      PREVIOUS STUDY:   04/17/2023---------Distance: 457.2 meters (1500 feet)       O2 Sat % Supplemental Oxygen Heart Rate Blood Pressure Rigoberto Scale   Pre-exercise  (Resting) 98 % Room Air 85 bpm 113/64 mmHg 0   During Exercise 94 % Room Air 125 bpm 132/67 mmHg 4   Post-exercise  (Recovery) 98 % Room Air  106 bpm           CLINICAL INTERPRETATION:  Six minute walk distance is 466.95 meters (1532 feet) with moderate dyspnea.  During exercise, there was no significant desaturation while breathing room air.  Blood pressure remained stable and Heart rate increased significantly with walking.  The patient did not report non-pulmonary symptoms during exercise.  Since the previous study in April 2023, exercise capacity is  unchanged.  Based upon age and body mass index, exercise capacity is normal.

## 2024-05-02 NOTE — LETTER
May 2, 2024        Ibis Holcomb  [address]  [facility] [address]  Phone: [phone]  Fax: [phone]             [facility] 1st Fl  [address]  [facility] [address]  Phone: [phone]   Patient: Juan Cruz   MR Number: [id number]   YOB: 1955   Date of Visit: 5/2/2024       Dear Dr. Ibis Holcomb    Thank you for referring Juan Cruz to me for evaluation. Attached you will find relevant portions of my assessment and plan of care.    If you have questions, please do not hesitate to call me. I look forward to following Juan Cruz along with you.    Sincerely,    Barbara Haile, DO    Enclosure    If you would like to receive this communication electronically, please contact [email or web address] or [phone] to request [employer] access.    [employer] is a tool which provides read-only access to select patient information with whom you have a relationship. Its easy to use and provides real time access to review your patients record including encounter summaries, notes, results, and demographic information.    If you feel you have received this communication in error or would no longer like to receive these types of communications, please e-mail [email or web address]   
[email or web address]

## 2024-05-10 ENCOUNTER — INFUSION (OUTPATIENT)
Dept: INFUSION THERAPY | Facility: HOSPITAL | Age: 69
End: 2024-05-10
Payer: MEDICARE

## 2024-05-10 VITALS
BODY MASS INDEX: 24.42 KG/M2 | HEART RATE: 69 BPM | SYSTOLIC BLOOD PRESSURE: 99 MMHG | HEIGHT: 62 IN | DIASTOLIC BLOOD PRESSURE: 57 MMHG | WEIGHT: 132.69 LBS | TEMPERATURE: 98 F | RESPIRATION RATE: 18 BRPM

## 2024-05-10 DIAGNOSIS — J84.9 INTERSTITIAL LUNG DISEASE: Primary | ICD-10-CM

## 2024-05-10 DIAGNOSIS — J84.10 PULMONARY FIBROSIS: ICD-10-CM

## 2024-05-10 DIAGNOSIS — J84.9 ILD (INTERSTITIAL LUNG DISEASE): Primary | ICD-10-CM

## 2024-05-10 DIAGNOSIS — M05.79 RHEUMATOID ARTHRITIS INVOLVING MULTIPLE SITES WITH POSITIVE RHEUMATOID FACTOR: ICD-10-CM

## 2024-05-10 PROCEDURE — 96365 THER/PROPH/DIAG IV INF INIT: CPT

## 2024-05-10 PROCEDURE — 96375 TX/PRO/DX INJ NEW DRUG ADDON: CPT

## 2024-05-10 PROCEDURE — 63600175 PHARM REV CODE 636 W HCPCS: Performed by: INTERNAL MEDICINE

## 2024-05-10 PROCEDURE — 96366 THER/PROPH/DIAG IV INF ADDON: CPT

## 2024-05-10 PROCEDURE — 25000003 PHARM REV CODE 250: Performed by: INTERNAL MEDICINE

## 2024-05-10 PROCEDURE — 96367 TX/PROPH/DG ADDL SEQ IV INF: CPT

## 2024-05-10 RX ORDER — ACETAMINOPHEN 325 MG/1
650 TABLET ORAL
Status: CANCELLED | OUTPATIENT
Start: 2024-05-15

## 2024-05-10 RX ORDER — FAMOTIDINE 10 MG/ML
20 INJECTION INTRAVENOUS
Status: CANCELLED | OUTPATIENT
Start: 2024-05-15

## 2024-05-10 RX ORDER — ACETAMINOPHEN 325 MG/1
650 TABLET ORAL
Status: CANCELLED | OUTPATIENT
Start: 2024-05-24

## 2024-05-10 RX ORDER — HEPARIN 100 UNIT/ML
500 SYRINGE INTRAVENOUS
Status: CANCELLED | OUTPATIENT
Start: 2024-05-24

## 2024-05-10 RX ORDER — SODIUM CHLORIDE 0.9 % (FLUSH) 0.9 %
10 SYRINGE (ML) INJECTION
Status: DISCONTINUED | OUTPATIENT
Start: 2024-05-10 | End: 2024-05-10 | Stop reason: HOSPADM

## 2024-05-10 RX ORDER — MEPERIDINE HYDROCHLORIDE 50 MG/ML
25 INJECTION INTRAMUSCULAR; INTRAVENOUS; SUBCUTANEOUS
Status: CANCELLED
Start: 2024-05-15 | End: 2024-05-16

## 2024-05-10 RX ORDER — FAMOTIDINE 10 MG/ML
20 INJECTION INTRAVENOUS
Status: COMPLETED | OUTPATIENT
Start: 2024-05-10 | End: 2024-05-10

## 2024-05-10 RX ORDER — SODIUM CHLORIDE 0.9 % (FLUSH) 0.9 %
10 SYRINGE (ML) INJECTION
Status: CANCELLED | OUTPATIENT
Start: 2024-05-15

## 2024-05-10 RX ORDER — FAMOTIDINE 10 MG/ML
20 INJECTION INTRAVENOUS
Status: CANCELLED | OUTPATIENT
Start: 2024-05-24

## 2024-05-10 RX ORDER — SODIUM CHLORIDE 0.9 % (FLUSH) 0.9 %
10 SYRINGE (ML) INJECTION
Status: CANCELLED | OUTPATIENT
Start: 2024-05-24

## 2024-05-10 RX ORDER — SODIUM CHLORIDE 0.9 % (FLUSH) 0.9 %
10 SYRINGE (ML) INJECTION
Status: CANCELLED | OUTPATIENT
Start: 2024-05-10

## 2024-05-10 RX ORDER — MEPERIDINE HYDROCHLORIDE 50 MG/ML
25 INJECTION INTRAMUSCULAR; INTRAVENOUS; SUBCUTANEOUS
Status: CANCELLED
Start: 2024-05-10 | End: 2024-05-11

## 2024-05-10 RX ORDER — HEPARIN 100 UNIT/ML
500 SYRINGE INTRAVENOUS
Status: CANCELLED | OUTPATIENT
Start: 2024-05-10

## 2024-05-10 RX ORDER — ACETAMINOPHEN 325 MG/1
650 TABLET ORAL
Status: COMPLETED | OUTPATIENT
Start: 2024-05-10 | End: 2024-05-10

## 2024-05-10 RX ORDER — ACETAMINOPHEN 325 MG/1
650 TABLET ORAL
Status: CANCELLED | OUTPATIENT
Start: 2024-05-10

## 2024-05-10 RX ORDER — FAMOTIDINE 10 MG/ML
20 INJECTION INTRAVENOUS
Status: CANCELLED | OUTPATIENT
Start: 2024-05-10

## 2024-05-10 RX ADMIN — FAMOTIDINE 20 MG: 10 INJECTION INTRAVENOUS at 08:05

## 2024-05-10 RX ADMIN — RITUXIMAB-ABBS 1000 MG: 10 INJECTION, SOLUTION INTRAVENOUS at 10:05

## 2024-05-10 RX ADMIN — DEXTROSE 100 MG: 50 INJECTION, SOLUTION INTRAVENOUS at 09:05

## 2024-05-10 RX ADMIN — ACETAMINOPHEN 650 MG: 325 TABLET ORAL at 08:05

## 2024-05-10 RX ADMIN — SODIUM CHLORIDE: 9 INJECTION, SOLUTION INTRAVENOUS at 07:05

## 2024-05-10 RX ADMIN — DIPHENHYDRAMINE HYDROCHLORIDE 25 MG: 50 INJECTION, SOLUTION INTRAMUSCULAR; INTRAVENOUS at 08:05

## 2024-05-10 NOTE — PROGRESS NOTES
Per provider note  Dr. Haile 5/2/24, patient RTC November 2024 with PFTs, 6MWT. Start on room air, modified. Request to .

## 2024-05-10 NOTE — NURSING
1005  OK per D. [facility] to start subsequent dose at 100mL/hr, titrate to 400mL/hr; PharmD will contact clinical PharmD to review Truxima administration instructions

## 2024-05-10 NOTE — NURSING
0701  Pt here for Truxima infusion, accompanied by spouse, no new complaints or concerns at present, reports tolerating prior infusions w/out issue; discussed treatment plan for today, all questions answered and pt agrees to proceed

## 2024-05-10 NOTE — PLAN OF CARE
1395  Infusion completed, pt tolerated; pt instructed to remain well hydrated; reviewed s/s of post infusion reaction, how to treat, when to contact MD, when to report to ED; pt and spouse verbalized understanding of all discussed and when to report next

## 2024-05-24 ENCOUNTER — PATIENT MESSAGE (OUTPATIENT)
Dept: RHEUMATOLOGY | Facility: CLINIC | Age: 69
End: 2024-05-24
Payer: MEDICARE

## 2024-05-24 ENCOUNTER — INFUSION (OUTPATIENT)
Dept: INFUSION THERAPY | Facility: HOSPITAL | Age: 69
End: 2024-05-24
Payer: MEDICARE

## 2024-05-24 VITALS
DIASTOLIC BLOOD PRESSURE: 52 MMHG | BODY MASS INDEX: 24.46 KG/M2 | SYSTOLIC BLOOD PRESSURE: 96 MMHG | TEMPERATURE: 98 F | OXYGEN SATURATION: 100 % | WEIGHT: 132.94 LBS | HEART RATE: 67 BPM | RESPIRATION RATE: 18 BRPM | HEIGHT: 62 IN

## 2024-05-24 DIAGNOSIS — M05.79 RHEUMATOID ARTHRITIS INVOLVING MULTIPLE SITES WITH POSITIVE RHEUMATOID FACTOR: ICD-10-CM

## 2024-05-24 DIAGNOSIS — J84.10 PULMONARY FIBROSIS: ICD-10-CM

## 2024-05-24 DIAGNOSIS — J84.9 ILD (INTERSTITIAL LUNG DISEASE): Primary | ICD-10-CM

## 2024-05-24 PROCEDURE — 25000003 PHARM REV CODE 250: Performed by: INTERNAL MEDICINE

## 2024-05-24 PROCEDURE — 96375 TX/PRO/DX INJ NEW DRUG ADDON: CPT

## 2024-05-24 PROCEDURE — 96415 CHEMO IV INFUSION ADDL HR: CPT

## 2024-05-24 PROCEDURE — 63600175 PHARM REV CODE 636 W HCPCS: Performed by: INTERNAL MEDICINE

## 2024-05-24 PROCEDURE — 96367 TX/PROPH/DG ADDL SEQ IV INF: CPT

## 2024-05-24 PROCEDURE — 96413 CHEMO IV INFUSION 1 HR: CPT

## 2024-05-24 RX ORDER — ACETAMINOPHEN 325 MG/1
650 TABLET ORAL
Status: COMPLETED | OUTPATIENT
Start: 2024-05-24 | End: 2024-05-24

## 2024-05-24 RX ORDER — FAMOTIDINE 10 MG/ML
20 INJECTION INTRAVENOUS
Status: COMPLETED | OUTPATIENT
Start: 2024-05-24 | End: 2024-05-24

## 2024-05-24 RX ORDER — SODIUM CHLORIDE 0.9 % (FLUSH) 0.9 %
10 SYRINGE (ML) INJECTION
Status: DISCONTINUED | OUTPATIENT
Start: 2024-05-24 | End: 2024-05-24 | Stop reason: HOSPADM

## 2024-05-24 RX ORDER — ACETAMINOPHEN 325 MG/1
650 TABLET ORAL
OUTPATIENT
Start: 2024-06-07

## 2024-05-24 RX ORDER — HEPARIN 100 UNIT/ML
500 SYRINGE INTRAVENOUS
OUTPATIENT
Start: 2024-06-07

## 2024-05-24 RX ORDER — SODIUM CHLORIDE 0.9 % (FLUSH) 0.9 %
10 SYRINGE (ML) INJECTION
OUTPATIENT
Start: 2024-06-07

## 2024-05-24 RX ORDER — FAMOTIDINE 10 MG/ML
20 INJECTION INTRAVENOUS
OUTPATIENT
Start: 2024-06-07

## 2024-05-24 RX ADMIN — RITUXIMAB-ABBS 1000 MG: 10 INJECTION, SOLUTION INTRAVENOUS at 09:05

## 2024-05-24 RX ADMIN — SODIUM CHLORIDE: 9 INJECTION, SOLUTION INTRAVENOUS at 07:05

## 2024-05-24 RX ADMIN — FAMOTIDINE 20 MG: 10 INJECTION INTRAVENOUS at 07:05

## 2024-05-24 RX ADMIN — ACETAMINOPHEN 650 MG: 325 TABLET ORAL at 07:05

## 2024-05-24 RX ADMIN — DEXTROSE 100 MG: 50 INJECTION, SOLUTION INTRAVENOUS at 07:05

## 2024-05-24 RX ADMIN — DIPHENHYDRAMINE HYDROCHLORIDE 25 MG: 50 INJECTION, SOLUTION INTRAMUSCULAR; INTRAVENOUS at 08:05

## 2024-05-24 NOTE — PLAN OF CARE
1400-Pt tolerated Truxima well today, no complaints or complications. VSS through duration of treatment. Pt aware to call provider with any questions or concerns and is aware of upcoming appts. Pt ambulatory from clinic with steady gait, no distress noted.

## 2024-05-25 ENCOUNTER — PATIENT MESSAGE (OUTPATIENT)
Dept: RHEUMATOLOGY | Facility: CLINIC | Age: 69
End: 2024-05-25
Payer: MEDICARE

## 2024-05-28 ENCOUNTER — OFFICE VISIT (OUTPATIENT)
Dept: PRIMARY CARE CLINIC | Facility: CLINIC | Age: 69
End: 2024-05-28
Payer: MEDICARE

## 2024-05-28 VITALS
OXYGEN SATURATION: 98 % | HEART RATE: 93 BPM | DIASTOLIC BLOOD PRESSURE: 62 MMHG | HEIGHT: 62 IN | WEIGHT: 129.44 LBS | BODY MASS INDEX: 23.82 KG/M2 | SYSTOLIC BLOOD PRESSURE: 100 MMHG

## 2024-05-28 DIAGNOSIS — H04.123 DRY EYES: ICD-10-CM

## 2024-05-28 DIAGNOSIS — M05.79 RHEUMATOID ARTHRITIS INVOLVING MULTIPLE SITES WITH POSITIVE RHEUMATOID FACTOR: ICD-10-CM

## 2024-05-28 DIAGNOSIS — D84.9 IMMUNODEFICIENCY, UNSPECIFIED: ICD-10-CM

## 2024-05-28 DIAGNOSIS — Z79.60 LONG-TERM USE OF IMMUNOSUPPRESSANT MEDICATION: ICD-10-CM

## 2024-05-28 DIAGNOSIS — R00.0 SINUS TACHYCARDIA: ICD-10-CM

## 2024-05-28 DIAGNOSIS — N95.1 VAGINAL DRYNESS, MENOPAUSAL: ICD-10-CM

## 2024-05-28 DIAGNOSIS — Z00.00 PREVENTATIVE HEALTH CARE: Primary | ICD-10-CM

## 2024-05-28 DIAGNOSIS — M81.0 AGE-RELATED OSTEOPOROSIS WITHOUT CURRENT PATHOLOGICAL FRACTURE: ICD-10-CM

## 2024-05-28 DIAGNOSIS — J84.112 IDIOPATHIC PULMONARY FIBROSIS: ICD-10-CM

## 2024-05-28 DIAGNOSIS — M62.89 MUSCLE STIFFNESS: ICD-10-CM

## 2024-05-28 DIAGNOSIS — E55.9 VITAMIN D DEFICIENCY: ICD-10-CM

## 2024-05-28 DIAGNOSIS — J84.9 ILD (INTERSTITIAL LUNG DISEASE): ICD-10-CM

## 2024-05-28 PROBLEM — R06.09 DYSPNEA ON EXERTION: Status: RESOLVED | Noted: 2022-09-16 | Resolved: 2024-05-28

## 2024-05-28 PROCEDURE — 99215 OFFICE O/P EST HI 40 MIN: CPT | Mod: NTX,S$GLB,, | Performed by: INTERNAL MEDICINE

## 2024-05-28 PROCEDURE — 99999 PR PBB SHADOW E&M-EST. PATIENT-LVL V: CPT | Mod: PBBFAC,TXP,, | Performed by: INTERNAL MEDICINE

## 2024-05-28 PROCEDURE — 3074F SYST BP LT 130 MM HG: CPT | Mod: CPTII,NTX,S$GLB, | Performed by: INTERNAL MEDICINE

## 2024-05-28 PROCEDURE — 1126F AMNT PAIN NOTED NONE PRSNT: CPT | Mod: CPTII,NTX,S$GLB, | Performed by: INTERNAL MEDICINE

## 2024-05-28 PROCEDURE — 1101F PT FALLS ASSESS-DOCD LE1/YR: CPT | Mod: CPTII,NTX,S$GLB, | Performed by: INTERNAL MEDICINE

## 2024-05-28 PROCEDURE — 3078F DIAST BP <80 MM HG: CPT | Mod: CPTII,NTX,S$GLB, | Performed by: INTERNAL MEDICINE

## 2024-05-28 PROCEDURE — 3008F BODY MASS INDEX DOCD: CPT | Mod: CPTII,NTX,S$GLB, | Performed by: INTERNAL MEDICINE

## 2024-05-28 PROCEDURE — 1159F MED LIST DOCD IN RCRD: CPT | Mod: CPTII,NTX,S$GLB, | Performed by: INTERNAL MEDICINE

## 2024-05-28 PROCEDURE — 3288F FALL RISK ASSESSMENT DOCD: CPT | Mod: CPTII,NTX,S$GLB, | Performed by: INTERNAL MEDICINE

## 2024-05-28 NOTE — ASSESSMENT & PLAN NOTE
Vit d and mag in 1 month  Repeat DEXA now  Refer to GYN for annual gyn exam  Wear mask at all times  Take MVI daily

## 2024-05-28 NOTE — PROGRESS NOTES
[facility]ediatric[facility] Progress Note      Chief Complaint     Osteoporosis and Follow-up       Subjective:      History of Present Illness:  Juan Cruz is a 69 y.o. female  went to [facility]/[place] from Jan through April 2024 and did well except minor viral illness; cough stable; wore N95     RA-ILD with NYHA 2 (failed MTX and ASA) as per Dr. Haile on NO supplemental oxygen:  pt tolerated infusion of Truxima on 5/24/20204  2nd of 2 infusions 15 days apart every 6 mo but felt very tired  with paradoxical insomnia x 2 days since she gets Benadryl and steroids with the infusion  -still has intermittent dry cough but does not interfere with routine activities - able to climb stairs in her home and travel   Saw Dr. Haile on 5/02/2024 who stopped her Breo x 1.5 months; will repeat PFTs and 6MWT in 6 mo   Pulm stress test 5/02/2024: 98% RA and 94% post-exercise with  (rest 85); PFTS 2/2024 stable   Labs: 2/2024 CRP 1.83, ESR 35, B12 617, fasting glucose 106 and 4/2024 CRP 1.8 and ESR 15 with normal CMP, CBC  -O2 sat 98% on RA now denies BAKER, SOB  Had induration of left lateral AC fossa, swelling of wrist and ankle with 1st of 2 recent infusions that has since resolved but had never happened with prior infusions.   -Dr. Holcomb retired and Dr. GALEANA is taking her place     Ig def'y: Ig G 630 (1165) ;  Ig M 44 (114) 4/2024:     Vit D 2/24 level 37:  had Vit D injection in 2/2024 with 2 wks of Vit D 50,000 IU weekly; takes Calcium 600mg daily     , , , HDL 52 2/2024    HbA1C 5.1     Osteoporosis: [facility] DEXA 1/2024:  LS -2.7, left FN -2.4, right FN -2.4, left hip -1.9; right hip -2.1, radius -4.1   -did not tolerate Prolia  3/2023 due to painful skin rash     Needs GYN     Sinus tachycardia: HR 78 on Metoprolol Er 25mg daily     AR: on zyrtec 10mg daily    Vaginal dryness: Vagifem 10mg biweekly     Past Medical History:  Past Medical History:   Diagnosis Date    Bruise 09/12/2022     Chronic sinusitis, unspecified     Dyspnea on exertion 09/16/2022    Elevated liver enzymes 09/12/2022    History of SIADH     Interstitial pulmonary disease, unspecified 07/20/2023    LLL pneumonia 12/20/2022    Mitral valve prolapse     Osteopenia     Petechiae 09/12/2022    Pulmonary fibrosis 09/12/2022    Rheumatoid arthritis involving multiple sites     Skin rash 03/09/2023       Past Surgical History:  Past Surgical History:   Procedure Laterality Date    BREAST BIOPSY Bilateral     FUNCTIONAL ENDOSCOPIC SINUS SURGERY (FESS)      ORIF WRIST FRACTURE Right        Allergies:  Review of patient's allergies indicates:   Allergen Reactions    Methotrexate Shortness Of Breath    Gluten protein Other (See Comments)     Sensitive - not allergic    Paxlovid [nirmatrelvir-ritonavir] Other (See Comments)     Trunk / chest / legs peeling after paxlovid    Ppd black rubber mix     Prolia [denosumab] Other (See Comments)     Rash to arms/trunk       Home Medications:  Current Outpatient Medications   Medication Sig Dispense Refill    B-complex with vitamin C (Z-BEC OR EQUIV) tablet Take 1 tablet by mouth 2 (two) times a day.      calcium citrate-vitamin D3 315-200 mg (CITRACAL+D) 315-200 mg-unit per tablet Take 1 tablet by mouth 2 (two) times daily.      estradioL (VAGIFEM) 10 mcg Tab Yuvafem 10 mcg vaginal tablet   INSERT 1 TABLET VAGINALLY TWICE A WEEK 45 tablet 5    magnesium oxide (MAG-OX) 400 mg (241.3 mg magnesium) tablet Take 400 mg by mouth 2 (two) times daily.      metoprolol succinate (TOPROL-XL) 25 MG 24 hr tablet Take 1 tablet by mouth once daily 90 tablet 3    RESTASIS 0.05 % ophthalmic emulsion Place 1 drop into both eyes once daily. Has not started      BREO ELLIPTA 200-25 mcg/dose DsDv diskus inhaler INHALE 1 PUFF BY MOUTH ONCE DAILY (CONTROLLER) (Patient not taking: Reported on 5/28/2024) 180 each 0    cetirizine (ZYRTEC) 10 MG tablet Take 1 tablet (10 mg total) by mouth once daily. 30 tablet 5      Current Facility-Administered Medications   Medication Dose Route Frequency Provider Last Rate Last Admin    famotidine (PF) injection 20 mg  20 mg Intravenous 1 time in Clinic/H[facility] Ibis Holcomb MD        methylPREDNISolone sodium succinate (SOLU-MEDROL) 100 mg in dextrose 5 % (D5W) 100 mL IVPB  100 mg Intravenous 1 time in Clinic/[facility] Ibis Holcomb MD        methylPREDNISolone sodium succinate (SOLU-MEDROL) 100 mg in dextrose 5 % (D5W) 100 mL IVPB  100 mg Intravenous 1 time in Clinic/[facility] Ibis Holcomb MD        riTUXimab-abbs (TRUXIMA) 1,000 mg in sodium chloride 0.9% 1,000 mL infusion (conc: 1 mg/mL)  1,000 mg Intravenous 1 time in Clinic/[facility] Ibis Holcomb MD        riTUXimab-abbs (TRUXIMA) 1,000 mg in sodium chloride 0.9% 1,000 mL infusion (conc: 1 mg/mL)  1,000 mg Intravenous 1 time in [facility] Ibis Holcomb MD        riTUXimab-abbs (TRUXIMA) 1,000 mg in sodium chloride 0.9% 1,000 mL infusion (conc: 1 mg/mL)  1,000 mg Intravenous 1 time in [facility] Ibis Holcomb MD        sodium chloride 0.9% flush 10 mL  10 mL Intravenous PRN Ibis Holcomb MD            Family History:  No family history on file.    Social History:  Social History     Tobacco Use    Smoking status: Never     Passive exposure: Never    Smokeless tobacco: Never   Substance Use Topics    Alcohol use: No    Drug use: No       Review of Systems:  Pertinent positives and negatives listed in HPI. All other systems are reviewed and are negative.    Health Maintaince :   Health Maintenance Topics with due status: Not Due       Topic Last Completion Date    Colorectal Cancer Screening 10/05/2021    DEXA Scan 05/06/2022    Lipid Panel 12/19/2023    Mammogram 04/18/2024    Influenza Vaccine Not Due           Eye: UTD  Dental: UTD    Immunizations:   Tdap: n/a.  Influenza: UTD.  Pneumovax: UTD  Shingrex x 2: n/a  COVID: needs  Hepatitis C:   Cancer  "Screening:  PAP: UTD - needs annual gyn exam  Mammogram: UTD 4/2024   Colonoscopy: 10/2021 neg - repeat 10/2024   DEXA:  due now  LDCT: n/a    The 10-year ASCVD risk score (Yohana GARCIA, et al., 2019) is: 5.4%    Values used to calculate the score:      Age: 69 years      Sex: Female      Is Non- : No      Diabetic: No      Tobacco smoker: No      Systolic Blood Pressure: 100 mmHg      Is BP treated: No      HDL Cholesterol: 42 mg/dL      Total Cholesterol: 219 mg/dL      Objective:   /62 (BP Location: Left arm, Patient Position: Sitting, BP Method: Medium (Manual))   Pulse 93   Ht 5' 2" (1.575 m)   Wt 58.7 kg (129 lb 6.6 oz)   LMP  (LMP Unknown)   SpO2 98%   BMI 23.67 kg/m²      Body mass index is 23.67 kg/m².       Physical Examination:  General: Alert and awake in no apparent distress  HEENT: Normocephalic and atraumatic; Tms WNL  Eyes:  PERRL; EOMi with anicteric sclera and clear conjunctivae  Mouth:  Oropharynx clear and without exudate; moist mucous membranes  Neck:   Cervical nodes not enlarged; supple; no bruits  Cardio:  Regular rate and rhythm with normal S1 and S2; no murmurs or rubs  Resp:  CTAB; respirations unlabored; no wheezes, crackles or rhonchi  Abdom: Soft, NTND with normoactive bowel sounds; negative HSM  Extrem: Warm and well-perfused with no clubbing, cyanosis or edema  Skin:  No rashes, lesions, or color changes  Pulses:  2+ and symmetric distally  Neuro:  AAOx3; cooperative and pleasant with no focal deficits    Laboratory:      Most Recent Data:  Lab Results   Component Value Date    WBC 6.46 04/16/2024    HGB 12.8 04/16/2024    HCT 39.3 04/16/2024     04/16/2024    CHOL 219 (H) 12/19/2023    TRIG 130 12/19/2023    HDL 42 12/19/2023    ALT 13 04/16/2024    AST 18 04/16/2024     04/16/2024    K 4.3 04/16/2024     04/16/2024    BUN 10 04/16/2024    CO2 25 04/16/2024    INR 0.9 09/09/2022    HGBA1C 5.4 12/19/2023              CBC:   WBC "   Date Value Ref Range Status   04/16/2024 6.46 3.90 - 12.70 K/uL Final     Hemoglobin   Date Value Ref Range Status   04/16/2024 12.8 12.0 - 16.0 g/dL Final     Hematocrit   Date Value Ref Range Status   04/16/2024 39.3 37.0 - 48.5 % Final     Platelets   Date Value Ref Range Status   04/16/2024 266 150 - 450 K/uL Final     MCV   Date Value Ref Range Status   04/16/2024 92 82 - 98 fL Final     RDW   Date Value Ref Range Status   04/16/2024 12.4 11.5 - 14.5 % Final     BMP:   Sodium   Date Value Ref Range Status   04/16/2024 139 136 - 145 mmol/L Final     Potassium   Date Value Ref Range Status   04/16/2024 4.3 3.5 - 5.1 mmol/L Final     Chloride   Date Value Ref Range Status   04/16/2024 108 95 - 110 mmol/L Final     CO2   Date Value Ref Range Status   04/16/2024 25 23 - 29 mmol/L Final     BUN   Date Value Ref Range Status   04/16/2024 10 8 - 23 mg/dL Final     Creatinine   Date Value Ref Range Status   04/16/2024 0.8 0.5 - 1.4 mg/dL Final     Glucose   Date Value Ref Range Status   04/16/2024 94 70 - 110 mg/dL Final     Calcium   Date Value Ref Range Status   04/16/2024 9.4 8.7 - 10.5 mg/dL Final     Magnesium   Date Value Ref Range Status   12/19/2023 2.2 1.6 - 2.6 mg/dL Final     LFTs:   Total Protein   Date Value Ref Range Status   04/16/2024 6.4 6.0 - 8.4 g/dL Final     Albumin   Date Value Ref Range Status   04/16/2024 3.5 3.5 - 5.2 g/dL Final     Total Bilirubin   Date Value Ref Range Status   04/16/2024 0.4 0.1 - 1.0 mg/dL Final     Comment:     For infants and newborns, interpretation of results should be based  on gestational age, weight and in agreement with clinical  observations.    Premature Infant recommended reference ranges:  Up to 24 hours.............<8.0 mg/dL  Up to 48 hours............<12.0 mg/dL  3-5 days..................<15.0 mg/dL  6-29 days.................<15.0 mg/dL       AST   Date Value Ref Range Status   04/16/2024 18 10 - 40 U/L Final     Alkaline Phosphatase   Date Value Ref Range  Status   04/16/2024 74 55 - 135 U/L Final     ALT   Date Value Ref Range Status   04/16/2024 13 10 - 44 U/L Final     Coags:   INR   Date Value Ref Range Status   09/09/2022 0.9 0.8 - 1.2 Final     Comment:     Coumadin Therapy:  2.0 - 3.0 for INR for all indicators except mechanical heart valves  and antiphospholipid syndromes which should use 2.5 - 3.5.       FLP:      Lab Results   Component Value Date    CHOL 219 (H) 12/19/2023    CHOL 238 (H) 09/19/2022     Lab Results   Component Value Date    HDL 42 12/19/2023    HDL 73 09/19/2022     Lab Results   Component Value Date    LDLCALC 151.0 12/19/2023    LDLCALC 143.6 09/19/2022     Lab Results   Component Value Date    TRIG 130 12/19/2023    TRIG 107 09/19/2022     Lab Results   Component Value Date    CHOLHDL 19.2 (L) 12/19/2023    CHOLHDL 30.7 09/19/2022      DM:      Hemoglobin A1C   Date Value Ref Range Status   12/19/2023 5.4 4.0 - 5.6 % Final     Comment:     ADA Screening Guidelines:  5.7-6.4%  Consistent with prediabetes  >or=6.5%  Consistent with diabetes    High levels of fetal hemoglobin interfere with the HbA1C  assay. Heterozygous hemoglobin variants (HbS, HgC, etc)do  not significantly interfere with this assay.   However, presence of multiple variants may affect accuracy.     12/22/2022 5.6 4.0 - 5.6 % Final     Comment:     ADA Screening Guidelines:  5.7-6.4%  Consistent with prediabetes  >or=6.5%  Consistent with diabetes    High levels of fetal hemoglobin interfere with the HbA1C  assay. Heterozygous hemoglobin variants (HbS, HgC, etc)do  not significantly interfere with this assay.   However, presence of multiple variants may affect accuracy.       LDL Cholesterol   Date Value Ref Range Status   12/19/2023 151.0 63.0 - 159.0 mg/dL Final     Comment:     The National Cholesterol Education Program (NCEP) has set the  following guidelines (reference values) for LDL Cholesterol:  Optimal.......................<130 mg/dL  Borderline  "High...............130-159 mg/dL  High..........................160-189 mg/dL  Very High.....................>190 mg/dL       Creatinine   Date Value Ref Range Status   04/16/2024 0.8 0.5 - 1.4 mg/dL Final     Thyroid: No results found for: "TSH", "FREET4", "U7TDNES", "O0HVDKI", "THYROIDAB"  Anemia: No results found for: "IRON", "TIBC", "FERRITIN", "JTKWHCXA12", "FOLATE"  Cardiac:   Troponin I   Date Value Ref Range Status   02/10/2023 <0.006 0.000 - 0.026 ng/mL Final     Comment:     The reference interval for Troponin I represents the 99th percentile   cutoff   for our facility and is consistent with 3rd generation assay   performance.       BNP   Date Value Ref Range Status   02/10/2023 <10 0 - 99 pg/mL Final     Comment:     Values of less than 100 pg/ml are consistent with non-CHF populations.     Urinalysis:   Color, UA   Date Value Ref Range Status   12/10/2023 Colorless (A) Yellow, Straw, Krystal Final     Specific Gravity, UA   Date Value Ref Range Status   12/10/2023 <1.005 (A) 1.005 - 1.030 Final     Nitrite, UA   Date Value Ref Range Status   12/10/2023 Negative Negative Final     Ketones, UA   Date Value Ref Range Status   12/10/2023 1+ (A) Negative Final     Urobilinogen, UA   Date Value Ref Range Status   12/10/2023 Negative <2.0 EU/dL Final       Other Results:  EKG (my interpretation):     Radiology:  Mammo Digital Screening Bilat w/ Luis  Narrative: Result:  Mammo Digital Screening Bilat w/ Luis    History:  Patient is 69 y.o. and is seen for a screening mammogram.    Films Compared:  Prior images (if available) were compared.     Findings:  This procedure was performed using tomosynthesis.   Computer-aided detection was utilized in the interpretation of this   examination.    The breasts are heterogeneously dense, which may obscure small masses.   There is no evidence of suspicious masses, microcalcifications or   architectural distortion.  Impression:    No mammographic evidence of " malignancy.    BI-RADS Category 1: Negative    Recommendation:  Routine screening mammogram in 1 year is recommended.    Your estimated lifetime risk of breast cancer (to age 85) based on   Tyrer-Cuzick risk assessment model is 6.08%.  According to the American   Cancer Society, patients with a lifetime breast cancer risk of 20% or   higher might benefit from supplemental screening tests, such as screening   breast MRI.          Assessment/Plan     uJan Cruz is a 69 y.o. female with:  1. Age-related osteoporosis without current pathological fracture  Overview:  Indications: History of Fracture (Adult), Family history of osteoporosis, Post-menopause, , Height Loss   Comparison: DEXA scan 7/3/2019 - done 5/2022  Treatments: Exercises 3 or more times a week, Vitamin D supplementation, Calcium supplementation    A DEXA scan was performed on the lumbar spine and both hips.    The average trabecular bone mineral density of the lumbar spine from L2-L4 was 0.880 g/cm2. This represents a T-score of -2.7 and a Z-score of  -0.9. Previous T score of -2.8 on 7/3/2019    The average trabecular bone mineral density measured at the proximal femurs was 0.778 g/cm2. This represents a T-score of -1.8 and Z-score of -0.4. Previous T score of -1.7 on 7/3/2019    FRAX Result (10 year probability of fracture):  Major osteoporotic fracture: 11%  Hip fracture: 2.1%        Assessment & Plan:  Check Vit D level in 1 mo     Orders:  -     DXA Bone Density Axial Skeleton 1 or more sites; Future; Expected date: 05/28/2024    2. Vitamin D deficiency  Overview:  Last Vit D infusion in [place] 2/2024; weekly vit D 50,000 IU weekly x 2    Assessment & Plan:  Check Vit D level in 4 wks     Orders:  -     Vitamin D; Future; Expected date: 06/28/2024    3. Preventative health care  Assessment & Plan:  Vit d in 1 month  Repeat DEXA   Refer to GYN   Wear mask at all times    Orders:  -     Cancel: Ambulatory referral/consult to Gynecology;  Future; Expected date: 05/28/2024  -     Ambulatory referral/consult to Gynecology; Future; Expected date: 05/28/2024    4. Immunodeficiency, unspecified  Assessment & Plan:  Ig def'y: Ig G 630 (1165) ;  Ig M 44 (114) 4/2024:   -f/u private Immunology, Christa Varner      5. Idiopathic pulmonary fibrosis  Assessment & Plan:  Cont biannual infusions of Truxima   F/u Dr. GALEANA and Dr. Haile       6. Muscle stiffness  -     Magnesium; Future; Expected date: 05/28/2024    7. Vaginal dryness, menopausal  Assessment & Plan:  Stable on Vagifem 10mg biweekly       8. Sinus tachycardia  Assessment & Plan:  Stable on Metoprolol Er 25mg daily                I spent a total of 55 minutes on the day of the visit.This includes face to face time and non-face to face time preparing to see the patient (eg, review of tests), obtaining and/or reviewing separately obtained history, documenting clinical information in the electronic or other health record, independently interpreting results and communicating results to the patient/family/caregiver, or care coordinator.   Code Status:     Gianna Carpenter MD

## 2024-05-28 NOTE — ASSESSMENT & PLAN NOTE
Ig def'y: Ig G 630 (0878) ;  Ig M 44 (433) 4/2024:   -f/u [facility], Christa Varner

## 2024-05-28 NOTE — ASSESSMENT & PLAN NOTE
Check Vit D level in 1 mo   Order DEXA now  Cont to weight-bear and avoid losing weight  Consider Evenity if DEXA with persistent osteoporosis

## 2024-05-29 ENCOUNTER — TELEPHONE (OUTPATIENT)
Dept: RHEUMATOLOGY | Facility: CLINIC | Age: 69
End: 2024-05-29
Payer: MEDICARE

## 2024-05-30 ENCOUNTER — OFFICE VISIT (OUTPATIENT)
Dept: RHEUMATOLOGY | Facility: CLINIC | Age: 69
End: 2024-05-30
Payer: MEDICARE

## 2024-05-30 DIAGNOSIS — M05.79 RHEUMATOID ARTHRITIS INVOLVING MULTIPLE SITES WITH POSITIVE RHEUMATOID FACTOR: Primary | ICD-10-CM

## 2024-05-30 DIAGNOSIS — M81.0 OSTEOPOROSIS, UNSPECIFIED OSTEOPOROSIS TYPE, UNSPECIFIED PATHOLOGICAL FRACTURE PRESENCE: ICD-10-CM

## 2024-05-30 PROCEDURE — 99214 OFFICE O/P EST MOD 30 MIN: CPT | Mod: 95,NTX,, | Performed by: INTERNAL MEDICINE

## 2024-05-30 NOTE — PROGRESS NOTES
The patient location is: home  The chief complaint leading to consultation is: RA    Visit type: audiovisual    Face to Face time with patient: 20   minutes of total time spent on the encounter, which includes face to face time and non-face to face time preparing to see the patient (eg, review of tests), Obtaining and/or reviewing separately obtained history, Documenting clinical information in the electronic or other health record, Independently interpreting results (not separately reported) and communicating results to the patient/family/caregiver, or Care coordination (not separately reported).         Each patient to whom he or she provides medical services by telemedicine is:  (1) informed of the relationship between the physician and patient and the respective role of any other health care provider with respect to management of the patient; and (2) notified that he or she may decline to receive medical services by telemedicine and may withdraw from such care at any time.    Notes:     Subjective:     Patient ID: Juan Cruz is a 69 y.o. 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    PCP: Gianna Caprenter MD   Ortho: Minh Iglesias MD  Pulmonary: Barbara Haile DO;   Derm: Susu Wright MD  HPI     69 yr old lady seen for the first time on 5/12/22 w sero+CCP+ non erosive RA and a chronic cough with an abnormal CXR & CT chest c/w pulmonary fibrosis (suggestive of UIP). She has been intolerant of MTX & had worsening cough on it. She was on a very short course of azathiopine but stopped it due to concomitant abn LFTs (also on Ofev) &  her request for RTX.  She received 1st course of RTX 10/24/22 & 11/7/22 & has been getting it since. She has been on tapering doses of prednisone & stopped it in December.    She was last seen on  5/30/23. She returns for f/u and is doing very well both RA wise & pulmonary wise.  Has some  arthralgia in her L foot & L volar aspect of her wrist wo much swelling. Saw a hand surgeon less than 6 months ago & had hand imaging that was said to be ok. She feels there might be mild swelling of her ankle.  Arthralgia is w use. No other joint pain or swelling. AM stiffness is well below 1/2 hr although she indicated 1 hr on LEV..   Since her last visit, denosumab was stopped due to rash. OP followed by Dr. Carpenter who decided to manage only w calcium & vitamin D.   She's been off prednisone since 12/2023.     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    5/30/24.    Her main issues today have to do with skin rashes for which she has seen dermatology and has had biopsy.    Some look like eczema; some like contact dermatitis; she was concerned about dermatomyositis as she had some involvement around her eyes. 1 yr ago MSA abs were negative.  Feels facial rashes occurred after prolia (mild after first injection), more pronounced after 2nd.  Also got skin rashes after Paxlovid,.  However she did have rashes back in December.    RA wise c/o L wrist pain & L ankle pain. She has not seen swelling.  We have tapered her prednisone to 2.5 mg/d but due to pain in these areas took an extra dose yesterday. Has not seen any response.   Overall however, RA sxs have subsided. No AM stiffness, joint pain or joint swelling.  Has hx of some dry eyes & mild dry mouth. Denies Raynaud's, tight skin, alopecia, oral ulcers, parotid gland swelling, pleurisy, pericarditis, photosensitivity, skin rashes, thromboses, muscle weakness, headaches, paresthesias; LBP, thyroid issues;   1 miscarriage 4-6 weeks in 1997; has 1 daughter Csection;  Has FHx of RA & myelodysplasia (mom).    Has been getting Pulmonary Rehab at [facility]--interrupted by  Covid. and feels her joints feel better due to the exercise.    5/2024    She saw  4/2024  She has had no joint pains until April      She wanted to see me sooner because    She has pain in :   Both feet, right> left towards peroneal tendons  Stiffness and pain in hips  Left wrist chronic swelling  Both elbows    All this started in may 2024    On rituximab 1000 mg x 2  May 10 and may 24    After she completed infusions    Hips are stiff  Hamstrings and back of the knees   Right foot sore on the sole and later aspect    Has persistently elevated ESR  April 2024 ESR 15  Off steroids for 6 months    PFTs and 6 minute walk stable  Off ofev due to liver toxicity- in 2022    Dexa 2020- [facility] 2022 2024- will move to [facility] for 9 years in the past-stopped it  Prolia gave allergic reaction- skin inflammation  If the new DEXA shows OP she will need new meds  On barrie and vit d  Vit d injectable and barrie         PMH  She was seen again on 5/31/22 at which time MTX was added to her prednisone for her joint pain, but she had intolerance to it (headaches, fatigue, anorexia, dysgeusia, sleep issues) but stayed on it until ~ 7/26 when she developed worsening cough and abnormal CT chest and it was stopped. CT Chest on 7/27 showed significant interval progression of subpleural predominant reticular and ground-glass airspace opacities associated with bilateral lower lobe volume loss, architectural distortion, symmetric mild central bronchiectasis and apicobasal gradient suggestive of UIP pattern of ILD which may reflect IPF.  Additional diagnostic considerations include NSIP, asbestosis, fibrotic hypersensitivity pneumonitis connective tissue associated lung disease and drug induced lung disease amongst other etiologies.    She was seen by Dr. Haile on 7/28 at which time due to progression of cough & SOB (& worsening CT chest) prednisone 40 mg/d was begun for presumed RA-ILD. Patient was to continue her  antibiotic (copious sputum; cultures NTD) & Trelegy which was begun by AI for some PFT reversibility. The plan was to reassess & if there has been a response to add MMF. Ofev also to be added (has not gotten it yet).    5/31/22  She has contacted us several times since her initial visit c/o generalized weakness & pain, tinnitus; peeing a lot; hip & knee pain; leg edema and was asked to come in to see us again, but she presents prior to her w/u and appointment to see Dr. Haile on 6/7/22.    Today she has multiple complaints and feels that she is in severe pain and unable to function. She has AM stiffness lasting 4 hrs. She has difficulty getting in and out of bed, dressing herself, washing herself, etc. She is fatigued & is unable to sleep but denies depression & anxiety. She now volunteers she had been seen by Dr.Luis Bee in the past ~ 2007 for similar sxs & no autoimmune/rheum dx was made.     Today, she feels she has developed joint swelling since her last visit, especially in some MCPs, but also c/o edema which improved after she stopped taking NSAIDs. She continue to c/o chronic neck pain.    She is currently taking prednisone 10 mg/d. She states she has bilateral shoulder pain & is unable to get OOB in AM. No GCA sxs. She is stiff x 4 hrs.     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  These appear to be incidental & unexpected findings as patient has very little joint swelling.  Patient feels that myalgias began after her last Covid vaccine.    In February got severe pain in R shoulder that responded to CSI but then developed excruciating spasms neck & around both shoulders. Labs gotten by Orthopedic (Dr. Iglesias);   In Jan 27,2022 had FESS for sinus surgery & needed bad infection and required more antibiotics. Developed rash on cheeks on levaquin. Same rash came back now again.    In March had only shoulder  pain & pronating L wrist.  Now R knee, L ankle pain & swelling & R foot 3rd & 4th toes even at rest.  Also weak all over  Baclofen helps her spasms & stiffness    AM stiffness x 3-4 hrs (since ~ February, 2022);     .  Denies Raynaud's, tight skin, alopecia, oral ulcers, parotid gland swelling, pleurisy, pericarditis, photosensitivity, skin rashes, thromboses, muscle weakness; fevers, headaches, conjunctivitis, chronic or bloody diarrhea, vaginal/urethral D/C; paresthesias; LBP, thyroid issues;   1 miscarriage 4-6 weeks in 1997; has 1 daughter Csection;   Chronic cough x 1.5 yr after swallowing--fatigues & drains her  Has dry eyes > mouth  Has BAKER & st w eating comes & goes;   Saw Dr. Siegel & told no obstructive pulmonary disease but restrictive.  Has FHx of RA & myelodysplasia (mom)    Started prednisone 10 mg qod ~ end of March, 2022; Helped pain   But had adverse effects on higher steroids in past.  In 1999 NYLA was on decadron but developed myopathy & psychosis on it.  .    TMJ on L x 1 2/22 lasted 1-2days  Neck since 2/22 L>R  B shoulders L>R  Elbows no  Wrists L>R (flexi carpi ulnaris)  MCPs no but only 2nd R MCP  PIPs & DIP  Hip: no  GT no  Knee: R 1 month ago; since Monday both; just hurt; hurt all the time.  Ankles: L 1 month R since Monday --only swelling  Toes 3 & 4th on R; L of  Both heels hurt  Achilles bilateral.    Current Outpatient Medications   Medication Sig Dispense Refill    B-complex with vitamin C (Z-BEC OR EQUIV) tablet Take 1 tablet by mouth 2 (two) times a day.      calcium citrate-vitamin D3 315-200 mg (CITRACAL+D) 315-200 mg-unit per tablet Take 1 tablet by mouth 2 (two) times daily.      cetirizine (ZYRTEC) 10 MG tablet Take 1 tablet (10 mg total) by mouth once daily. 30 tablet 5    estradioL (VAGIFEM) 10 mcg Tab Yuvafem 10 mcg vaginal tablet   INSERT 1 TABLET VAGINALLY TWICE A WEEK 45 tablet 5    magnesium oxide (MAG-OX) 400 mg (241.3 mg magnesium) tablet Take 400 mg by mouth 2 (two)  times daily.      metoprolol succinate (TOPROL-XL) 25 MG 24 hr tablet Take 1 tablet by mouth once daily 90 tablet 3    RESTASIS 0.05 % ophthalmic emulsion Place 1 drop into both eyes once daily. Has not started       Current Facility-Administered Medications   Medication Dose Route Frequency Provider Last Rate Last Admin    methylPREDNISolone sodium succinate (SOLU-MEDROL) 100 mg in dextrose 5 % (D5W) 100 mL IVPB  100 mg Intravenous 1 time in Clinic/HOD Ibis Holcomb MD        methylPREDNISolone sodium succinate (SOLU-MEDROL) 100 mg in dextrose 5 % (D5W) 100 mL IVPB  100 mg Intravenous 1 time in Clinic/HOD Ibis Holcomb MD        riTUXimab-abbs (TRUXIMA) 1,000 mg in sodium chloride 0.9% 1,000 mL infusion (conc: 1 mg/mL)  1,000 mg Intravenous 1 time in Clinic/HOD Ibis Holcomb MD        riTUXimab-abbs (TRUXIMA) 1,000 mg in sodium chloride 0.9% 1,000 mL infusion (conc: 1 mg/mL)  1,000 mg Intravenous 1 time in Clinic/HOD Ibis Holcomb MD        riTUXimab-abbs (TRUXIMA) 1,000 mg in sodium chloride 0.9% 1,000 mL infusion (conc: 1 mg/mL)  1,000 mg Intravenous 1 time in Clinic/HOD Ibis Holcomb MD        sodium chloride 0.9% flush 10 mL  10 mL Intravenous PRN Ibis Holcomb MD       Off folic acid & flonase.       Review of patient's allergies indicates:   Allergen Reactions    Methotrexate Shortness Of Breath    Gluten protein Other (See Comments)     Sensitive - not allergic    Paxlovid [nirmatrelvir-ritonavir] Other (See Comments)     Trunk / chest / legs peeling after paxlovid    Ppd black rubber mix     Prolia [denosumab] Other (See Comments)     Rash to arms/trunk       Review of Systems   Constitutional:  Negative for chills, fatigue, fever and unexpected weight change.   HENT:  Positive for hearing loss (R loss of hearing; following SIADH) and tinnitus. Negative for mouth sores, postnasal drip, sinus pain and trouble swallowing.    Eyes:   Negative for redness.   Respiratory:  Positive for cough (better) and shortness of breath (better). Negative for chest tightness.    Cardiovascular: Negative.  Negative for chest pain and palpitations (Has had MVP on metoprolol).   Gastrointestinal:  Positive for diarrhea (attributed to Paxlovid). Negative for constipation.   Endocrine: Positive for cold intolerance.   Genitourinary:  Positive for enuresis. Negative for dysuria and genital sores.        Incontinence   Musculoskeletal:  Negative for arthralgias, back pain, joint swelling, myalgias, neck pain and neck stiffness.   Skin:  Negative for rash.   Neurological: Negative.  Negative for dizziness, seizures, syncope, numbness and headaches.   Hematological:  Does not bruise/bleed easily.   Psychiatric/Behavioral:  Positive for sleep disturbance (attributed in part to steroids.). Negative for dysphoric mood. The patient is not nervous/anxious.         In past feels prednisone made her irritable.       Past Medical History:   Diagnosis Date    Bruise 2022    Chronic sinusitis, unspecified     Dyspnea on exertion 2022    Elevated liver enzymes 2022    History of SIADH     Interstitial pulmonary disease, unspecified 2023    LLL pneumonia 2022    Mitral valve prolapse     Osteopenia     Petechiae 2022    Pulmonary fibrosis 2022    Rheumatoid arthritis involving multiple sites     Skin rash 2023       Past Surgical History:   Procedure Laterality Date    BREAST BIOPSY Bilateral     FUNCTIONAL ENDOSCOPIC SINUS SURGERY (FESS)      ORIF WRIST FRACTURE Right        FH:  M: dx 83 w RA; passed away at age 84; also had some intestinal problems.  Had myelodysplasia.  1 S hx of chronic myalgia & gets bedridden 2-3 days.  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    Objective:     LMP  (LMP Unknown)     Physical Exam   Constitutional: She is oriented to person, place, and time. normal appearance. No distress.   HENT:   Head: Normocephalic and atraumatic.   Mouth/Throat: Oropharynx is clear and moist. No oropharyngeal exudate.   No facial rashes  Parotids not enlarged     Eyes: Pupils are equal, round, and reactive to light. Conjunctivae are normal. Right eye exhibits no discharge. Left eye exhibits no discharge. No scleral icterus.   Neck: No JVD present. No tracheal deviation present. No thyromegaly present.   Cardiovascular: Regular rhythm and normal heart sounds. Exam reveals no gallop and no friction rub.   No murmur heard.  Pulmonary/Chest: Effort normal. No respiratory distress. She has no wheezes. She has no rales. She exhibits no tenderness.   Abdominal: Soft. Bowel sounds are normal. She exhibits no distension and no mass. There is no splenomegaly or hepatomegaly. There is no abdominal tenderness. There is no rebound and no guarding.   Musculoskeletal:         General: No swelling or tenderness. Normal range of motion.      Cervical back: Neck supple.      Comments: No SHT anywhere   Mild TTP volar aspect L wrist by edge of ulna.  Adequate ROM all joints.  Ankles unremarkable.   No metacarpalgia; No metatarsalgia        Lymphadenopathy:     She has no cervical adenopathy.   Neurological: She is alert and oriented to person, place, and time. She has normal reflexes. No cranial nerve deficit. Gait normal.   Proximal & distal upper extremity strength appropriate.  LE strength probably 4+ (limited by some muscle pain)   Skin: Skin is warm and dry. No rash noted. She is not diaphoretic.   Psychiatric: Mood, memory, affect and thought content normal.   Vitals reviewed.              4/17/24: ESR 15; CRP 1.8; CBC ok; CMP ok;   5/5/23: CBC ok; CMP ok; CPK ok; Zn low at 53  (60);   11/15/22: ESR 36 (20) CRP 19.2; CBC WC 15.26; CMP Na 135; alb 3.3  10/10/22: CBC WC 15.30; CMP glu 112; alb 3.1;   10/3/22: ESR 48 (20); CRP 14.4  8/10/22: ESR 25 (36); CRP 0.5; CMP alb 3.1;   7/19/22: ESR 80 (20); CRP 45.2; CBC Ht 36.1; CMP glu 117; alb 3.1;   5/17/22: CBC Hg 11.6; Ht 34.3; CMP Na 135; alb 3.3; TPMT normal metabolizer; CPK 13; pre-DMARDs ok;   5/12/22: ESR 74 (36); CRP 37.2; UA ok; ; .2; ROBBIE/C3/C4/QIG wnl or neg; IgM aCLA 16.70 (12.49);   3/14/22: ESR 50 (30) CRP 2; ROBBIE neg; RF 74 (<6) CCP >250  11/30/21: RF<14;     12/9/22: CT chest: diffuse subpleural reticular opacity and nodular pleural thickening; minimal perihilar bronchiectasis.; no honeycombing or focal opacity or pleural thickening; diffuse nonspecific interstitial lung disease, possible UIP pattern  11/29/22: CXR: personally viewed: consistent with chronic interstitial lung disease.  9/29/22: ECHO: mild CHAPINCITO; mild TVS; PA press 31;   7/28/22: CT chest:  significant interval progression of previously noted subpleural predominant reticular and ground-glass airspace opacities associated with bilateral lower lobe volume loss, architectural distortion, symmetric mild central bronchiectasis and apicobasal gradient suggestive of UIP pattern of interstitial lung disease which may reflect IPF.  Additional diagnostic considerations include NSIP, asbestosis, fibrotic hypersensitivity pneumonitis connective tissue associated lung disease and drug induced lung disease amongst other etiologies.  5/12/22: Bilateral hands: personally viewed:mild STS dorsal MCPs bilaterally; min OA otherwise; remote healed distal radius fx w hardware.  5/12/22: Arthritis survey: personally viewed:  Mild OA Cspine, T spine; tibial spines; hands (Lwrist fx) & feet.  5/12/22: CXR: personally viewed: bibasilar coarse interstitial reticulated opacities c/w PF  6/28/21: CT chest:  predominant LL patchy increased attenuation and reticulation within the  periphery of the bilateral lower lobes c/w sequela of atelectasis; r/o early ILD; no bronchiectasis.  No honeycombing.  No significant ground-glass attenuation. Small sliding-type hiatal hernia.    10/15/20: DXA: TLS -2.7; TFN -2.2;  TTH -1.8; FRAX 18.9%;/3.5%  Assessment:   Sero+(173) CCP+(1162.7) non erosive RA              Mild SHT MCPs--resolved on mod dose pred              AM stiffness x3-4 hrs now varies up to 1/2 hr.               FHx: RA & myelodysplasia   S/P prednisone: none since 12/2023   S/P MTX ~ 5/31 - 7/26/22 w multiple intolerance exs   Brief azathioprine   RTX began10/24/22 & 11/7/22 scheduled for 5/10 & 5/24   S/P Ofev ~ 6 weeks stopped due to abn LFTs (together w MTX)           Chronic cough/SOB/ intermittent BAKER/abn CXR & CT c/w ILD--largely gone              CT chest w ILD              CXR: blake coarse interstitial reticular opacities c/w PF              Rx prednisone started 40 mg to taper--now off   Ofev w abn LFTs     S/PDermatitis   Skin peeling   Eczema/contact dermatitis/presumed allergic to prolia    Central sensitivity syndrome   Muscle aches & pains since childhood.   Some response to gluten restriction   CSI = 47 = hi moderate     Joint pain/muscle spasms/myalgia--multiple sites   Worse post Covid vaccine   Worse w Covid              RA/OA/CSS                          Bilateral hip pain                          Bilateral shoulder                          L wrist                          R knee                          L ankle                          R foot                          Cervical spine: mild DDD & spondylolistesis                                      Some response to baclofen (spasm & stiffness)                Osteoporosis handled by PCP              S/P R wrist fx 3/2019 (ORIF)                S/P rx w alendronate x 9 yrs--was on holiday >5 yrs   S/P Denosumab (Dr. Carpenter)-stopped due to rash    Facial rash wi 2 days of injection.    Possibly some erythema following 1st dose.               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    Gluten sensitivity --non celiac    S/P Covid 19+ 1/30/23   S/P worsening cough, fever, chills--resolved,       I am seeing her for the first time     5/2024    She saw  4/2024  She has had no joint pains until April      She wanted to see me sooner because    She has pain in :   Both feet, right> left towards peroneal tendons  Stiffness and pain in hips  Left wrist chronic swelling  Both elbows    All this started in may 2024    On rituximab 1000 mg x 2  May 10 and may 24    After she completed infusions    Hips are stiff  Hamstrings and back of the knees   Right foot sore on the sole and later aspect    Has persistently elevated ESR  April 2024 ESR 15  Off steroids for 6 months    PFTs and 6 minute walk stable  Off ofev due to liver toxicity- in 2022    Dexa 2020- [facility], [place] 2022 2024- will move to [facility] for 9 years in the past-stopped it  Prolia gave allergic reaction- skin inflammation  If the new DEXA shows OP she will need new meds  On johnson and vit d  Vit d injectable and johnson     I asked her if she needs to be treated for her joint pains  She didn't think so  She feels that her joint pains aren't that bad  She can wait until she gets her infusion in November     Plan:   Continue RTX q 6 months  IF HER joint pains are ongoing she will need a second agent  She can also get her rituximab sooner than 6 months-may be at 4 months    [place]oved [place]  [place]- [place]  20[place]  It will be hard to compare if she changes places  So stick to [place] henceforth  She needs to be treated but she might be willing to discuss after the new dexa  Johnson and vit d    I ordered labs for October-November and she will come back in November 2024

## 2024-06-11 DIAGNOSIS — N89.8 VAGINAL DISCHARGE: Primary | ICD-10-CM

## 2024-06-11 DIAGNOSIS — D84.9 IMMUNODEFICIENCY, UNSPECIFIED: Primary | ICD-10-CM

## 2024-06-12 ENCOUNTER — LAB VISIT (OUTPATIENT)
Dept: LAB | Facility: HOSPITAL | Age: 69
End: 2024-06-12
Attending: ALLERGY & IMMUNOLOGY
Payer: MEDICARE

## 2024-06-12 DIAGNOSIS — D84.9 IMMUNODEFICIENCY, UNSPECIFIED: ICD-10-CM

## 2024-06-12 PROCEDURE — 82784 ASSAY IGA/IGD/IGG/IGM EACH: CPT | Mod: 59,NTX | Performed by: ALLERGY & IMMUNOLOGY

## 2024-06-12 PROCEDURE — 86355 B CELLS TOTAL COUNT: CPT | Mod: NTX | Performed by: ALLERGY & IMMUNOLOGY

## 2024-06-12 PROCEDURE — 86360 T CELL ABSOLUTE COUNT/RATIO: CPT | Mod: NTX | Performed by: ALLERGY & IMMUNOLOGY

## 2024-06-12 PROCEDURE — 82787 IGG 1 2 3 OR 4 EACH: CPT | Mod: 59,TXP | Performed by: ALLERGY & IMMUNOLOGY

## 2024-06-12 PROCEDURE — 86317 IMMUNOASSAY INFECTIOUS AGENT: CPT | Mod: TXP | Performed by: ALLERGY & IMMUNOLOGY

## 2024-06-12 PROCEDURE — 36415 COLL VENOUS BLD VENIPUNCTURE: CPT | Mod: TXP | Performed by: ALLERGY & IMMUNOLOGY

## 2024-06-12 PROCEDURE — 86359 T CELLS TOTAL COUNT: CPT | Mod: NTX | Performed by: ALLERGY & IMMUNOLOGY

## 2024-06-12 PROCEDURE — 86357 NK CELLS TOTAL COUNT: CPT | Mod: NTX | Performed by: ALLERGY & IMMUNOLOGY

## 2024-06-13 LAB
CD3+CD4+ CELLS # BLD: 1158 CELLS/UL (ref 300–1400)
CD3+CD4+ CELLS NFR BLD: 55.3 % (ref 28–57)
IGA SERPL-MCNC: 93 MG/DL (ref 40–350)
IGG SERPL-MCNC: 664 MG/DL (ref 650–1600)
IGM SERPL-MCNC: 40 MG/DL (ref 50–300)
LYMPHOCYTES NFR CSF MANUAL: 0 % (ref 6–19)
LYMPHOCYTES NFR CSF MANUAL: 0 CELLS/UL (ref 100–500)
LYMPHOCYTES NFR CSF MANUAL: 1582 CELLS/UL (ref 700–2100)
LYMPHOCYTES NFR CSF MANUAL: 19.8 % (ref 10–39)
LYMPHOCYTES NFR CSF MANUAL: 2.79 % (ref 0.9–3.6)
LYMPHOCYTES NFR CSF MANUAL: 21.8 % (ref 7–31)
LYMPHOCYTES NFR CSF MANUAL: 415 CELLS/UL (ref 200–900)
LYMPHOCYTES NFR CSF MANUAL: 458 CELLS/UL (ref 90–600)
LYMPHOCYTES NFR CSF MANUAL: 75.6 % (ref 55–83)

## 2024-06-17 LAB
IGG1 SER-MCNC: 434 MG/DL (ref 382–929)
IGG2 SER-MCNC: 157 MG/DL (ref 242–700)
IGG3 SER-MCNC: 23 MG/DL (ref 22–176)
IGG4 SER-MCNC: 4 MG/DL (ref 4–86)

## 2024-06-20 LAB
IMMUNOLOGIST REVIEW: NORMAL
S PN DA SERO 19F IGG SER-MCNC: 0.5 MCG/ML
S PNEUM DA 1 IGG SER-MCNC: 0.2 MCG/ML
S PNEUM DA 10A IGG SER-MCNC: 0.3 MCG/ML
S PNEUM DA 11A IGG SER-MCNC: 0.2 MCG/ML
S PNEUM DA 12F IGG SER-MCNC: 0.1 MCG/ML
S PNEUM DA 14 IGG SER-MCNC: NORMAL MCG/ML
S PNEUM DA 15B IGG SER-MCNC: 2.2 MCG/ML
S PNEUM DA 17F IGG SER-MCNC: 0.6 MCG/ML
S PNEUM DA 18C IGG SER-MCNC: 0.9 MCG/ML
S PNEUM DA 19A IGG SER-MCNC: 1 MCG/ML
S PNEUM DA 2 IGG SER-MCNC: 0.1 MCG/ML
S PNEUM DA 20A IGG SER-MCNC: 1.1 MCG/ML
S PNEUM DA 22F IGG SER-MCNC: 0.1 MCG/ML
S PNEUM DA 23F IGG SER-MCNC: 0.1 MCG/ML
S PNEUM DA 3 IGG SER-MCNC: 0.8 MCG/ML
S PNEUM DA 33F IGG SER-MCNC: 1.5 MCG/ML
S PNEUM DA 4 IGG SER-MCNC: 2.2 MCG/ML
S PNEUM DA 5 IGG SER-MCNC: <0.1 MCG/ML
S PNEUM DA 6B IGG SER-MCNC: 1.3 MCG/ML
S PNEUM DA 7F IGG SER-MCNC: 0.3 MCG/ML
S PNEUM DA 8 IGG SER-MCNC: 1.7 MCG/ML
S PNEUM DA 9N IGG SER-MCNC: 0.3 MCG/ML
S PNEUM DA 9V IGG SER-MCNC: 0.6 MCG/ML

## 2024-06-28 ENCOUNTER — HOSPITAL ENCOUNTER (OUTPATIENT)
Dept: RADIOLOGY | Facility: HOSPITAL | Age: 69
Discharge: HOME | End: 2024-06-28
Attending: INTERNAL MEDICINE
Payer: MEDICARE

## 2024-06-28 ENCOUNTER — TELEPHONE (OUTPATIENT)
Dept: PRIMARY CARE CLINIC | Facility: CLINIC | Age: 69
End: 2024-06-28
Payer: MEDICARE

## 2024-06-28 DIAGNOSIS — N89.8 VAGINAL DISCHARGE: Primary | ICD-10-CM

## 2024-06-28 DIAGNOSIS — M81.0 AGE-RELATED OSTEOPOROSIS WITHOUT CURRENT PATHOLOGICAL FRACTURE: ICD-10-CM

## 2024-06-28 PROCEDURE — 77080 DXA BONE DENSITY AXIAL: CPT | Mod: 26,NTX,, | Performed by: INTERNAL MEDICINE

## 2024-06-28 PROCEDURE — 77080 DXA BONE DENSITY AXIAL: CPT | Mod: TC,TXP

## 2024-06-28 NOTE — TELEPHONE ENCOUNTER
----- Message from Cathi Johnson sent at 6/28/2024 12:38 PM CDT -----  Contact: [phone]  [email or web address]     Patient is calling for Medical Advice regarding: Patient need to have  Vaginosis Screen by DNA Probe test schedule , please call to schedule patient.

## 2024-06-30 ENCOUNTER — PATIENT MESSAGE (OUTPATIENT)
Dept: PRIMARY CARE CLINIC | Facility: CLINIC | Age: 69
End: 2024-06-30
Payer: MEDICARE

## 2024-06-30 DIAGNOSIS — M81.0 OSTEOPOROSIS, UNSPECIFIED OSTEOPOROSIS TYPE, UNSPECIFIED PATHOLOGICAL FRACTURE PRESENCE: Primary | ICD-10-CM

## 2024-06-30 RX ORDER — ALENDRONATE SODIUM 70 MG/1
70 TABLET ORAL
Qty: 4 TABLET | Refills: 11 | Status: SHIPPED | OUTPATIENT
Start: 2024-06-30 | End: 2025-06-30

## 2024-07-01 NOTE — TELEPHONE ENCOUNTER
No care due was identified.  [facility] Embedded Care Due Messages. Reference number: [id number].   6/30/2024 7:53:51 PM CDT

## 2024-07-02 ENCOUNTER — CLINICAL SUPPORT (OUTPATIENT)
Dept: PRIMARY CARE CLINIC | Facility: CLINIC | Age: 69
End: 2024-07-02
Payer: MEDICARE

## 2024-07-02 DIAGNOSIS — D84.9 IMMUNODEFICIENCY, UNSPECIFIED: Primary | ICD-10-CM

## 2024-07-02 DIAGNOSIS — N89.8 VAGINAL DISCHARGE: ICD-10-CM

## 2024-07-02 DIAGNOSIS — N95.1 VAGINAL DRYNESS, MENOPAUSAL: Primary | ICD-10-CM

## 2024-07-02 PROCEDURE — 81514 NFCT DS BV&VAGINITIS DNA ALG: CPT | Mod: TXP | Performed by: INTERNAL MEDICINE

## 2024-07-03 DIAGNOSIS — M05.79 RHEUMATOID ARTHRITIS INVOLVING MULTIPLE SITES WITH POSITIVE RHEUMATOID FACTOR: Primary | ICD-10-CM

## 2024-07-03 LAB
BACTERIAL VAGINOSIS DNA: NEGATIVE
CANDIDA GLABRATA DNA: NEGATIVE
CANDIDA KRUSEI DNA: NEGATIVE
CANDIDA RRNA VAG QL PROBE: NEGATIVE
T VAGINALIS RRNA GENITAL QL PROBE: NEGATIVE

## 2024-07-05 ENCOUNTER — TELEPHONE (OUTPATIENT)
Dept: RHEUMATOLOGY | Facility: CLINIC | Age: 69
End: 2024-07-05
Payer: MEDICARE

## 2024-07-05 NOTE — TELEPHONE ENCOUNTER
Spoke with patient informed her I would send  robbie a message to fix infusion dates if they were scheduled incorrectly.   ----- Message from Barbara Castillo sent at 7/5/2024  2:45 PM CDT -----  Regarding: Patient advice  Contact: Pt  [phone]            Name of Caller: Juan      Contact Preference:  [phone]    Nature of Call:  Requesting a call would like to confirm appts states treatments suppose to be every 6 months she just completed a round

## 2024-07-08 ENCOUNTER — PATIENT MESSAGE (OUTPATIENT)
Dept: RHEUMATOLOGY | Facility: CLINIC | Age: 69
End: 2024-07-08
Payer: MEDICARE

## 2024-07-22 ENCOUNTER — PATIENT MESSAGE (OUTPATIENT)
Dept: RHEUMATOLOGY | Facility: CLINIC | Age: 69
End: 2024-07-22
Payer: MEDICARE

## 2024-08-06 ENCOUNTER — PATIENT MESSAGE (OUTPATIENT)
Dept: RHEUMATOLOGY | Facility: CLINIC | Age: 69
End: 2024-08-06
Payer: MEDICARE

## 2024-08-09 ENCOUNTER — LAB VISIT (OUTPATIENT)
Dept: LAB | Facility: HOSPITAL | Age: 69
End: 2024-08-09
Attending: ALLERGY & IMMUNOLOGY
Payer: MEDICARE

## 2024-08-09 DIAGNOSIS — M05.79 RHEUMATOID ARTHRITIS INVOLVING MULTIPLE SITES WITH POSITIVE RHEUMATOID FACTOR: ICD-10-CM

## 2024-08-09 DIAGNOSIS — D84.9 IMMUNODEFICIENCY, UNSPECIFIED: ICD-10-CM

## 2024-08-09 LAB
CRP SERPL-MCNC: 1.9 MG/L (ref 0–8.2)
ERYTHROCYTE [SEDIMENTATION RATE] IN BLOOD BY PHOTOMETRIC METHOD: 20 MM/HR (ref 0–36)
IGA SERPL-MCNC: 94 MG/DL (ref 40–350)
IGG SERPL-MCNC: 656 MG/DL (ref 650–1600)
IGM SERPL-MCNC: 39 MG/DL (ref 50–300)

## 2024-08-09 PROCEDURE — 82784 ASSAY IGA/IGD/IGG/IGM EACH: CPT | Mod: 59,TXP | Performed by: ALLERGY & IMMUNOLOGY

## 2024-08-09 PROCEDURE — 36415 COLL VENOUS BLD VENIPUNCTURE: CPT | Mod: NTX | Performed by: ALLERGY & IMMUNOLOGY

## 2024-08-09 PROCEDURE — 82787 IGG 1 2 3 OR 4 EACH: CPT | Mod: TXP | Performed by: ALLERGY & IMMUNOLOGY

## 2024-08-09 PROCEDURE — 86140 C-REACTIVE PROTEIN: CPT | Mod: TXP | Performed by: INTERNAL MEDICINE

## 2024-08-09 PROCEDURE — 85652 RBC SED RATE AUTOMATED: CPT | Mod: TXP | Performed by: INTERNAL MEDICINE

## 2024-08-09 PROCEDURE — 86317 IMMUNOASSAY INFECTIOUS AGENT: CPT | Mod: NTX | Performed by: ALLERGY & IMMUNOLOGY

## 2024-08-12 LAB — IGG4 SER-MCNC: 4 MG/DL (ref 4–86)

## 2024-08-14 ENCOUNTER — OFFICE VISIT (OUTPATIENT)
Dept: RHEUMATOLOGY | Facility: CLINIC | Age: 69
End: 2024-08-14
Payer: MEDICARE

## 2024-08-14 ENCOUNTER — TELEPHONE (OUTPATIENT)
Dept: TRANSPLANT | Facility: CLINIC | Age: 69
End: 2024-08-14
Payer: MEDICARE

## 2024-08-14 ENCOUNTER — HOSPITAL ENCOUNTER (OUTPATIENT)
Dept: RADIOLOGY | Facility: HOSPITAL | Age: 69
Discharge: HOME | End: 2024-08-14
Attending: INTERNAL MEDICINE
Payer: MEDICARE

## 2024-08-14 VITALS
SYSTOLIC BLOOD PRESSURE: 98 MMHG | WEIGHT: 133.19 LBS | HEART RATE: 90 BPM | DIASTOLIC BLOOD PRESSURE: 66 MMHG | BODY MASS INDEX: 24.35 KG/M2

## 2024-08-14 DIAGNOSIS — M25.652 HIP JOINT STIFFNESS, BILATERAL: ICD-10-CM

## 2024-08-14 DIAGNOSIS — M81.0 OSTEOPOROSIS, UNSPECIFIED OSTEOPOROSIS TYPE, UNSPECIFIED PATHOLOGICAL FRACTURE PRESENCE: ICD-10-CM

## 2024-08-14 DIAGNOSIS — Z79.60 LONG-TERM USE OF IMMUNOSUPPRESSANT MEDICATION: ICD-10-CM

## 2024-08-14 DIAGNOSIS — M05.79 RHEUMATOID ARTHRITIS INVOLVING MULTIPLE SITES WITH POSITIVE RHEUMATOID FACTOR: ICD-10-CM

## 2024-08-14 DIAGNOSIS — M25.652 HIP JOINT STIFFNESS, BILATERAL: Primary | ICD-10-CM

## 2024-08-14 DIAGNOSIS — M25.651 HIP JOINT STIFFNESS, BILATERAL: ICD-10-CM

## 2024-08-14 DIAGNOSIS — J84.9 ILD (INTERSTITIAL LUNG DISEASE): ICD-10-CM

## 2024-08-14 DIAGNOSIS — M25.651 HIP JOINT STIFFNESS, BILATERAL: Primary | ICD-10-CM

## 2024-08-14 DIAGNOSIS — J84.9 INTERSTITIAL LUNG DISEASE: ICD-10-CM

## 2024-08-14 LAB
IMMUNOLOGIST REVIEW: NORMAL
S PN DA SERO 19F IGG SER-MCNC: 0.6 MCG/ML
S PNEUM DA 1 IGG SER-MCNC: 0.2 MCG/ML
S PNEUM DA 10A IGG SER-MCNC: 0.3 MCG/ML
S PNEUM DA 11A IGG SER-MCNC: 0.2 MCG/ML
S PNEUM DA 12F IGG SER-MCNC: 0.1 MCG/ML
S PNEUM DA 14 IGG SER-MCNC: NORMAL MCG/ML
S PNEUM DA 15B IGG SER-MCNC: 1.6 MCG/ML
S PNEUM DA 17F IGG SER-MCNC: 0.6 MCG/ML
S PNEUM DA 18C IGG SER-MCNC: 1.1 MCG/ML
S PNEUM DA 19A IGG SER-MCNC: 1 MCG/ML
S PNEUM DA 2 IGG SER-MCNC: 1 MCG/ML
S PNEUM DA 20A IGG SER-MCNC: 1 MCG/ML
S PNEUM DA 22F IGG SER-MCNC: 0.1 MCG/ML
S PNEUM DA 23F IGG SER-MCNC: 0.1 MCG/ML
S PNEUM DA 3 IGG SER-MCNC: 0.7 MCG/ML
S PNEUM DA 33F IGG SER-MCNC: 1.5 MCG/ML
S PNEUM DA 4 IGG SER-MCNC: 1.9 MCG/ML
S PNEUM DA 5 IGG SER-MCNC: <0.1 MCG/ML
S PNEUM DA 6B IGG SER-MCNC: 1.1 MCG/ML
S PNEUM DA 7F IGG SER-MCNC: 0.2 MCG/ML
S PNEUM DA 8 IGG SER-MCNC: 1.6 MCG/ML
S PNEUM DA 9N IGG SER-MCNC: 0.3 MCG/ML
S PNEUM DA 9V IGG SER-MCNC: 0.6 MCG/ML

## 2024-08-14 PROCEDURE — 99999 PR PBB SHADOW E&M-EST. PATIENT-LVL V: CPT | Mod: PBBFAC,TXP,, | Performed by: INTERNAL MEDICINE

## 2024-08-14 PROCEDURE — 72200 X-RAY EXAM SI JOINTS: CPT | Mod: TC,TXP

## 2024-08-14 PROCEDURE — 72100 X-RAY EXAM L-S SPINE 2/3 VWS: CPT | Mod: TC,TXP

## 2024-08-14 PROCEDURE — 73521 X-RAY EXAM HIPS BI 2 VIEWS: CPT | Mod: TC,TXP

## 2024-08-14 RX ORDER — EPINEPHRINE 0.3 MG/.3ML
0.3 INJECTION SUBCUTANEOUS ONCE AS NEEDED
OUTPATIENT
Start: 2024-08-14

## 2024-08-14 RX ORDER — ACETAMINOPHEN 325 MG/1
650 TABLET ORAL
OUTPATIENT
Start: 2024-08-14

## 2024-08-14 RX ORDER — MEPERIDINE HYDROCHLORIDE 50 MG/ML
25 INJECTION INTRAMUSCULAR; INTRAVENOUS; SUBCUTANEOUS
OUTPATIENT
Start: 2024-08-14 | End: 2024-08-15

## 2024-08-14 RX ORDER — NEOMYCIN SULFATE, POLYMYXIN B SULFATE AND DEXAMETHASONE 3.5; 10000; 1 MG/ML; [USP'U]/ML; MG/ML
1 SUSPENSION/ DROPS OPHTHALMIC 3 TIMES DAILY
COMMUNITY
Start: 2024-06-05

## 2024-08-14 RX ORDER — DIPHENHYDRAMINE HYDROCHLORIDE 50 MG/ML
50 INJECTION INTRAMUSCULAR; INTRAVENOUS ONCE AS NEEDED
OUTPATIENT
Start: 2024-08-14

## 2024-08-14 RX ORDER — METHYLPREDNISOLONE SOD SUCC 125 MG
100 VIAL (EA) INJECTION
OUTPATIENT
Start: 2024-08-14

## 2024-08-14 RX ORDER — FAMOTIDINE 10 MG/ML
20 INJECTION INTRAVENOUS
OUTPATIENT
Start: 2024-08-14

## 2024-08-14 RX ORDER — SODIUM CHLORIDE 0.9 % (FLUSH) 0.9 %
10 SYRINGE (ML) INJECTION
OUTPATIENT
Start: 2024-08-14

## 2024-08-14 RX ORDER — HEPARIN 100 UNIT/ML
500 SYRINGE INTRAVENOUS
OUTPATIENT
Start: 2024-08-14

## 2024-08-14 NOTE — TELEPHONE ENCOUNTER
Planned follow up November 2024, previously. Request to  to move appointment up to next available.  ----- Message from Barbara Haile DO sent at 8/14/2024 12:35 PM CDT -----  Absolutely.  Raymundo, she should be about due for a visit.  I feel like I haven't seen her in awhile.  Can we bump up her appointment?  ----- Message -----  From: Kraig Eng MD  Sent: 8/14/2024  12:08 PM CDT  To: DO Felix Valdez    I met the patient today  She seems to be coughing more than usual  She got her last rituximab in may  She says she can go up and down the stairs 17 steps  She is not short of breath however she coughed a lot while she was here as well    Can you please let her know what she should do    thanks

## 2024-08-14 NOTE — PROGRESS NOTES
The chief complaint leading to consultation is: RA      Subjective:     Patient ID: Juan Cruz is a 69 y.o. female w sero+CCP+RA-ILD on prednisone & RTX; also on denosumab for OP (PCP)    Chief Complaint: Disease Management    PCP: Gianna Carpenter MD   Ortho: Minh Iglesias MD  Pulmonary: Barbara Haile, DO;   Derm: Susu Wright MD      HPI     69 yr old lady seen for the first time on 5/12/22 w sero+CCP+ non erosive RA and a chronic cough with an abnormal CXR & CT chest c/w pulmonary fibrosis (suggestive of UIP). She has been intolerant of MTX & had worsening cough on it. She was on a very short course of azathiopine but stopped it due to concomitant abn LFTs (also on Ofev) &  her request for RTX.  She received 1st course of RTX 10/24/22 & 11/7/22 & has been getting it since. She has been on tapering doses of prednisone & stopped it in December.    She was last seen on  5/30/23. She returns for f/u and is doing very well both RA wise & pulmonary wise.  Has some arthralgia in her L foot & L volar aspect of her wrist wo much swelling. Saw a hand surgeon less than 6 months ago & had hand imaging that was said to be ok. She feels there might be mild swelling of her ankle.  Arthralgia is w use. No other joint pain or swelling. AM stiffness is well below 1/2 hr although she indicated 1 hr on LEV..   Since her last visit, denosumab was stopped due to rash. OP followed by Dr. Carpenter who decided to manage only w calcium & vitamin D.   She's been off prednisone since 12/2023.     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    4/2024    Her main issues today have to do with skin rashes for which she has seen dermatology and has had biopsy.    Some look like eczema; some like contact dermatitis; she was  concerned about dermatomyositis as she had some involvement around her eyes. 1 yr ago MSA abs were negative.  Feels facial rashes occurred after prolia (mild after first injection), more pronounced after 2nd.  Also got skin rashes after Paxlovid,.  However she did have rashes back in December.    RA wise c/o L wrist pain & L ankle pain. She has not seen swelling.  We have tapered her prednisone to 2.5 mg/d but due to pain in these areas took an extra dose yesterday. Has not seen any response.   Overall however, RA sxs have subsided. No AM stiffness, joint pain or joint swelling.  Has hx of some dry eyes & mild dry mouth. Denies Raynaud's, tight skin, alopecia, oral ulcers, parotid gland swelling, pleurisy, pericarditis, photosensitivity, skin rashes, thromboses, muscle weakness, headaches, paresthesias; LBP, thyroid issues;   1 miscarriage 4-6 weeks in 1997; has 1 daughter Csection;  Has FHx of RA & myelodysplasia (mom).    Has been getting Pulmonary Re[facility] at [facility]--interrupted by Covid. and feels her joints feel better due to the exercise.      5/2024    She saw  4/2024  She has had no joint pains until April      She wanted to see me sooner because    She has pain in :   Both feet, right> left towards peroneal tendons  Stiffness and pain in hips  Left wrist chronic swelling  Both elbows    All this started in may 2024    On rituximab 1000 mg x 2  May 10 and may 24    After she completed infusions    Hips are stiff  Hamstrings and back of the knees   Right foot sore on the sole and later aspect    Has persistently elevated ESR  April 2024 ESR 15  Off steroids for 6 months    PFTs and 6 minute walk stable  Off ofev due to liver toxicity- in 2022    Dexa 2020- [facility] 2022 2024- [place]   [facility] for 9 years in the past-stopped it  Prolia gave allergic reaction- skin inflammation  If the new DEXA shows OP she will need new meds  On barrie and vit d  Vit d injectable and barrie               5/2/2024     9:54 AM 10/25/2023     1:41 PM 4/17/2023     1:19 PM 12/8/2022    10:24 AM 9/12/2022     6:00 PM 6/7/2022    11:54 AM   Pulmonary Function Tests   FVC 2.08 liters 2.16 liters 2.02 liters 1.86 liters 1.82 liters 2.18 liters   FEV1 1.83 liters 1.85 liters 1.76 liters 1.64 liters 1.62 liters 1.9 liters   TLC (liters) 2.67 liters 2.77 liters 2.94 liters 2.88 liters   2.79 liters   DLCO (ml/mmHg sec) 9.13 ml/mmHg sec 8.32 ml/mmHg sec 9.22 ml/mmHg sec 8.62 ml/mmHg sec   8.17 ml/mmHg sec   FVC% 89 92 86 78 77 92   FEV1% 98 98 93 86 85 99   FEF 25-75 3.13 3 2.9 2.93 2.69 3.24   FEF 25-75% 121 166 159 160 146 175   TLC% 58 60 63 63   61   RV 0.6 0.6 0.92 1.01   0.61   RV% 31 31 48 53   32   DLCO% 45 41 45 42   40           5/2/2024     8:36 AM 7/20/2023     9:33 AM 4/17/2023    11:38 AM 12/22/2022    10:05 AM 12/8/2022     9:08 AM 9/12/2022    11:43 AM 8/5/2022    10:29 AM   6MW   6MWT Status completed without stopping completed without stopping completed without stopping completed without stopping completed without stopping completed without stopping completed without stopping   Patient Reported No complaints No complaints Dyspnea No complaints Dyspnea Dyspnea No complaints   Was O2 used? No No No No No No     6MW Distance walked (feet) 1532 feet 1500 feet 1500 feet 1300 feet 1332 feet 1300 feet 1034 feet   Distance walked (meters) 466.95 meters 457.2 meters 457.2 meters 396.24 meters 405.99 meters 396.24 meters 315.16 meters   Did patient stop? No   No No No No No   Oxygen Saturation 98 % 98 % 98 % 98 % 99 % 98 % 96 %   Supplemental Oxygen Room Air Room Air Room Air Room Air Room Air Room Air Room Air   Heart Rate 85 bpm 95 bpm 85 bpm 78 bpm 81 bpm 89 bpm 82 bpm   Blood Pressure 114/75 109/62 113/64 118/77 145/78 120/74 106/65   Rigoberto Dyspnea Rating  nothing at all light nothing at all light moderate light somewhat heavy   Oxygen Saturation 98 % 94 % 94 % 95 % 98 % 95 % 91 %   Supplemental Oxygen Room Air    Room Air Room Air Room Air Room Air Room Air   Heart Rate 113 bpm 126 bpm 125 bpm 113 bpm 106 bpm 113 bpm 110 bpm   Blood Pressure 118/76 114/62 132/67 130/86 129/66 131/77 118/79   Rigoberto Dyspnea Rating  moderate moderate somewhat heavy somewhat heavy somewhat heavy somewhat heavy very heavy   Recovery Time (seconds) 135 seconds 180 seconds 149 seconds 180 seconds 123 seconds 72 seconds 180 seconds   Oxygen Saturation 98 % 97 % 98 % 99 % 98 % 98 % 96 %   Supplemental Oxygen Room Air Room Air Room Air Room Air Room Air Room Air Room Air   Heart Rate 95 bpm 101 bpm 106 bpm 83 bpm 94 bpm 97 bpm 86 bpm        8/2024      Since May she has lot more pain    Hips hurt-at the iliac crest  Hips have full ROM  Abductors are stiff  Stiffness goes up to the back  Getting up is hard  Hips stiff  Hips weak    Knees hurt  Hard to straighten her legs    Right SI hurts-? Trigger points tender    On exam she has perfectly normal ROM of the hips,knees  Negative SLRT  However she had stiffness when we externally rotated her hips and abducted her hips  She has difficulty getting up and squatting    Elbows get sore  She had an episode of swelling a few months ago  Soft tissue induration  She also has soft tissue indurations around the elbows which apparently come and go and very prominent around the time of infusion    However elbow exam today normal  No tendinitis  No epicondylitis  No synovitis      ESR,CRP nml    Cough worse  She climbs stairs  She coughs more when she talks, she coughs when she eats    New rash- contact dermatitis on face,with facial swelling,flakiness in ears and around eyes  Allergist has seen her        PMH  She was seen again on 5/31/22 at which time MTX was added to her prednisone for her joint pain, but she had intolerance to it (headaches, fatigue, anorexia, dysgeusia, sleep issues) but stayed on it until ~ 7/26 when she developed worsening cough and abnormal CT chest and it was stopped. CT Chest on 7/27 showed  significant interval progression of subpleural predominant reticular and ground-glass airspace opacities associated with bilateral lower lobe volume loss, architectural distortion, symmetric mild central bronchiectasis and apicobasal gradient suggestive of UIP pattern of ILD which may reflect IPF.  Additional diagnostic considerations include NSIP, asbestosis, fibrotic hypersensitivity pneumonitis connective tissue associated lung disease and drug induced lung disease amongst other etiologies.    She was seen by Dr. Haile on 7/28 at which time due to progression of cough & SOB (& worsening CT chest) prednisone 40 mg/d was begun for presumed RA-ILD. Patient was to continue her antibiotic (copious sputum; cultures NTD) & Trelegy which was begun by AI for some PFT reversibility. The plan was to reassess & if there has been a response to add MMF. Ofev also to be added (has not gotten it yet).    5/31/22  She has contacted us several times since her initial visit c/o generalized weakness & pain, tinnitus; peeing a lot; hip & knee pain; leg edema and was asked to come in to see us again, but she presents prior to her w/u and appointment to see Dr. Haile on 6/7/22.    Today she has multiple complaints and feels that she is in severe pain and unable to function. She has AM stiffness lasting 4 hrs. She has difficulty getting in and out of bed, dressing herself, washing herself, etc. She is fatigued & is unable to sleep but denies depression & anxiety. She now volunteers she had been seen by Dr.Luis Bee in the past ~ 2007 for similar sxs & no autoimmune/rheum dx was made.     Today, she feels she has developed joint swelling since her last visit, especially in some MCPs, but also c/o edema which improved after she stopped taking NSAIDs. She continue to c/o chronic neck pain.    She is currently taking prednisone 10 mg/d. She states she has bilateral shoulder pain & is unable to get OOB in AM. No GCA sxs. She is  stiff x 4 hrs.     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  These appear to be incidental & unexpected findings as patient has very little joint swelling.  Patient feels that myalgias began after her last Covid vaccine.    In February got severe pain in R shoulder that responded to CSI but then developed excruciating spasms neck & around both shoulders. Labs gotten by Orthopedic (Dr. Iglesias);   In Jan 27,2022 had FESS for sinus surgery & needed bad infection and required more antibiotics. Developed rash on cheeks on levaquin. Same rash came back now again.    In March had only shoulder pain & pronating L wrist.  Now R knee, L ankle pain & swelling & R foot 3rd & 4th toes even at rest.  Also weak all over  Baclofen helps her spasms & stiffness    AM stiffness x 3-4 hrs (since ~ February, 2022);     .  Denies Raynaud's, tight skin, alopecia, oral ulcers, parotid gland swelling, pleurisy, pericarditis, photosensitivity, skin rashes, thromboses, muscle weakness; fevers, headaches, conjunctivitis, chronic or bloody diarrhea, vaginal/urethral D/C; paresthesias; LBP, thyroid issues;   1 miscarriage 4-6 weeks in 1997; has 1 daughter Csection;   Chronic cough x 1.5 yr after swallowing--fatigues & drains her  Has dry eyes > mouth  Has BAKER & st w eating comes & goes;   Saw Dr. Siegel & told no obstructive pulmonary disease but restrictive.  Has FHx of RA & myelodysplasia (mom)    Started prednisone 10 mg qod ~ end of March, 2022; Helped pain   But had adverse effects on higher steroids in past.  In 1999 NYLA was on decadron but developed myopathy & psychosis on it.  .    TMJ on L x 1 2/22 lasted 1-2days  Neck since 2/22 L>R  B shoulders L>R  Elbows no  Wrists L>R (flexi carpi ulnaris)  MCPs no but only 2nd R MCP  PIPs & DIP  Hip: no  GT no  Knee: R 1 month ago; since Monday both; just hurt; hurt all the time.  Ankles: L 1 month R  since Monday --only swelling  Toes 3 & 4th on R; L of  Both heels hurt  Achilles bilateral.    Current Outpatient Medications   Medication Sig Dispense Refill    alendronate (FOSAMAX) 70 MG tablet Take 1 tablet (70 mg total) by mouth every 7 days. 4 tablet 11    B-complex with vitamin C (Z-BEC OR EQUIV) tablet Take 1 tablet by mouth 2 (two) times a day.      calcium citrate-vitamin D3 315-200 mg (CITRACAL+D) 315-200 mg-unit per tablet Take 1 tablet by mouth 2 (two) times daily.      cetirizine (ZYRTEC) 10 MG tablet Take 1 tablet (10 mg total) by mouth once daily. 30 tablet 5    estradioL (VAGIFEM) 10 mcg Tab Yuvafem 10 mcg vaginal tablet   INSERT 1 TABLET VAGINALLY TWICE A WEEK 45 tablet 5    magnesium oxide (MAG-OX) 400 mg (241.3 mg magnesium) tablet Take 400 mg by mouth 2 (two) times daily.      metoprolol succinate (TOPROL-XL) 25 MG 24 hr tablet Take 1 tablet by mouth once daily 90 tablet 3    RESTASIS 0.05 % ophthalmic emulsion Place 1 drop into both eyes once daily. Has not started      abaloparatide (TYMLOS) 80 mcg (3,120 mcg/1.56 mL) PnIj 80 mcg daily 1.56 mL 11    neomycin-polymyxin-dexamethasone (MAXITROL) 3.5mg/mL-10,000 unit/mL-0.1 % DrpS Place 1 drop into both eyes 3 (three) times daily. (Patient not taking: Reported on 8/14/2024)       Current Facility-Administered Medications   Medication Dose Route Frequency Provider Last Rate Last Admin    methylPREDNISolone sodium succinate (SOLU-MEDROL) 100 mg in dextrose 5 % (D5W) 100 mL IVPB  100 mg Intravenous 1 time in Clinic/HOD Ibis Holcomb MD        methylPREDNISolone sodium succinate (SOLU-MEDROL) 100 mg in dextrose 5 % (D5W) 100 mL IVPB  100 mg Intravenous 1 time in Clinic/HOD Ibis Holcomb MD        riTUXimab-abbs (TRUXIMA) 1,000 mg in sodium chloride 0.9% 1,000 mL infusion (conc: 1 mg/mL)  1,000 mg Intravenous 1 time in Clinic/HOD Ibis Holcomb MD        riTUXimab-abbs (TRUXIMA) 1,000 mg in sodium chloride 0.9%  1,000 mL infusion (conc: 1 mg/mL)  1,000 mg Intravenous 1 time in Clinic/HOD Ibis Holcomb MD        riTUXimab-abbs (TRUXIMA) 1,000 mg in sodium chloride 0.9% 1,000 mL infusion (conc: 1 mg/mL)  1,000 mg Intravenous 1 time in Clinic/HOD Ibis Holcomb MD        sodium chloride 0.9% flush 10 mL  10 mL Intravenous PRN Ibis Holcomb MD       Off folic acid & flonase.       Review of patient's allergies indicates:   Allergen Reactions    Methotrexate Shortness Of Breath    Gluten protein Other (See Comments)     Sensitive - not allergic    Paxlovid [nirmatrelvir-ritonavir] Other (See Comments)     Trunk / chest / legs peeling after paxlovid    Ppd black rubber mix     Prolia [denosumab] Other (See Comments)     Rash to arms/trunk       Review of Systems   Constitutional:  Negative for chills, fatigue, fever and unexpected weight change.   HENT:  Positive for hearing loss (R loss of hearing; following SIADH) and tinnitus. Negative for mouth sores, postnasal drip, sinus pain and trouble swallowing.    Eyes:  Negative for redness.   Respiratory:  Positive for cough (better) and shortness of breath (better). Negative for chest tightness.    Cardiovascular: Negative.  Negative for chest pain and palpitations (Has had MVP on metoprolol).   Gastrointestinal:  Positive for diarrhea (attributed to Paxlovid). Negative for constipation.   Endocrine: Positive for cold intolerance.   Genitourinary:  Positive for enuresis. Negative for dysuria and genital sores.        Incontinence   Musculoskeletal:  Negative for arthralgias, back pain, joint swelling, myalgias, neck pain and neck stiffness.   Skin:  Negative for rash.   Neurological: Negative.  Negative for dizziness, seizures, syncope, numbness and headaches.   Hematological:  Does not bruise/bleed easily.   Psychiatric/Behavioral:  Positive for sleep disturbance (attributed in part to steroids.). Negative for dysphoric mood. The patient is not  nervous/anxious.         In past feels prednisone made her irritable.       Past Medical History:   Diagnosis Date    Bruise 2022    Chronic sinusitis, unspecified     Dyspnea on exertion 2022    Elevated liver enzymes 2022    History of SIADH     Interstitial pulmonary disease, unspecified 2023    LLL pneumonia 2022    Mitral valve prolapse     Osteopenia     Petechiae 2022    Pulmonary fibrosis 2022    Rheumatoid arthritis involving multiple sites     Skin rash 2023       Past Surgical History:   Procedure Laterality Date    BREAST BIOPSY Bilateral     FUNCTIONAL ENDOSCOPIC SINUS SURGERY (FESS)      ORIF WRIST FRACTURE Right        FH:  M: dx 83 w RA; passed away at age 85; also had some intestinal problems.  Had myelodysplasia.  1 S hx of chronic myalgia & gets bedridden 2-3 days.  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    Objective:     BP 98/66   Pulse 90   Wt 60.4 kg (133 lb 2.5 oz)   LMP  (LMP Unknown)   BMI 24.35 kg/m²     Physical Exam   Constitutional: She is oriented to person, place, and time. normal appearance. No distress.   HENT:   Head: Normocephalic and atraumatic.   Mouth/Throat: Oropharynx is clear and moist. No oropharyngeal exudate.   No facial rashes  Parotids not enlarged     Eyes: Pupils are equal, round, and reactive to light. Conjunctivae are normal. Right eye exhibits no discharge. Left eye exhibits no discharge. No scleral icterus.   Neck: No JVD present. No tracheal deviation present. No thyromegaly present.   Cardiovascular: Regular rhythm and normal heart sounds. Exam reveals no gallop and no friction rub.   No murmur heard.  Pulmonary/Chest: Effort normal. No  respiratory distress. She has no wheezes. She has no rales. She exhibits no tenderness.   Abdominal: Soft. Bowel sounds are normal. She exhibits no distension and no mass. There is no splenomegaly or hepatomegaly. There is no abdominal tenderness. There is no rebound and no guarding.   Musculoskeletal:         General: No swelling or tenderness. Normal range of motion.      Cervical back: Neck supple.      Comments: No SHT anywhere   Mild TTP volar aspect L wrist by edge of ulna.  Adequate ROM all joints.  Ankles unremarkable.   No metacarpalgia; No metatarsalgia        Lymphadenopathy:     She has no cervical adenopathy.   Neurological: She is alert and oriented to person, place, and time. She has normal reflexes. No cranial nerve deficit. Gait normal.   Proximal & distal upper extremity strength appropriate.  LE strength probably 4+ (limited by some muscle pain)   Skin: Skin is warm and dry. No rash noted. She is not diaphoretic.   Psychiatric: Mood, memory, affect and thought content normal.   Vitals reviewed.              4/17/24: ESR 15; CRP 1.8; CBC ok; CMP ok;   5/5/23: CBC ok; CMP ok; CPK ok; Zn low at 53 (60);   11/15/22: ESR 36 (20) CRP 19.2; CBC WC 15.26; CMP Na 135; alb 3.3  10/10/22: CBC WC 15.30; CMP glu 112; alb 3.1;   10/3/22: ESR 48 (20); CRP 14.4  8/10/22: ESR 25 (36); CRP 0.5; CMP alb 3.1;   7/19/22: ESR 80 (20); CRP 45.2; CBC Ht 36.1; CMP glu 117; alb 3.1;   5/17/22: CBC Hg 11.6; Ht 34.3; CMP Na 135; alb 3.3; TPMT normal metabolizer; CPK 13; pre-DMARDs ok;   5/12/22: ESR 74 (36); CRP 37.2; UA ok; ; .2; ROBBIE/C3/C4/QIG wnl or neg; IgM aCLA 16.70 (12.49);   3/14/22: ESR 50 (30) CRP 2; ROBBIE neg; RF 74 (<6) CCP >250  11/30/21: RF<14;     12/9/22: CT chest: diffuse subpleural reticular opacity and nodular pleural thickening; minimal perihilar bronchiectasis.; no honeycombing or focal opacity or pleural thickening; diffuse nonspecific interstitial lung disease, possible UIP pattern  11/29/22:  CXR: personally viewed: consistent with chronic interstitial lung disease.  9/29/22: ECHO: mild CHAPINCITO; mild TVS; PA press 31;   7/28/22: CT chest:  significant interval progression of previously noted subpleural predominant reticular and ground-glass airspace opacities associated with bilateral lower lobe volume loss, architectural distortion, symmetric mild central bronchiectasis and apicobasal gradient suggestive of UIP pattern of interstitial lung disease which may reflect IPF.  Additional diagnostic considerations include NSIP, asbestosis, fibrotic hypersensitivity pneumonitis connective tissue associated lung disease and drug induced lung disease amongst other etiologies.  5/12/22: Bilateral hands: personally viewed:mild STS dorsal MCPs bilaterally; min OA otherwise; remote healed distal radius fx w hardware.  5/12/22: Arthritis survey: personally viewed:  Mild OA Cspine, T spine; tibial spines; hands (Lwrist fx) & feet.  5/12/22: CXR: personally viewed: bibasilar coarse interstitial reticulated opacities c/w PF  6/28/21: CT chest:  predominant LL patchy increased attenuation and reticulation within the periphery of the bilateral lower lobes c/w sequela of atelectasis; r/o early ILD; no bronchiectasis.  No honeycombing.  No significant ground-glass attenuation. Small sliding-type hiatal hernia.    10/15/20: DXA: TLS -2.7; TFN -2.2;  TTH -1.8; FRAX 18.9%;/3.5%  Assessment:   Sero+(173) CCP+(1162.7) non erosive RA              Mild SHT MCPs--resolved on mod dose pred              AM stiffness x3-4 hrs now varies up to 1/2 hr.               FHx: RA & myelodysplasia   S/P prednisone: none since 12/2023   S/P MTX ~ 5/31 - 7/26/22 w multiple intolerance exs   Brief azathioprine   RTX began10/24/22 & 11/7/22            Chronic cough/SOB/ intermittent BAKER/abn CXR & CT c/w ILD--largely gone              CT chest w ILD              CXR: blake coarse interstitial reticular opacities c/w PF              Rx prednisone started  40 mg to taper--now off       S/PDermatitis   Skin peeling   Eczema/contact dermatitis/presumed allergic to prolia    Central sensitivity syndrome   Muscle aches & pains since childhood.   Some response to gluten restriction   CSI = 48 = hi moderate     Joint pain/muscle spasms/myalgia--multiple sites   Worse post Covid vaccine   Worse w Covid              RA/OA/CSS                          Bilateral hip pain                          Bilateral shoulder                          L wrist                          R knee                          L ankle                          R foot                          Cervical spine: mild DDD & spondylolistesis                                      Some response to baclofen (spasm & stiffness)                Osteoporosis               S/P R wrist fx 3/2019 (ORIF)                S/P rx w alendronate x 9 yrs--was on holiday >5 yrs   S/P Denosumab (Dr. Carpenter)-stopped due to rash    Facial rash wi 2 days of injection.    Possibly some erythema following 1st dose.              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    Gluten sensitivity --non celiac    S/P Covid 19+ 1/30/23   S/P worsening cough, fever, chills--resolved,     I started seeing her 5/2024 5/2024    This was a video visit and I did not examine her    She saw  4/2024  She has had no joint pains until April  She wanted to see me sooner because    She has pain in :   Both feet, right> left towards peroneal tendons  Stiffness and pain in hips  Left wrist chronic swelling  Both elbows    All this started in may 2024    On rituximab 1000 mg x 2  May 10 and may 24    After she completed infusions    Hips are stiff  Hamstrings and back of the knees   Right foot sore on the sole and later aspect    Has persistently elevated ESR  April 2024 ESR 15  Off steroids for 6 months    PFTs and 6 minute walk stable  Off ofev due to liver  toxicity- in 2022    [facility] [place] [facility] [place] [facility] in the past-stopped it  Prolia gave allergic reaction- skin inflammation  If the new DEXA shows OP she will need new meds  On barrie and vit d  Vit d injectable and barrie     I asked her if she needs to be treated for her joint pains  She didn't think so  She feels that her joint pains aren't that bad  She can wait until she gets her infusion in November 8/2024    She comes in today since she is in pain    Since May she has lot more pain    Hips hurt-at the iliac crest  Hips have full ROM  Abductors are stiff  Stiffness goes up to the back  Getting up is hard  Hips stiff  Hips weak    Knees hurt  Hard to straighten her legs    Right SI hurts-? Trigger points tender    On exam she has perfectly normal ROM of the hips,knees  Negative SLRT  However she had stiffness when we externally rotated her hips and abducted her hips  She has difficulty getting up and squatting    Elbows get sore  She had an episode of swelling a few months ago  Soft tissue induration  She also has soft tissue indurations around the elbows which apparently come and go and very prominent around the time of infusion    However elbow exam today normal  No tendinitis  No epicondylitis  No synovitis    ESR,CRP nml    Cough worse  She climbs stairs  She coughs more when she talks, she coughs when she eats    New rash- contact dermatitis on face,with facial swelling,flakiness in ears and around eyes  Allergist has seen her    I wonder if her symptoms are due to hypermobility and due to muscle strains,tightness and bursitis  I really didn't appreciate any synovitis  There was some pelvic asymmetry    Plan:       I told her a PMR physician can assess her well  Physical rehab referral placed  I ordered [facility] spine  Hip and SI joint xrays    Messaged   That her cough is worse    Rituximab Nov 2024-orders placed  CBC,CMP,ESR,CRP,immunoglobulins PRE  INFUSION      Discussed her recent dexa   The FRAX score (10 year probability of fracture) is 22.8 % for a major osteoporotic fracture and 5.8 % for hip fracture.   She has osteoporosis  She needs anabolic agent  She takes fosamax  Johnson and vit d   I will stop fosamax and start abaloparatide    Continue RTX q 6 months  IF HER joint pains are ongoing and she has inflammatory arthritis she will need a second agent  She can also get her rituximab sooner than 6 months-may be at 4 months    I ordered labs for November and she will come back in November 2024    She wants to see  and I asked staff to help

## 2024-08-14 NOTE — TELEPHONE ENCOUNTER
LM for Ms Cruz - 1st avail appt is 08/22 - scheduled appt - asked if its not a good time to please call or message through the Portal.

## 2024-08-20 ENCOUNTER — OFFICE VISIT (OUTPATIENT)
Dept: OBSTETRICS AND GYNECOLOGY | Facility: CLINIC | Age: 69
End: 2024-08-20
Payer: MEDICARE

## 2024-08-20 VITALS
BODY MASS INDEX: 24.38 KG/M2 | WEIGHT: 132.5 LBS | HEIGHT: 62 IN | SYSTOLIC BLOOD PRESSURE: 110 MMHG | DIASTOLIC BLOOD PRESSURE: 64 MMHG

## 2024-08-20 DIAGNOSIS — N95.1 VAGINAL DRYNESS, MENOPAUSAL: ICD-10-CM

## 2024-08-20 DIAGNOSIS — N95.2 VAGINAL ATROPHY: ICD-10-CM

## 2024-08-20 DIAGNOSIS — Z00.00 PREVENTATIVE HEALTH CARE: ICD-10-CM

## 2024-08-20 DIAGNOSIS — Z01.419 ENCOUNTER FOR GYNECOLOGICAL EXAMINATION WITHOUT ABNORMAL FINDING: Primary | ICD-10-CM

## 2024-08-20 DIAGNOSIS — D84.9 IMMUNOSUPPRESSED STATUS: ICD-10-CM

## 2024-08-20 PROCEDURE — 1160F RVW MEDS BY RX/DR IN RCRD: CPT | Mod: CPTII,S$GLB,, | Performed by: OBSTETRICS & GYNECOLOGY

## 2024-08-20 PROCEDURE — 99397 PER PM REEVAL EST PAT 65+ YR: CPT | Mod: S$GLB,,, | Performed by: OBSTETRICS & GYNECOLOGY

## 2024-08-20 PROCEDURE — 3288F FALL RISK ASSESSMENT DOCD: CPT | Mod: CPTII,S$GLB,, | Performed by: OBSTETRICS & GYNECOLOGY

## 2024-08-20 PROCEDURE — 1159F MED LIST DOCD IN RCRD: CPT | Mod: CPTII,S$GLB,, | Performed by: OBSTETRICS & GYNECOLOGY

## 2024-08-20 PROCEDURE — 1126F AMNT PAIN NOTED NONE PRSNT: CPT | Mod: CPTII,S$GLB,, | Performed by: OBSTETRICS & GYNECOLOGY

## 2024-08-20 PROCEDURE — 3074F SYST BP LT 130 MM HG: CPT | Mod: CPTII,S$GLB,, | Performed by: OBSTETRICS & GYNECOLOGY

## 2024-08-20 PROCEDURE — 99999 PR PBB SHADOW E&M-EST. PATIENT-LVL IV: CPT | Mod: PBBFAC,,, | Performed by: OBSTETRICS & GYNECOLOGY

## 2024-08-20 PROCEDURE — 88175 CYTOPATH C/V AUTO FLUID REDO: CPT | Performed by: OBSTETRICS & GYNECOLOGY

## 2024-08-20 PROCEDURE — 1101F PT FALLS ASSESS-DOCD LE1/YR: CPT | Mod: CPTII,S$GLB,, | Performed by: OBSTETRICS & GYNECOLOGY

## 2024-08-20 PROCEDURE — 3078F DIAST BP <80 MM HG: CPT | Mod: CPTII,S$GLB,, | Performed by: OBSTETRICS & GYNECOLOGY

## 2024-08-20 PROCEDURE — 3008F BODY MASS INDEX DOCD: CPT | Mod: CPTII,S$GLB,, | Performed by: OBSTETRICS & GYNECOLOGY

## 2024-08-20 RX ORDER — ESTRADIOL 10 UG/1
INSERT VAGINAL
Qty: 45 TABLET | Refills: 5 | Status: SHIPPED | OUTPATIENT
Start: 2024-08-20

## 2024-08-20 NOTE — PROGRESS NOTES
CC: Well woman exam    Juan Cruz is a 69 y.o. female  presents for a well woman exam.  LMP: No LMP recorded (lmp unknown). Patient is postmenopausal.. She had vaginal dryness and discomfort. She was using vagifem and started with a vaginal discharge.  The discharge improved when she stopped the vagifem but the dryness was problematic.      Past Medical History:   Diagnosis Date    Bruise 2022    Chronic sinusitis, unspecified     Dyspnea on exertion 2022    Elevated liver enzymes 2022    History of SIADH     Interstitial pulmonary disease, unspecified 2023    LLL pneumonia 2022    Mitral valve prolapse     Osteopenia     Petechiae 2022    Pulmonary fibrosis 2022    Rheumatoid arthritis involving multiple sites     Skin rash 2023     Past Surgical History:   Procedure Laterality Date    BREAST BIOPSY Bilateral     FUNCTIONAL ENDOSCOPIC SINUS SURGERY (FESS)      ORIF WRIST FRACTURE Right      Social History     Socioeconomic History    Marital status:    Tobacco Use    Smoking status: Never     Passive exposure: Never    Smokeless tobacco: Never   Substance and Sexual Activity    Alcohol use: No    Drug use: No    Sexual activity: Not Currently     Social Determinants of Health     Financial Resource Strain: Low Risk  (2024)    Overall Financial Resource Strain (CARDIA)     Difficulty of Paying Living Expenses: Not hard at all   Food Insecurity: No Food Insecurity (2024)    Hunger Vital Sign     Worried About Running Out of Food in the Last Year: Never true     Ran Out of Food in the Last Year: Never true   Transportation Needs: No Transportation Needs (2024)    PRAPARE - Transportation     Lack of Transportation (Medical): No     Lack of Transportation (Non-Medical): No   Physical Activity: Sufficiently Active (2024)    Exercise Vital Sign     Days of Exercise per Week: 7 days     Minutes of Exercise per Session: 50 min   Stress:  "Stress Concern Present (2024)    [employer] - Occupational Stress Questionnaire     Feeling of Stress : To some extent   Housing Stability: Unknown (2024)    Housing Stability Vital Sign     Unable to Pay for Housing in the Last Year: No     Family History   Problem Relation Name Age of Onset    Breast cancer Neg Hx      Colon cancer Neg Hx      Ovarian cancer Neg Hx      Uterine cancer Neg Hx      Cervical cancer Neg Hx       OB History          1    Para   1    Term   1            AB        Living             SAB        IAB        Ectopic        Multiple        Live Births                     /64   Ht 5' 2" (1.575 m)   Wt 60.1 kg (132 lb 7.9 oz)   LMP  (LMP Unknown)   BMI 24.23 kg/m²       ROS:    ROS:  GENERAL: Denies weight gain or weight loss. Feeling well overall.   SKIN: Denies rash or lesions.   HEAD: Denies head injury or headache.   NODES: Denies enlarged lymph nodes.   CHEST: Denies chest pain or shortness of breath.   CARDIOVASCULAR: Denies palpitations or left sided chest pain.   ABDOMEN: No abdominal pain, constipation, diarrhea, nausea, vomiting or rectal bleeding.   URINARY: No frequency, dysuria, hematuria, or burning on urination.  REPRODUCTIVE: See HPI.   BREASTS: The patient performs breast self-examination and denies pain, lumps, or nipple discharge.   HEMATOLOGIC: No easy bruisability or excessive bleeding.   MUSCULOSKELETAL: Denies joint pain or swelling.   NEUROLOGIC: Denies syncope or weakness.   PSYCHIATRIC: Denies depression, anxiety or mood swings.    PHYSICAL EXAM:    APPEARANCE: Well nourished, well developed, in no acute distress.  AFFECT: WNL, alert and oriented x 3  SKIN: No acne or hirsutism  NECK: Neck symmetric without masses or thyromegaly  NODES: No inguinal, cervical, axillary, or femoral lymph node enlargement  CHEST: Good respiratory effect  ABDOMEN: Soft.  No tenderness or masses.  No hepatosplenomegaly.  No " hernias.  BREASTS: Symmetrical, no skin changes or visible lesions.  No palpable masses, nipple discharge bilaterally.  PELVIC: Normal external genitalia without lesions.  Normal hair distribution.  Adequate perineal body, normal urethral meatus.  Vagina moist and well rugated without lesions or discharge.  Cervix pale pink, without lesions, discharge or tenderness.  No significant cystocele or rectocele.  Bimanual exam shows uterus to be normal size, regular, mobile and nontender.  Adnexa without masses or tenderness.    RECTAL: Rectovaginal exam confirms above with normal sphincter tone, no masses.  EXTREMITIES: No edema.    DIagnosis      ICD-10-CM ICD-9-CM    1. Encounter for gynecological examination without abnormal finding  Z01.419 V72.31 Liquid-Based Pap Smear, Screening      2. Preventative health care  Z00.00 V70.0 Ambulatory referral/consult to Gynecology      3. Vaginal atrophy  N95.2 627.3       4. Vaginal dryness, menopausal  N95.1 627.2 estradioL (VAGIFEM) 10 mcg Tab      5. Immunosuppressed status  D84.9 279.9           Vaginitis prevention including :   a. avoiding feminine products such as deoderant soaps, body wash, bubble bath, douches, scented toilet paper, deoderant tampons or pads, baby or feminine wipes, chronic pad use, etc. and   b. avoiding other vulvovaginal irritants such as long hot baths, humidity, tight, synthetic clothing, chlorine and sitting around in wet bathing suits and   c. wearing cotton underwear, avoiding thong underwear and no underwear to bed and   d. taking showers instead of baths and use a hair dryer on cool setting afterwards to dry and   e.wearing cotton to exercise and shower immediately after exercise and change clothes and   f. using polyurethane condoms without spermicide if sexually active and symptoms are triggered by intercourse;   Diflucan and Mycolog cream use and potential side effects;   -pelvic rest until symptoms resolve.       Patient was counseled today  on A.C.S. Pap guidelines and recommendations for yearly pelvic exams, mammograms and monthly self breast exams; to see her PCP for other health maintenance.       Follow up in about 1 year (around 8/20/2025).

## 2024-08-22 ENCOUNTER — HOSPITAL ENCOUNTER (OUTPATIENT)
Dept: PULMONOLOGY | Facility: CLINIC | Age: 69
Discharge: HOME | End: 2024-08-22
Payer: MEDICARE

## 2024-08-22 ENCOUNTER — OFFICE VISIT (OUTPATIENT)
Dept: TRANSPLANT | Facility: CLINIC | Age: 69
End: 2024-08-22
Payer: MEDICARE

## 2024-08-22 VITALS
HEART RATE: 71 BPM | DIASTOLIC BLOOD PRESSURE: 63 MMHG | SYSTOLIC BLOOD PRESSURE: 105 MMHG | OXYGEN SATURATION: 98 % | HEIGHT: 62 IN | WEIGHT: 132.25 LBS | BODY MASS INDEX: 24.34 KG/M2 | RESPIRATION RATE: 16 BRPM | TEMPERATURE: 98 F

## 2024-08-22 VITALS — HEIGHT: 62 IN | WEIGHT: 132.5 LBS | BODY MASS INDEX: 24.38 KG/M2

## 2024-08-22 DIAGNOSIS — K21.9 GASTROESOPHAGEAL REFLUX DISEASE, UNSPECIFIED WHETHER ESOPHAGITIS PRESENT: ICD-10-CM

## 2024-08-22 DIAGNOSIS — J84.9 INTERSTITIAL LUNG DISEASE: Primary | ICD-10-CM

## 2024-08-22 DIAGNOSIS — J84.9 INTERSTITIAL LUNG DISEASE: ICD-10-CM

## 2024-08-22 DIAGNOSIS — M05.79 RHEUMATOID ARTHRITIS INVOLVING MULTIPLE SITES WITH POSITIVE RHEUMATOID FACTOR: ICD-10-CM

## 2024-08-22 DIAGNOSIS — J30.2 SEASONAL ALLERGIC RHINITIS, UNSPECIFIED TRIGGER: ICD-10-CM

## 2024-08-22 LAB
CLINICAL INFO: NORMAL
DATE OF PREVIOUS PAP: NORMAL
DATE PREVIOUS BX: NO
DLCO SINGLE BREATH LLN: 14.6
DLCO SINGLE BREATH PRE REF: 50.9 %
DLCO SINGLE BREATH REF: 20.33
DLCOC SBVA LLN: 2.85
DLCOC SBVA REF: 4.42
DLCOC SINGLE BREATH LLN: 14.6
DLCOC SINGLE BREATH REF: 20.33
DLCOCSBVAULN: 5.98
DLCOCSINGLEBREATHULN: 26.07
DLCOSINGLEBREATHULN: 26.07
DLCOSINGLEBREATHZSCORE: -2.86
DLCOVA LLN: 2.85
DLCOVA PRE REF: 89 %
DLCOVA PRE: 3.93 ML/(MIN*MMHG*L) (ref 2.85–5.98)
DLCOVA REF: 4.42
DLCOVAULN: 5.98
ERVN2 LLN: -16449.34
ERVN2 PRE REF: 117.4 %
ERVN2 PRE: 0.77 L (ref -16449.34–16450.66)
ERVN2 REF: 0.66
ERVN2ULN: ABNORMAL
FEF 25 75 LLN: 1.18
FEF 25 75 PRE REF: 118.1 %
FEF 25 75 REF: 2.58
FEV05 LLN: 0.78
FEV05 REF: 1.63
FEV1 FVC LLN: 67
FEV1 FVC PRE REF: 109 %
FEV1 FVC REF: 79
FEV1 LLN: 1.43
FEV1 PRE REF: 92.5 %
FEV1 REF: 1.96
FEV1FVCZSCORE: 1.1
FEV1ZSCORE: -0.46
FRCN2 LLN: 1.77
FRCN2 PRE REF: 56 %
FRCN2 REF: 2.6
FRCN2ULN: 3.42
FVC LLN: 1.85
FVC PRE REF: 84.4 %
FVC REF: 2.48
FVCZSCORE: -1
ICN2REF: 1.77
IVC PRE: 2.03 L (ref 1.85–3.14)
IVC SINGLE BREATH LLN: 1.85
IVC SINGLE BREATH PRE REF: 82 %
IVC SINGLE BREATH REF: 2.48
IVCSINGLEBREATHULN: 3.14
LLN IC N2: -16448.23
LMP START DATE: NORMAL
PEF LLN: 4.03
PEF PRE REF: 106.9 %
PEF REF: 5.51
PHYSICIAN COMMENT: ABNORMAL
PRE DLCO: 10.36 ML/(MIN*MMHG) (ref 14.6–26.07)
PRE FEF 25 75: 3.04 L/S (ref 1.18–3.97)
PRE FET 100: 6.28 SEC
PRE FEV05 REF: 99.6 %
PRE FEV1 FVC: 86.59 % (ref 67.33–89.92)
PRE FEV1: 1.81 L (ref 1.43–2.47)
PRE FEV5: 1.63 L (ref 0.78–2.49)
PRE FRC N2: 1.45 L (ref 1.77–3.42)
PRE FVC: 2.09 L (ref 1.85–3.14)
PRE IC N2: 1.32 L (ref -16448.23–16451.77)
PRE PEF: 5.89 L/S (ref 4.03–6.99)
PRE REF IC N2: 74.7 %
RVN2 LLN: 1.36
RVN2 PRE REF: 35.2 %
RVN2 PRE: 0.68 L (ref 1.36–2.51)
RVN2 REF: 1.94
RVN2TLCN2 LLN: 32.49
RVN2TLCN2 PRE REF: 58.4 %
RVN2TLCN2 PRE: 24.59 % (ref 32.49–51.67)
RVN2TLCN2 REF: 42.08
RVN2TLCN2ULN: 51.67
RVN2ULN: 2.51
SPECIMEN SOURCE CVX/VAG CYTO: NORMAL
TLCN2 LLN: 3.62
TLCN2 PRE REF: 60.3 %
TLCN2 PRE: 2.78 L (ref 3.62–5.59)
TLCN2 REF: 4.6
TLCN2ULN: 5.59
TLCN2ZSCORE: -3.05
ULN IC N2: ABNORMAL
VA PRE: 2.64 L (ref 4.45–4.45)
VA SINGLE BREATH LLN: 4.45
VA SINGLE BREATH PRE REF: 59.2 %
VA SINGLE BREATH REF: 4.45
VASINGLEBREATHULN: 4.45
VCMAXN2 LLN: 1.85
VCMAXN2 PRE REF: 84.4 %
VCMAXN2 PRE: 2.09 L (ref 1.85–3.14)
VCMAXN2 REF: 2.48
VCMAXN2ULN: 3.14

## 2024-08-22 PROCEDURE — 99999 PR PBB SHADOW E&M-EST. PATIENT-LVL III: CPT | Mod: PBBFAC,TXP,, | Performed by: INTERNAL MEDICINE

## 2024-08-22 NOTE — PROGRESS NOTES
ADVANCED LUNG DISEASE FOLLOW-UP                                                                                                                                          Reason for Visit:  RA-ILD, cough    Referring Physician: Ibis Holcomb    History of Present Illness: Juan Cruz is a 69 y.o. female who is on 0L of oxygen.  She is on no assisted ventilation.  Her New [place] Heart Association Class is II and a Karnofsky score of 80% - Normal activity with effort: some symptoms of disease. She is not diabetic.     She presents today for follow-up of RA-ILD.  Currently on rituxan.  Experienced lung toxicity with MTX, intolerance of aza, and hepatotoxicity to OFEV.  Has had some soft tissue manifestations since initiating rituximab; painful subcutaneous nodules at fascial sheaths on elbows, paraspinal tissue, IT bands. Waxing/waning. Inflammatory markers are normal; does not seem that this represents RA flare. Pain and stiffness is responding to baclofen and ibuprofen; with this her dyspnea has improved. She is able to walk around the house, climb 17 stairs, do yoga without shortness of breath. PFTs stable to slightly improved. 6MWT with limitation of leg and back pain, but oxygenation is % on testing which is also improved; at last measure she had desaturation with exertion from 98% to 94% which, though not requiring supplemental O2, still represents a significant change. She is having worsening dry cough which feels less like a LRI symptom and more upper airway to her; cough is pronounced when talking or eating.         Past Medical History:   Diagnosis Date    Bruise 09/12/2022    Chronic sinusitis, unspecified     Dyspnea on exertion 09/16/2022    Elevated liver enzymes 09/12/2022    History of SIADH     Interstitial pulmonary disease, unspecified 07/20/2023    LLL pneumonia 12/20/2022    Mitral valve prolapse     Osteopenia     Petechiae 09/12/2022    Pulmonary fibrosis 09/12/2022    Rheumatoid  arthritis involving multiple sites     Skin rash 03/09/2023       Past Surgical History:   Procedure Laterality Date    BREAST BIOPSY Bilateral     FUNCTIONAL ENDOSCOPIC SINUS SURGERY (FESS)      ORIF WRIST FRACTURE Right        Allergies: Methotrexate, Gluten protein, Paxlovid [nirmatrelvir-ritonavir], Ppd black rubber mix, and Prolia [denosumab]    Current Outpatient Medications   Medication Sig    abaloparatide (TYMLOS) 80 mcg (3,120 mcg/1.56 mL) PnIj 80 mcg daily (Patient not taking: Reported on 8/20/2024)    albuterol sulfate (VENTOLIN HFA INHL) Ventolin HFA    alendronate (FOSAMAX) 70 MG tablet Take 1 tablet (70 mg total) by mouth every 7 days.    B-complex with vitamin C (Z-BEC OR EQUIV) tablet Take 1 tablet by mouth 2 (two) times a day.    calcium carb/vit A palm/vit D3 (CALCIUM-VITAMIN A-VITAMIN D ORAL) Calcium-Vitamin A-Vitamin D    calcium citrate-vitamin D3 315-200 mg (CITRACAL+D) 315-200 mg-unit per tablet Take 1 tablet by mouth 2 (two) times daily.    cetirizine (ZYRTEC) 10 MG tablet Take 1 tablet (10 mg total) by mouth once daily.    estradioL (VAGIFEM) 10 mcg Tab Yuvafem 10 mcg vaginal tablet   INSERT 1 TABLET VAGINALLY TWICE A WEEK    magnesium oxide (MAG-OX) 400 mg (241.3 mg magnesium) tablet Take 400 mg by mouth 2 (two) times daily.    metoprolol succinate (TOPROL-XL) 25 MG 24 hr tablet Take 1 tablet by mouth once daily    neomycin-polymyxin-dexamethasone (MAXITROL) 3.5mg/mL-10,000 unit/mL-0.1 % DrpS Place 1 drop into both eyes 3 (three) times daily. (Patient not taking: Reported on 8/14/2024)    RESTASIS 0.05 % ophthalmic emulsion Place 1 drop into both eyes once daily. Has not started     Current Facility-Administered Medications   Medication    riTUXimab-abbs (TRUXIMA) 1,000 mg in sodium chloride 0.9% 1,000 mL infusion (conc: 1 mg/mL)    riTUXimab-abbs (TRUXIMA) 1,000 mg in sodium chloride 0.9% 1,000 mL infusion (conc: 1 mg/mL)    riTUXimab-abbs (TRUXIMA) 1,000 mg in sodium chloride 0.9% 1,000  mL infusion (conc: 1 mg/mL)    sodium chloride 0.9% flush 10 mL       Immunization History   Administered Date(s) Administered    COVID-19, MRNA, LN-S, PF (Pfizer) (Purple Cap) 12/31/2020, 01/25/2021, 08/25/2021    Pneumococcal Conjugate - 20 Valent 09/21/2023    Pneumococcal Polysaccharide - 23 Valent 09/16/2022    Yellow Fever 08/31/2017    Zoster Recombinant 01/18/2020, 08/19/2020     Family History:    Family History   Problem Relation Name Age of Onset    Breast cancer Neg Hx      Colon cancer Neg Hx      Ovarian cancer Neg Hx      Uterine cancer Neg Hx      Cervical cancer Neg Hx       Social History     Substance and Sexual Activity   Alcohol Use No      Social History     Substance and Sexual Activity   Drug Use No      Social History     Socioeconomic History    Marital status:    Tobacco Use    Smoking status: Never     Passive exposure: Never    Smokeless tobacco: Never   Substance and Sexual Activity    Alcohol use: No    Drug use: No    Sexual activity: Not Currently     Social Determinants of Health     Financial Resource Strain: Low Risk  (8/7/2024)    Overall Financial Resource Strain (CARDIA)     Difficulty of Paying Living Expenses: Not hard at all   Food Insecurity: No Food Insecurity (8/7/2024)    Hunger Vital Sign     Worried About Running Out of Food in the Last Year: Never true     Ran Out of Food in the Last Year: Never true   Transportation Needs: No Transportation Needs (4/23/2024)    PRAPARE - Transportation     Lack of Transportation (Medical): No     Lack of Transportation (Non-Medical): No   Physical Activity: Sufficiently Active (8/7/2024)    Exercise Vital Sign     Days of Exercise per Week: 7 days     Minutes of Exercise per Session: 50 min   Stress: Stress Concern Present (8/7/2024)    [employer] - Occupational Stress Questionnaire     Feeling of Stress : To some extent   Housing Stability: Unknown (8/7/2024)    Housing Stability Vital Sign      "Unable to Pay for Housing in the Last Year: No     Review of Systems   Constitutional:  Negative for chills, diaphoresis, fever, malaise/fatigue and weight loss.   HENT:  Negative for congestion, ear discharge, ear pain, hearing loss, nosebleeds, sinus pain, sore throat and tinnitus.    Eyes:  Negative for blurred vision, double vision, photophobia, pain, discharge and redness.   Respiratory:  Positive for cough (stable). Negative for hemoptysis, sputum production, shortness of breath (improved), wheezing and stridor.    Cardiovascular:  Negative for chest pain, palpitations, orthopnea, claudication, leg swelling and PND.   Gastrointestinal:  Negative for abdominal pain, blood in stool, constipation, diarrhea, heartburn, melena, nausea and vomiting.   Genitourinary:  Negative for dysuria, flank pain, frequency, hematuria and urgency.   Musculoskeletal:  Positive for myalgias. Negative for back pain, falls, joint pain and neck pain.   Skin:  Negative for itching and rash.   Neurological:  Negative for dizziness, tingling, tremors, sensory change, speech change, focal weakness, seizures, loss of consciousness, weakness and headaches.   Endo/Heme/Allergies:  Negative for environmental allergies and polydipsia. Bruises/bleeds easily.   Psychiatric/Behavioral:  Negative for depression, hallucinations, memory loss, substance abuse and suicidal ideas. The patient is not nervous/anxious and does not have insomnia.      Vitals  /63 (BP Location: Right arm, Patient Position: Sitting, BP Method: Medium (Automatic))   Pulse 71   Temp 98.1 °F (36.7 °C) (Oral)   Resp 16   Ht 5' 2" (1.575 m)   Wt 60 kg (132 lb 4.4 oz)   LMP  (LMP Unknown)   SpO2 98%   BMI 24.19 kg/m²   Physical Exam  Vitals and nursing note reviewed.   Constitutional:       General: She is not in acute distress.     Appearance: She is well-developed. She is not diaphoretic.   HENT:      Head: Normocephalic and atraumatic.      Nose: No rhinorrhea.    "   Mouth/Throat:      Mouth: Mucous membranes are moist.      Pharynx: No oropharyngeal exudate.   Eyes:      General: No scleral icterus.     Conjunctiva/sclera: Conjunctivae normal.      Pupils: Pupils are equal, round, and reactive to light.   Neck:      Thyroid: No thyromegaly.      Vascular: No JVD.      Trachea: No tracheal deviation.   Cardiovascular:      Rate and Rhythm: Normal rate and regular rhythm.      Pulses: Normal pulses.      Heart sounds: Normal heart sounds. No murmur heard.     No friction rub. No gallop.   Pulmonary:      Effort: Pulmonary effort is normal. No respiratory distress.      Breath sounds: No wheezing or rales (fine dry crackles bibasilar).   Abdominal:      General: Abdomen is flat. Bowel sounds are normal. There is no distension.      Palpations: Abdomen is soft.      Tenderness: There is no abdominal tenderness.   Musculoskeletal:         General: No deformity. Normal range of motion.      Cervical back: Normal range of motion and neck supple.   Skin:     General: Skin is warm and dry.      Capillary Refill: Capillary refill takes less than 2 seconds.      Coloration: Skin is not cyanotic or pale.      Findings: No bruising, erythema or rash.      Nails: There is no clubbing.      Comments: Focal soft tissue nodules palpable to touch at elbows, IT band, paraspinal tissues   Neurological:      General: No focal deficit present.      Mental Status: She is alert and oriented to person, place, and time. Mental status is at baseline.      Cranial Nerves: No cranial nerve deficit.   Psychiatric:         Mood and Affect: Mood normal.         Behavior: Behavior normal.         Thought Content: Thought content normal.         Judgment: Judgment normal.         Labs:  Hospital Outpatient Visit on 08/22/2024   Component Date Value    Pre FVC 08/22/2024 2.09     PRE FEV5 08/22/2024 1.63     Pre FEV1 08/22/2024 1.81     Pre FEV1 FVC 08/22/2024 86.59     Pre FEF 25 75 08/22/2024 3.04     Pre  PEF 08/22/2024 5.89     Pre  08/22/2024 6.28     Pre DLCO 08/22/2024 10.36 (L)     DLCOVA PRE 08/22/2024 3.93     VA PRE 08/22/2024 2.64 (L)     IVC PRE 08/22/2024 2.03     TLCN2 PRE 08/22/2024 2.78 (L)     VCMAXN2 PRE 08/22/2024 2.09     PRE IC N2 08/22/2024 1.32     Pre FRC N2 08/22/2024 1.45 (L)     ERVN2 PRE 08/22/2024 0.77     RVN2 PRE 08/22/2024 0.68 (L)     [id number] PRE 08/22/2024 24.59 (L)     FVC Ref 08/22/2024 2.48     FVC LLN 08/22/2024 1.85     FVC Pre Ref 08/22/2024 84.4     FEV05 REF 08/22/2024 1.63     FEV05 LLN 08/22/2024 0.78     PRE FEV05 REF 08/22/2024 99.6     FEV1 Ref 08/22/2024 1.96     FEV1 LLN 08/22/2024 1.43     FEV1 Pre Ref 08/22/2024 92.5     FEV1 FVC Ref 08/22/2024 79     FEV1 FVC LLN 08/22/2024 67     FEV1 FVC Pre Ref 08/22/2024 109.0     FEF 25 75 Ref 08/22/2024 2.58     FEF 25 75 LLN 08/22/2024 1.18     FEF 25 75 Pre Ref 08/22/2024 118.1     PEF Ref 08/22/2024 5.51     PEF LLN 08/22/2024 4.03     PEF Pre Ref 08/22/2024 106.9     TLCN2 Ref 08/22/2024 4.60     TLCN2 LLN 08/22/2024 3.62     TLC N2 ULN 08/22/2024 5.59     TLCN2 Pre Ref 08/22/2024 60.3     VCMAXN2 Ref 08/22/2024 2.48     VCMAXN2 LLN 08/22/2024 1.85     VCMAX N2 ULN 08/22/2024 3.14     VCMAXN2 Pre Ref 08/22/2024 84.4     MXT9ZUN 08/22/2024 1.77     LLN IC N2 08/22/2024 -33667.23     ULN IC N2 08/22/2024 81246.77     PRE REF IC N2 08/22/2024 74.7     FRCN2 Ref 08/22/2024 2.60     FRCN2 LLN 08/22/2024 1.77     FRC N2 ULN 08/22/2024 3.42     FRCN2 Pre Ref 08/22/2024 56.0     ERVN2 Ref 08/22/2024 0.66     ERVN2 LLN 08/22/2024 -35578.34     ERV N2 ULN 08/22/2024 80028.66     ERVN2 Pre Ref 08/22/2024 117.4     RVN2 Ref 08/22/2024 1.94     RVN2 LLN 08/22/2024 1.36     RV N2 ULN 08/22/2024 2.51     RVN2 Pre Ref 08/22/2024 35.2     OID5TTWF2 Ref 08/22/2024 42.08     HFO1KDRG9 LLN 08/22/2024 32.49     RV N2 TLC N2 ULN 08/22/2024 51.67     HAX5NXZJ0 Pre Ref 08/22/2024 58.4     DLCO Single Breath Ref 08/22/2024 20.33     DLCO  Single Breath LLN 08/22/2024 14.60     DLCO SINGLEBREATH ULN 08/22/2024 26.07     DLCO Single Breath Pre R* 08/22/2024 50.9     DLCOc Single Breath Ref 08/22/2024 20.33     DLCOc Single Breath LLN 08/22/2024 14.60     DLCOC SINGLEBREATH ULN 08/22/2024 26.07     DLCOVA Ref 08/22/2024 4.42     DLCOVA LLN 08/22/2024 2.85     DLCOVA ULN 08/22/2024 5.98     DLCOVA Pre Ref 08/22/2024 89.0     DLCOc SBVA Ref 08/22/2024 4.42     DLCOc SBVA LLN 08/22/2024 2.85     DLCOCSBVA ULN 08/22/2024 5.98     VA Single Breath Ref 08/22/2024 4.45     VA Single Breath LLN 08/22/2024 4.45     VA SINGLEBREATH ULN 08/22/2024 4.45     VA Single Breath Pre Ref 08/22/2024 59.2     IVC Single Breath Ref 08/22/2024 2.48     IVC Single Breath LLN 08/22/2024 1.85     IVC SINGLEBREATH ULN 08/22/2024 3.14     IVC Single Breath Pre Ref 08/22/2024 82.0     FVCZSCORE 08/22/2024 -1.00     ALT9KYMOJT 08/22/2024 -0.46     IOQ9RIVTVRWED 08/22/2024 1.10     VCNT4YBJFWA 08/22/2024 -3.05     DLCOSINGLEBREATHZSCORE 08/22/2024 -2.86            5/2/2024     9:54 AM 10/25/2023     1:41 PM 4/17/2023     1:19 PM 12/8/2022    10:24 AM 9/12/2022     6:00 PM 6/7/2022    11:54 AM   Pulmonary Function Tests   FVC 2.08 liters 2.16 liters 2.02 liters 1.86 liters 1.82 liters 2.18 liters   FEV1 1.83 liters 1.85 liters 1.76 liters 1.64 liters 1.62 liters 1.9 liters   TLC (liters) 2.67 liters 2.77 liters 2.94 liters 2.88 liters  2.79 liters   DLCO (ml/mmHg sec) 9.13 ml/mmHg sec 8.32 ml/mmHg sec 9.22 ml/mmHg sec 8.62 ml/mmHg sec  8.17 ml/mmHg sec   FVC% 89 92 86 78 77 92   FEV1% 98 98 93 86 85 99   FEF 25-75 3.13 3 2.9 2.93 2.69 3.24   FEF 25-75% 121 166 159 160 146 175   TLC% 58 60 63 63  61   RV 0.6 0.6 0.92 1.01  0.61   RV% 31 31 48 53  32   DLCO% 45 41 45 42  40         8/22/2024     8:04 AM 5/2/2024     8:36 AM 7/20/2023     9:33 AM 4/17/2023    11:38 AM 12/22/2022    10:05 AM 12/8/2022     9:08 AM 9/12/2022    11:43 AM   6MW   6MWT Status completed without stopping  completed without stopping completed without stopping completed without stopping completed without stopping completed without stopping completed without stopping   Patient Reported Dyspnea;Other (Comment) No complaints No complaints Dyspnea No complaints Dyspnea Dyspnea   Was O2 used? No No No No No No No   6MW Distance walked (feet) 1450 feet 1532 feet 1500 feet 1500 feet 1300 feet 1332 feet 1300 feet   Distance walked (meters) 441.96 meters 466.95 meters 457.2 meters 457.2 meters 396.24 meters 405.99 meters 396.24 meters   Did patient stop? No No  No No No No   Oxygen Saturation 99 % 98 % 98 % 98 % 98 % 99 % 98 %   Supplemental Oxygen Room Air Room Air Room Air Room Air Room Air Room Air Room Air   Heart Rate 87 bpm 85 bpm 95 bpm 85 bpm 78 bpm 81 bpm 89 bpm   Blood Pressure 105/69 114/75 109/62 113/64 118/77 145/78 120/74   Rigoberto Dyspnea Rating  moderate nothing at all light nothing at all light moderate light   Oxygen Saturation 99 % 98 % 94 % 94 % 95 % 98 % 95 %   Supplemental Oxygen Room Air Room Air  Room Air Room Air Room Air Room Air   Heart Rate 114 bpm 113 bpm 126 bpm 125 bpm 113 bpm 106 bpm 113 bpm   Blood Pressure 117/74 118/76 114/62 132/67 130/86 129/66 131/77   Rigoberto Dyspnea Rating  heavy moderate moderate somewhat heavy somewhat heavy somewhat heavy somewhat heavy   Recovery Time (seconds) 119 seconds 135 seconds 180 seconds 149 seconds 180 seconds 123 seconds 72 seconds   Oxygen Saturation 99 % 98 % 97 % 98 % 99 % 98 % 98 %   Supplemental Oxygen Room Air Room Air Room Air Room Air Room Air Room Air Room Air   Heart Rate 88 bpm 95 bpm 101 bpm 106 bpm 83 bpm 94 bpm 97 bpm       Imaging:  Results for orders placed during the hospital encounter of 07/27/22    CT Chest Without Contrast    Narrative  EXAMINATION:  CT CHEST WITHOUT CONTRAST    CLINICAL HISTORY:  Interstitial lung disease; Cough, unspecified    TECHNIQUE:  Low dose axial images, sagittal and coronal reformations were obtained from the  thoracic inlet to the lung bases.  Intravenous contrast was not administered.    COMPARISON:  CT thorax 06/28/2021    FINDINGS:  Base of Neck: Imaged portions of the base of the neck are grossly unremarkable.    Aorta: 3-branch vessel configuration of a left-sided type 3 aortic arch.  No hyperdense crescent sign, aneurysmal degeneration, periaortic fat stranding or periaortic fluid.    Heart: Heart is normal in size.  No significant pericardial thickening. No appreciable coronary artery calcifications.    Pulmonary vasculature: Pulmonary arteries are not enlarged and distribute normally.  There are four pulmonary veins.    Axilla/Alexandra/Mediastinum: Prominent mediastinal lymph nodes however, no rachael axillary or mediastinal lymphadenopathy.  Evaluation of hilar lymph nodes is limited without IV contrast.    Airways: Trachea and mainstem bronchi are patent.  Symmetric mild central bronchiectasis present.    Lungs/Pleura: Significant interval progression of previously noted subpleural predominant reticular and ground-glass airspace opacities associated with bilateral lower lobe volume loss, architectural distortion, symmetric mild central bronchiectasis and apicobasal gradient.  No pleural effusions.  No pneumothorax.    Esophagus: Small hiatal hernia, not significantly changed.  Otherwise, normal in course and caliber however, evaluation is limited given the lack of oral contrast.    Soft tissues: Imaged soft tissues are grossly unremarkable.    Osseous structures: Diffuse osseous demineralization and mild multilevel degenerative changes of the imaged spine.  No acute displaced fracture, dislocation or suspicious lytic/blastic osseous lesion.    Upper Abdomen: Imaged portions of the upper abdomen are grossly unremarkable.    Impression  1. Significant interval progression of previously noted subpleural predominant reticular and ground-glass airspace opacities associated with bilateral lower lobe volume loss,  architectural distortion, symmetric mild central bronchiectasis and apicobasal gradient suggestive of UIP pattern of interstitial lung disease which may reflect IPF.  Additional diagnostic considerations include NSIP, asbestosis, fibrotic hypersensitivity pneumonitis connective tissue associated lung disease and drug induced lung disease amongst other etiologies.      Electronically signed by: Braulio Austine  Date:    07/27/2022  Time:    16:11    Cardiodiagnostics:  6/28/2021:  The estimated ejection fraction is 55%.  Normal systolic function.  Normal right ventricular size with normal right ventricular systolic function.  Mild to moderate left atrial enlargement.  Mild right atrial enlargement.  Normal central venous pressure (3 mmHg).  The estimated PA systolic pressure is 18 mmHg.       Assessment:  1. Interstitial lung disease    2. Rheumatoid arthritis involving multiple sites with positive rheumatoid factor    3. Seasonal allergic rhinitis, unspecified trigger    4. Gastroesophageal reflux disease, unspecified whether esophagitis present          Plan:   1. Followed for RA-ILD.  FVC and DLCO are stable to slightly improved.  Failed MTX and AZA.  Currently on rituxan infusions; tolerating well.  Follows with ENT/derm/allergy; takes daily antihistamine, and uses NETI pot regularly.  Acute hepatitis 2/2 OFEV so not a candidate for anti fibrotic therapy.  Overall, doing well from a pulmonary standpoint with stable function. Cough has been worse since stopping breo; she will restart this.       2. Follow-up with Rheum.  Currently on rituxan.    3. Continue with allergy regimen.    4. Will see GI for colonoscopy; can also evaluate if endoscopy is needed. Will order esophogram.    5. Follow-up in 3 months with PFTs.      Seen and examined with Dr. Haile attending.    Isaías Amaral MD  [facility] Fellow    Attending Note:     I have seen and evaluated the patient with Dr. Amaral. Their note  reflects the content of our discussion and my plan of care.      Barbara Haile D.O.  Pulmonary/Critical Care and Lung Transplantation  [facility]

## 2024-08-22 NOTE — LETTER
August 22, 2024        Ibis Holcomb  [address]  [facility] [address]  Phone: [phone]  Fax: [phone]             [facility] 1st Fl  [address]  [facility] [address]  Phone: [phone]   Patient: Juan Cruz   MR Number: [id number]   YOB: 1955   Date of Visit: 8/22/2024       Dear Dr. Ibis Holcomb    Thank you for referring Juan Cruz to me for evaluation. Attached you will find relevant portions of my assessment and plan of care.    If you have questions, please do not hesitate to call me. I look forward to following Juan Cruz along with you.    Sincerely,    Barbara Haile, DO    Enclosure    If you would like to receive this communication electronically, please contact [email or web address] or [phone] to request [employer] access.    [employer] is a tool which provides read-only access to select patient information with whom you have a relationship. Its easy to use and provides real time access to review your patients record including encounter summaries, notes, results, and demographic information.    If you feel you have received this communication in error or would no longer like to receive these types of communications, please e-mail [email or web address]

## 2024-08-23 NOTE — PROCEDURES
Juan Cruz is a 69 y.o.  female patient, who presents for a 6 minute walk test ordered by DO Lazara.  The diagnosis is Interstitial Lung Disease; Connective Tissue Disease.  The patient's BMI is 24.2 kg/m2.  Predicted distance (lower limit of normal) is 330.55 meters.      Test Results:    The test was completed without stopping.  The total time walked was 360 seconds.  During walking, the patient reported:  Dyspnea, Other (Comment) (lower back and knee pain).  The patient used no assistive devices during testing.     08/22/2024---------Distance: 441.96 meters (1450 feet)     Lap Walk Time O2 Sat % Supplemental Oxygen Heart Rate Blood Pressure Rigoberto Scale Comment   Pre-exercise  (Resting) 0 0 99 % Room Air 87 bpm 105/69 mmHg 3    During Exercise 1 45 sec 99 % Room Air 99 bpm      During Exercise 2 94 sec 100 % Room Air 100 bpm      During Exercise 3 144 sec 99 % Room Air 112 bpm      During Exercise 4 194 sec 99 % Room Air 112 bpm      During Exercise 5 244 sec 99 % Room Air 111 bpm      During Exercise 6 294 sec 99 % Room Air 117 bpm      During Exercise 7 343 sec 99 % Room Air 117 bpm      End of Exercise  360 sec 99 % Room Air 114 bpm 117/74 mmHg 5-6    Post-exercise  (Recovery)   99 % Room Air  88 bpm        Recovery Time: 119 seconds    Performing nurse/tech: ОЛЕГ Machado      PREVIOUS STUDY:   05/02/2024---------Distance: 466.95 meters (1532 feet)       Lap Walk Time O2 Sat % Supplemental Oxygen Heart Rate Blood Pressure Rigoberto Scale   Pre-exercise  (Resting) 0 0 98 % Room Air 85 bpm 114/75 mmHg 0   During Exercise 1 44 sec 99 % Room Air 100 bpm       During Exercise 2 90 sec 98 % Room Air 113 bpm       During Exercise 3 137 sec 97 % Room Air 114 bpm       During Exercise 4 183 sec 98 % Room Air 111 bpm       During Exercise 5 230 sec 99 % Room Air 114 bpm       During Exercise 6 278 sec 96 % Room Air 113 bpm       During Exercise 7 325 sec 95 % Room Air 113 bpm       End of Exercise   360 sec 98 %  Room Air 113 bpm 118/76 mmHg 3   Post-exercise  (Recovery)     98 % Room Air  95 bpm           CLINICAL INTERPRETATION:  Six minute walk distance is 441.96 meters (1450 feet) with heavy dyspnea.  During exercise, there was no desaturation while breathing room air.  Blood pressure remained stable and Heart rate increased significantly with walking.  The patient reported non-pulmonary symptoms during exercise.  Since the previous study in May 2024, exercise capacity is unchanged.  Based upon age and body mass index, exercise capacity is normal.

## 2024-08-26 ENCOUNTER — TELEPHONE (OUTPATIENT)
Dept: TRANSPLANT | Facility: CLINIC | Age: 69
End: 2024-08-26
Payer: MEDICARE

## 2024-08-26 DIAGNOSIS — M05.79 RHEUMATOID ARTHRITIS INVOLVING MULTIPLE SITES WITH POSITIVE RHEUMATOID FACTOR: ICD-10-CM

## 2024-08-26 DIAGNOSIS — J84.9 INTERSTITIAL LUNG DISEASE: Primary | ICD-10-CM

## 2024-08-26 NOTE — TELEPHONE ENCOUNTER
Patient to follow up November 2024 with PFTs, per provider note Dr. Haile. Request to .    ----- Message from Barbara Haile DO sent at 8/26/2024  8:31 AM CDT -----  Looks great.  No needs for oxygen

## 2024-08-28 DIAGNOSIS — M81.0 OSTEOPOROSIS, UNSPECIFIED OSTEOPOROSIS TYPE, UNSPECIFIED PATHOLOGICAL FRACTURE PRESENCE: Primary | ICD-10-CM

## 2024-08-28 RX ORDER — TERIPARATIDE 250 UG/ML
20 INJECTION, SOLUTION SUBCUTANEOUS DAILY
Qty: 2.4 ML | Refills: 11 | Status: ACTIVE | OUTPATIENT
Start: 2024-08-28 | End: 2025-08-23

## 2024-08-29 ENCOUNTER — HOSPITAL ENCOUNTER (OUTPATIENT)
Dept: RADIOLOGY | Facility: HOSPITAL | Age: 69
Discharge: HOME | End: 2024-08-29
Attending: INTERNAL MEDICINE
Payer: MEDICARE

## 2024-08-29 ENCOUNTER — TELEPHONE (OUTPATIENT)
Dept: TRANSPLANT | Facility: CLINIC | Age: 69
End: 2024-08-29
Payer: MEDICARE

## 2024-08-29 DIAGNOSIS — K21.9 GASTROESOPHAGEAL REFLUX DISEASE, UNSPECIFIED WHETHER ESOPHAGITIS PRESENT: ICD-10-CM

## 2024-08-29 PROCEDURE — 25500020 PHARM REV CODE 255: Mod: TXP | Performed by: INTERNAL MEDICINE

## 2024-08-29 PROCEDURE — A9698 NON-RAD CONTRAST MATERIALNOC: HCPCS | Mod: TXP | Performed by: INTERNAL MEDICINE

## 2024-08-29 PROCEDURE — 74220 X-RAY XM ESOPHAGUS 1CNTRST: CPT | Mod: TC,TXP

## 2024-08-29 RX ADMIN — BARIUM SULFATE 200 ML: 0.6 SUSPENSION ORAL at 08:08

## 2024-08-29 NOTE — TELEPHONE ENCOUNTER
Left message for patient explaining provider results. My aure[employer] message sent as well.  ----- Message from Barbara Haile DO sent at 8/29/2024  1:24 PM CDT -----  Mild laryngeal penetration.  Recommend making sure to eat sitting upright and making sure that her food bolus fully clears before taking further bites.  ----- Message -----  From: Josiane Lind  Sent: 8/29/2024  11:07 AM CDT  To: Barbara Haile,     Please advise if any further action needed re: esophagram.

## 2024-08-30 ENCOUNTER — PATIENT MESSAGE (OUTPATIENT)
Dept: RHEUMATOLOGY | Facility: CLINIC | Age: 69
End: 2024-08-30
Payer: MEDICARE

## 2024-08-30 ENCOUNTER — PATIENT MESSAGE (OUTPATIENT)
Dept: TRANSPLANT | Facility: CLINIC | Age: 69
End: 2024-08-30
Payer: MEDICARE

## 2024-09-02 PROBLEM — Z00.00 PREVENTATIVE HEALTH CARE: Status: RESOLVED | Noted: 2022-09-16 | Resolved: 2024-09-02

## 2024-09-03 ENCOUNTER — DOCUMENTATION ONLY (OUTPATIENT)
Dept: RHEUMATOLOGY | Facility: CLINIC | Age: 69
End: 2024-09-03
Payer: MEDICARE

## 2024-09-03 NOTE — PROGRESS NOTES
Note from pharmacy      I just spoke with Ms. Cruz. She does not wish to start the Tymlos or Forteo at this time. She told me she would think on it and let me know if she changes her mind. She explained that she has had reactions to injectable medications in the past and is hesitant to pay the high copay and end up not tolerating the medication. She recently restarted Fosamax and plans to continue with this. I let her know I would inform you of our conversation.

## 2024-09-05 ENCOUNTER — TELEPHONE (OUTPATIENT)
Dept: GASTROENTEROLOGY | Facility: CLINIC | Age: 69
End: 2024-09-05
Payer: MEDICARE

## 2024-09-05 NOTE — TELEPHONE ENCOUNTER
----- Message from Angelica Mcgraw MD sent at 9/5/2024  1:52 PM CDT -----  Regarding: RE: APPT  Contact: PT@[phone]  They have not referred her, I think she follows in their advanced lung disease clinic.  I dont think they are evaluating her for transplant.  She is probably ok to see anyone  ----- Message -----  From: Autumn Walker MA  Sent: 9/5/2024   1:41 PM CDT  To: Angelica Mcgraw MD  Subject: RE: APPT                                         I don't think so. But since this is a lung patient, is this someone you should see?  ----- Message -----  From: Radha Hudson  Sent: 9/5/2024  10:01 AM CDT  To: [facility] Clinical Staff  Subject: APPT                                             Pt is calling to speak to someone in the office to schedule an appt. Pt is willing to see any provider that is available to see them sooner. Please call to advise. Thanks.     Is Pt's established provider available? CIPRIANO OKEEFE MD     Additional Information:   WANTS A COLONOSCOPY

## 2024-09-06 ENCOUNTER — TELEPHONE (OUTPATIENT)
Dept: GASTROENTEROLOGY | Facility: CLINIC | Age: 69
End: 2024-09-06
Payer: MEDICARE

## 2024-09-06 NOTE — TELEPHONE ENCOUNTER
Spoke with patient. Wanted an appointment with Dr Marr. Offered another provider. Patient declined. Added to wait list.for Dr Marr

## 2024-09-06 NOTE — TELEPHONE ENCOUNTER
----- Message from Brant Ureña sent at 9/6/2024  9:22 AM CDT -----  Regarding: returning missed call  Contact: pt @ [phone]  Missed Callback     Pt returning call from missed call. Requesting to speak with Autumn in the office. To be scheduled with first avail provider for reflux and colonoscopy. Please call to further advise. Thank you for all you are doing.

## 2024-09-20 ENCOUNTER — PATIENT MESSAGE (OUTPATIENT)
Dept: TRANSPLANT | Facility: CLINIC | Age: 69
End: 2024-09-20
Payer: MEDICARE

## 2024-09-20 DIAGNOSIS — R05.9 COUGH, UNSPECIFIED TYPE: ICD-10-CM

## 2024-09-20 DIAGNOSIS — J84.9 INTERSTITIAL LUNG DISEASE: ICD-10-CM

## 2024-09-20 DIAGNOSIS — R93.89 ABNORMAL X-RAY: ICD-10-CM

## 2024-09-21 RX ORDER — FLUTICASONE FUROATE AND VILANTEROL TRIFENATATE 200; 25 UG/1; UG/1
POWDER RESPIRATORY (INHALATION)
Qty: 180 EACH | Refills: 0 | Status: SHIPPED | OUTPATIENT
Start: 2024-09-21

## 2024-09-23 ENCOUNTER — OFFICE VISIT (OUTPATIENT)
Dept: PHYSICAL MEDICINE AND REHAB | Facility: CLINIC | Age: 69
End: 2024-09-23
Payer: MEDICARE

## 2024-09-23 VITALS
HEART RATE: 83 BPM | HEIGHT: 62 IN | DIASTOLIC BLOOD PRESSURE: 75 MMHG | SYSTOLIC BLOOD PRESSURE: 116 MMHG | BODY MASS INDEX: 24.41 KG/M2 | WEIGHT: 132.63 LBS

## 2024-09-23 DIAGNOSIS — M16.0 PRIMARY OSTEOARTHRITIS OF BOTH HIPS: ICD-10-CM

## 2024-09-23 DIAGNOSIS — R29.898 BILATERAL LEG WEAKNESS: Primary | ICD-10-CM

## 2024-09-23 DIAGNOSIS — M47.816 SPONDYLOSIS OF LUMBAR REGION WITHOUT MYELOPATHY OR RADICULOPATHY: ICD-10-CM

## 2024-09-23 DIAGNOSIS — R29.898 BILATERAL ARM WEAKNESS: ICD-10-CM

## 2024-09-23 DIAGNOSIS — M05.79 RHEUMATOID ARTHRITIS INVOLVING MULTIPLE SITES WITH POSITIVE RHEUMATOID FACTOR: ICD-10-CM

## 2024-09-23 DIAGNOSIS — M53.3 SACROILIAC JOINT DISEASE: ICD-10-CM

## 2024-09-23 PROCEDURE — 1159F MED LIST DOCD IN RCRD: CPT | Mod: CPTII,S$GLB,, | Performed by: PHYSICAL MEDICINE & REHABILITATION

## 2024-09-23 PROCEDURE — 99999 PR PBB SHADOW E&M-EST. PATIENT-LVL IV: CPT | Mod: PBBFAC,,, | Performed by: PHYSICAL MEDICINE & REHABILITATION

## 2024-09-23 PROCEDURE — 3078F DIAST BP <80 MM HG: CPT | Mod: CPTII,S$GLB,, | Performed by: PHYSICAL MEDICINE & REHABILITATION

## 2024-09-23 PROCEDURE — 99204 OFFICE O/P NEW MOD 45 MIN: CPT | Mod: S$GLB,,, | Performed by: PHYSICAL MEDICINE & REHABILITATION

## 2024-09-23 PROCEDURE — 3074F SYST BP LT 130 MM HG: CPT | Mod: CPTII,S$GLB,, | Performed by: PHYSICAL MEDICINE & REHABILITATION

## 2024-09-23 PROCEDURE — 3008F BODY MASS INDEX DOCD: CPT | Mod: CPTII,S$GLB,, | Performed by: PHYSICAL MEDICINE & REHABILITATION

## 2024-09-23 PROCEDURE — 1125F AMNT PAIN NOTED PAIN PRSNT: CPT | Mod: CPTII,S$GLB,, | Performed by: PHYSICAL MEDICINE & REHABILITATION

## 2024-09-23 NOTE — PROGRESS NOTES
Subjective:       Patient ID: Juan Cruz is a 69 y.o. female.    Chief Complaint: No chief complaint on file.      HPI      Dr. Cruz is a 69-year-old female with past medical history of seropositive rheumatoid arthritis followed up by Rheumatology, COVID-19 illness, interstitial lung disease, mitral valve prolapse, dyspnea on exertion, osteoporosis.  She is a retired anesthesiologist.  She was referred to the [facility] Medicine [facility] for help with stiffness and pain.    The patient has been followed up by Rheumatology for her seropositive rheumatoid arthritis.  He has been tried on multiple medications.  She has been recently under the care of Dr. Eng .  She had imaging studies done on 8/14/2024.  They included x-rays of the lumbar spine that showed mild DJD with bridging osteophytosis.  X-rays of the SI joints also showed mild DJD.  X-rays of the hip showed mild DJD.  The patient denies any significant low back pain.  She denies any hip pain..  She has been complaining however of stiffness in her pelvic girdle muscles as well as her hips.  She denies any subjective weakness.  Of note is that she has been having frequent cuff.  She has an appointment for ENT and esophagoscopy is planned.  She does not have any clear dysphagia.  He reports some change in her voice..  He has interstitial lung disease but denies any significant impaired breathing.  She denies fasciculations but reports generalized stiffness.  She is staying active.  She practices yoga about 45 minutes daily.  She walks frequently and goes up and down steps at home (she has 17 steps) few times per day      Past Medical History:   Diagnosis Date    Bruise 09/12/2022    Chronic sinusitis, unspecified     Dyspnea on exertion 09/16/2022    Elevated liver enzymes 09/12/2022    History of SIADH     Interstitial pulmonary disease, unspecified 07/20/2023    LLL pneumonia 12/20/2022    Mitral valve prolapse     Osteopenia     Petechiae  09/12/2022    Pulmonary fibrosis 09/12/2022    Rheumatoid arthritis involving multiple sites     Skin rash 03/09/2023        Review of patient's allergies indicates:   Allergen Reactions    Methotrexate Shortness Of Breath    Gluten protein Other (See Comments)     Sensitive - not allergic    Paxlovid [nirmatrelvir-ritonavir] Other (See Comments)     Trunk / chest / legs peeling after paxlovid    Ppd black rubber mix     Prolia [denosumab] Other (See Comments)     Rash to arms/trunk        Review of Systems   Constitutional:  Negative for chills and fever.   Eyes:  Negative for visual disturbance.   Respiratory:  Positive for cough. Negative for shortness of breath.    Cardiovascular:  Negative for chest pain.   Gastrointestinal:  Negative for blood in stool, constipation, nausea and vomiting.   Genitourinary:  Positive for urgency. Negative for difficulty urinating.   Musculoskeletal:  Positive for arthralgias and back pain. Negative for gait problem and neck pain.   Neurological:  Positive for speech difficulty and weakness (?). Negative for dizziness and headaches.   Psychiatric/Behavioral:  Positive for sleep disturbance. Negative for behavioral problems.            Objective:      Physical Exam  Vitals reviewed.   Constitutional:       Appearance: She is well-developed.   HENT:      Head: Normocephalic and atraumatic.   Eyes:      Extraocular Movements: Extraocular movements intact.   Musculoskeletal:      Cervical back: Normal range of motion. No tenderness.      Comments: BUE:  ROM:   RUE: full.   LUE: full.  Strength:    RUE: 4-/5 at shoulder abduction, 4 elbow flexion, 4 elbow extension, 4 hand .   LUE: 4-/5 at shoulder abduction, 4 elbow flexion, 4 elbow extension, 4 hand .  Sensation to pinprick:   RUE: intact.   LUE: intact.  DTR:    RUE: +3 biceps, +3 triceps.   LUE:  +3 biceps, +3 triceps.  Wolff:   RUE: -ve.   LUE: -ve.    BLE:  ROM:   RLE: full.   LLE: full.  Knee crepitus:   RLE: +ve.    LLE: +ve.   Strength:    RLE: 4-/5 at hip flexion, 4+ knee extension, 5 ankle DF, 5 PF, 5 EHL.   LLE: 4-/5 at hip flexion, 4+ knee extension, 5 ankle DF, 5 PF, 5 EHL.  Sensation to pinprick:     RLE: intact.      LLE: intact.   DTR:     RLE: +3 knee, +3 ankle.    LLE: +3 knee, +3 ankle.  Clonus:    Rt ankle: -ve.    Lt ankle: -ve.  SLR:      RLE: -ve at 80 degrees.      LLE: -ve at 80 degrees.   ROSALBA:     RLE: -ve.      LLE: -ve.  -ve tenderness over lumbar spine.  No leg length discrepancy.      Gait: WNL     Skin:     General: Skin is warm.   Neurological:      General: No focal deficit present.      Mental Status: She is alert.   Psychiatric:         Behavior: Behavior normal.         Assessment:       1. Bilateral leg weakness    2. Bilateral arm weakness    3. Rheumatoid arthritis involving multiple sites with positive rheumatoid factor    4. Spondylosis of lumbar region without myelopathy or radiculopathy    5. Primary osteoarthritis of both hips    6. Sacroiliac joint disease        Plan:     69-year-old female with past medical history of seropositive rheumatoid arthritis.  She does not have any active synovitis.  She has degenerative changes in her lumbar spine, SI joints and hips but denies any significant pain.  She has generalized subjective stiffness.  She has proximal muscle weakness.  She denies fasciculations.  She does not have any spasticity.  She does have diffuse hyperreflexia.  I informed the patient that without specific pain, our clinic may not be of great help.  However, I believe referral to Neurology to workup motor neuron disease is warranted.  I will place a consult to Neurology.        This was a 45 minute visit, 50% of which was spent educating the patient about the diagnosis and the treatment plan.      This note was partly generated with [employer] voice recognition software. I apologize for any possible typographical errors.

## 2024-10-01 ENCOUNTER — TELEPHONE (OUTPATIENT)
Dept: TRANSPLANT | Facility: CLINIC | Age: 69
End: 2024-10-01
Payer: MEDICARE

## 2024-10-02 ENCOUNTER — TELEPHONE (OUTPATIENT)
Dept: TRANSPLANT | Facility: CLINIC | Age: 69
End: 2024-10-02
Payer: MEDICARE

## 2024-10-02 ENCOUNTER — OFFICE VISIT (OUTPATIENT)
Dept: TRANSPLANT | Facility: CLINIC | Age: 69
End: 2024-10-02
Payer: MEDICARE

## 2024-10-02 DIAGNOSIS — J84.9 INTERSTITIAL LUNG DISEASE: Primary | ICD-10-CM

## 2024-10-02 DIAGNOSIS — K21.9 GASTROESOPHAGEAL REFLUX DISEASE, UNSPECIFIED WHETHER ESOPHAGITIS PRESENT: ICD-10-CM

## 2024-10-02 DIAGNOSIS — M05.79 RHEUMATOID ARTHRITIS INVOLVING MULTIPLE SITES WITH POSITIVE RHEUMATOID FACTOR: ICD-10-CM

## 2024-10-02 PROCEDURE — 1160F RVW MEDS BY RX/DR IN RCRD: CPT | Mod: CPTII,95,NTX, | Performed by: INTERNAL MEDICINE

## 2024-10-02 PROCEDURE — 99214 OFFICE O/P EST MOD 30 MIN: CPT | Mod: 95,NTX,, | Performed by: INTERNAL MEDICINE

## 2024-10-02 PROCEDURE — 1159F MED LIST DOCD IN RCRD: CPT | Mod: CPTII,95,NTX, | Performed by: INTERNAL MEDICINE

## 2024-10-02 NOTE — LETTER
October 7, 2024        Ibis Holcomb  [address]  [facility] [address]  Phone: [phone]  Fax: [phone]             [facility] 1st Fl  [address]  [facility] [address]  Phone: [phone]   Patient: Juan Cruz   MR Number: [id number]   YOB: 1955   Date of Visit: 10/2/2024       Dear Dr. Ibis Holcomb    Thank you for referring Juan Cruz to me for evaluation. Attached you will find relevant portions of my assessment and plan of care.    If you have questions, please do not hesitate to call me. I look forward to following Juan Cruz along with you.    Sincerely,    Barbara Haile, DO    Enclosure    If you would like to receive this communication electronically, please contact [email or web address] or [phone] to request [employer] access.    [employer] is a tool which provides read-only access to select patient information with whom you have a relationship. Its easy to use and provides real time access to review your patients record including encounter summaries, notes, results, and demographic information.    If you feel you have received this communication in error or would no longer like to receive these types of communications, please e-mail [email or web address]

## 2024-10-02 NOTE — TELEPHONE ENCOUNTER
"Delayed entry note: patient called to request to speak to Dr. Haile regarding "multiple issues". Offered VV for discussion of concerns re:  cough,  swallowing. She reports she saw PM&R provider  that is referring her to neurology for evaluation. She wants to get Dr. Haile's opinion. As well, she is requesting referral/support to follow up with GI provider (currently seeing / [facility] and requesting to establish with provider at [facility]). Patient also upset that she cannot schedule ('tried to call twice') with rheumatology. Previous provider has departed [facility] and patient is unable to schedule over the phone. Suggested she schedule through [facility], this may be more successful. Per Dr. Haile, ok to schedule patient per her request.   "

## 2024-10-07 NOTE — PROGRESS NOTES
ADVANCED LUNG DISEASE FOLLOW-UP                      The patient location is: Home ([place])  The chief complaint leading to consultation is: RA-ILD     Visit type: audiovisual     Face to Face time with patient: 30 minutes of total time spent on the encounter, which includes face to face time and non-face to face time preparing to see the patient (eg, review of tests), Obtaining and/or reviewing separately obtained history, Documenting clinical information in the electronic or other health record, Independently interpreting results (not separately reported) and communicating results to the patient/family/caregiver, or Care coordination (not separately reported).      Each patient to whom he or she provides medical services by telemedicine is:  (1) informed of the relationship between the physician and patient and the respective role of any other health care provider with respect to management of the patient; and (2) notified that he or she may decline to receive medical services by telemedicine and may withdraw from such care at any time.                                                                                                                          Reason for Visit:  RA-ILD, cough    Referring Physician: Ibis Holcomb    History of Present Illness: Juan Cruz is a 69 y.o. female who is on 0L of oxygen.  She is on no assisted ventilation.  Her New [place] Heart Association Class is II and a Karnofsky score of 80% - Normal activity with effort: some symptoms of disease. She is not diabetic.     She presents today for follow-up of RA-ILD.  She is doing well from a respiratory standpoint and feels like her cough is at baseline and slightly worse which she feels like is related to allergies.  She is more concerned about her esophagus and GERD.  She needs an EGD and needs recommendations for GI here at [facility].  She also needs to establish with rheum here now that Dr. GALEANA has left and is uncertain if she  needs to continue q6 month rituxan or if it can be spaced out longer.          Past Medical History:   Diagnosis Date    Bruise 09/12/2022    Chronic sinusitis, unspecified     Dyspnea on exertion 09/16/2022    Elevated liver enzymes 09/12/2022    History of SIADH     Interstitial pulmonary disease, unspecified 07/20/2023    LLL pneumonia 12/20/2022    Mitral valve prolapse     Osteopenia     Petechiae 09/12/2022    Pulmonary fibrosis 09/12/2022    Rheumatoid arthritis involving multiple sites     Skin rash 03/09/2023       Past Surgical History:   Procedure Laterality Date    BREAST BIOPSY Bilateral     FUNCTIONAL ENDOSCOPIC SINUS SURGERY (FESS)      ORIF WRIST FRACTURE Right        Allergies: Methotrexate, Gluten protein, Paxlovid [nirmatrelvir-ritonavir], Ppd black rubber mix, and Prolia [denosumab]    Current Outpatient Medications   Medication Sig    albuterol sulfate (VENTOLIN HFA INHL) Ventolin HFA    alendronate (FOSAMAX) 70 MG tablet Take 1 tablet (70 mg total) by mouth every 7 days.    B-complex with vitamin C (Z-BEC OR EQUIV) tablet Take 1 tablet by mouth 2 (two) times a day.    BREO ELLIPTA 200-25 mcg/dose DsDv diskus inhaler INHALE 1 PUFF BY MOUTH ONCE DAILY (CONTROLLER)    calcium carb/vit A palm/vit D3 (CALCIUM-VITAMIN A-VITAMIN D ORAL) Calcium-Vitamin A-Vitamin D    calcium citrate-vitamin D3 315-200 mg (CITRACAL+D) 315-200 mg-unit per tablet Take 1 tablet by mouth 2 (two) times daily.    cetirizine (ZYRTEC) 10 MG tablet Take 1 tablet (10 mg total) by mouth once daily.    estradioL (VAGIFEM) 10 mcg Tab Yuvafem 10 mcg vaginal tablet   INSERT 1 TABLET VAGINALLY TWICE A WEEK    magnesium oxide (MAG-OX) 400 mg (241.3 mg magnesium) tablet Take 400 mg by mouth 2 (two) times daily.    metoprolol succinate (TOPROL-XL) 25 MG 24 hr tablet Take 1 tablet by mouth once daily    neomycin-polymyxin-dexamethasone (MAXITROL) 3.5mg/mL-10,000 unit/mL-0.1 % DrpS Place 1 drop into both eyes 3 (three) times daily.  (Patient not taking: Reported on 8/14/2024)    RESTASIS 0.05 % ophthalmic emulsion Place 1 drop into both eyes once daily. Has not started     Current Facility-Administered Medications   Medication    riTUXimab-abbs (TRUXIMA) 1,000 mg in sodium chloride 0.9% 1,000 mL infusion (conc: 1 mg/mL)    riTUXimab-abbs (TRUXIMA) 1,000 mg in sodium chloride 0.9% 1,000 mL infusion (conc: 1 mg/mL)    riTUXimab-abbs (TRUXIMA) 1,000 mg in sodium chloride 0.9% 1,000 mL infusion (conc: 1 mg/mL)    sodium chloride 0.9% flush 10 mL       Immunization History   Administered Date(s) Administered    COVID-19, MRNA, LN-S, PF (Pfizer) (Purple Cap) 12/31/2020, 01/25/2021, 08/25/2021    Pneumococcal Conjugate - 20 Valent 09/21/2023    Pneumococcal Polysaccharide - 23 Valent 09/16/2022    Yellow Fever 08/31/2017    Zoster Recombinant 01/18/2020, 08/19/2020     Family History:    Family History   Problem Relation Name Age of Onset    Breast cancer Neg Hx      Colon cancer Neg Hx      Ovarian cancer Neg Hx      Uterine cancer Neg Hx      Cervical cancer Neg Hx       Social History     Substance and Sexual Activity   Alcohol Use No      Social History     Substance and Sexual Activity   Drug Use No      Social History     Socioeconomic History    Marital status:    Tobacco Use    Smoking status: Never     Passive exposure: Never    Smokeless tobacco: Never   Substance and Sexual Activity    Alcohol use: No    Drug use: No    Sexual activity: Not Currently     Social Drivers of Health     Financial Resource Strain: Low Risk  (8/7/2024)    Overall Financial Resource Strain (CARDIA)     Difficulty of Paying Living Expenses: Not hard at all   Food Insecurity: No Food Insecurity (8/7/2024)    Hunger Vital Sign     Worried About Running Out of Food in the Last Year: Never true     Ran Out of Food in the Last Year: Never true   Transportation Needs: No Transportation Needs (4/23/2024)    PRAPARE - Transportation     Lack of Transportation  (Medical): No     Lack of Transportation (Non-Medical): No   Physical Activity: Sufficiently Active (8/7/2024)    Exercise Vital Sign     Days of Exercise per Week: 7 days     Minutes of Exercise per Session: 50 min   Stress: Stress Concern Present (8/7/2024)    [facility] Occupational [facility] - Occupational Stress Questionnaire     Feeling of Stress : To some extent   Housing Stability: Unknown (8/7/2024)    Housing Stability Vital Sign     Unable to Pay for Housing in the Last Year: No     Review of Systems   Constitutional:  Negative for chills, diaphoresis, fever, malaise/fatigue and weight loss.   HENT:  Negative for congestion, ear discharge, ear pain, hearing loss, nosebleeds, sinus pain, sore throat and tinnitus.    Eyes:  Negative for blurred vision, double vision, photophobia, pain, discharge and redness.   Respiratory:  Positive for cough (stable). Negative for hemoptysis, sputum production, shortness of breath (improved), wheezing and stridor.    Cardiovascular:  Negative for chest pain, palpitations, orthopnea, claudication, leg swelling and PND.   Gastrointestinal:  Negative for abdominal pain, blood in stool, constipation, diarrhea, heartburn, melena, nausea and vomiting.   Genitourinary:  Negative for dysuria, flank pain, frequency, hematuria and urgency.   Musculoskeletal:  Positive for myalgias. Negative for back pain, falls, joint pain and neck pain.   Skin:  Negative for itching and rash.   Neurological:  Negative for dizziness, tingling, tremors, sensory change, speech change, focal weakness, seizures, loss of consciousness, weakness and headaches.   Endo/Heme/Allergies:  Negative for environmental allergies and polydipsia. Bruises/bleeds easily.   Psychiatric/Behavioral:  Negative for depression, hallucinations, memory loss, substance abuse and suicidal ideas. The patient is not nervous/anxious and does not have insomnia.      Vitals  LMP  (LMP Unknown)   Physical Exam    Labs:  No  visits with results within 7 Day(s) from this visit.   Latest known visit with results is:   Hospital Outpatient Visit on 08/22/2024   Component Date Value    Physician Comment 08/22/2024                      Value:Spirometry is normal. Lung volumes show moderate restriction is present. DLCO is moderately decreased.   Â   Notes: No recent hemoglobin value available. DLCO interpretation assumes a normal hemoglobin value.       Pre FVC 08/22/2024 2.09     PRE FEV5 08/22/2024 1.63     Pre FEV1 08/22/2024 1.81     Pre FEV1 FVC 08/22/2024 86.59     Pre FEF 25 75 08/22/2024 3.04     Pre PEF 08/22/2024 5.89     Pre  08/22/2024 6.28     Pre DLCO 08/22/2024 10.36 (L)     DLCOVA PRE 08/22/2024 3.93     VA PRE 08/22/2024 2.64 (L)     IVC PRE 08/22/2024 2.03     TLCN2 PRE 08/22/2024 2.78 (L)     VCMAXN2 PRE 08/22/2024 2.09     PRE IC N2 08/22/2024 1.32     Pre FRC N2 08/22/2024 1.45 (L)     ERVN2 PRE 08/22/2024 0.77     RVN2 PRE 08/22/2024 0.68 (L)     DKD8KOOH1 PRE 08/22/2024 24.59 (L)     FVC Ref 08/22/2024 2.48     FVC LLN 08/22/2024 1.85     FVC Pre Ref 08/22/2024 84.4     FEV05 REF 08/22/2024 1.63     FEV05 LLN 08/22/2024 0.78     PRE FEV05 REF 08/22/2024 99.6     FEV1 Ref 08/22/2024 1.96     FEV1 LLN 08/22/2024 1.43     FEV1 Pre Ref 08/22/2024 92.5     FEV1 FVC Ref 08/22/2024 79     FEV1 FVC LLN 08/22/2024 67     FEV1 FVC Pre Ref 08/22/2024 109.0     FEF 25 75 Ref 08/22/2024 2.58     FEF 25 75 LLN 08/22/2024 1.18     FEF 25 75 Pre Ref 08/22/2024 118.1     PEF Ref 08/22/2024 5.51     PEF LLN 08/22/2024 4.03     PEF Pre Ref 08/22/2024 106.9     TLCN2 Ref 08/22/2024 4.60     TLCN2 LLN 08/22/2024 3.62     TLC N2 ULN 08/22/2024 5.59     TLCN2 Pre Ref 08/22/2024 60.3     VCMAXN2 Ref 08/22/2024 2.48     VCMAXN2 LLN 08/22/2024 1.85     VCMAX N2 ULN 08/22/2024 3.14     VCMAXN2 Pre Ref 08/22/2024 84.4     PDG1TTI 08/22/2024 1.77     LLN IC N2 08/22/2024 -97068.23     ULN IC N2 08/22/2024 87725.77     PRE REF IC N2 08/22/2024  74.7     FRCN2 Ref 08/22/2024 2.60     FRCN2 LLN 08/22/2024 1.77     FRC N2 ULN 08/22/2024 3.42     FRCN2 Pre Ref 08/22/2024 56.0     ERVN2 Ref 08/22/2024 0.66     ERVN2 LLN 08/22/2024 -54002.34     ERV N2 ULN 08/22/2024 14569.66     ERVN2 Pre Ref 08/22/2024 117.4     RVN2 Ref 08/22/2024 1.94     RVN2 LLN 08/22/2024 1.36     RV N2 ULN 08/22/2024 2.51     RVN2 Pre Ref 08/22/2024 35.2     IAV6UYON1 Ref 08/22/2024 42.08     MXL3VRIQ4 LLN 08/22/2024 32.49     RV N2 TLC N2 ULN 08/22/2024 51.67     DRY1YIMW0 Pre Ref 08/22/2024 58.4     DLCO Single Breath Ref 08/22/2024 20.33     DLCO Single Breath LLN 08/22/2024 14.60     DLCO SINGLEBREATH ULN 08/22/2024 26.07     DLCO Single Breath Pre R* 08/22/2024 50.9     DLCOc Single Breath Ref 08/22/2024 20.33     DLCOc Single Breath LLN 08/22/2024 14.60     DLCOC SINGLEBREATH ULN 08/22/2024 26.07     DLCOVA Ref 08/22/2024 4.42     DLCOVA LLN 08/22/2024 2.85     DLCOVA ULN 08/22/2024 5.98     DLCOVA Pre Ref 08/22/2024 89.0     DLCOc SBVA Ref 08/22/2024 4.42     DLCOc SBVA LLN 08/22/2024 2.85     DLCOCSBVA ULN 08/22/2024 5.98     VA Single Breath Ref 08/22/2024 4.45     VA Single Breath LLN 08/22/2024 4.45     VA SINGLEBREATH ULN 08/22/2024 4.45     VA Single Breath Pre Ref 08/22/2024 59.2     IVC Single Breath Ref 08/22/2024 2.48     IVC Single Breath LLN 08/22/2024 1.85     IVC SINGLEBREATH ULN 08/22/2024 3.14     IVC Single Breath Pre Ref 08/22/2024 82.0     FVCZSCORE 08/22/2024 -1.00     CDO6XRDAEL 08/22/2024 -0.46     HOE9LVWNQLOSJ 08/22/2024 1.10     PKMA6KWYCZF 08/22/2024 -3.05     DLCOSINGLEBREATHZSCORE 08/22/2024 -2.86            5/2/2024     9:54 AM 10/25/2023     1:41 PM 4/17/2023     1:19 PM 12/8/2022    10:24 AM 9/12/2022     6:00 PM 6/7/2022    11:54 AM   Pulmonary Function Tests   FVC 2.08 liters 2.16 liters 2.02 liters 1.86 liters 1.82 liters 2.18 liters   FEV1 1.83 liters 1.85 liters 1.76 liters 1.64 liters 1.62 liters 1.9 liters   TLC (liters) 2.67 liters 2.77  liters 2.94 liters 2.88 liters  2.79 liters   DLCO (ml/mmHg sec) 9.13 ml/mmHg sec 8.32 ml/mmHg sec 9.22 ml/mmHg sec 8.62 ml/mmHg sec  8.17 ml/mmHg sec   FVC% 89 92 86 78 77 92   FEV1% 98 98 93 86 85 99   FEF 25-75 3.13 3 2.9 2.93 2.69 3.24   FEF 25-75% 121 166 159 160 146 175   TLC% 58 60 63 63  61   RV 0.6 0.6 0.92 1.01  0.61   RV% 31 31 48 53  32   DLCO% 45 41 45 42  40         8/22/2024     8:04 AM 5/2/2024     8:36 AM 7/20/2023     9:33 AM 4/17/2023    11:38 AM 12/22/2022    10:05 AM 12/8/2022     9:08 AM 9/12/2022    11:43 AM   6MW   6MWT Status completed without stopping completed without stopping completed without stopping completed without stopping completed without stopping completed without stopping completed without stopping   Patient Reported Dyspnea;Other (Comment) No complaints No complaints Dyspnea No complaints Dyspnea Dyspnea   Was O2 used? No No No No No No No   6MW Distance walked (feet) 1450 feet 1532 feet 1500 feet 1500 feet 1300 feet 1332 feet 1300 feet   Distance walked (meters) 441.96 meters 466.95 meters 457.2 meters 457.2 meters 396.24 meters 405.99 meters 396.24 meters   Did patient stop? No No  No No No No   Oxygen Saturation 99 % 98 % 98 % 98 % 98 % 99 % 98 %   Supplemental Oxygen Room Air Room Air Room Air Room Air Room Air Room Air Room Air   Heart Rate 87 bpm 85 bpm 95 bpm 85 bpm 78 bpm 81 bpm 89 bpm   Blood Pressure 105/69 114/75 109/62 113/64 118/77 145/78 120/74   Rigoberto Dyspnea Rating  moderate nothing at all light nothing at all light moderate light   Oxygen Saturation 99 % 98 % 94 % 94 % 95 % 98 % 95 %   Supplemental Oxygen Room Air Room Air  Room Air Room Air Room Air Room Air   Heart Rate 114 bpm 113 bpm 126 bpm 125 bpm 113 bpm 106 bpm 113 bpm   Blood Pressure 117/74 118/76 114/62 132/67 130/86 129/66 131/77   Rigoberto Dyspnea Rating  heavy moderate moderate somewhat heavy somewhat heavy somewhat heavy somewhat heavy   Recovery Time (seconds) 119 seconds 135 seconds 180 seconds 149  seconds 180 seconds 123 seconds 72 seconds   Oxygen Saturation 99 % 98 % 97 % 98 % 99 % 98 % 98 %   Supplemental Oxygen Room Air Room Air Room Air Room Air Room Air Room Air Room Air   Heart Rate 88 bpm 95 bpm 101 bpm 106 bpm 83 bpm 94 bpm 97 bpm       Imaging:  Results for orders placed during the hospital encounter of 07/27/22    CT Chest Without Contrast    Narrative  EXAMINATION:  CT CHEST WITHOUT CONTRAST    CLINICAL HISTORY:  Interstitial lung disease; Cough, unspecified    TECHNIQUE:  Low dose axial images, sagittal and coronal reformations were obtained from the thoracic inlet to the lung bases.  Intravenous contrast was not administered.    COMPARISON:  CT thorax 06/28/2021    FINDINGS:  Base of Neck: Imaged portions of the base of the neck are grossly unremarkable.    Aorta: 3-branch vessel configuration of a left-sided type 3 aortic arch.  No hyperdense crescent sign, aneurysmal degeneration, periaortic fat stranding or periaortic fluid.    Heart: Heart is normal in size.  No significant pericardial thickening. No appreciable coronary artery calcifications.    Pulmonary vasculature: Pulmonary arteries are not enlarged and distribute normally.  There are four pulmonary veins.    Axilla/Alexandra/Mediastinum: Prominent mediastinal lymph nodes however, no rachael axillary or mediastinal lymphadenopathy.  Evaluation of hilar lymph nodes is limited without IV contrast.    Airways: Trachea and mainstem bronchi are patent.  Symmetric mild central bronchiectasis present.    Lungs/Pleura: Significant interval progression of previously noted subpleural predominant reticular and ground-glass airspace opacities associated with bilateral lower lobe volume loss, architectural distortion, symmetric mild central bronchiectasis and apicobasal gradient.  No pleural effusions.  No pneumothorax.    Esophagus: Small hiatal hernia, not significantly changed.  Otherwise, normal in course and caliber however, evaluation is limited  given the lack of oral contrast.    Soft tissues: Imaged soft tissues are grossly unremarkable.    Osseous structures: Diffuse osseous demineralization and mild multilevel degenerative changes of the imaged spine.  No acute displaced fracture, dislocation or suspicious lytic/blastic osseous lesion.    Upper Abdomen: Imaged portions of the upper abdomen are grossly unremarkable.    Impression  1. Significant interval progression of previously noted subpleural predominant reticular and ground-glass airspace opacities associated with bilateral lower lobe volume loss, architectural distortion, symmetric mild central bronchiectasis and apicobasal gradient suggestive of UIP pattern of interstitial lung disease which may reflect IPF.  Additional diagnostic considerations include NSIP, asbestosis, fibrotic hypersensitivity pneumonitis connective tissue associated lung disease and drug induced lung disease amongst other etiologies.      Electronically signed by: Braulio Haro  Date:    07/27/2022  Time:    16:11    Cardiodiagnostics:  6/28/2021:  The estimated ejection fraction is 55%.  Normal systolic function.  Normal right ventricular size with normal right ventricular systolic function.  Mild to moderate left atrial enlargement.  Mild right atrial enlargement.  Normal central venous pressure (3 mmHg).  The estimated PA systolic pressure is 18 mmHg.       Assessment:  1. Interstitial lung disease    2. Rheumatoid arthritis involving multiple sites with positive rheumatoid factor    3. Gastroesophageal reflux disease, unspecified whether esophagitis present        Plan:   1. Followed for RA-ILD.  Failed MTX and AZA.  Currently on rituxan infusions; tolerating well but wants to discuss with rheum whether it needs to be continued q6 months or can be spaced out further.  Follows with ENT/derm/allergy; takes daily antihistamine, and uses NETI pot regularly.  Acute hepatitis 2/2 OFEV so not a candidate for anti fibrotic  therapy.       2. Follow-up with Rheum.  Currently on rituxan.  Will reach out to Dr. Feliciano regarding her follow-up since Dr. GALEANA has left [facility].    3. Will reach out to GI regarding getting her scheduled here at [facility] for GI evaluation.      4. Follow-up in 3 months with PFTs.        Barbara Haile D.O.  Pulmonary/Critical Care and Lung Transplantation  [facility]

## 2024-10-10 ENCOUNTER — PATIENT OUTREACH (OUTPATIENT)
Dept: ADMINISTRATIVE | Facility: HOSPITAL | Age: 69
End: 2024-10-10
Payer: MEDICARE

## 2024-10-10 DIAGNOSIS — M05.79 RHEUMATOID ARTHRITIS INVOLVING MULTIPLE SITES WITH POSITIVE RHEUMATOID FACTOR: ICD-10-CM

## 2024-10-10 DIAGNOSIS — Z12.12 SCREENING FOR COLORECTAL CANCER: Primary | ICD-10-CM

## 2024-10-10 DIAGNOSIS — Z12.11 SCREENING FOR COLORECTAL CANCER: Primary | ICD-10-CM

## 2024-10-10 DIAGNOSIS — J84.9 INTERSTITIAL LUNG DISEASE: Primary | ICD-10-CM

## 2024-10-31 ENCOUNTER — TELEPHONE (OUTPATIENT)
Dept: PRIMARY CARE CLINIC | Facility: CLINIC | Age: 69
End: 2024-10-31
Payer: MEDICARE

## 2024-10-31 DIAGNOSIS — E78.2 MIXED HYPERLIPIDEMIA: Primary | ICD-10-CM

## 2024-11-02 DIAGNOSIS — J84.9 INTERSTITIAL PULMONARY DISEASE, UNSPECIFIED: Primary | ICD-10-CM

## 2024-11-04 ENCOUNTER — LAB VISIT (OUTPATIENT)
Dept: LAB | Facility: HOSPITAL | Age: 69
End: 2024-11-04
Attending: INTERNAL MEDICINE
Payer: MEDICARE

## 2024-11-04 ENCOUNTER — TELEPHONE (OUTPATIENT)
Dept: URGENT CARE | Facility: CLINIC | Age: 69
End: 2024-11-04
Payer: MEDICARE

## 2024-11-04 DIAGNOSIS — E78.2 MIXED HYPERLIPIDEMIA: ICD-10-CM

## 2024-11-04 DIAGNOSIS — M05.79 RHEUMATOID ARTHRITIS INVOLVING MULTIPLE SITES WITH POSITIVE RHEUMATOID FACTOR: ICD-10-CM

## 2024-11-04 DIAGNOSIS — J84.9 INTERSTITIAL LUNG DISEASE: ICD-10-CM

## 2024-11-04 DIAGNOSIS — M81.0 OSTEOPOROSIS, UNSPECIFIED OSTEOPOROSIS TYPE, UNSPECIFIED PATHOLOGICAL FRACTURE PRESENCE: ICD-10-CM

## 2024-11-04 LAB
ALBUMIN SERPL BCP-MCNC: 3.8 G/DL (ref 3.5–5.2)
ALP SERPL-CCNC: 67 U/L (ref 40–150)
ALT SERPL W/O P-5'-P-CCNC: 21 U/L (ref 10–44)
ANION GAP SERPL CALC-SCNC: 7 MMOL/L (ref 8–16)
AST SERPL-CCNC: 24 U/L (ref 10–40)
BASOPHILS # BLD AUTO: 0.02 K/UL (ref 0–0.2)
BASOPHILS NFR BLD: 0.3 % (ref 0–1.9)
BILIRUB SERPL-MCNC: 0.4 MG/DL (ref 0.1–1)
BUN SERPL-MCNC: 8 MG/DL (ref 8–23)
CALCIUM SERPL-MCNC: 9.6 MG/DL (ref 8.7–10.5)
CHLORIDE SERPL-SCNC: 104 MMOL/L (ref 95–110)
CHOLEST SERPL-MCNC: 270 MG/DL (ref 120–199)
CHOLEST/HDLC SERPL: 3.6 {RATIO} (ref 2–5)
CO2 SERPL-SCNC: 26 MMOL/L (ref 23–29)
CREAT SERPL-MCNC: 0.8 MG/DL (ref 0.5–1.4)
CRP SERPL-MCNC: 1.7 MG/L (ref 0–8.2)
DIFFERENTIAL METHOD BLD: NORMAL
EOSINOPHIL # BLD AUTO: 0.1 K/UL (ref 0–0.5)
EOSINOPHIL NFR BLD: 1 % (ref 0–8)
ERYTHROCYTE [DISTWIDTH] IN BLOOD BY AUTOMATED COUNT: 12.4 % (ref 11.5–14.5)
ERYTHROCYTE [SEDIMENTATION RATE] IN BLOOD BY PHOTOMETRIC METHOD: 12 MM/HR (ref 0–36)
EST. GFR  (NO RACE VARIABLE): >60 ML/MIN/1.73 M^2
GLUCOSE SERPL-MCNC: 97 MG/DL (ref 70–110)
HCT VFR BLD AUTO: 41.4 % (ref 37–48.5)
HDLC SERPL-MCNC: 76 MG/DL (ref 40–75)
HDLC SERPL: 28.1 % (ref 20–50)
HGB BLD-MCNC: 14.1 G/DL (ref 12–16)
IGA SERPL-MCNC: 111 MG/DL (ref 40–350)
IGG SERPL-MCNC: 678 MG/DL (ref 650–1600)
IGM SERPL-MCNC: 41 MG/DL (ref 50–300)
IMM GRANULOCYTES # BLD AUTO: 0.02 K/UL (ref 0–0.04)
IMM GRANULOCYTES NFR BLD AUTO: 0.3 % (ref 0–0.5)
LDLC SERPL CALC-MCNC: 177.6 MG/DL (ref 63–159)
LYMPHOCYTES # BLD AUTO: 1.8 K/UL (ref 1–4.8)
LYMPHOCYTES NFR BLD: 22.2 % (ref 18–48)
MCH RBC QN AUTO: 30.9 PG (ref 27–31)
MCHC RBC AUTO-ENTMCNC: 34.1 G/DL (ref 32–36)
MCV RBC AUTO: 91 FL (ref 82–98)
MONOCYTES # BLD AUTO: 0.7 K/UL (ref 0.3–1)
MONOCYTES NFR BLD: 9.1 % (ref 4–15)
NEUTROPHILS # BLD AUTO: 5.4 K/UL (ref 1.8–7.7)
NEUTROPHILS NFR BLD: 67.1 % (ref 38–73)
NONHDLC SERPL-MCNC: 194 MG/DL
NRBC BLD-RTO: 0 /100 WBC
PLATELET # BLD AUTO: 295 K/UL (ref 150–450)
PMV BLD AUTO: 9.6 FL (ref 9.2–12.9)
POTASSIUM SERPL-SCNC: 4.3 MMOL/L (ref 3.5–5.1)
PROT SERPL-MCNC: 7 G/DL (ref 6–8.4)
RBC # BLD AUTO: 4.56 M/UL (ref 4–5.4)
SODIUM SERPL-SCNC: 137 MMOL/L (ref 136–145)
TRIGL SERPL-MCNC: 82 MG/DL (ref 30–150)
WBC # BLD AUTO: 7.99 K/UL (ref 3.9–12.7)

## 2024-11-04 PROCEDURE — 80061 LIPID PANEL: CPT | Mod: TXP | Performed by: INTERNAL MEDICINE

## 2024-11-04 PROCEDURE — 36415 COLL VENOUS BLD VENIPUNCTURE: CPT | Mod: TXP | Performed by: INTERNAL MEDICINE

## 2024-11-04 PROCEDURE — 86140 C-REACTIVE PROTEIN: CPT | Mod: TXP | Performed by: INTERNAL MEDICINE

## 2024-11-04 PROCEDURE — 82784 ASSAY IGA/IGD/IGG/IGM EACH: CPT | Mod: 59,TXP | Performed by: INTERNAL MEDICINE

## 2024-11-04 PROCEDURE — 85652 RBC SED RATE AUTOMATED: CPT | Mod: TXP | Performed by: INTERNAL MEDICINE

## 2024-11-04 PROCEDURE — 80053 COMPREHEN METABOLIC PANEL: CPT | Mod: TXP | Performed by: INTERNAL MEDICINE

## 2024-11-04 PROCEDURE — 85025 COMPLETE CBC W/AUTO DIFF WBC: CPT | Mod: TXP | Performed by: INTERNAL MEDICINE

## 2024-11-04 NOTE — TELEPHONE ENCOUNTER
----- Message from Gianna Carpenter MD sent at 11/2/2024 11:36 AM CDT -----  Regarding: Lab  Please add fasting lipids to dr. Tidwell labs that are going to be drawn  at 9am on the [facility] on November 4 not nov 24 which I originally asked.    Thanks!!!

## 2024-11-06 ENCOUNTER — OFFICE VISIT (OUTPATIENT)
Dept: RHEUMATOLOGY | Facility: CLINIC | Age: 69
End: 2024-11-06
Payer: MEDICARE

## 2024-11-06 VITALS
BODY MASS INDEX: 24.34 KG/M2 | WEIGHT: 132.25 LBS | HEIGHT: 62 IN | SYSTOLIC BLOOD PRESSURE: 107 MMHG | DIASTOLIC BLOOD PRESSURE: 70 MMHG | HEART RATE: 79 BPM

## 2024-11-06 DIAGNOSIS — Z79.60 LONG-TERM USE OF IMMUNOSUPPRESSANT MEDICATION: ICD-10-CM

## 2024-11-06 DIAGNOSIS — J84.9 ILD (INTERSTITIAL LUNG DISEASE): ICD-10-CM

## 2024-11-06 DIAGNOSIS — J84.10 PULMONARY FIBROSIS: ICD-10-CM

## 2024-11-06 DIAGNOSIS — M81.0 AGE-RELATED OSTEOPOROSIS WITHOUT CURRENT PATHOLOGICAL FRACTURE: ICD-10-CM

## 2024-11-06 DIAGNOSIS — J84.9 INTERSTITIAL LUNG DISEASE: ICD-10-CM

## 2024-11-06 DIAGNOSIS — D84.9 IMMUNODEFICIENCY, UNSPECIFIED: ICD-10-CM

## 2024-11-06 DIAGNOSIS — M05.79 RHEUMATOID ARTHRITIS INVOLVING MULTIPLE SITES WITH POSITIVE RHEUMATOID FACTOR: Primary | ICD-10-CM

## 2024-11-06 PROBLEM — J30.2 SEASONAL ALLERGIES: Status: ACTIVE | Noted: 2024-11-06

## 2024-11-06 PROBLEM — E55.9 VITAMIN D DEFICIENCY: Status: RESOLVED | Noted: 2024-05-28 | Resolved: 2024-11-06

## 2024-11-06 PROBLEM — I34.1 MITRAL VALVE PROLAPSE: Status: ACTIVE | Noted: 2024-11-06

## 2024-11-06 PROBLEM — M08.80 ENTHESITIS RELATED ARTHRITIS: Status: RESOLVED | Noted: 2023-09-14 | Resolved: 2024-11-06

## 2024-11-06 PROBLEM — R00.0 SINUS TACHYCARDIA: Status: RESOLVED | Noted: 2022-09-16 | Resolved: 2024-11-06

## 2024-11-06 PROBLEM — K90.41 NCGS (NON-CELIAC GLUTEN SENSITIVITY): Status: ACTIVE | Noted: 2024-11-06

## 2024-11-06 PROCEDURE — 99999 PR PBB SHADOW E&M-EST. PATIENT-LVL III: CPT | Mod: PBBFAC,TXP,, | Performed by: INTERNAL MEDICINE

## 2024-11-06 RX ORDER — CETIRIZINE HYDROCHLORIDE 10 MG/1
10 TABLET ORAL
Status: CANCELLED
Start: 2024-11-08

## 2024-11-06 RX ORDER — METHYLPREDNISOLONE SOD SUCC 125 MG
40 VIAL (EA) INJECTION
Status: CANCELLED | OUTPATIENT
Start: 2024-11-08

## 2024-11-06 RX ORDER — ACETAMINOPHEN 325 MG/1
650 TABLET ORAL
Status: CANCELLED | OUTPATIENT
Start: 2024-11-08

## 2024-11-06 RX ORDER — FAMOTIDINE 10 MG/ML
20 INJECTION INTRAVENOUS
Status: CANCELLED | OUTPATIENT
Start: 2024-11-08

## 2024-11-06 RX ORDER — DIPHENHYDRAMINE HYDROCHLORIDE 50 MG/ML
50 INJECTION INTRAMUSCULAR; INTRAVENOUS ONCE AS NEEDED
Status: CANCELLED | OUTPATIENT
Start: 2024-11-08

## 2024-11-06 RX ORDER — MEPERIDINE HYDROCHLORIDE 50 MG/ML
25 INJECTION INTRAMUSCULAR; INTRAVENOUS; SUBCUTANEOUS
Status: CANCELLED | OUTPATIENT
Start: 2024-11-08 | End: 2024-11-09

## 2024-11-06 RX ORDER — SODIUM CHLORIDE 0.9 % (FLUSH) 0.9 %
10 SYRINGE (ML) INJECTION
Status: CANCELLED | OUTPATIENT
Start: 2024-11-08

## 2024-11-06 RX ORDER — HEPARIN 100 UNIT/ML
500 SYRINGE INTRAVENOUS
Status: CANCELLED | OUTPATIENT
Start: 2024-11-08

## 2024-11-06 RX ORDER — EPINEPHRINE 0.3 MG/.3ML
0.3 INJECTION SUBCUTANEOUS ONCE AS NEEDED
Status: CANCELLED | OUTPATIENT
Start: 2024-11-08

## 2024-11-06 ASSESSMENT — ROUTINE ASSESSMENT OF PATIENT INDEX DATA (RAPID3)
MDHAQ FUNCTION SCORE: 0.3
AM STIFFNESS SCORE: 1, YES
PSYCHOLOGICAL DISTRESS SCORE: 2.2
PATIENT GLOBAL ASSESSMENT SCORE: 3
FATIGUE SCORE: 2.5
PAIN SCORE: 2
WHEN YOU AWAKENED IN THE MORNING OVER THE LAST WEEK, PLEASE INDICATE THE AMOUNT OF TIME IT TAKES UNTIL YOU ARE AS LIMBER AS YOU WILL BE FOR THE DAY: 3
TOTAL RAPID3 SCORE: 2

## 2024-11-06 NOTE — ASSESSMENT & PLAN NOTE
Unusual presentation of interstitial lung disease with rheumatoid arthritis serology and arthralgia though no synovitis; but now reports less musc-sk pain after rituximab    Will continue rituximab but decrease to 1000 q6m and possibly lower if remains stable    Needs hep b test today for rituximab Friday    Will plan follow up 3 mo with 4lab

## 2024-11-06 NOTE — PROGRESS NOTES
Subjective:       Patient ID: Juan Cruz is a 69 y.o. female.    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 Velasquez Holcomb saw her 2022 with interstitial lung disease and CCP and RF; dx rheumatoid arthritis 2022 though less arthritis and more tendinitis; started methotrexate May 2022 but interstitial lung disease worsened; pulmonary Rx Ofev but prompt liver toxicity; took azathioprine briefly  Rituximab: Oct 24 and Nov 7 2022 4/25 and 5/10/23  11/10 and 11/24/23  5/10 and 5/24/24    IVIG levels decreased   11/4/24 IgG 678, IgA 111, IgM 41  Last hep b tests 4/16/24    Got COVID Jan 2023  Rash after COVID and paxlovid treated with steroid  Prolia # 2 2023 with skin reaction again treated with steroid  Now on alendronate and Tymlos recommended  Prednisone completed Dec 2023    Says less musculoskeletal pain and no joint swelling  Says lung symptoms stable; can talk, can walk, can climb stairs    Still some stiffness and pain in iliac crest     Review of Systems   Constitutional:  Positive for fatigue. Negative for fever and unexpected weight change.   HENT:  Positive for trouble swallowing. Negative for mouth sores.    Eyes:  Positive for redness.   Respiratory:  Positive for cough. Negative for shortness of breath.    Cardiovascular:  Negative for chest pain.   Gastrointestinal:  Negative for constipation and diarrhea.   Genitourinary:  Negative for dysuria and genital sores.   Skin:  Negative for rash.   Neurological:  Negative for headaches.   Hematological:  Does not bruise/bleed easily.   Psychiatric/Behavioral:  Positive for sleep disturbance.            11/2/2024    11:15 AM   Rapid3 Question Responses and Scores   MDHAQ Score 0.4   Psychologic Score 3.3   Pain Score 5   When you awakened in the morning OVER THE LAST WEEK, did  "you feel stiff? Yes   If Yes, please indicate the number of hours until you are as limber as you will be for the day 1   Fatigue Score 3   Global Health Score 6   RAPID3 Score 4.11      Objective:   /70 (BP Location: Left arm, Patient Position: Sitting)   Pulse 79   Ht 5' 2" (1.575 m)   Wt 60 kg (132 lb 4.4 oz)   LMP  (LMP Unknown)   BMI 24.19 kg/m²      Physical Exam   Eyes: Conjunctivae are normal.   Cardiovascular: Normal rate and regular rhythm.   Pulmonary/Chest: No respiratory distress. She has no wheezes. She has rales.   Pulmonary Comments: Slight basilar rales L base  Musculoskeletal:      Comments: No synovitis   Minimal interosseous atrophy  No deformity or tenderness   Lymphadenopathy:     She has no cervical adenopathy.   Skin: No rash noted.         11/6/2024   Tender (SUN-28) 0 / 28    Swollen (SUN-28) 0 / 28    Provider Global 0 / 100   Patient Global 60 / 100   ESR 12 mm/hr   CRP 1.7 mg/L   SUN-28 (ESR) 2.58 (Remission)   SUN-28 (CRP) 2.16 (Remission)   CDAI Score 6      Lab Results   Component Value Date    SEDRATE 12 11/04/2024          Assessment:       1. Rheumatoid arthritis involving multiple sites with positive rheumatoid factor    2. ILD (interstitial lung disease)    3. Pulmonary fibrosis    4. Interstitial lung disease    5. Long-term use of immunosuppressant medication    6. Immunodeficiency, unspecified    7. Age-related osteoporosis without current pathological fracture            Plan:       Problem List Items Addressed This Visit          Active Problems    Rheumatoid arthritis involving multiple sites with positive rheumatoid factor - Primary     Unusual presentation of interstitial lung disease with rheumatoid arthritis serology and arthralgia though no synovitis; but now reports less musc-sk pain after rituximab    Will continue rituximab but decrease to 1000 q6m and possibly lower if remains stable    Needs hep b test today for rituximab Friday    Will plan follow up 3 " mo with 4lab         Relevant Orders    CBC Auto Differential    Comprehensive Metabolic Panel    Sedimentation rate    C-Reactive Protein    Hepatitis B Surface Antigen    Hepatitis B Surface Ab, Qualitative    Hepatitis B Core Antibody, Total    Quantiferon Gold TB    IMMUNOGLOBULINS (IGG, IGA, IGM) QUANTITATIVE    Interstitial lung disease     Being followed by [facility]; will continue rituximab for now; maybe switch to mycophenolate mofetil in future          Long-term use of immunosuppressant medication     No interval infections and recent Ig levels ok x low IgM           Relevant Orders    Hepatitis B surface antigen    HEPATITIS B CORE ANTIBODY, TOTAL    Hepatitis B Surface Antigen    Hepatitis B Surface Ab, Qualitative    Hepatitis B Core Antibody, Total    Quantiferon Gold TB    Age-related osteoporosis without current pathological fracture     She is considering starting abaloparatide as  needs anabolic for high fracture risk         Immunodeficiency, unspecified     Low IgM after 2 years of rituximab anti-B cell Rx         Relevant Orders    IMMUNOGLOBULINS (IGG, IGA, IGM) QUANTITATIVE     Other Visit Diagnoses       ILD (interstitial lung disease)        Pulmonary fibrosis

## 2024-11-06 NOTE — ASSESSMENT & PLAN NOTE
Being followed by [facility]; will continue rituximab for now; maybe switch to mycophenolate mofetil in future

## 2024-11-07 ENCOUNTER — LAB VISIT (OUTPATIENT)
Dept: LAB | Facility: HOSPITAL | Age: 69
End: 2024-11-07
Attending: INTERNAL MEDICINE
Payer: MEDICARE

## 2024-11-07 DIAGNOSIS — M05.79 RHEUMATOID ARTHRITIS INVOLVING MULTIPLE SITES WITH POSITIVE RHEUMATOID FACTOR: ICD-10-CM

## 2024-11-07 DIAGNOSIS — Z79.60 LONG-TERM USE OF IMMUNOSUPPRESSANT MEDICATION: ICD-10-CM

## 2024-11-07 LAB
HBV CORE AB SERPL QL IA: NORMAL
HBV SURFACE AB SER-ACNC: >1000 MIU/ML
HBV SURFACE AB SER-ACNC: REACTIVE M[IU]/ML

## 2024-11-07 PROCEDURE — 86704 HEP B CORE ANTIBODY TOTAL: CPT | Mod: TXP | Performed by: INTERNAL MEDICINE

## 2024-11-07 PROCEDURE — 86706 HEP B SURFACE ANTIBODY: CPT | Mod: TXP | Performed by: INTERNAL MEDICINE

## 2024-11-07 PROCEDURE — 36415 COLL VENOUS BLD VENIPUNCTURE: CPT | Mod: TXP | Performed by: INTERNAL MEDICINE

## 2024-11-08 ENCOUNTER — INFUSION (OUTPATIENT)
Dept: INFUSION THERAPY | Facility: HOSPITAL | Age: 69
End: 2024-11-08
Payer: MEDICARE

## 2024-11-08 VITALS
HEART RATE: 70 BPM | RESPIRATION RATE: 18 BRPM | OXYGEN SATURATION: 99 % | TEMPERATURE: 98 F | DIASTOLIC BLOOD PRESSURE: 59 MMHG | SYSTOLIC BLOOD PRESSURE: 106 MMHG

## 2024-11-08 DIAGNOSIS — M05.79 RHEUMATOID ARTHRITIS INVOLVING MULTIPLE SITES WITH POSITIVE RHEUMATOID FACTOR: Primary | ICD-10-CM

## 2024-11-08 PROCEDURE — 96413 CHEMO IV INFUSION 1 HR: CPT

## 2024-11-08 PROCEDURE — 63600175 PHARM REV CODE 636 W HCPCS: Mod: JZ,JG | Performed by: INTERNAL MEDICINE

## 2024-11-08 PROCEDURE — 96375 TX/PRO/DX INJ NEW DRUG ADDON: CPT

## 2024-11-08 PROCEDURE — 96415 CHEMO IV INFUSION ADDL HR: CPT

## 2024-11-08 PROCEDURE — 25000003 PHARM REV CODE 250: Performed by: INTERNAL MEDICINE

## 2024-11-08 RX ORDER — FAMOTIDINE 10 MG/ML
20 INJECTION INTRAVENOUS
Status: COMPLETED | OUTPATIENT
Start: 2024-11-08 | End: 2024-11-08

## 2024-11-08 RX ORDER — FAMOTIDINE 10 MG/ML
20 INJECTION INTRAVENOUS
OUTPATIENT
Start: 2025-02-02

## 2024-11-08 RX ORDER — SODIUM CHLORIDE 0.9 % (FLUSH) 0.9 %
10 SYRINGE (ML) INJECTION
OUTPATIENT
Start: 2025-02-02

## 2024-11-08 RX ORDER — EPINEPHRINE 0.3 MG/.3ML
0.3 INJECTION SUBCUTANEOUS ONCE AS NEEDED
Status: DISCONTINUED | OUTPATIENT
Start: 2024-11-08 | End: 2024-11-08 | Stop reason: HOSPADM

## 2024-11-08 RX ORDER — METHYLPREDNISOLONE SOD SUCC 125 MG
40 VIAL (EA) INJECTION
OUTPATIENT
Start: 2025-02-02

## 2024-11-08 RX ORDER — ACETAMINOPHEN 325 MG/1
650 TABLET ORAL
OUTPATIENT
Start: 2025-02-02

## 2024-11-08 RX ORDER — MEPERIDINE HYDROCHLORIDE 50 MG/ML
25 INJECTION INTRAMUSCULAR; INTRAVENOUS; SUBCUTANEOUS
Status: DISCONTINUED | OUTPATIENT
Start: 2024-11-08 | End: 2024-11-08 | Stop reason: HOSPADM

## 2024-11-08 RX ORDER — SODIUM CHLORIDE 0.9 % (FLUSH) 0.9 %
10 SYRINGE (ML) INJECTION
Status: DISCONTINUED | OUTPATIENT
Start: 2024-11-08 | End: 2024-11-08 | Stop reason: HOSPADM

## 2024-11-08 RX ORDER — HEPARIN 100 UNIT/ML
500 SYRINGE INTRAVENOUS
Status: DISCONTINUED | OUTPATIENT
Start: 2024-11-08 | End: 2024-11-08 | Stop reason: HOSPADM

## 2024-11-08 RX ORDER — MEPERIDINE HYDROCHLORIDE 50 MG/ML
25 INJECTION INTRAMUSCULAR; INTRAVENOUS; SUBCUTANEOUS
OUTPATIENT
Start: 2025-02-02 | End: 2025-02-03

## 2024-11-08 RX ORDER — DIPHENHYDRAMINE HYDROCHLORIDE 50 MG/ML
50 INJECTION INTRAMUSCULAR; INTRAVENOUS ONCE AS NEEDED
Status: DISCONTINUED | OUTPATIENT
Start: 2024-11-08 | End: 2024-11-08 | Stop reason: HOSPADM

## 2024-11-08 RX ORDER — DIPHENHYDRAMINE HYDROCHLORIDE 50 MG/ML
50 INJECTION INTRAMUSCULAR; INTRAVENOUS ONCE AS NEEDED
OUTPATIENT
Start: 2025-02-02

## 2024-11-08 RX ORDER — HEPARIN 100 UNIT/ML
500 SYRINGE INTRAVENOUS
OUTPATIENT
Start: 2025-02-02

## 2024-11-08 RX ORDER — CETIRIZINE HYDROCHLORIDE 10 MG/1
10 TABLET ORAL
Status: COMPLETED | OUTPATIENT
Start: 2024-11-08 | End: 2024-11-08

## 2024-11-08 RX ORDER — CETIRIZINE HYDROCHLORIDE 10 MG/1
10 TABLET ORAL
Start: 2025-02-02 | End: 2025-02-02

## 2024-11-08 RX ORDER — EPINEPHRINE 0.3 MG/.3ML
0.3 INJECTION SUBCUTANEOUS ONCE AS NEEDED
OUTPATIENT
Start: 2025-02-02

## 2024-11-08 RX ORDER — ACETAMINOPHEN 325 MG/1
650 TABLET ORAL
Status: COMPLETED | OUTPATIENT
Start: 2024-11-08 | End: 2024-11-08

## 2024-11-08 RX ADMIN — ACETAMINOPHEN 650 MG: 325 TABLET ORAL at 07:11

## 2024-11-08 RX ADMIN — SODIUM CHLORIDE 1000 MG: 9 INJECTION, SOLUTION INTRAVENOUS at 08:11

## 2024-11-08 RX ADMIN — METHYLPREDNISOLONE SODIUM SUCCINATE 40 MG: 125 INJECTION, POWDER, FOR SOLUTION INTRAMUSCULAR; INTRAVENOUS at 07:11

## 2024-11-08 RX ADMIN — SODIUM CHLORIDE: 9 INJECTION, SOLUTION INTRAVENOUS at 07:11

## 2024-11-08 RX ADMIN — CETIRIZINE HYDROCHLORIDE 10 MG: 10 TABLET, FILM COATED ORAL at 08:11

## 2024-11-08 RX ADMIN — FAMOTIDINE 20 MG: 10 INJECTION INTRAVENOUS at 07:11

## 2024-11-08 NOTE — PLAN OF CARE
1140-Pt tolerated Ruxience infusion well, no complications or side effects, POC and meds discussed with pt, pt aware of upcoming appts, pt knows to call MD with any questions or concerns. Pt ambulated off unit, no distress noted.

## 2024-11-12 ENCOUNTER — PATIENT MESSAGE (OUTPATIENT)
Dept: PHYSICAL MEDICINE AND REHAB | Facility: CLINIC | Age: 69
End: 2024-11-12
Payer: MEDICARE

## 2024-11-14 ENCOUNTER — OFFICE VISIT (OUTPATIENT)
Dept: NEUROLOGY | Facility: CLINIC | Age: 69
End: 2024-11-14
Payer: MEDICARE

## 2024-11-14 ENCOUNTER — LAB VISIT (OUTPATIENT)
Dept: LAB | Facility: HOSPITAL | Age: 69
End: 2024-11-14
Attending: PSYCHIATRY & NEUROLOGY
Payer: MEDICARE

## 2024-11-14 VITALS
SYSTOLIC BLOOD PRESSURE: 115 MMHG | HEART RATE: 79 BPM | DIASTOLIC BLOOD PRESSURE: 80 MMHG | WEIGHT: 130.31 LBS | HEIGHT: 62 IN | BODY MASS INDEX: 23.98 KG/M2

## 2024-11-14 DIAGNOSIS — Z92.241 HISTORY OF RECENT STEROID USE: ICD-10-CM

## 2024-11-14 DIAGNOSIS — M05.79 RHEUMATOID ARTHRITIS INVOLVING MULTIPLE SITES WITH POSITIVE RHEUMATOID FACTOR: ICD-10-CM

## 2024-11-14 DIAGNOSIS — J84.112 IDIOPATHIC PULMONARY FIBROSIS: Primary | ICD-10-CM

## 2024-11-14 DIAGNOSIS — R29.2 HYPERREFLEXIA: ICD-10-CM

## 2024-11-14 DIAGNOSIS — M25.60 STIFFNESS IN JOINT: ICD-10-CM

## 2024-11-14 LAB — CK SERPL-CCNC: 35 U/L (ref 20–180)

## 2024-11-14 PROCEDURE — 86235 NUCLEAR ANTIGEN ANTIBODY: CPT | Mod: 59,TXP | Performed by: PSYCHIATRY & NEUROLOGY

## 2024-11-14 PROCEDURE — 3079F DIAST BP 80-89 MM HG: CPT | Mod: CPTII,S$GLB,, | Performed by: PSYCHIATRY & NEUROLOGY

## 2024-11-14 PROCEDURE — 99204 OFFICE O/P NEW MOD 45 MIN: CPT | Mod: S$GLB,,, | Performed by: PSYCHIATRY & NEUROLOGY

## 2024-11-14 PROCEDURE — 99999 PR PBB SHADOW E&M-EST. PATIENT-LVL IV: CPT | Mod: PBBFAC,,, | Performed by: PSYCHIATRY & NEUROLOGY

## 2024-11-14 PROCEDURE — 1101F PT FALLS ASSESS-DOCD LE1/YR: CPT | Mod: CPTII,S$GLB,, | Performed by: PSYCHIATRY & NEUROLOGY

## 2024-11-14 PROCEDURE — 3008F BODY MASS INDEX DOCD: CPT | Mod: CPTII,S$GLB,, | Performed by: PSYCHIATRY & NEUROLOGY

## 2024-11-14 PROCEDURE — 1159F MED LIST DOCD IN RCRD: CPT | Mod: CPTII,S$GLB,, | Performed by: PSYCHIATRY & NEUROLOGY

## 2024-11-14 PROCEDURE — 83516 IMMUNOASSAY NONANTIBODY: CPT | Mod: 59,TXP | Performed by: PSYCHIATRY & NEUROLOGY

## 2024-11-14 PROCEDURE — 1125F AMNT PAIN NOTED PAIN PRSNT: CPT | Mod: CPTII,S$GLB,, | Performed by: PSYCHIATRY & NEUROLOGY

## 2024-11-14 PROCEDURE — 83520 IMMUNOASSAY QUANT NOS NONAB: CPT | Mod: TXP | Performed by: PSYCHIATRY & NEUROLOGY

## 2024-11-14 PROCEDURE — 86235 NUCLEAR ANTIGEN ANTIBODY: CPT | Mod: TXP | Performed by: PSYCHIATRY & NEUROLOGY

## 2024-11-14 PROCEDURE — 82607 VITAMIN B-12: CPT | Mod: TXP | Performed by: PSYCHIATRY & NEUROLOGY

## 2024-11-14 PROCEDURE — 86341 ISLET CELL ANTIBODY: CPT | Mod: TXP | Performed by: PSYCHIATRY & NEUROLOGY

## 2024-11-14 PROCEDURE — 3074F SYST BP LT 130 MM HG: CPT | Mod: CPTII,S$GLB,, | Performed by: PSYCHIATRY & NEUROLOGY

## 2024-11-14 PROCEDURE — 86366 MUSCLE-SPECIFIC KINASE ANTB: CPT | Mod: TXP | Performed by: PSYCHIATRY & NEUROLOGY

## 2024-11-14 PROCEDURE — 86041 ACETYLCHOLN RCPTR BNDNG ANTB: CPT | Mod: TXP | Performed by: PSYCHIATRY & NEUROLOGY

## 2024-11-14 PROCEDURE — G2211 COMPLEX E/M VISIT ADD ON: HCPCS | Mod: S$GLB,,, | Performed by: PSYCHIATRY & NEUROLOGY

## 2024-11-14 PROCEDURE — 82550 ASSAY OF CK (CPK): CPT | Mod: TXP | Performed by: PSYCHIATRY & NEUROLOGY

## 2024-11-14 PROCEDURE — 82085 ASSAY OF ALDOLASE: CPT | Mod: TXP | Performed by: PSYCHIATRY & NEUROLOGY

## 2024-11-14 PROCEDURE — 3288F FALL RISK ASSESSMENT DOCD: CPT | Mod: CPTII,S$GLB,, | Performed by: PSYCHIATRY & NEUROLOGY

## 2024-11-14 NOTE — PROGRESS NOTES
[facility] NEUROLOGY 7TH [facility], [place]    Date: 11/14/24  Patient Name: Juan Cruz   MRN: [id number]   Referring Provider: Kelly Zhang MD    Thank you so much Kelly Zhang MD for your patient referral to Neuromuscular team at [facility]. We take pride in our care coordination and look forward to your feedback and questions.    Assessment:   69-year-old female with multiple complicated medical issues including interstitial lung disease, rheumatoid arthritis on chronic immunosuppression including steroids (1 year up to a dose of 40 mg daily) for evaluation and 2nd opinion.  Some degree of proximal muscle weakness could be secondary to chronic steroid use, her episode of inability to move the neck muscles could be secondary to neck muscle myositis secondary to autoimmune condition or infection like COVID.  Due to hyperreflexia, I will complete noncompressive and comprehensive myelopathy workup.    I discussed about fall precautions and need for Physiotherapy. I addressed her complaints. I provided information about fall precautions and healthy lifestyle. I would wish her very best for improvement/recovery in her condition.    Future direction based on feedback:    Plan:     Problem List Items Addressed This Visit          Pulmonary    Idiopathic pulmonary fibrosis - Primary       Immunology/Multi System    Rheumatoid arthritis involving multiple sites with positive rheumatoid factor    Overview     11/15/2022:  ESR 36 (48),  CRP 19 (14), WBC 15.26  2/2023: ESR 22, CRP 2.3    Failed MTX and ASA  2/2024 CRP 1.83, ESR 35, B12 617, fasting glucose 106 and 4/2024 CRP 1.8 and ESR 15 with normal CMP, CBC         Relevant Orders    Anti-scleroderma antibody    RNA polymerase III Ab, IgG    PM-Scl Antibody by Immunodiffusion    Anti Sm/RNP Antibody    MyoMarker Panel 3    CK    ALDOLASE    ANTI -SSA ANTIBODY    Sjogrens syndrome-B extractable nuclear antibody    EMG W/  ULTRASOUND AND NERVE CONDUCTION TEST 4 Extremities    Myasthenia Gravis Eval With Musk Reflex    GLUTAMIC ACID DECARBOXYLASE       Endocrine    History of recent steroid use       Orthopedic    Stiffness in joint    Relevant Orders    Anti-scleroderma antibody    RNA polymerase III Ab, IgG    PM-Scl Antibody by Immunodiffusion    Anti Sm/RNP Antibody    MyoMarker Panel 3    CK    ALDOLASE    ANTI -SSA ANTIBODY    Sjogrens syndrome-B extractable nuclear antibody    EMG W/ ULTRASOUND AND NERVE CONDUCTION TEST 4 Extremities    Myasthenia Gravis Eval With Musk Reflex    GLUTAMIC ACID DECARBOXYLASE     Other Visit Diagnoses       Hyperreflexia        Relevant Orders    MRI Cervical Spine Without Contrast    Vitamin B12 Deficiency Panel          Tracee Perry MD    This evaluation was completed in >55  Minutes over 50% of the time spent on education & counseling. This includes face to face time and non-face to face time preparing to see the patient (eg, review of tests), obtaining and/or reviewing separately obtained history, documenting clinical information in the electronic or other health record, independently interpreting results and communicating results to the patient/family/caregiver, or care coordinator.    Visit today is associated with current or anticipated ongoing medical care related to this patient's single serious condition/complex condition (diffuse hyperreflexia and fatigue ). Follow up: 3 months    Patient note was created using MModal Dictation.  Any errors in syntax or even information may not have been identified and edited on initial review prior to signing this note.    Details provided by:    Patient    Reason for visit:  Upper and lower extremity weakness for evaluation    HISTORY OF PRESENT ILLNESS     History of Present Illness  The patient is here today for an evaluation of her medical condition, specifically the weakness in her arms and legs.  She has PMH of idiopathic pulmonary fibrosis,  mitral valve prolapse, seropositive rheumatoid arthritis, long-term immunosuppression, COVID-19 illness and osteoporosis.  She is a retired anesthesiologist.      She reports that after her Rituxan infusion in 05/2024, she experienced difficulty squatting and standing up. She receives first two doses of Rituxan followed by maintenance every six months for her RA and ILD since 2022 (1st course of RTX 10/24/22 & 11/7/22 ). She describes a significant amount of stiffness, particularly in her knees and glutes, and believes it may be due to tightness in the soft tissue, fascia, or tendons.  She has not had an EMG or nerve conduction study.  Currently she is not having any difficulty with stairs but she feels her gait is not normal with proximal hip weakness but she noticed general improvement in her fatigue over the time.  There is questionable swallowing difficulty and breathlessness on exertion.    Her RA test was negative in 2020, but she has experienced vague muscle and joint pains growing up. After receiving the Pfizer COVID-19 vaccine in 2020, she noticed difficulty breathing and a persistent cough, especially when talking to patients. She also had difficulty climbing stairs. Despite seeing two pulmonologists and having multiple PFTs, no cause was found. She received a booster in 10/2021 or 11/2021, but her cough persisted. An ENT diagnosed her with chronic sinusitis and removed three polyps in 01/2022.    In 03/2022, she woke up unable to move her neck, shoulders, and upper body. X-rays were normal, but her ESR and CRP were elevated. She was put on a minimal dose of steroids, which was later increased to 10 to 20 mg. She started methotrexate in 06/2022 or 07/2022, but within a week, she felt something was off. Her pain was along the wrist tendons, and her joints were fine, but the tendons were so inflamed that she couldn't even move her fingers. She was put on high-dose steroids and admitted to the hospital. A CT  scan showed ILD and pulmonary fibrosis. She was put on Ofev for pulmonary fibrosis and Adriamycin by her rheumatologist. However, she developed severe obstructive jaundice and liver toxicity, so both medications were stopped.    In 01/2023, she got COVID-19 and was given Paxlovid. In 03/2023, she started shedding skin, which was diagnosed as a reaction to Paxlovid. She was put back on high-dose steroids and took her second dose of Prolia, which caused severe inflammation of her face. She has been off steroids since 12/2023 and is currently on Breo daily.     She has noticed a loss of muscle mass in her left arm and has been going for deep tissue massages for the last three months, which have helped with her stiffness and rigidity. She has started doing yoga and stretching again, but has noticed that she struggles with certain exercises, particularly those involving both legs.  She noted difficulty in swiping cell phone screen with right hand.  She is right-handed and sometimes struggles to roll cotton with right hand.    She has a history of SIADH in 1999 with a relapse lasting up to six months, but no cause was found. In 2001, she experienced severe bouts of vertigo with hearing loss in her right ear, which lasted for almost a year. Despite three years of follow-up MRIs, no cause was found. She has stopped practicing as an anesthetist due to these health issues.    SOCIAL HISTORY  She does not smoke or drink alcohol. She has 1 daughter who is a .    FAMILY HISTORY  Her mother had RA, but she was diagnosed at age of 84. She never had any issues before that and she got diagnosed because she developed maybe little GI symptoms and then suddenly she was basically not able to move with severe swelling and stuff like that and she passed away after 2 years with all the complications. Her brother passed away because of COVID-19.    IMMUNIZATIONS  She had 2 doses of COVID-19 vaccine and a booster in 10/2021 or  11/2021.    Medications tried in the past include:   Methotrexate 2022   Adriamycin   Ofev   Prolia   Paxlovid   Rituximab    Pertinent work up based on chart review for current condition:  Hepatitis-B antibody negative   Low IgM immunoglobulin   C-reactive protein 1.7   ESR 12   Comprehensive metabolic panel normal   CBC with MCV 91   CPK 13   VZV IgG positive   RPR nonreactive   HIV 1 and 2 antibody negative   Aldolase level 4.4   Rheumatoid factor strongly positive in May 2022   Anti CCP antibodies strongly positive   Myositis panel negative   Scleroderma antibody negative   SSA/SSB negative   ROBBIE negative   C3 and C4 complement normal   Cardiolipin antibody positive    MRI cervical spine on 03/22/2022.    Unchanged minimal disc degeneration predominantly at the C5-6 and C6-7 levels with an annular fissure also noted at C5-6 also without change. There is no spinal canal or significant neural foraminal narrowing.    MRI brain on 03/22/2022.    No acute intracranial hemorrhage or evidence of acute ischemia. Hyperintense signal intensity scattered throughout the subcortical and periventricular deep white matter, which is a finding that is nonspecific but can be seen in the setting of chronic small vessel ischemia, vasculitis or as a sequela of chronic migraine headaches. The possibility of demyelinating disease is felt to be unlikely given the distribution and appearance of the findings.     CT scan of chest on 06/28/2021.    1. Lower lobe predominant patchy increased attenuation and reticulation within the periphery of the bilateral lower lobes, favored to represent sequela of atelectasis.  An early interstitial lung disease can present with similar findings and cannot be entirely excluded.  No bronchiectasis.  No honeycombing.  No significant ground-glass attenuation.  2. Small sliding-type hiatal hernia    Echocardiogram on 11/01/2023.    The left ventricle is normal in size. Normal wall thickness. There is  "concentric remodeling. Normal wall motion. There is normal systolic function with a visually estimated ejection fraction of 65 - 70%. There is normal diastolic function.    Review of Systems:  12 system review of systems is negative except for the symptoms mentioned in HPI.     PHYSICAL EXAMINATION     Vitals:    11/14/24 0855   BP: 115/80   BP Location: Left arm   Patient Position: Sitting   Pulse: 79   Weight: 59.1 kg (130 lb 4.7 oz)   Height: 5' 2" (1.575 m)       Body mass index is 23.83 kg/m².     GENERAL/CONSTITUTIONAL/SYSTEMIC:    -Well appearing; well nourished    HIGHER INTEGRATIVE FUNCTIONS:   -Attention & concentration: Normal   -Orientation: Oriented to person, place & time  -Memory: Normal  -Language: Normal   -Fund of Knowledge: Normal     CRANIAL NERVES:   -CN 2: Visual fields full  -CN 2,3: PERRL  -CN 3,4,6: EOMI  -CN 5: Facial sensation intact bilaterally, jaw jerk normal  -CN 7: Facial strength/movement intact bilaterally  -CN 8: Hearing normal bilaterally  -CN 9,10: Palate elevates symmetrically  -CN 11: Normal shoulder shrug and head turn  -CN 12: Tongue protrudes midline.      MOTOR:   -Tone: normal in upper and lower extremities  -UE/LE motor: 5/5 throughout, no pronator drift     SENSATION:   -Intact bilaterally to Vibration and pin prick    REFLEXES:   -2/4 upper and lower extremities bilaterally with some spreading of reflexes in upper and lower extremities  -Withdrawal plantar reflex bilaterally    COORDINATION:   -FNF normal bilaterally    GAIT:   -Normal casual  gait. Able to stand on heels and toes without difficulty.  -mild difficulty in squatting    Scheduled Follow-up :  Future Appointments   Date Time Provider Department Center   11/14/2024  9:00 AM Tracee Perry MD [facility] NEURO [facility]   11/18/2024 11:45 AM PULMONARY FUNCTION [facility] PULMLAB [facility]   11/18/2024 12:00 PM PULMONARY FUNCTION [facility] PUL[facility]AB [facility]   11/18/2024 12:15 PM PULMONARY FUNCTION [facility] PULAB [facility] "   11/18/2024  1:00 PM Barbara Haile, DO [facility] LUNGTX [facility]   11/20/2024  9:30 AM Gianna Carpenter MD [facility] PRICAR [place]   11/22/2024  7:00 AM CHAIR 2, [facility] BMT INF [facility] BMT INF [facility] BMT   1/21/2025 12:00 PM Tracee Perry MD [facility] NEURO [facility]       After Visit Medication List :     Medication List            Accurate as of November 14, 2024  8:01 AM. If you have any questions, ask your nurse or doctor.                CONTINUE taking these medications      alendronate 70 MG tablet  Commonly known as: FOSAMAX  Take 1 tablet (70 mg total) by mouth every 7 days.     B-complex with vitamin C tablet  Commonly known as: Z-Bec or Equiv     BREO ELLIPTA 200-25 mcg/dose Dsdv diskus inhaler  Generic drug: fluticasone furoate-vilanteroL  INHALE 1 PUFF BY MOUTH ONCE DAILY (CONTROLLER)     calcium citrate-vitamin D3 315-200 mg 315 mg-5 mcg (200 unit) per tablet  Commonly known as: CITRACAL+D     CALCIUM-VITAMIN A-VITAMIN D ORAL     cetirizine 10 MG tablet  Commonly known as: ZYRTEC  Take 1 tablet (10 mg total) by mouth once daily.     estradioL 10 mcg Tab  Commonly known as: VAGIFEM  Yuvafem 10 mcg vaginal tablet   INSERT 1 TABLET VAGINALLY TWICE A WEEK     magnesium oxide 400 mg (241.3 mg magnesium) tablet  Commonly known as: MAG-OX     metoprolol succinate 25 MG 24 hr tablet  Commonly known as: TOPROL-XL  Take 1 tablet by mouth once daily     neomycin-polymyxin-dexamethasone 3.5mg/mL-10,000 unit/mL-0.1 % Drps  Commonly known as: MAXITROL     RESTASIS 0.05 % ophthalmic emulsion  Generic drug: cycloSPORINE     VENTOLIN HFA INHL              Signing Physician:          Tracee Perry MD  , [employer] [employer] [facility] [employer]  Section Head Neuromuscular Medicine. [facility].   [address],  [facility] [place]. [address]th [place] [address].    This note was generated with the assistance of ambient listening technology. Verbal consent was  obtained by the patient and accompanying visitor(s) for the recording of patient appointment to facilitate this note. I attest to having reviewed and edited the generated note for accuracy, though some syntax or spelling errors may persist. Please contact the author of this note for any clarification.

## 2024-11-14 NOTE — PATIENT INSTRUCTIONS
Walk and exercise  Fall precautions    In case of any question, plz contact through [facility].

## 2024-11-15 ENCOUNTER — TELEPHONE (OUTPATIENT)
Dept: TRANSPLANT | Facility: CLINIC | Age: 69
End: 2024-11-15
Payer: MEDICARE

## 2024-11-15 LAB
ALDOLASE SERPL-CCNC: 2.6 U/L (ref 1.2–7.6)
ANTI SM/RNP ANTIBODY: 0.07 RATIO (ref 0–0.99)
ANTI-SM/RNP INTERPRETATION: NEGATIVE
ANTI-SSA ANTIBODY: 0.05 RATIO (ref 0–0.99)
ANTI-SSA INTERPRETATION: NEGATIVE
ANTI-SSB ANTIBODY: 0.05 RATIO (ref 0–0.99)
ANTI-SSB INTERPRETATION: NEGATIVE
ENA SCL70 IGG SER IA-ACNC: <0.2 U
VIT B12 SERPL-MCNC: 768 NG/L (ref 180–914)

## 2024-11-15 RX ORDER — SODIUM CHLORIDE 0.9 % (FLUSH) 0.9 %
10 SYRINGE (ML) INJECTION
OUTPATIENT
Start: 2025-05-15

## 2024-11-15 RX ORDER — CETIRIZINE HYDROCHLORIDE 10 MG/1
10 TABLET ORAL
Start: 2025-05-15 | End: 2025-05-15

## 2024-11-15 RX ORDER — EPINEPHRINE 0.3 MG/.3ML
0.3 INJECTION SUBCUTANEOUS ONCE AS NEEDED
OUTPATIENT
Start: 2025-05-15

## 2024-11-15 RX ORDER — HEPARIN 100 UNIT/ML
500 SYRINGE INTRAVENOUS
OUTPATIENT
Start: 2025-05-15

## 2024-11-15 RX ORDER — METHYLPREDNISOLONE SOD SUCC 125 MG
40 VIAL (EA) INJECTION
OUTPATIENT
Start: 2025-05-15

## 2024-11-15 RX ORDER — DIPHENHYDRAMINE HYDROCHLORIDE 50 MG/ML
50 INJECTION INTRAMUSCULAR; INTRAVENOUS ONCE AS NEEDED
OUTPATIENT
Start: 2025-05-15

## 2024-11-15 RX ORDER — ACETAMINOPHEN 325 MG/1
650 TABLET ORAL
OUTPATIENT
Start: 2025-05-15

## 2024-11-15 RX ORDER — FAMOTIDINE 10 MG/ML
20 INJECTION INTRAVENOUS
OUTPATIENT
Start: 2025-05-15

## 2024-11-15 RX ORDER — MEPERIDINE HYDROCHLORIDE 50 MG/ML
25 INJECTION INTRAMUSCULAR; INTRAVENOUS; SUBCUTANEOUS
OUTPATIENT
Start: 2025-05-15 | End: 2025-05-16

## 2024-11-18 ENCOUNTER — HOSPITAL ENCOUNTER (OUTPATIENT)
Dept: PULMONOLOGY | Facility: CLINIC | Age: 69
Discharge: HOME | End: 2024-11-18
Payer: MEDICARE

## 2024-11-18 ENCOUNTER — OFFICE VISIT (OUTPATIENT)
Dept: TRANSPLANT | Facility: CLINIC | Age: 69
End: 2024-11-18
Payer: MEDICARE

## 2024-11-18 VITALS
HEIGHT: 62 IN | RESPIRATION RATE: 16 BRPM | OXYGEN SATURATION: 100 % | DIASTOLIC BLOOD PRESSURE: 71 MMHG | SYSTOLIC BLOOD PRESSURE: 131 MMHG | WEIGHT: 132 LBS | HEART RATE: 72 BPM | TEMPERATURE: 98 F | BODY MASS INDEX: 24.29 KG/M2

## 2024-11-18 DIAGNOSIS — M05.79 RHEUMATOID ARTHRITIS INVOLVING MULTIPLE SITES WITH POSITIVE RHEUMATOID FACTOR: ICD-10-CM

## 2024-11-18 DIAGNOSIS — K21.9 GASTROESOPHAGEAL REFLUX DISEASE, UNSPECIFIED WHETHER ESOPHAGITIS PRESENT: ICD-10-CM

## 2024-11-18 DIAGNOSIS — J84.9 INTERSTITIAL LUNG DISEASE: ICD-10-CM

## 2024-11-18 DIAGNOSIS — J84.9 INTERSTITIAL LUNG DISEASE: Primary | ICD-10-CM

## 2024-11-18 DIAGNOSIS — J30.2 SEASONAL ALLERGIC RHINITIS, UNSPECIFIED TRIGGER: ICD-10-CM

## 2024-11-18 LAB
DLCO ADJ PRE: 11.65 ML/(MIN*MMHG) (ref 14.6–26.07)
DLCO SINGLE BREATH LLN: 14.6
DLCO SINGLE BREATH PRE REF: 58.5 %
DLCO SINGLE BREATH REF: 20.33
DLCOC SBVA LLN: 2.85
DLCOC SBVA PRE REF: 90.5 %
DLCOC SBVA REF: 4.42
DLCOC SINGLE BREATH LLN: 14.6
DLCOC SINGLE BREATH PRE REF: 57.3 %
DLCOC SINGLE BREATH REF: 20.33
DLCOCSBVAULN: 5.98
DLCOCSINGLEBREATHULN: 26.07
DLCOCSINGLEBREATHZSCORE: -2.49
DLCOSINGLEBREATHULN: 26.07
DLCOSINGLEBREATHZSCORE: -2.42
DLCOVA LLN: 2.85
DLCOVA PRE REF: 92.3 %
DLCOVA PRE: 4.08 ML/(MIN*MMHG*L) (ref 2.85–5.98)
DLCOVA REF: 4.42
DLCOVAULN: 5.98
DLVAADJ PRE: 4 ML/(MIN*MMHG*L) (ref 2.85–5.98)
ERV LLN: -16449.34
ERV PRE REF: 155.5 %
ERV REF: 0.66
ERVULN: ABNORMAL
FEF 25 75 LLN: 1.18
FEF 25 75 PRE REF: 121.7 %
FEF 25 75 REF: 2.58
FEV05 LLN: 0.78
FEV05 REF: 1.63
FEV1 FVC LLN: 69
FEV1 FVC PRE REF: 108.4 %
FEV1 FVC REF: 81
FEV1 LLN: 1.32
FEV1 PRE REF: 101.2 %
FEV1 REF: 1.86
FEV1FVCZSCORE: 1.09
FEV1ZSCORE: 0.07
FRCPLETH LLN: 1.77
FRCPLETH PREREF: 72.9 %
FRCPLETH REF: 2.6
FRCPLETHULN: 3.42
FVC LLN: 1.67
FVC PRE REF: 93 %
FVC REF: 2.31
FVCZSCORE: -0.41
IVC PRE: 2.21 L (ref 1.67–2.99)
IVC SINGLE BREATH LLN: 1.67
IVC SINGLE BREATH PRE REF: 95.9 %
IVC SINGLE BREATH REF: 2.31
IVCSINGLEBREATHULN: 2.99
LLN IC: -16448.23
PEF LLN: 4.03
PEF PRE REF: 131 %
PEF REF: 5.51
PHYSICIAN COMMENT: ABNORMAL
PRE DLCO: 11.9 ML/(MIN*MMHG) (ref 14.6–26.07)
PRE ERV: 1.02 L (ref -16449.34–16450.66)
PRE FEF 25 75: 3.14 L/S (ref 1.18–3.97)
PRE FET 100: 6.3 SEC
PRE FEV05 REF: 101.2 %
PRE FEV1 FVC: 87.62 % (ref 69.34–90.9)
PRE FEV1: 1.88 L (ref 1.32–2.37)
PRE FEV5: 1.65 L (ref 0.78–2.49)
PRE FRC PL: 1.89 L (ref 1.77–3.42)
PRE FVC: 2.15 L (ref 1.67–2.99)
PRE IC: 1.19 L (ref -16448.23–16451.77)
PRE PEF: 7.22 L/S (ref 4.03–6.99)
PRE REF IC: 67.4 %
PRE RV: 0.87 L (ref 1.36–2.51)
PRE TLC: 3.09 L (ref 3.62–5.59)
RAW PRE REF: 52.1 %
RAW PRE: 1.59 CMH2O*S/L (ref 3.06–3.06)
RAW REF: 3.06
REF IC: 1.77
RV LLN: 1.36
RV PRE REF: 45 %
RV REF: 1.94
RVTLC LLN: 32
RVTLC PRE REF: 67.1 %
RVTLC PRE: 28.24 % (ref 32.49–51.67)
RVTLC REF: 42
RVTLCULN: 52
RVULN: 2.51
SGAW PRE REF: 283.9 %
SGAW PRE: 0.29 1/(CMH2O*S) (ref 0.1–0.1)
SGAW REF: 0.1
TLC LLN: 3.62
TLC PRE REF: 67 %
TLC REF: 4.6
TLC ULN: 5.59
TLCZSCORE: -2.53
ULN IC: ABNORMAL
VA PRE: 2.92 L (ref 4.45–4.45)
VA SINGLE BREATH LLN: 4.45
VA SINGLE BREATH PRE REF: 65.5 %
VA SINGLE BREATH REF: 4.45
VASINGLEBREATHULN: 4.45
VC LLN: 1.67
VC PRE REF: 95.9 %
VC PRE: 2.21 L (ref 1.67–2.99)
VC REF: 2.31
VC ULN: 2.99

## 2024-11-18 PROCEDURE — 94726 PLETHYSMOGRAPHY LUNG VOLUMES: CPT | Mod: NTX,S$GLB,, | Performed by: INTERNAL MEDICINE

## 2024-11-18 PROCEDURE — 94729 DIFFUSING CAPACITY: CPT | Mod: NTX,S$GLB,, | Performed by: INTERNAL MEDICINE

## 2024-11-18 PROCEDURE — 99214 OFFICE O/P EST MOD 30 MIN: CPT | Mod: 25,NTX,S$GLB, | Performed by: INTERNAL MEDICINE

## 2024-11-18 PROCEDURE — 94010 BREATHING CAPACITY TEST: CPT | Mod: NTX,S$GLB,, | Performed by: INTERNAL MEDICINE

## 2024-11-18 PROCEDURE — 3078F DIAST BP <80 MM HG: CPT | Mod: CPTII,NTX,S$GLB, | Performed by: INTERNAL MEDICINE

## 2024-11-18 PROCEDURE — 3288F FALL RISK ASSESSMENT DOCD: CPT | Mod: CPTII,NTX,S$GLB, | Performed by: INTERNAL MEDICINE

## 2024-11-18 PROCEDURE — 3075F SYST BP GE 130 - 139MM HG: CPT | Mod: CPTII,NTX,S$GLB, | Performed by: INTERNAL MEDICINE

## 2024-11-18 PROCEDURE — 1125F AMNT PAIN NOTED PAIN PRSNT: CPT | Mod: CPTII,NTX,S$GLB, | Performed by: INTERNAL MEDICINE

## 2024-11-18 PROCEDURE — 1159F MED LIST DOCD IN RCRD: CPT | Mod: CPTII,NTX,S$GLB, | Performed by: INTERNAL MEDICINE

## 2024-11-18 PROCEDURE — 1160F RVW MEDS BY RX/DR IN RCRD: CPT | Mod: CPTII,NTX,S$GLB, | Performed by: INTERNAL MEDICINE

## 2024-11-18 PROCEDURE — 99999 PR PBB SHADOW E&M-EST. PATIENT-LVL III: CPT | Mod: PBBFAC,TXP,, | Performed by: INTERNAL MEDICINE

## 2024-11-18 PROCEDURE — 1101F PT FALLS ASSESS-DOCD LE1/YR: CPT | Mod: CPTII,NTX,S$GLB, | Performed by: INTERNAL MEDICINE

## 2024-11-18 PROCEDURE — 3008F BODY MASS INDEX DOCD: CPT | Mod: CPTII,NTX,S$GLB, | Performed by: INTERNAL MEDICINE

## 2024-11-18 NOTE — LETTER
November 19, 2024        [facility]  [address]  [facility] [address]  Phone: [phone]  Fax: [phone]             [facility] Trans[facility] 1st Fl  [address]  [facility] [address]  Phone: [phone]   Patient: Juan Cruz   MR Number: [id number]   YOB: 1955   Date of Visit: 11/18/2024       Dear Dr. Ibis Holcomb    Thank you for referring Juan Cruz to me for evaluation. Attached you will find relevant portions of my assessment and plan of care.    If you have questions, please do not hesitate to call me. I look forward to following Juan Cruz along with you.    Sincerely,    Barbara Haile, DO    Enclosure    If you would like to receive this communication electronically, please contact [email or web address] or [phone] to request [employer] Link access.    [employer] Link is a tool which provides read-only access to select patient information with whom you have a relationship. Its easy to use and provides real time access to review your patients record including encounter summaries, notes, results, and demographic information.    If you feel you have received this communication in error or would no longer like to receive these types of communications, please e-mail [email or web address]

## 2024-11-19 LAB
ENA SCL70 AB SER-ACNC: <0.6 U/ML
GAD65 AB SER-SCNC: 0 NMOL/L
MUSK ANTIBODY TEST: 0 NMOL/L (ref 0–0.02)

## 2024-11-19 NOTE — PROGRESS NOTES
ADVANCED LUNG DISEASE FOLLOW-UP                                                                                                                     Reason for Visit:  RA-ILD, cough    Referring Physician: Ibis Holcomb    History of Present Illness: Juan Cruz is a 69 y.o. female who is on 0L of oxygen.  She is on no assisted ventilation.  Her [place] Heart Association Class is II and a Karnofsky score of 80% - Normal activity with effort: some symptoms of disease. She is not diabetic.     She presents today for follow-up of RA-ILD.  She is doing well from a respiratory standpoint and feels like her cough is at baseline and possibly the best it has been.  She still has GERD and is awaiting EGD.  She did have a barium swallow at [facility] and it did not show any aspiration. She has established care with Dr. Feliciano and is happy that she will cut back her rituxan to 1 dose every 6months instead of 2 doses every 6 months.  She is working with an online respiratory therapist and feels the best she has felt in a long time.  Recently traveled to [place] and is planning a trip the beginning of the year to [facility].           Past Medical History:   Diagnosis Date    Bruise 09/12/2022    Chronic sinusitis, unspecified     Dyspnea on exertion 09/16/2022    Elevated liver enzymes 09/12/2022    History of SIADH     Interstitial pulmonary disease, unspecified 07/20/2023    LLL pneumonia 12/20/2022    Mitral valve prolapse     Osteopenia     Petechiae 09/12/2022    Pulmonary fibrosis 09/12/2022    Rheumatoid arthritis involving multiple sites     Skin rash 03/09/2023       Past Surgical History:   Procedure Laterality Date    BREAST BIOPSY Bilateral     FUNCTIONAL ENDOSCOPIC SINUS SURGERY (FESS)      ORIF WRIST FRACTURE Right        Allergies: Methotrexate, Gluten protein, Paxlovid [nirmatrelvir-ritonavir], Ppd black rubber mix, and Prolia [denosumab]    Current Outpatient Medications   Medication Sig    albuterol  sulfate (VENTOLIN HFA INHL) Ventolin HFA    alendronate (FOSAMAX) 70 MG tablet Take 1 tablet (70 mg total) by mouth every 7 days.    B-complex with vitamin C (Z-BEC OR EQUIV) tablet Take 1 tablet by mouth 2 (two) times a day.    BREO ELLIPTA 200-25 mcg/dose DsDv diskus inhaler INHALE 1 PUFF BY MOUTH ONCE DAILY (CONTROLLER)    calcium carb/vit A palm/vit D3 (CALCIUM-VITAMIN A-VITAMIN D ORAL) Calcium-Vitamin A-Vitamin D    calcium citrate-vitamin D3 315-200 mg (CITRACAL+D) 315-200 mg-unit per tablet Take 1 tablet by mouth 2 (two) times daily.    cetirizine (ZYRTEC) 10 MG tablet Take 1 tablet (10 mg total) by mouth once daily.    estradioL (VAGIFEM) 10 mcg Tab Yuvafem 10 mcg vaginal tablet   INSERT 1 TABLET VAGINALLY TWICE A WEEK    magnesium oxide (MAG-OX) 400 mg (241.3 mg magnesium) tablet Take 400 mg by mouth 2 (two) times daily.    metoprolol succinate (TOPROL-XL) 25 MG 24 hr tablet Take 1 tablet by mouth once daily    neomycin-polymyxin-dexamethasone (MAXITROL) 3.5mg/mL-10,000 unit/mL-0.1 % DrpS Place 1 drop into both eyes 3 (three) times daily. (Patient not taking: Reported on 11/6/2024)    RESTASIS 0.05 % ophthalmic emulsion Place 1 drop into both eyes once daily. Has not started     Current Facility-Administered Medications   Medication    riTUXimab-abbs (TRUXIMA) 1,000 mg in sodium chloride 0.9% 1,000 mL infusion (conc: 1 mg/mL)    riTUXimab-abbs (TRUXIMA) 1,000 mg in sodium chloride 0.9% 1,000 mL infusion (conc: 1 mg/mL)    riTUXimab-abbs (TRUXIMA) 1,000 mg in sodium chloride 0.9% 1,000 mL infusion (conc: 1 mg/mL)    sodium chloride 0.9% flush 10 mL       Immunization History   Administered Date(s) Administered    COVID-19, MRNA, LN-S, PF (Pfizer) (Purple Cap) 12/31/2020, 01/25/2021, 08/25/2021    Pneumococcal Conjugate - 20 Valent 09/21/2023, 08/02/2024    Pneumococcal Polysaccharide - 23 Valent 09/16/2022    Yellow Fever 08/31/2017    Zoster Recombinant 01/18/2020, 08/19/2020     Family History:    Family  History   Problem Relation Name Age of Onset    Breast cancer Neg Hx      Colon cancer Neg Hx      Ovarian cancer Neg Hx      Uterine cancer Neg Hx      Cervical cancer Neg Hx       Social History     Substance and Sexual Activity   Alcohol Use No      Social History     Substance and Sexual Activity   Drug Use No      Social History     Socioeconomic History    Marital status:    Tobacco Use    Smoking status: Never     Passive exposure: Never    Smokeless tobacco: Never   Substance and Sexual Activity    Alcohol use: No    Drug use: No    Sexual activity: Not Currently     Social Drivers of Health     Financial Resource Strain: Low Risk  (8/7/2024)    Overall Financial Resource Strain (CARDIA)     Difficulty of Paying Living Expenses: Not hard at all   Food Insecurity: No Food Insecurity (8/7/2024)    Hunger Vital Sign     Worried About Running Out of Food in the Last Year: Never true     Ran Out of Food in the Last Year: Never true   Transportation Needs: No Transportation Needs (4/23/2024)    PRAPARE - Transportation     Lack of Transportation (Medical): No     Lack of Transportation (Non-Medical): No   Physical Activity: Sufficiently Active (8/7/2024)    Exercise Vital Sign     Days of Exercise per Week: 7 days     Minutes of Exercise per Session: 50 min   Stress: Stress Concern Present (8/7/2024)    [employer] [place] [employer] - Occupational Stress Questionnaire     Feeling of Stress : To some extent   Housing Stability: Unknown (8/7/2024)    Housing Stability Vital Sign     Unable to Pay for Housing in the Last Year: No     Review of Systems   Constitutional:  Negative for chills, diaphoresis, fever, malaise/fatigue and weight loss.   HENT:  Negative for congestion, ear discharge, ear pain, hearing loss, nosebleeds, sinus pain, sore throat and tinnitus.    Eyes:  Negative for blurred vision, double vision, photophobia, pain, discharge and redness.   Respiratory:  Positive for cough  "(stable). Negative for hemoptysis, sputum production, shortness of breath (improved), wheezing and stridor.    Cardiovascular:  Negative for chest pain, palpitations, orthopnea, claudication, leg swelling and PND.   Gastrointestinal:  Negative for abdominal pain, blood in stool, constipation, diarrhea, heartburn, melena, nausea and vomiting.   Genitourinary:  Negative for dysuria, flank pain, frequency, hematuria and urgency.   Musculoskeletal:  Positive for myalgias. Negative for back pain, falls, joint pain and neck pain.   Skin:  Negative for itching and rash.   Neurological:  Negative for dizziness, tingling, tremors, sensory change, speech change, focal weakness, seizures, loss of consciousness, weakness and headaches.   Endo/Heme/Allergies:  Negative for environmental allergies and polydipsia. Bruises/bleeds easily.   Psychiatric/Behavioral:  Negative for depression, hallucinations, memory loss, substance abuse and suicidal ideas. The patient is not nervous/anxious and does not have insomnia.      Vitals  /71 (BP Location: Right arm, Patient Position: Sitting)   Pulse 72   Temp 98.2 °F (36.8 °C) (Oral)   Resp 16   Ht 5' 2" (1.575 m)   Wt 59.9 kg (132 lb)   LMP  (LMP Unknown)   SpO2 100%   BMI 24.14 kg/m²   Physical Exam  Vitals and nursing note reviewed.   Constitutional:       General: She is not in acute distress.     Appearance: She is well-developed. She is not diaphoretic.   HENT:      Head: Normocephalic and atraumatic.      Nose: Nose normal.      Mouth/Throat:      Pharynx: No oropharyngeal exudate.   Eyes:      General: No scleral icterus.     Conjunctiva/sclera: Conjunctivae normal.      Pupils: Pupils are equal, round, and reactive to light.   Neck:      Thyroid: No thyromegaly.      Vascular: No JVD.      Trachea: No tracheal deviation.   Cardiovascular:      Rate and Rhythm: Normal rate and regular rhythm.      Heart sounds: Normal heart sounds. No murmur heard.     No friction rub. " No gallop.   Pulmonary:      Effort: Pulmonary effort is normal. No respiratory distress.      Breath sounds: Normal breath sounds. No wheezing or rales.   Abdominal:      General: Bowel sounds are normal. There is no distension.      Palpations: Abdomen is soft.      Tenderness: There is no abdominal tenderness.   Musculoskeletal:         General: No deformity. Normal range of motion.      Cervical back: Normal range of motion and neck supple.   Skin:     General: Skin is warm and dry.      Capillary Refill: Capillary refill takes less than 2 seconds.      Coloration: Skin is not pale.      Findings: No erythema or rash.   Neurological:      Mental Status: She is alert and oriented to person, place, and time.      Cranial Nerves: No cranial nerve deficit.   Psychiatric:         Judgment: Judgment normal.         Labs:  Hospital Outpatient Visit on 11/18/2024   Component Date Value    Physician Comment 11/18/2024                      Value:Spirometry is normal. Lung volumes show mild restriction is present. Airway mechanics show decreased airway resistance and increased conductance. DLCO is moderately decreased.   Â   Notes: DLCO interpretation is based upon the reported hemoglobin level.      Pre FVC 11/18/2024 2.15     PRE FEV5 11/18/2024 1.65     Pre FEV1 11/18/2024 1.88     Pre FEV1 FVC 11/18/2024 87.62     Pre FEF 25 75 11/18/2024 3.14     Pre PEF 11/18/2024 7.22 (H)     Pre  11/18/2024 6.30     Pre DLCO 11/18/2024 11.90 (L)     DLCO ADJ PRE 11/18/2024 11.65 (L)     DLCOVA PRE 11/18/2024 4.08     DLVAAdj PRE 11/18/2024 4.00     VA PRE 11/18/2024 2.92 (L)     IVC PRE 11/18/2024 2.21     Pre TLC 11/18/2024 3.09 (L)     VC PRE 11/18/2024 2.21     PRE IC 11/18/2024 1.19     Pre FRC PL 11/18/2024 1.89     Pre ERV 11/18/2024 1.02     Pre RV 11/18/2024 0.87 (L)     RVTLC PRE 11/18/2024 28.24 (L)     Raw PRE 11/18/2024 1.59 (L)     sGaw PRE 11/18/2024 0.29 (H)     FVC Ref 11/18/2024 2.31     FVC LLN 11/18/2024  1.67     FVC Pre Ref 11/18/2024 93.0     FEV05 REF 11/18/2024 1.63     FEV05 LLN 11/18/2024 0.78     PRE FEV05 REF 11/18/2024 101.2     FEV1 Ref 11/18/2024 1.86     FEV1 LLN 11/18/2024 1.32     FEV1 Pre Ref 11/18/2024 101.2     FEV1 FVC Ref 11/18/2024 81     FEV1 FVC LLN 11/18/2024 69     FEV1 FVC Pre Ref 11/18/2024 108.4     FEF 25 75 Ref 11/18/2024 2.58     FEF 25 75 LLN 11/18/2024 1.18     FEF 25 75 Pre Ref 11/18/2024 121.7     PEF Ref 11/18/2024 5.51     PEF LLN 11/18/2024 4.03     PEF Pre Ref 11/18/2024 131.0     TLC Ref 11/18/2024 4.60     TLC LLN 11/18/2024 3.62     TLC ULN 11/18/2024 5.59     TLC Pre Ref 11/18/2024 67.0     VC Ref 11/18/2024 2.31     VC LLN 11/18/2024 1.67     VC ULN 11/18/2024 2.99     VC Pre Ref 11/18/2024 95.9     REF IC 11/18/2024 1.77     LLN IC 11/18/2024 -12519.23     ULN IC 11/18/2024 04995.77     PRE REF IC 11/18/2024 67.4     FRCpleth Ref 11/18/2024 2.60     FRCpleth LLN 11/18/2024 1.77     FRC PLETH ULN 11/18/2024 3.42     FRCpleth PreRef 11/18/2024 72.9     ERV Ref 11/18/2024 0.66     ERV LLN 11/18/2024 -09623.34     ERV ULN 11/18/2024 40382.66     ERV Pre Ref 11/18/2024 155.5     RV Ref 11/18/2024 1.94     RV LLN 11/18/2024 1.36     RV ULN 11/18/2024 2.51     RV Pre Ref 11/18/2024 45.0     RVTLC Ref 11/18/2024 42     RVTLC LLN 11/18/2024 32     RV TLC ULN 11/18/2024 52     RVTLC Pre Ref 11/18/2024 67.1     Raw Ref 11/18/2024 3.06     Raw Pre Ref 11/18/2024 52.1     sGaw Ref 11/18/2024 0.10     sGaw Pre Ref 11/18/2024 283.9     DLCO Single Breath Ref 11/18/2024 20.33     DLCO Single Breath LLN 11/18/2024 14.60     DLCO SINGLEBREATH ULN 11/18/2024 26.07     DLCO Single Breath Pre R* 11/18/2024 58.5     DLCOc Single Breath Ref 11/18/2024 20.33     DLCOc Single Breath LLN 11/18/2024 14.60     DLCOC SINGLEBREATH ULN 11/18/2024 26.07     DLCOc Single Breath Pre * 11/18/2024 57.3     DLCOVA Ref 11/18/2024 4.42     DLCOVA LLN 11/18/2024 2.85     DLCOVA ULN 11/18/2024 5.98     DLCOVA Pre  Ref 11/18/2024 92.3     DLCOc SBVA Ref 11/18/2024 4.42     DLCOc SBVA LLN 11/18/2024 2.85     DLCOCSBVA ULN 11/18/2024 5.98     DLCOc SBVA Pre Ref 11/18/2024 90.5     VA Single Breath Ref 11/18/2024 4.45     VA Single Breath LLN 11/18/2024 4.45     VA SINGLEBREATH ULN 11/18/2024 4.45     VA Single Breath Pre Ref 11/18/2024 65.5     IVC Single Breath Ref 11/18/2024 2.31     IVC Single Breath LLN 11/18/2024 1.67     IVC SINGLEBREATH ULN 11/18/2024 2.99     IVC Single Breath Pre Ref 11/18/2024 95.9     FVCZSCORE 11/18/2024 -0.41     JTM6BZWCGP 11/18/2024 0.07     YHV8TOTCZTFCA 11/18/2024 1.09     TLCZSCORE 11/18/2024 -2.53     DLCOSINGLEBREATHZSCORE 11/18/2024 -2.42     DLCOcSingleBreathZSCORE 11/18/2024 -2.49    Lab Visit on 11/14/2024   Component Date Value    Scleroderma SCL- 11/14/2024 <0.6     SCL-70 Antibody 11/14/2024 <0.2     Anti Sm/RNP Antibody 11/14/2024 0.07     Anti-Sm/RNP Interpretati* 11/14/2024 Negative     CPK 11/14/2024 35     Aldolase 11/14/2024 2.6     Anti-SSA Antibody 11/14/2024 0.05     Anti-SSA Interpretation 11/14/2024 Negative     Anti-SSB Antibody 11/14/2024 0.05     Anti-SSB Interpretation 11/14/2024 Negative     Vitamin B-12 11/14/2024 768            11/18/2024    12:26 PM 5/2/2024     9:54 AM 10/25/2023     1:41 PM 4/17/2023     1:19 PM 12/8/2022    10:24 AM 9/12/2022     6:00 PM 6/7/2022    11:54 AM   Pulmonary Function Tests   FVC 2.15 liters 2.08 liters 2.16 liters 2.02 liters 1.86 liters 1.82 liters 2.18 liters   FEV1 1.88 liters 1.83 liters 1.85 liters 1.76 liters 1.64 liters 1.62 liters 1.9 liters   TLC (liters) 3.09 liters 2.67 liters 2.77 liters 2.94 liters 2.88 liters  2.79 liters   DLCO (ml/mmHg sec) 11.9 ml/mmHg sec 9.13 ml/mmHg sec 8.32 ml/mmHg sec 9.22 ml/mmHg sec 8.62 ml/mmHg sec  8.17 ml/mmHg sec   FVC% 93 89 92 86 78 77 92   FEV1% 101.2 98 98 93 86 85 99   FEF 25-75 3.14 3.13 3 2.9 2.93 2.69 3.24   FEF 25-75% 121 121 166 159 160 146 175   TLC% 67 58 60 63 63  61   RV 0.87  0.6 0.6 0.92 1.01  0.61   RV% 45 31 31 48 53  32   DLCO% 58 45 41 45 42  40         8/22/2024     8:04 AM 5/2/2024     8:36 AM 7/20/2023     9:33 AM 4/17/2023    11:38 AM 12/22/2022    10:05 AM 12/8/2022     9:08 AM 9/12/2022    11:43 AM   6MW   6MWT Status completed without stopping completed without stopping completed without stopping completed without stopping completed without stopping completed without stopping completed without stopping   Patient Reported Dyspnea;Other (Comment) No complaints No complaints Dyspnea No complaints Dyspnea Dyspnea   Was O2 used? No No No No No No No   6MW Distance walked (feet) 1450 feet 1532 feet 1500 feet 1500 feet 1300 feet 1332 feet 1300 feet   Distance walked (meters) 441.96 meters 466.95 meters 457.2 meters 457.2 meters 396.24 meters 405.99 meters 396.24 meters   Did patient stop? No No  No No No No   Oxygen Saturation 99 % 98 % 98 % 98 % 98 % 99 % 98 %   Supplemental Oxygen Room Air Room Air Room Air Room Air Room Air Room Air Room Air   Heart Rate 87 bpm 85 bpm 95 bpm 85 bpm 78 bpm 81 bpm 89 bpm   Blood Pressure 105/69 114/75 109/62 113/64 118/77 145/78 120/74   Rigoberto Dyspnea Rating  moderate nothing at all light nothing at all light moderate light   Oxygen Saturation 99 % 98 % 94 % 94 % 95 % 98 % 95 %   Supplemental Oxygen Room Air Room Air  Room Air Room Air Room Air Room Air   Heart Rate 114 bpm 113 bpm 126 bpm 125 bpm 113 bpm 106 bpm 113 bpm   Blood Pressure 117/74 118/76 114/62 132/67 130/86 129/66 131/77   Rigoberto Dyspnea Rating  heavy moderate moderate somewhat heavy somewhat heavy somewhat heavy somewhat heavy   Recovery Time (seconds) 119 seconds 135 seconds 180 seconds 149 seconds 180 seconds 123 seconds 72 seconds   Oxygen Saturation 99 % 98 % 97 % 98 % 99 % 98 % 98 %   Supplemental Oxygen Room Air Room Air Room Air Room Air Room Air Room Air Room Air   Heart Rate 88 bpm 95 bpm 101 bpm 106 bpm 83 bpm 94 bpm 97 bpm       Imaging:  Results for orders placed during the  hospital encounter of 07/27/22    CT Chest Without Contrast    Narrative  EXAMINATION:  CT CHEST WITHOUT CONTRAST    CLINICAL HISTORY:  Interstitial lung disease; Cough, unspecified    TECHNIQUE:  Low dose axial images, sagittal and coronal reformations were obtained from the thoracic inlet to the lung bases.  Intravenous contrast was not administered.    COMPARISON:  CT thorax 06/28/2021    FINDINGS:  Base of Neck: Imaged portions of the base of the neck are grossly unremarkable.    Aorta: 3-branch vessel configuration of a left-sided type 3 aortic arch.  No hyperdense crescent sign, aneurysmal degeneration, periaortic fat stranding or periaortic fluid.    Heart: Heart is normal in size.  No significant pericardial thickening. No appreciable coronary artery calcifications.    Pulmonary vasculature: Pulmonary arteries are not enlarged and distribute normally.  There are four pulmonary veins.    Axilla/Alexandra/Mediastinum: Prominent mediastinal lymph nodes however, no rachael axillary or mediastinal lymphadenopathy.  Evaluation of hilar lymph nodes is limited without IV contrast.    Airways: Trachea and mainstem bronchi are patent.  Symmetric mild central bronchiectasis present.    Lungs/Pleura: Significant interval progression of previously noted subpleural predominant reticular and ground-glass airspace opacities associated with bilateral lower lobe volume loss, architectural distortion, symmetric mild central bronchiectasis and apicobasal gradient.  No pleural effusions.  No pneumothorax.    Esophagus: Small hiatal hernia, not significantly changed.  Otherwise, normal in course and caliber however, evaluation is limited given the lack of oral contrast.    Soft tissues: Imaged soft tissues are grossly unremarkable.    Osseous structures: Diffuse osseous demineralization and mild multilevel degenerative changes of the imaged spine.  No acute displaced fracture, dislocation or suspicious lytic/blastic osseous  lesion.    Upper Abdomen: Imaged portions of the upper abdomen are grossly unremarkable.    Impression  1. Significant interval progression of previously noted subpleural predominant reticular and ground-glass airspace opacities associated with bilateral lower lobe volume loss, architectural distortion, symmetric mild central bronchiectasis and apicobasal gradient suggestive of UIP pattern of interstitial lung disease which may reflect IPF.  Additional diagnostic considerations include NSIP, asbestosis, fibrotic hypersensitivity pneumonitis connective tissue associated lung disease and drug induced lung disease amongst other etiologies.      Electronically signed by: Braulio Haro  Date:    07/27/2022  Time:    16:11    Cardiodiagnostics:  6/28/2021:  The estimated ejection fraction is 55%.  Normal systolic function.  Normal right ventricular size with normal right ventricular systolic function.  Mild to moderate left atrial enlargement.  Mild right atrial enlargement.  Normal central venous pressure (3 mmHg).  The estimated PA systolic pressure is 18 mmHg.       Assessment:  1. Interstitial lung disease    2. Rheumatoid arthritis involving multiple sites with positive rheumatoid factor    3. Seasonal allergic rhinitis, unspecified trigger    4. Gastroesophageal reflux disease, unspecified whether esophagitis present          Plan:   1. Followed for RA-ILD.  Failed MTX and AZA.  Currently on rituxan infusions; tolerating well and is now reducing the dose to 1 dose every 6 months.  Follows with ENT/derm/allergy; takes daily antihistamine, and uses NETI pot regularly.  Acute hepatitis 2/2 OFEV so not a candidate for anti fibrotic therapy.  Continue with pulmonary rehab exercises with online RT.  FVC and DLCO stable.  Does not require supplemental oxygen.  UTD on TTE.  Schedule CT chest for routine surveillance in April 2025.       2. Follow-up with Rheum.  Currently on rituxan.  Follows with Dr. Feliciano    3. Pt to  schedule EGD as outpatient.  [place] [facility] [place] barium swallow for aspiration.      4. Follow-up in April 2025 with CT chest and PFTs.          Barbara Haile D.O.  Pulmonary/Critical Care and Lung Transplantation  [facility]

## 2024-11-20 ENCOUNTER — OFFICE VISIT (OUTPATIENT)
Dept: PRIMARY CARE CLINIC | Facility: CLINIC | Age: 69
End: 2024-11-20
Payer: MEDICARE

## 2024-11-20 VITALS
HEIGHT: 62 IN | BODY MASS INDEX: 23.93 KG/M2 | DIASTOLIC BLOOD PRESSURE: 86 MMHG | HEART RATE: 78 BPM | OXYGEN SATURATION: 98 % | SYSTOLIC BLOOD PRESSURE: 110 MMHG | WEIGHT: 130.06 LBS

## 2024-11-20 DIAGNOSIS — J84.9 INTERSTITIAL LUNG DISEASE: ICD-10-CM

## 2024-11-20 DIAGNOSIS — K21.9 GASTROESOPHAGEAL REFLUX DISEASE WITHOUT ESOPHAGITIS: Primary | ICD-10-CM

## 2024-11-20 DIAGNOSIS — M05.79 RHEUMATOID ARTHRITIS INVOLVING MULTIPLE SITES WITH POSITIVE RHEUMATOID FACTOR: ICD-10-CM

## 2024-11-20 DIAGNOSIS — M25.69 BACK STIFFNESS: ICD-10-CM

## 2024-11-20 DIAGNOSIS — Z79.60 LONG-TERM USE OF IMMUNOSUPPRESSANT MEDICATION: ICD-10-CM

## 2024-11-20 DIAGNOSIS — R35.89 POLYURIA: ICD-10-CM

## 2024-11-20 DIAGNOSIS — M81.0 AGE-RELATED OSTEOPOROSIS WITHOUT CURRENT PATHOLOGICAL FRACTURE: ICD-10-CM

## 2024-11-20 PROBLEM — M54.50 CHRONIC BILATERAL LOW BACK PAIN WITHOUT SCIATICA: Status: RESOLVED | Noted: 2024-11-20 | Resolved: 2024-11-20

## 2024-11-20 PROBLEM — Z92.241 HISTORY OF RECENT STEROID USE: Status: RESOLVED | Noted: 2024-11-14 | Resolved: 2024-11-20

## 2024-11-20 PROBLEM — J84.112 IDIOPATHIC PULMONARY FIBROSIS: Status: RESOLVED | Noted: 2022-09-12 | Resolved: 2024-11-20

## 2024-11-20 PROBLEM — M54.50 CHRONIC BILATERAL LOW BACK PAIN WITHOUT SCIATICA: Status: ACTIVE | Noted: 2024-11-20

## 2024-11-20 PROBLEM — M25.60 STIFFNESS IN JOINT: Status: RESOLVED | Noted: 2024-11-14 | Resolved: 2024-11-20

## 2024-11-20 PROBLEM — G89.29 CHRONIC BILATERAL LOW BACK PAIN WITHOUT SCIATICA: Status: RESOLVED | Noted: 2024-11-20 | Resolved: 2024-11-20

## 2024-11-20 PROBLEM — I34.1 MITRAL VALVE PROLAPSE: Status: RESOLVED | Noted: 2024-11-06 | Resolved: 2024-11-20

## 2024-11-20 PROBLEM — G89.29 CHRONIC BILATERAL LOW BACK PAIN WITHOUT SCIATICA: Status: ACTIVE | Noted: 2024-11-20

## 2024-11-20 LAB
ACHR BIND AB SER-SCNC: 0 NMOL/L
IMMUNOLOGIST REVIEW: NORMAL

## 2024-11-20 PROCEDURE — 99999 PR PBB SHADOW E&M-EST. PATIENT-LVL V: CPT | Mod: PBBFAC,TXP,, | Performed by: INTERNAL MEDICINE

## 2024-11-20 NOTE — ASSESSMENT & PLAN NOTE
Continue rituximab but decrease to 1000 q6m and possibly lower if remains stable   next infusion in May 2025; next CT chest  and PFTs in 4/2025; last TTE 10/2023 WNL      Will plan follow up 3 mo with labs with Dr. Feliciano

## 2024-11-20 NOTE — PROGRESS NOTES
[facility]/Pediatric[facility] Progress Note      Chief Complaint     Follow-up and Health Maintenance       Subjective:      History of Present Illness:  Juan Cruz is a 69 y.o. female  went to [facility] from Jan through April 2024 and did well except minor viral illness; cough stable; wore N95     RA-ILD with NYHA 2 (failed MTX and ASA)   -seen by Dr. Feliciano (rheum) and Pulm, Dr. Haile, doing very well now on lower dose of Truxima 2 doses (1000mg) every 6 mo (formerly 4 doses every 6 mo) with last infusion on 11/08/20204 and hope to continue to wean as per Dr. Feliciano.  - next infusion in May 2025; next CT chest  and PFTs in 4/2025; last TTE 10/2023 WNL  Most recent PFT 11/18/2024: cont to demonstrated improvement and stable FVC and DLCO  8/2024: 6 MVT: completed with out stopping   -still has intermittent dry cough but does not interfere with routine activities - able to climb stairs in her home and travel   Saw Dr. Haile on 11/18/2024  and felt she is doing great; will repeat PFTs and 6MWT, and CT chest in 4/2025  Pulm stress test 5/02/2024: 98% RA and 94% post-exercise with  (rest 85); PFTS 11/2024 stable    -O2 sat 98% on RA now denies BAKER, SOB  -plans to visit [facility] Jan - Feb 2025    Dysphagia: normal Ba swallow but now needs EGD; no longer needs Speech therapy     UE and LE weakness: now sees Dr. Perry, Neurology with ongoing w/up    Polyuria since Sept 2024: urinates 2 times per night     Bilateral LBP stiffness along iliac crest since 5/2024 infusion:  unable to squat, bending forward: still does yoga, stretching, breathing; does get deep tissue massage which does improve her stiffness; would benefit from dry needling     GERD with hiatal hernia without laryngeal penetration via EGD in [place] in 2022    Ig def'y: Ig G 630 (1165) ;  Ig M 44 (114) 4/2024:     Osteoporosis: Vit D 2/24 level 37:  had Vit D injection in 2/2024 with 2 wks of Vit D 50,000 IU weekly; takes Citracal max  plus bid    HLD: HDL 76, ,  G 82  in 11/2024 - pt prefers not to start statin; consider Calcium CT score fabricio with 10-year CV risk 5.6%    HbA1C 5.1     Osteoporosis: Ilda DEXA 1/2024:  LS -2.7, left FN -2.4, right FN -2.4, left hip -1.9; right hip -2.1, radius -4.1   -did not tolerate Prolia  3/2023 due to painful skin rash   -Dr. Hernandez will be prescribing different medication (possibly abaloparatide since fosamax exacerbates GERD       Past Medical History:  Past Medical History:   Diagnosis Date    Bruise 09/12/2022    Chronic bilateral low back pain without sciatica 11/20/2024    Chronic sinusitis, unspecified     Dyspnea on exertion 09/16/2022    Elevated liver enzymes 09/12/2022    History of recent steroid use 11/14/2024    History of SIADH     Idiopathic pulmonary fibrosis 09/12/2022    Interstitial pulmonary disease, unspecified 07/20/2023    LLL pneumonia 12/20/2022    Mitral valve prolapse     Mitral valve prolapse 11/06/2024    Osteopenia     Petechiae 09/12/2022    Pulmonary fibrosis 09/12/2022    Rheumatoid arthritis involving multiple sites     Skin rash 03/09/2023    Stiffness in joint 11/14/2024       Past Surgical History:  Past Surgical History:   Procedure Laterality Date    BREAST BIOPSY Bilateral     FUNCTIONAL ENDOSCOPIC SINUS SURGERY (FESS)      ORIF WRIST FRACTURE Right        Allergies:  Review of patient's allergies indicates:   Allergen Reactions    Methotrexate Shortness Of Breath    Gluten protein Other (See Comments)     Sensitive - not allergic    Paxlovid [nirmatrelvir-ritonavir] Other (See Comments)     Trunk / chest / legs peeling after paxlovid    Ppd black rubber mix     Prolia [denosumab] Other (See Comments)     Rash to arms/trunk       Home Medications:  Current Outpatient Medications   Medication Sig Dispense Refill    albuterol sulfate (VENTOLIN HFA INHL) Ventolin HFA      alendronate (FOSAMAX) 70 MG tablet Take 1 tablet (70 mg total) by mouth every 7 days. 4 tablet 11     B-complex with vitamin C (Z-BEC OR EQUIV) tablet Take 1 tablet by mouth 2 (two) times a day.      BREO ELLIPTA 200-25 mcg/dose DsDv diskus inhaler INHALE 1 PUFF BY MOUTH ONCE DAILY (CONTROLLER) 180 each 0    calcium carb/vit A palm/vit D3 (CALCIUM-VITAMIN A-VITAMIN D ORAL) Calcium-Vitamin A-Vitamin D      calcium citrate-vitamin D3 315-200 mg (CITRACAL+D) 315-200 mg-unit per tablet Take 1 tablet by mouth 2 (two) times daily.      cetirizine (ZYRTEC) 10 MG tablet Take 1 tablet (10 mg total) by mouth once daily. 30 tablet 5    estradioL (VAGIFEM) 10 mcg Tab Yuvafem 10 mcg vaginal tablet   INSERT 1 TABLET VAGINALLY TWICE A WEEK 45 tablet 5    magnesium oxide (MAG-OX) 400 mg (241.3 mg magnesium) tablet Take 400 mg by mouth 2 (two) times daily.      metoprolol succinate (TOPROL-XL) 25 MG 24 hr tablet Take 1 tablet by mouth once daily 90 tablet 3    RESTASIS 0.05 % ophthalmic emulsion Place 1 drop into both eyes once daily. Has not started       Current Facility-Administered Medications   Medication Dose Route Frequency Provider Last Rate Last Admin    riTUXimab-abbs (TRUXIMA) 1,000 mg in sodium chloride 0.9% 1,000 mL infusion (conc: 1 mg/mL)  1,000 mg Intravenous 1 time in Clinic/HOD Ibis Holcomb MD        riTUXimab-abbs (TRUXIMA) 1,000 mg in sodium chloride 0.9% 1,000 mL infusion (conc: 1 mg/mL)  1,000 mg Intravenous 1 time in Clinic/HOD Ibis Holcomb MD        riTUXimab-abbs (TRUXIMA) 1,000 mg in sodium chloride 0.9% 1,000 mL infusion (conc: 1 mg/mL)  1,000 mg Intravenous 1 time in Clinic/HOD Ibis Holcomb MD        sodium chloride 0.9% flush 10 mL  10 mL Intravenous PRN Ibis Holcomb MD            Family History:  Family History   Problem Relation Name Age of Onset    Breast cancer Neg Hx      Colon cancer Neg Hx      Ovarian cancer Neg Hx      Uterine cancer Neg Hx      Cervical cancer Neg Hx         Social History:  Social History     Tobacco Use    Smoking  "status: Never     Passive exposure: Never    Smokeless tobacco: Never   Substance Use Topics    Alcohol use: No    Drug use: No       Review of Systems:  Pertinent positives and negatives listed in HPI. All other systems are reviewed and are negative.    Health Maintaince :   Health Maintenance Topics with due status: Not Due       Topic Last Completion Date    Mammogram 04/18/2024    DEXA Scan 06/28/2024    Lipid Panel 11/04/2024           Eye: UTD  Dental: UTD    Immunizations:   Tdap: n/a.  Influenza: UTD.  Pneumovax: UTD  Shingrex x 2: n/a  COVID: needs  Hepatitis C:   Cancer Screening:  PAP: UTD - needs annual gyn exam  Mammogram: UTD 4/2024   Colonoscopy: 10/2021 neg - repeat 10/2024   DEXA:  due now  LDCT: n/a    The 10-year ASCVD risk score (Yohana GARCIA, et al., 2019) is: 5.9%    Values used to calculate the score:      Age: 69 years      Sex: Female      Is Non- : No      Diabetic: No      Tobacco smoker: No      Systolic Blood Pressure: 110 mmHg      Is BP treated: No      HDL Cholesterol: 76 mg/dL      Total Cholesterol: 270 mg/dL      Objective:   /86 (BP Location: Left arm, Patient Position: Sitting)   Pulse 78   Ht 5' 2" (1.575 m)   Wt 59 kg (130 lb 1.1 oz)   LMP  (LMP Unknown)   SpO2 98%   BMI 23.79 kg/m²      Body mass index is 23.79 kg/m².       Physical Examination:  General: Alert and awake in no apparent distress with intermittent dry cough  HEENT: Normocephalic and atraumatic; Tms WNL  Eyes:  PERRL; EOMi with anicteric sclera and clear conjunctivae  Mouth:  Oropharynx clear and without exudate; moist mucous membranes  Neck:   Cervical nodes not enlarged; supple; no bruits  Cardio:  Regular rate and rhythm with normal S1 and S2; no murmurs or rubs  Resp:  CTAB; respirations unlabored; no wheezes, crackles or rhonchi  Abdom: Soft, NTND with normoactive bowel sounds; negative HSM  Extrem: Warm and well-perfused with no clubbing, cyanosis or edema  Back:             "   bilateral lumbosacral tenderness due to stiffness with iliac crest tenderness  Skin:  No rashes, lesions, or color changes  Pulses:  2+ and symmetric distally  Neuro:  AAOx3; cooperative and pleasant with no focal deficits    Laboratory:      Most Recent Data:  Lab Results   Component Value Date    WBC 7.99 11/04/2024    HGB 14.1 11/04/2024    HCT 41.4 11/04/2024     11/04/2024    CHOL 270 (H) 11/04/2024    TRIG 82 11/04/2024    HDL 76 (H) 11/04/2024    ALT 21 11/04/2024    AST 24 11/04/2024     11/04/2024    K 4.3 11/04/2024     11/04/2024    BUN 8 11/04/2024    CO2 26 11/04/2024    INR 0.9 09/09/2022    HGBA1C 5.4 12/19/2023              CBC:   WBC   Date Value Ref Range Status   11/04/2024 7.99 3.90 - 12.70 K/uL Final     Hemoglobin   Date Value Ref Range Status   11/04/2024 14.1 12.0 - 16.0 g/dL Final     Hematocrit   Date Value Ref Range Status   11/04/2024 41.4 37.0 - 48.5 % Final     Platelets   Date Value Ref Range Status   11/04/2024 295 150 - 450 K/uL Final     MCV   Date Value Ref Range Status   11/04/2024 91 82 - 98 fL Final     RDW   Date Value Ref Range Status   11/04/2024 12.4 11.5 - 14.5 % Final     BMP:   Sodium   Date Value Ref Range Status   11/04/2024 137 136 - 145 mmol/L Final     Potassium   Date Value Ref Range Status   11/04/2024 4.3 3.5 - 5.1 mmol/L Final     Chloride   Date Value Ref Range Status   11/04/2024 104 95 - 110 mmol/L Final     CO2   Date Value Ref Range Status   11/04/2024 26 23 - 29 mmol/L Final     BUN   Date Value Ref Range Status   11/04/2024 8 8 - 23 mg/dL Final     Creatinine   Date Value Ref Range Status   11/04/2024 0.8 0.5 - 1.4 mg/dL Final     Glucose   Date Value Ref Range Status   11/04/2024 97 70 - 110 mg/dL Final     Calcium   Date Value Ref Range Status   11/04/2024 9.6 8.7 - 10.5 mg/dL Final     Magnesium   Date Value Ref Range Status   06/28/2024 1.9 1.6 - 2.6 mg/dL Final     LFTs:   Total Protein   Date Value Ref Range Status   11/04/2024  7.0 6.0 - 8.4 g/dL Final     Albumin   Date Value Ref Range Status   11/04/2024 3.8 3.5 - 5.2 g/dL Final     Total Bilirubin   Date Value Ref Range Status   11/04/2024 0.4 0.1 - 1.0 mg/dL Final     Comment:     For infants and newborns, interpretation of results should be based  on gestational age, weight and in agreement with clinical  observations.    Premature Infant recommended reference ranges:  Up to 24 hours.............<8.0 mg/dL  Up to 48 hours............<12.0 mg/dL  3-5 days..................<15.0 mg/dL  6-29 days.................<15.0 mg/dL       AST   Date Value Ref Range Status   11/04/2024 24 10 - 40 U/L Final     Alkaline Phosphatase   Date Value Ref Range Status   11/04/2024 67 40 - 150 U/L Final     ALT   Date Value Ref Range Status   11/04/2024 21 10 - 44 U/L Final     Coags:   INR   Date Value Ref Range Status   09/09/2022 0.9 0.8 - 1.2 Final     Comment:     Coumadin Therapy:  2.0 - 3.0 for INR for all indicators except mechanical heart valves  and antiphospholipid syndromes which should use 2.5 - 3.5.       FLP:      Lab Results   Component Value Date    CHOL 270 (H) 11/04/2024    CHOL 219 (H) 12/19/2023    CHOL 238 (H) 09/19/2022     Lab Results   Component Value Date    HDL 76 (H) 11/04/2024    HDL 42 12/19/2023    HDL 73 09/19/2022     Lab Results   Component Value Date    LDLCALC 177.6 (H) 11/04/2024    LDLCALC 151.0 12/19/2023    LDLCALC 143.6 09/19/2022     Lab Results   Component Value Date    TRIG 82 11/04/2024    TRIG 130 12/19/2023    TRIG 107 09/19/2022     Lab Results   Component Value Date    CHOLHDL 28.1 11/04/2024    CHOLHDL 19.2 (L) 12/19/2023    CHOLHDL 30.7 09/19/2022      DM:      Hemoglobin A1C   Date Value Ref Range Status   12/19/2023 5.4 4.0 - 5.6 % Final     Comment:     ADA Screening Guidelines:  5.7-6.4%  Consistent with prediabetes  >or=6.5%  Consistent with diabetes    High levels of fetal hemoglobin interfere with the HbA1C  assay. Heterozygous hemoglobin variants  "(HbS, HgC, etc)do  not significantly interfere with this assay.   However, presence of multiple variants may affect accuracy.     12/22/2022 5.6 4.0 - 5.6 % Final     Comment:     ADA Screening Guidelines:  5.7-6.4%  Consistent with prediabetes  >or=6.5%  Consistent with diabetes    High levels of fetal hemoglobin interfere with the HbA1C  assay. Heterozygous hemoglobin variants (HbS, HgC, etc)do  not significantly interfere with this assay.   However, presence of multiple variants may affect accuracy.       LDL Cholesterol   Date Value Ref Range Status   11/04/2024 177.6 (H) 63.0 - 159.0 mg/dL Final     Comment:     The National Cholesterol Education Program (NCEP) has set the  following guidelines (reference values) for LDL Cholesterol:  Optimal.......................<130 mg/dL  Borderline High...............130-159 mg/dL  High..........................160-189 mg/dL  Very High.....................>190 mg/dL       Creatinine   Date Value Ref Range Status   11/04/2024 0.8 0.5 - 1.4 mg/dL Final     Thyroid: No results found for: "TSH", "FREET4", "K6HZGRW", "N2GCOWR", "THYROIDAB"  Anemia:   Vitamin B-12   Date Value Ref Range Status   11/14/2024 768 180 - 914 ng/L Final     Comment:     Test Performed by:  [facility]  [address] [id number]  : Mela Hdz Ph.D.; CLIA# [id number]       Cardiac:   Troponin I   Date Value Ref Range Status   02/10/2023 <0.006 0.000 - 0.026 ng/mL Final     Comment:     The reference interval for Troponin I represents the 99th percentile   cutoff   for our facility and is consistent with 3rd generation assay   performance.       BNP   Date Value Ref Range Status   02/10/2023 <10 0 - 99 pg/mL Final     Comment:     Values of less than 100 pg/ml are consistent with non-CHF populations.     Urinalysis:   Color, UA   Date Value Ref Range Status   12/10/2023 Colorless (A) Yellow, Straw, Krystal Final     Specific " Gravity, UA   Date Value Ref Range Status   12/10/2023 <1.005 (A) 1.005 - 1.030 Final     Nitrite, UA   Date Value Ref Range Status   12/10/2023 Negative Negative Final     Ketones, UA   Date Value Ref Range Status   12/10/2023 1+ (A) Negative Final     Urobilinogen, UA   Date Value Ref Range Status   12/10/2023 Negative <2.0 EU/dL Final       Other Results:  EKG (my interpretation):     Radiology:  FL Esophagram Complete  Narrative: EXAMINATION:  FL ESOPHAGRAM COMPLETE    CLINICAL HISTORY:  Gastro-esophageal reflux disease without esophagitis    TECHNIQUE:  Fluoroscopic evaluation and spot images of the esophagus were obtained.  Fluoroscopic and rapid sequence spot radiographic evaluation of the hypopharynx and cervical esophagus were obtained as well.    Contrast material: Barium sulfate suspension    Fluoroscopy time: 1.6 minutes    COMPARISON:  None    FINDINGS:  Swallowing: The oral and pharyngeal stages of swallowing demonstrate mild laryngeal penetration.  No aspiration.  No pharyngeal mass.    Esophagus: The esophagus is normal in contour and caliber without evidence of masses or strictures. Normal esophageal motility is noted.    Gastroesophageal reflux: Mild gastroesophageal reflux observed.    Other findings: Small hiatal hernia demonstrated.  The gastric cardia is unremarkable in appearance.  Impression: Normal esophagus without evidence of dysmotility or stricture.    Mild laryngeal penetration on stages of swallowing without evidence for aspiration.    Mild gastroesophageal reflux.    Small hiatal hernia.    Electronically signed by: Serafin Borjas  Date:    08/29/2024  Time:    08:58          Assessment/Plan     Juan Cruz is a 69 y.o. female with:  1. Gastroesophageal reflux disease without esophagitis  Assessment & Plan:  Minimize use of PPI and change to Pepcid prn  Initiate GERD precautions ie lose weight, avoid eating 3 hours prior to bed, minimize caffeine, alcohol, tobacco, spicy, greasy,  fatty foods; avoid peppermint chocolates, citrus and other acidic foods. Increase exercise to help with digestion and avoid lying down immediately after eating.  Elevate head of bed if GERD awakens pt from sleep.  Eat 6 small snacks rather than 3 large meals.      Refer to dr. Johnny Gaspar for EGD    Orders:  -     Ambulatory referral/consult to Gastroenterology; Future; Expected date: 11/27/2024    2. Rheumatoid arthritis involving multiple sites with positive rheumatoid factor  Overview:  11/15/2022:  ESR 36 (48),  CRP 19 (14), WBC 15.26  2/2023: ESR 22, CRP 2.3    Failed MTX and ASA  2/2024 CRP 1.83, ESR 35, B12 617, fasting glucose 106 and 4/2024 CRP 1.8 and ESR 15 with normal CMP, CBC    Unusual presentation of interstitial lung disease with rheumatoid arthritis serology and arthralgia though no synovitis; but now reports less musc-sk pain after rituximab    Assessment & Plan:  Continue rituximab but decrease to 1000 q6m and possibly lower if remains stable   next infusion in May 2025; next CT chest  and PFTs in 4/2025; last TTE 10/2023 WNL      Will plan follow up 3 mo with labs with Dr. Feliciano       3. Age-related osteoporosis without current pathological fracture  Overview:  Indications: History of Fracture (Adult), Family history of osteoporosis, Post-menopause, , Height Loss   Comparison: DEXA scan 7/3/2019 - done 5/2022  Treatments: Exercises 3 or more times a week, Vitamin D supplementation, Calcium supplementation    A DEXA scan was performed on the lumbar spine and both hips.    The average trabecular bone mineral density of the lumbar spine from L2-L4 was 0.880 g/cm2. This represents a T-score of -2.7 and a Z-score of  -0.9. Previous T score of -2.8 on 7/3/2019    The average trabecular bone mineral density measured at the proximal femurs was 0.778 g/cm2. This represents a T-score of -1.8 and Z-score of -0.4. Previous T score of -1.7 on 7/3/2019    FRAX Result (10 year probability of  fracture):  Major osteoporotic fracture: 11%  Hip fracture: 2.1%      Ilda DEXA 1/2024:  LS -2.7, left FN -2.4, right FN -2.4, left hip -1.9; right hip -2.1, radius -4.1   -did not tolerate Prolia  3/2023 due to painful skin rash       Assessment & Plan:  She is considering starting abaloparatide as needs anabolic for high fracture risk vs Evenity   Cont Vit D, weight-bearing, citracal max plus      4. Polyuria  Assessment & Plan:  Avoid hydration within 3 hours of sleeping including fruit       5. Long-term use of immunosuppressant medication  Assessment & Plan:  Cont to monitor on Truxima - pt refuses all immunizations at this time       6. Interstitial lung disease  Overview:  TTE 10/2023 WNL    4/2022 Spirometry is normal. Lung volumes show mild-moderate restriction is present. Airway mechanics are abnormal showing increased airway resistance and decreased conductance. DLCO is moderately decreased    CT chest 12/09/2022: Diffuse subpleural reticular opacity and nodular pleural thickening.  Minimal perihilar bronchiectasis.  No evidence of honeycombing.  No focal opacity.  No pleural thickening.  Dx: Diffuse nonspecific interstitial lung disease, possible UIP pattern    Had Lung toxicity with MTX, intolerance of aza, and hepatotoxicity to OFEV.      Assessment & Plan:  F/y Dr. Haile at [facility] clinic;   Cont decreased dose Truxima 1000mg doses every 6 mo with next infusion in 5/2025  -might be switched to mycophenolate mofetil in future     F/u Dr. Haile 4/2025 with CT chest  and PFTs in 4/2025; last TTE 10/2023 WNL      7. Back stiffness  Assessment & Plan:  Cont stretching exercises, yoga, deep tissue massage  Consider dry needling. At [facility] on [address] in [place]                 I spent a total of 40 minutes on the day of the visit.This includes face to face time and non-face to face time preparing to see the patient (eg, review of tests), obtaining and/or reviewing separately  obtained history, documenting clinical information in the electronic or other health record, independently interpreting results and communicating results to the patient/family/caregiver, or care coordinator.   Code Status:     Gianna Carpenter MD

## 2024-11-20 NOTE — ASSESSMENT & PLAN NOTE
She is considering starting abaloparatide as needs anabolic for high fracture risk vs Evenity   Cont Vit D, weight-bearing, citracal max plus

## 2024-11-20 NOTE — PATIENT INSTRUCTIONS
Gastroesophageal reflux disease without esophagitis  Assessment & Plan:  Minimize use of PPI and change to Pepcid prn  Initiate GERD precautions ie lose weight, avoid eating 3 hours prior to bed, minimize caffeine, alcohol, tobacco, spicy, greasy, fatty foods; avoid peppermint chocolates, citrus and other acidic foods. Increase exercise to help with digestion and avoid lying down immediately after eating.  Elevate head of bed if GERD awakens pt from sleep.  Eat 6 small snacks rather than 3 large meals.      Refer to dr. Johnny Gaspar     Orders:  -     Ambulatory referral/consult to Gastroenterology; Future; Expected date: 11/27/2024    Idiopathic pulmonary fibrosis  Assessment & Plan:  Cont biannual infusions of Truxima   F/u Dr. GALEANA and Dr. Haile       Rheumatoid arthritis involving multiple sites with positive rheumatoid factor  Assessment & Plan:  Will continue rituximab but decrease to 1000 q6m and possibly lower if remains stable    Will plan follow up 3 mo with 4lab      Age-related osteoporosis without current pathological fracture  Assessment & Plan:  She is considering starting abaloparatide as  needs anabolic for high fracture risk vs Evenity       Chronic bilateral low back pain without sciatica  Assessment & Plan:  Consider dry needling       Polyuria  Assessment & Plan:  Avoid hydration within 3 hours of sleeping including fruit

## 2024-11-20 NOTE — ASSESSMENT & PLAN NOTE
Minimize use of PPI and change to Pepcid prn  Initiate GERD precautions ie lose weight, avoid eating 3 hours prior to bed, minimize caffeine, alcohol, tobacco, spicy, greasy, fatty foods; avoid peppermint chocolates, citrus and other acidic foods. Increase exercise to help with digestion and avoid lying down immediately after eating.  Elevate head of bed if GERD awakens pt from sleep.  Eat 6 small snacks rather than 3 large meals.      Refer to dr. Johnny Gaspar for EGD

## 2024-11-21 DIAGNOSIS — M25.69 BACK STIFFNESS: Primary | ICD-10-CM

## 2024-11-21 NOTE — ASSESSMENT & PLAN NOTE
F/y Dr. Haile at [facility] Disease clinic;   Cont decreased dose Truxima 1000mg doses every 6 mo with next infusion in 5/2025  -might be switched to mycophenolate mofetil in future     F/u Dr. Haile 4/2025 with CT chest  and PFTs in 4/2025; last TTE 10/2023 WNL

## 2024-11-21 NOTE — ASSESSMENT & PLAN NOTE
Cont stretching exercises, yoga, deep tissue massage  Consider dry needling. At [facility] on [address] in [place]

## 2024-11-22 ENCOUNTER — HOSPITAL ENCOUNTER (OUTPATIENT)
Dept: RADIOLOGY | Facility: HOSPITAL | Age: 69
Discharge: HOME | End: 2024-11-22
Attending: PSYCHIATRY & NEUROLOGY
Payer: MEDICARE

## 2024-11-22 DIAGNOSIS — R29.2 HYPERREFLEXIA: ICD-10-CM

## 2024-11-22 PROCEDURE — 72141 MRI NECK SPINE W/O DYE: CPT | Mod: 26,TXP,, | Performed by: RADIOLOGY

## 2024-11-22 PROCEDURE — 72141 MRI NECK SPINE W/O DYE: CPT | Mod: TC,TXP

## 2024-11-24 NOTE — PROGRESS NOTES
Patient to have PFTs and CT chest April 2025 with RTC per Dr. Haile note on 11/18/24. Request to .

## 2024-11-25 ENCOUNTER — PATIENT MESSAGE (OUTPATIENT)
Dept: TRANSPLANT | Facility: CLINIC | Age: 69
End: 2024-11-25
Payer: MEDICARE

## 2024-11-29 ENCOUNTER — PATIENT MESSAGE (OUTPATIENT)
Dept: NEUROLOGY | Facility: CLINIC | Age: 69
End: 2024-11-29
Payer: MEDICARE

## 2024-12-01 ENCOUNTER — PATIENT MESSAGE (OUTPATIENT)
Dept: NEUROLOGY | Facility: CLINIC | Age: 69
End: 2024-12-01
Payer: MEDICARE

## 2024-12-02 ENCOUNTER — PATIENT MESSAGE (OUTPATIENT)
Dept: RHEUMATOLOGY | Facility: CLINIC | Age: 69
End: 2024-12-02
Payer: MEDICARE

## 2024-12-02 DIAGNOSIS — M81.0 OSTEOPOROSIS, UNSPECIFIED OSTEOPOROSIS TYPE, UNSPECIFIED PATHOLOGICAL FRACTURE PRESENCE: ICD-10-CM

## 2024-12-02 NOTE — TELEPHONE ENCOUNTER
----- Message from Mita Guadalupe sent at 12/2/2024  9:16 AM CST -----  Regarding: New RX  Contact: [phone]  Is this a Refill or New Rx (Script/Out of Refills):  New RX    Name of Caller: Patient     Rx Name: teriparatide (FORTEO) 20 mcg/dose (600mcg/2.4mL) PnIj     Pharmacy Name:   [facility]   [address]   [facility] [address]   Phone Number: [phone]  Fax Number: [phone]

## 2024-12-03 RX ORDER — TERIPARATIDE 250 UG/ML
20 INJECTION, SOLUTION SUBCUTANEOUS DAILY
Qty: 2.4 ML | Refills: 11 | OUTPATIENT
Start: 2024-12-03 | End: 2025-11-28

## 2024-12-04 RX ORDER — TERIPARATIDE 250 UG/ML
20 INJECTION, SOLUTION SUBCUTANEOUS DAILY
Qty: 2.4 ML | Refills: 11 | Status: ACTIVE | OUTPATIENT
Start: 2024-12-04 | End: 2025-11-29

## 2024-12-09 ENCOUNTER — TELEPHONE (OUTPATIENT)
Dept: NEUROLOGY | Facility: CLINIC | Age: 69
End: 2024-12-09
Payer: MEDICARE

## 2024-12-10 ENCOUNTER — PATIENT MESSAGE (OUTPATIENT)
Dept: ADMINISTRATIVE | Facility: CLINIC | Age: 69
End: 2024-12-10
Payer: MEDICARE

## 2024-12-11 ENCOUNTER — OFFICE VISIT (OUTPATIENT)
Dept: HOME HEALTH SERVICES | Facility: CLINIC | Age: 69
End: 2024-12-11
Payer: MEDICARE

## 2024-12-11 VITALS — BODY MASS INDEX: 24.29 KG/M2 | HEIGHT: 62 IN | WEIGHT: 132 LBS

## 2024-12-11 DIAGNOSIS — J84.112 IDIOPATHIC PULMONARY FIBROSIS: ICD-10-CM

## 2024-12-11 DIAGNOSIS — M05.79 RHEUMATOID ARTHRITIS INVOLVING MULTIPLE SITES WITH POSITIVE RHEUMATOID FACTOR: ICD-10-CM

## 2024-12-11 DIAGNOSIS — K21.9 GASTROESOPHAGEAL REFLUX DISEASE WITHOUT ESOPHAGITIS: ICD-10-CM

## 2024-12-11 DIAGNOSIS — J84.9 INTERSTITIAL LUNG DISEASE: ICD-10-CM

## 2024-12-11 DIAGNOSIS — D84.9 IMMUNODEFICIENCY, UNSPECIFIED: ICD-10-CM

## 2024-12-11 DIAGNOSIS — M81.0 AGE-RELATED OSTEOPOROSIS WITHOUT CURRENT PATHOLOGICAL FRACTURE: ICD-10-CM

## 2024-12-11 DIAGNOSIS — Z00.00 ENCOUNTER FOR PREVENTIVE HEALTH EXAMINATION: Primary | ICD-10-CM

## 2024-12-11 PROCEDURE — 1126F AMNT PAIN NOTED NONE PRSNT: CPT | Mod: CPTII,95,NTX, | Performed by: NURSE PRACTITIONER

## 2024-12-11 PROCEDURE — 1160F RVW MEDS BY RX/DR IN RCRD: CPT | Mod: CPTII,95,NTX, | Performed by: NURSE PRACTITIONER

## 2024-12-11 PROCEDURE — 3288F FALL RISK ASSESSMENT DOCD: CPT | Mod: CPTII,95,NTX, | Performed by: NURSE PRACTITIONER

## 2024-12-11 PROCEDURE — 1101F PT FALLS ASSESS-DOCD LE1/YR: CPT | Mod: CPTII,95,NTX, | Performed by: NURSE PRACTITIONER

## 2024-12-11 PROCEDURE — 1159F MED LIST DOCD IN RCRD: CPT | Mod: CPTII,95,NTX, | Performed by: NURSE PRACTITIONER

## 2024-12-11 PROCEDURE — 1158F ADVNC CARE PLAN TLK DOCD: CPT | Mod: CPTII,95,NTX, | Performed by: NURSE PRACTITIONER

## 2024-12-11 PROCEDURE — G0439 PPPS, SUBSEQ VISIT: HCPCS | Mod: 95,NTX,, | Performed by: NURSE PRACTITIONER

## 2024-12-11 RX ORDER — FAMOTIDINE 40 MG/1
40 TABLET, FILM COATED ORAL 2 TIMES DAILY
COMMUNITY
Start: 2024-12-10

## 2024-12-20 DIAGNOSIS — M81.0 OSTEOPOROSIS, UNSPECIFIED OSTEOPOROSIS TYPE, UNSPECIFIED PATHOLOGICAL FRACTURE PRESENCE: ICD-10-CM

## 2024-12-20 RX ORDER — TERIPARATIDE 250 UG/ML
20 INJECTION, SOLUTION SUBCUTANEOUS DAILY
Qty: 2.48 ML | Refills: 11 | Status: SHIPPED | OUTPATIENT
Start: 2024-12-20 | End: 2025-12-15

## 2024-12-21 PROBLEM — J84.112 IDIOPATHIC PULMONARY FIBROSIS: Status: ACTIVE | Noted: 2024-12-21

## 2024-12-22 NOTE — PATIENT INSTRUCTIONS
Counseling and Referral of Other Preventative  (Italic type indicates deductible and co-insurance are waived)    Patient Name: Juan Cruz  Today's Date: 12/21/2024    Health Maintenance       Date Due Completion Date    TETANUS VACCINE Never done ---    RSV Vaccine (Age 60+ and Pregnant patients) (1 - Risk 60-74 years 1-dose series) Never done ---    Influenza Vaccine (1) Never done ---    COVID-19 Vaccine (4 - 2024-25 season) 09/01/2024 8/25/2021    Mammogram 04/18/2025 4/18/2024    DEXA Scan 06/28/2027 6/28/2024    Colorectal Cancer Screening 11/18/2027 11/18/2024    Lipid Panel 11/04/2029 11/4/2024        No orders of the defined types were placed in this encounter.    The following information is provided to all patients.  This information is to help you find resources for any of the problems found today that may be affecting your health:                  Living healthy guide: www.[facility].[place].[facility]      Understanding Diabetes: www.diabetes.org      Eating healthy: www.cdc.gov/healthyweight      CDC home safety checklist: www.cdc.gov/steadi/patient.html      Agency on Aging: www.goea.[place].gov      Alcoholics anonymous (AA): www.aa.org      Physical Activity: www.nanette.nih.gov/nr0yabx      Tobacco use: www.quitwithusla.org

## 2024-12-22 NOTE — PROGRESS NOTES
"The patient location is: [place]  The chief complaint leading to consultation is: Health Risk Assessment    Visit type: audiovisual    Face to Face time with patient: 20  35 minutes of total time spent on the encounter, which includes face to face time and non-face to face time preparing to see the patient (eg, review of tests), Obtaining and/or reviewing separately obtained history, Documenting clinical information in the electronic or other health record, Independently interpreting results (not separately reported) and communicating results to the patient/family/caregiver, or Care coordination (not separately reported).         Each patient to whom he or she provides medical services by telemedicine is:  (1) informed of the relationship between the physician and patient and the respective role of any other health care provider with respect to management of the patient; and (2) notified that he or she may decline to receive medical services by telemedicine and may withdraw from such care at any time.    Notes:       Juan Cruz presented for an initial Medicare AWV today. The following components were reviewed and updated:    Medical history  Family History  Social history  Allergies and Current Medications  Health Risk Assessment  Health Maintenance  Care Team    **See Completed Assessments for Annual Wellness visit with in the encounter summary    The following assessments were completed:  Depression Screening  Cognitive function Screening  Timed Get Up Test  Whisper Test      Opioid documentation:      Patient does not have a current opioid prescription.          Vitals:    12/11/24 1101   Weight: 59.9 kg (132 lb)   Height: 5' 2" (1.575 m)     Body mass index is 24.14 kg/m².       Physical Exam  Constitutional:       Appearance: Normal appearance.   Neurological:      General: No focal deficit present.      Mental Status: She is alert and oriented to person, place, and time.           Diagnoses and health risks " identified today and associated recommendations/orders:  1. Encounter for preventive health examination  Assessments completed. Preventive measures, health maintenance, and immunizations reviewed with patient.    2. Rheumatoid arthritis involving multiple sites with positive rheumatoid factor  Stable, followed by Rheumatology.    3. Immunodeficiency, unspecified  Stable, followed by Rheumatology.    4. Idiopathic pulmonary fibrosis  Stable, followed by Transplant.    5. Interstitial lung disease  Stable, followed by     6. Age-related osteoporosis without current pathological fracture  Stable, patient on Teriparatide. Followed by PCP.    7. Gastroesophageal reflux disease without esophagitis  Stable, patient on Pepcid. Followed by PCP.      Provided Juan with a 5-10 year written screening schedule and personal prevention plan. Recommendations were developed using the USPSTF age appropriate recommendations. Education, counseling, and referrals were provided as needed.  After Visit Summary printed and given to patient which includes a list of additional screenings\tests needed.    Follow up in about 1 year (around 12/11/2025) for your next annual wellness visit.      Chelsea Adan NP  I offered to discuss advanced care planning, including how to pick a person who would make decisions for you if you were unable to make them for yourself, called a health care power of , and what kind of decisions you might make such as use of life sustaining treatments such as ventilators and tube feeding when faced with a life limiting illness recorded on a living will that they will need to know. (How you want to be cared for as you near the end of your natural life)     X Patient is interested in learning more about how to make advanced directives.  I provided them paperwork and offered to discuss this with them.

## 2024-12-31 ENCOUNTER — PROCEDURE VISIT (OUTPATIENT)
Dept: NEUROLOGY | Facility: CLINIC | Age: 69
End: 2024-12-31
Payer: MEDICARE

## 2024-12-31 DIAGNOSIS — M05.79 RHEUMATOID ARTHRITIS INVOLVING MULTIPLE SITES WITH POSITIVE RHEUMATOID FACTOR: ICD-10-CM

## 2024-12-31 DIAGNOSIS — M25.60 STIFFNESS IN JOINT: ICD-10-CM

## 2024-12-31 PROCEDURE — 95913 NRV CNDJ TEST 13/> STUDIES: CPT | Mod: S$GLB,,, | Performed by: PSYCHIATRY & NEUROLOGY

## 2024-12-31 PROCEDURE — 95885 MUSC TST DONE W/NERV TST LIM: CPT | Mod: S$GLB,,, | Performed by: PSYCHIATRY & NEUROLOGY

## 2025-01-02 ENCOUNTER — PATIENT MESSAGE (OUTPATIENT)
Dept: NEUROLOGY | Facility: CLINIC | Age: 70
End: 2025-01-02
Payer: MEDICARE

## 2025-01-02 NOTE — PROCEDURES
Department of Neurology  Phone No: [phone], Fax: [phone]    Neurography & Electromyography Report        Full Name: Juan Cruz Gender: Female  Patient ID: [id number] YOB: 1955      Visit Date: 12/31/2024 3:07 PM  Age: 69 Years  Examining Physician: Tracee Perry MD  Height: 5 feet 2 inch  Weight: 132 lbs        Juan Cruz [id number] 12/31/2024 3:07 PM     Reason for Referral:    Concern for myopathy/myositis.      Relevant medical diagnoses:    Rheumatoid arthritis.    Chronic immunosuppression.      Juan Cruz [id number] 12/31/2024 3:07 PM     History and Examination:    69-year-old female with RA and almost 1 year history of steroid use.  On examination, normal reflexes, strength, vibration and pinprick sensation.    Technical Difficulties:    None.      Interpretation:     Abnormal study.  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 The differential diagnosis for sensory polyneuropathy may include vitamin deficiency, immune mediated, paraneoplastic, toxic/metabolic neuropathy.  There is no evidence of myopathy, myositis, right cervical or lumbosacral radiculopathy.    Tracee Perry MD, FRCPE, FCPS, CHCQM  Neuromuscular Consultant  [facility]    Juan Cruz [id number] 12/31/2024 3:07 PM                  Sensory NCS      Nerve / Sites Rec. Site Segments Onset Lat Peak Lat Onset Yaniv Temp. Amp Distance      ms ms m/s °C µV mm   L Median - Dig II (Antidromic)      Wrist Index Wrist - Index 2.50 3.28 56.0 33.1 48.5 140      Ref.  Ref. <=3.30 <=4.00   >=7.0    R Median - Dig II (Antidromic)      Wrist Index Wrist - Index 2.66 3.54 52.7 33.5 26.6 140      Ref.  Ref. <=3.30 <=4.00   >=7.0    L Ulnar - Dig V (Antidromic)      Wrist Dig V Wrist - Dig V 2.14 2.97 65.6 33.1 44.4 140      Ref.  Ref. <=3.10 <=4.00   >=5.0    R Ulnar - Dig V (Antidromic)      Wrist Dig V Wrist - Dig V 1.93 2.86 72.6 33.8 38.5 140      Ref.  Ref. <=3.10 <=4.00   >=5.0    L Radial -  Superficial (Antidromic)      Forearm Wrist Forearm - Wrist 1.41 2.03 71.1 33.3 54.4 100      Ref.  Ref. <=2.20 <=2.80   >=7.0    R Radial - Superficial (Antidromic)      Forearm Wrist Forearm - Wrist 1.41 2.08 71.1 33.8 56.2 100      Ref.  Ref. <=2.20 <=2.80   >=7.0    L Sural - (Antidromic)      Calf Ankle Calf - Ankle NR NR NR 32.4       Ref.  Ref. <=3.60 <=4.50   >=4.0    R Sural - (Antidromic)      Calf Ankle Calf - Ankle NR NR NR 31.5       Ref.  Ref. <=3.60 <=4.50   >=4.0    L Superficial peroneal - (Antidromic)      Lat leg Ankle Lat leg - Ankle NR NR NR 32.1       Ref.  Ref.  <=4.40   >=5.0    R Superficial peroneal - (Antidromic)      Lat leg Ankle Lat leg - Ankle NR NR NR 31.3       Ref.  Ref.  <=4.40   >=5.0        Motor NCS      Nerve / Sites Muscle Segments Latency Ref. Velocity Ref. Amplitude Ref. Temp. Dur. Distance      ms ms m/s m/s mV mV °C ms mm   L Median - APB      Wrist APB Wrist - APB 2.77 <=4.40   7.9 >=3.8 33.3 6.7 80      Elbow APB Elbow - Wrist 6.38  61 >=51 7.6  33.3 6.5 220   R Median - APB      Wrist APB Wrist - APB 3.15 <=4.40   8.8 >=3.8 33.8 6.0 80      Elbow APB Elbow - Wrist 6.81  63 >=51 8.3  33.8 5.6 230   L Ulnar - ADM      Wrist ADM Wrist - ADM 2.23 <=3.70   7.7 >=7.9 33.1 6.6 80      B.Elbow ADM B.Elbow - Wrist 5.27  62 >=52 7.1  33.3 6.8 190      A.Elbow ADM A.Elbow - B.Elbow 6.73  75 >=43 6.4  33.3 6.8 110     A.Elbow - Wrist       33.3     R Ulnar - ADM      Wrist ADM Wrist - ADM 2.25 <=3.70   10.0 >=7.9 33.6 5.8 80      B.Elbow ADM B.Elbow - Wrist 5.85  58 >=52 9.7  33.5 6.3 210      A.Elbow ADM A.Elbow - B.Elbow 7.27  64 >=43 9.7  33.5 6.2 90     A.Elbow - Wrist       33.5     L Peroneal - EDB      Ankle EDB Ankle - EDB 3.29 <=6.50   4.8 >=1.1 31.9 8.3 80      B. Fib Head EDB B. Fib Head - Ankle 9.69  52 >=39 4.3  31.7 8.7 330      A. Fib Head EDB A. Fib Head - B. Fib Head 11.23  58 >=42 3.8  31.6 8.8 90   R Peroneal - EDB      Ankle EDB Ankle -  EDB 3.77 <=6.50   4.3 >=1.1 31.2 8.5 80      B. Fib Head EDB B. Fib Head - Ankle 9.88  52 >=39 4.2  31.2 9.1 320      A. Fib Head EDB A. Fib Head - B. Fib Head 11.50  55 >=42 3.9  31.2 8.9 90   L Tibial - AH      Ankle AH Ankle - AH 3.50 <=6.10   13.2 >=1.1 31.5 6.4 80   R Tibial - AH      Ankle AH Ankle - AH 3.46 <=6.10   10.2 >=1.1 31.1 5.5 80       F  Wave      Nerve F Latency Ref. M Latency F - M Lat    ms ms ms ms   L Median - APB 24.8 <=28.6 3.4 21.4   L Ulnar - ADM 25.1 <=28.8 2.7 22.4   L Peroneal - EDB 42.0 <=55.0 3.9 38.1   L Tibial - AH 45.0 <=56.0 4.1 40.9   R Peroneal - EDB 45.6 <=55.0 4.2 41.5   R Tibial - AH 43.6 <=56.0 5.3 38.3   R Median - APB 24.8 <=28.6 3.1 21.7   R Ulnar - ADM 24.8 <=28.8 2.1 22.7       EMG Summary Table     Spontaneous Recruitment MUAP   Muscle Nerve Roots IA Fib PSW Fasc Other Pattern Amp Dur. PPP   R. First dorsal interosseous Ulnar C8-T1 N None None None N N N N N   R. Biceps brachii Musculocutaneous C5-C6 N None None None N N N N N   R. Triceps brachii Radial C6-C8 N None None None N N N N N   R. Tibialis anterior Deep peroneal (Fibular) L4-L5 N None None None N N N N N   R. Gastrocnemius Tibial S1-S2 N None None None N N N N N   R. Quadriceps Femoral L2-L4 N None None None N N N N N   L. C7 paraspinal Spinal C7- N None None None N       R. C8 paraspinal Spinal C8- N None None None N

## 2025-01-08 ENCOUNTER — PATIENT MESSAGE (OUTPATIENT)
Dept: RHEUMATOLOGY | Facility: CLINIC | Age: 70
End: 2025-01-08
Payer: MEDICARE

## 2025-01-18 DIAGNOSIS — R00.0 SINUS TACHYCARDIA: ICD-10-CM

## 2025-01-18 NOTE — TELEPHONE ENCOUNTER
No care due was identified.  [facility] Embedded Care Due Messages. Reference number: [id number].   1/18/2025 10:37:07 AM CST

## 2025-01-19 RX ORDER — METOPROLOL SUCCINATE 25 MG/1
25 TABLET, EXTENDED RELEASE ORAL DAILY
Qty: 90 TABLET | Refills: 3 | Status: SHIPPED | OUTPATIENT
Start: 2025-01-19

## 2025-01-20 NOTE — TELEPHONE ENCOUNTER
Refill Decision Note   Juan Cruz  is requesting a refill authorization.  Brief Assessment and Rationale for Refill:  Approve     Medication Therapy Plan:         Comments:     Note composed:8:45 PM 01/19/2025

## 2025-01-24 ENCOUNTER — TELEPHONE (OUTPATIENT)
Dept: TRANSPLANT | Facility: CLINIC | Age: 70
End: 2025-01-24
Payer: MEDICARE

## 2025-01-27 ENCOUNTER — HOSPITAL ENCOUNTER (OUTPATIENT)
Dept: PULMONOLOGY | Facility: CLINIC | Age: 70
Discharge: HOME | End: 2025-01-27
Payer: MEDICARE

## 2025-01-27 ENCOUNTER — OFFICE VISIT (OUTPATIENT)
Dept: TRANSPLANT | Facility: CLINIC | Age: 70
End: 2025-01-27
Attending: INTERNAL MEDICINE
Payer: MEDICARE

## 2025-01-27 VITALS
WEIGHT: 132 LBS | RESPIRATION RATE: 16 BRPM | HEART RATE: 83 BPM | HEIGHT: 62 IN | SYSTOLIC BLOOD PRESSURE: 106 MMHG | BODY MASS INDEX: 24.29 KG/M2 | DIASTOLIC BLOOD PRESSURE: 72 MMHG | OXYGEN SATURATION: 100 % | TEMPERATURE: 99 F

## 2025-01-27 DIAGNOSIS — J84.9 INTERSTITIAL LUNG DISEASE: ICD-10-CM

## 2025-01-27 DIAGNOSIS — M05.79 RHEUMATOID ARTHRITIS INVOLVING MULTIPLE SITES WITH POSITIVE RHEUMATOID FACTOR: ICD-10-CM

## 2025-01-27 DIAGNOSIS — K21.9 GASTROESOPHAGEAL REFLUX DISEASE, UNSPECIFIED WHETHER ESOPHAGITIS PRESENT: ICD-10-CM

## 2025-01-27 DIAGNOSIS — J84.9 INTERSTITIAL PULMONARY DISEASE, UNSPECIFIED: Primary | ICD-10-CM

## 2025-01-27 DIAGNOSIS — J30.2 SEASONAL ALLERGIC RHINITIS, UNSPECIFIED TRIGGER: ICD-10-CM

## 2025-01-27 LAB
DLCO SINGLE BREATH LLN: 14.45
DLCO SINGLE BREATH PRE REF: 55.2 %
DLCO SINGLE BREATH REF: 20.19
DLCOC SBVA LLN: 2.82
DLCOC SBVA REF: 4.39
DLCOC SINGLE BREATH LLN: 14.45
DLCOC SINGLE BREATH REF: 20.19
DLCOCSBVAULN: 5.95
DLCOCSINGLEBREATHULN: 25.92
DLCOSINGLEBREATHULN: 25.92
DLCOSINGLEBREATHZSCORE: -2.6
DLCOVA LLN: 2.82
DLCOVA PRE REF: 92.1 %
DLCOVA PRE: 4.04 ML/(MIN*MMHG*L) (ref 2.82–5.95)
DLCOVA REF: 4.39
DLCOVAULN: 5.95
ERV LLN: -16449.36
ERV PRE REF: 134.7 %
ERV REF: 0.64
ERVULN: ABNORMAL
FEF 25 75 LLN: 1.14
FEF 25 75 PRE REF: 131 %
FEF 25 75 REF: 2.54
FEV05 LLN: 0.76
FEV05 REF: 1.62
FEV1 FVC LLN: 65
FEV1 FVC PRE REF: 111.8 %
FEV1 FVC REF: 79
FEV1 LLN: 1.53
FEV1 PRE REF: 91.2 %
FEV1 REF: 2.09
FEV1FVCZSCORE: 1.33
FEV1ZSCORE: -0.55
FRCPLETH LLN: 1.77
FRCPLETH PREREF: 67.5 %
FRCPLETH REF: 2.6
FRCPLETHULN: 3.42
FVC LLN: 1.96
FVC PRE REF: 81.1 %
FVC REF: 2.68
FVCZSCORE: -1.15
IVC PRE: 2.03 L (ref 1.96–3.43)
IVC SINGLE BREATH LLN: 1.96
IVC SINGLE BREATH PRE REF: 75.7 %
IVC SINGLE BREATH REF: 2.68
IVCSINGLEBREATHULN: 3.43
LLN IC: -16448.24
PEF LLN: 3.81
PEF PRE REF: 130.9 %
PEF REF: 5.42
PHYSICIAN COMMENT: ABNORMAL
PRE DLCO: 11.14 ML/(MIN*MMHG) (ref 14.45–25.92)
PRE ERV: 0.86 L (ref -16449.36–16450.64)
PRE FEF 25 75: 3.33 L/S (ref 1.14–3.94)
PRE FET 100: 6.01 SEC
PRE FEV05 REF: 105.2 %
PRE FEV1 FVC: 87.76 % (ref 65.33–89.85)
PRE FEV1: 1.91 L (ref 1.53–2.63)
PRE FEV5: 1.7 L (ref 0.76–2.48)
PRE FRC PL: 1.75 L (ref 1.77–3.42)
PRE FVC: 2.17 L (ref 1.96–3.43)
PRE IC: 1.31 L (ref -16448.24–16451.76)
PRE PEF: 7.09 L/S (ref 3.81–7.02)
PRE REF IC: 74.4 %
PRE RV: 0.89 L (ref 1.38–2.53)
PRE TLC: 3.06 L (ref 3.62–5.59)
RAW PRE REF: 90.4 %
RAW PRE: 2.77 CMH2O*S/L (ref 3.06–3.06)
RAW REF: 3.06
REF IC: 1.76
RV LLN: 1.38
RV PRE REF: 45.4 %
RV REF: 1.95
RVTLC LLN: 33
RVTLC PRE REF: 68.3 %
RVTLC PRE: 28.98 % (ref 32.83–52.01)
RVTLC REF: 42
RVTLCULN: 52
RVULN: 2.53
SGAW PRE REF: 167.5 %
SGAW PRE: 0.17 1/(CMH2O*S) (ref 0.1–0.1)
SGAW REF: 0.1
TLC LLN: 3.62
TLC PRE REF: 66.5 %
TLC REF: 4.6
TLC ULN: 5.59
TLCZSCORE: -2.57
ULN IC: ABNORMAL
VA PRE: 2.76 L (ref 4.45–4.45)
VA SINGLE BREATH LLN: 4.45
VA SINGLE BREATH PRE REF: 61.9 %
VA SINGLE BREATH REF: 4.45
VASINGLEBREATHULN: 4.45
VC LLN: 1.96
VC PRE REF: 81.1 %
VC PRE: 2.17 L (ref 1.96–3.43)
VC REF: 2.68
VC ULN: 3.43

## 2025-01-27 PROCEDURE — 99999 PR PBB SHADOW E&M-EST. PATIENT-LVL IV: CPT | Mod: PBBFAC,TXP,, | Performed by: INTERNAL MEDICINE

## 2025-01-27 PROCEDURE — 94010 BREATHING CAPACITY TEST: CPT | Mod: NTX,S$GLB,, | Performed by: INTERNAL MEDICINE

## 2025-01-27 PROCEDURE — 94729 DIFFUSING CAPACITY: CPT | Mod: NTX,S$GLB,, | Performed by: INTERNAL MEDICINE

## 2025-01-27 PROCEDURE — 1160F RVW MEDS BY RX/DR IN RCRD: CPT | Mod: CPTII,NTX,S$GLB, | Performed by: INTERNAL MEDICINE

## 2025-01-27 PROCEDURE — 3288F FALL RISK ASSESSMENT DOCD: CPT | Mod: CPTII,NTX,S$GLB, | Performed by: INTERNAL MEDICINE

## 2025-01-27 PROCEDURE — 1126F AMNT PAIN NOTED NONE PRSNT: CPT | Mod: CPTII,NTX,S$GLB, | Performed by: INTERNAL MEDICINE

## 2025-01-27 PROCEDURE — 3074F SYST BP LT 130 MM HG: CPT | Mod: CPTII,NTX,S$GLB, | Performed by: INTERNAL MEDICINE

## 2025-01-27 PROCEDURE — 3008F BODY MASS INDEX DOCD: CPT | Mod: CPTII,NTX,S$GLB, | Performed by: INTERNAL MEDICINE

## 2025-01-27 PROCEDURE — 3078F DIAST BP <80 MM HG: CPT | Mod: CPTII,NTX,S$GLB, | Performed by: INTERNAL MEDICINE

## 2025-01-27 PROCEDURE — 1159F MED LIST DOCD IN RCRD: CPT | Mod: CPTII,NTX,S$GLB, | Performed by: INTERNAL MEDICINE

## 2025-01-27 PROCEDURE — 1101F PT FALLS ASSESS-DOCD LE1/YR: CPT | Mod: CPTII,NTX,S$GLB, | Performed by: INTERNAL MEDICINE

## 2025-01-27 PROCEDURE — 99214 OFFICE O/P EST MOD 30 MIN: CPT | Mod: 25,NTX,S$GLB, | Performed by: INTERNAL MEDICINE

## 2025-01-27 PROCEDURE — 94726 PLETHYSMOGRAPHY LUNG VOLUMES: CPT | Mod: NTX,S$GLB,, | Performed by: INTERNAL MEDICINE

## 2025-01-27 NOTE — LETTER
January 28, 2025        Ibis Holcomb  [address]  [facility] [address]  Phone: [phone]  Fax: [phone]             [facility] 1st Fl  [address]  [facility] [address]  Phone: [phone]   Patient: Juan Cruz   MR Number: [id number]   YOB: 1955   Date of Visit: 1/27/2025       Dear Dr. Ibis Holcomb    Thank you for referring Juan Cruz to me for evaluation. Attached you will find relevant portions of my assessment and plan of care.    If you have questions, please do not hesitate to call me. I look forward to following Juan Cruz along with you.    Sincerely,    Barbara Haile, DO    Enclosure    If you would like to receive this communication electronically, please contact [email or web address] or [phone] to request [employer] access.    [employer] is a tool which provides read-only access to select patient information with whom you have a relationship. Its easy to use and provides real time access to review your patients record including encounter summaries, notes, results, and demographic information.    If you feel you have received this communication in error or would no longer like to receive these types of communications, please e-mail [email or web address]

## 2025-01-28 NOTE — PROGRESS NOTES
ADVANCED LUNG DISEASE FOLLOW-UP                                                                                                                     Reason for Visit:  RA-ILD, cough    Referring Physician: Ibis Holcomb    History of Present Illness: Juan Cruz is a 70 y.o. female who is on 0L of oxygen.  She is on no assisted ventilation.  Her New [place] Heart Association Class is II and a Karnofsky score of 80% - Normal activity with effort: some symptoms of disease. She is not diabetic.     She presents today for follow-up of RA-ILD.  She is doing well from a respiratory standpoint and feels like her cough is at baseline.  No recent illnesses or hospitalizations.  Rhinitis is controlled.  Her biggest issue is currently with pelvic girdle and upper thigh pain and stiffness.  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  Not on any therapy.  She attributes findings to her rituxan infusions which exacerbate her muscle symptoms.  She is due for her next infusion in May but does not plan to get it.  She plans to leave for [facility] on Sunday.           Past Medical History:   Diagnosis Date    Bruise 09/12/2022    Chronic bilateral low back pain without sciatica 11/20/2024    Chronic sinusitis, unspecified     Dyspnea on exertion 09/16/2022    Elevated liver enzymes 09/12/2022    History of recent steroid use 11/14/2024    History of SIADH     Idiopathic pulmonary fibrosis 09/12/2022    Interstitial pulmonary disease, unspecified 07/20/2023    LLL pneumonia 12/20/2022    Mitral valve prolapse     Mitral valve prolapse 11/06/2024    Osteopenia     Petechiae 09/12/2022    Pulmonary fibrosis 09/12/2022    Rheumatoid arthritis involving multiple sites     Skin rash 03/09/2023    Stiffness in joint 11/14/2024       Past Surgical History:   Procedure Laterality Date    BREAST BIOPSY Bilateral     FUNCTIONAL ENDOSCOPIC SINUS SURGERY (FESS)      ORIF WRIST FRACTURE Right        Allergies:  Methotrexate, Gluten protein, Paxlovid [nirmatrelvir-ritonavir], Ppd black rubber mix, and Prolia [denosumab]    Current Outpatient Medications   Medication Sig    albuterol sulfate (VENTOLIN HFA INHL) Ventolin HFA    B-complex with vitamin C (Z-BEC OR EQUIV) tablet Take 1 tablet by mouth 2 (two) times a day.    BREO ELLIPTA 200-25 mcg/dose DsDv diskus inhaler INHALE 1 PUFF BY MOUTH ONCE DAILY (CONTROLLER)    calcium citrate-vitamin D3 315-200 mg (CITRACAL+D) 315-200 mg-unit per tablet Take 1 tablet by mouth 2 (two) times daily.    estradioL (VAGIFEM) 10 mcg Tab Yuvafem 10 mcg vaginal tablet   INSERT 1 TABLET VAGINALLY TWICE A WEEK    famotidine (PEPCID) 40 MG tablet Take 40 mg by mouth 2 (two) times daily.    magnesium oxide (MAG-OX) 400 mg (241.3 mg magnesium) tablet Take 400 mg by mouth 2 (two) times daily.    metoprolol succinate (TOPROL-XL) 25 MG 24 hr tablet Take 1 tablet (25 mg total) by mouth once daily.    RESTASIS 0.05 % ophthalmic emulsion Place 1 drop into both eyes once daily. Has not started    cetirizine (ZYRTEC) 10 MG tablet Take 1 tablet (10 mg total) by mouth once daily.     Current Facility-Administered Medications   Medication    riTUXimab-abbs (TRUXIMA) 1,000 mg in sodium chloride 0.9% 1,000 mL infusion (conc: 1 mg/mL)    riTUXimab-abbs (TRUXIMA) 1,000 mg in sodium chloride 0.9% 1,000 mL infusion (conc: 1 mg/mL)    riTUXimab-abbs (TRUXIMA) 1,000 mg in sodium chloride 0.9% 1,000 mL infusion (conc: 1 mg/mL)    sodium chloride 0.9% flush 10 mL       Immunization History   Administered Date(s) Administered    COVID-19, MRNA, LN-S, PF (Pfizer) (Purple Cap) 12/31/2020, 01/25/2021, 08/25/2021    Pneumococcal Conjugate - 20 Valent 09/21/2023, 08/02/2024    Pneumococcal Polysaccharide - 23 Valent 09/16/2022    Yellow Fever 08/31/2017    Zoster Recombinant 01/18/2020, 08/19/2020     Family History:    Family History   Problem Relation Name Age of Onset    Breast cancer Neg Hx      Colon cancer Neg Hx       Ovarian cancer Neg Hx      Uterine cancer Neg Hx      Cervical cancer Neg Hx       Social History     Substance and Sexual Activity   Alcohol Use No      Social History     Substance and Sexual Activity   Drug Use No      Social History     Socioeconomic History    Marital status:    Tobacco Use    Smoking status: Never     Passive exposure: Never    Smokeless tobacco: Never   Substance and Sexual Activity    Alcohol use: No    Drug use: No    Sexual activity: Not Currently     Social Drivers of Health     Financial Resource Strain: Low Risk  (12/11/2024)    Overall Financial Resource Strain (CARDIA)     Difficulty of Paying Living Expenses: Not hard at all   Food Insecurity: No Food Insecurity (12/11/2024)    Hunger Vital Sign     Worried About Running Out of Food in the Last Year: Never true     Ran Out of Food in the Last Year: Never true   Transportation Needs: No Transportation Needs (12/11/2024)    PRAPARE - Transportation     Lack of Transportation (Medical): No     Lack of Transportation (Non-Medical): No   Physical Activity: Sufficiently Active (12/11/2024)    Exercise Vital Sign     Days of Exercise per Week: 7 days     Minutes of Exercise per Session: 50 min   Stress: Stress Concern Present (12/11/2024)    [employer] - Occupational Stress Questionnaire     Feeling of Stress : To some extent   Housing Stability: Low Risk  (12/11/2024)    Housing Stability Vital Sign     Unable to Pay for Housing in the Last Year: No     Homeless in the Last Year: No     Review of Systems   Constitutional:  Negative for chills, diaphoresis, fever, malaise/fatigue and weight loss.   HENT:  Negative for congestion, ear discharge, ear pain, hearing loss, nosebleeds, sinus pain, sore throat and tinnitus.    Eyes:  Negative for blurred vision, double vision, photophobia, pain, discharge and redness.   Respiratory:  Positive for cough (stable). Negative for hemoptysis, sputum production,  "shortness of breath (improved), wheezing and stridor.    Cardiovascular:  Negative for chest pain, palpitations, orthopnea, claudication, leg swelling and PND.   Gastrointestinal:  Negative for abdominal pain, blood in stool, constipation, diarrhea, heartburn, melena, nausea and vomiting.   Genitourinary:  Negative for dysuria, flank pain, frequency, hematuria and urgency.   Musculoskeletal:  Positive for myalgias. Negative for back pain, falls, joint pain and neck pain.   Skin:  Negative for itching and rash.   Neurological:  Negative for dizziness, tingling, tremors, sensory change, speech change, focal weakness, seizures, loss of consciousness, weakness and headaches.   Endo/Heme/Allergies:  Negative for environmental allergies and polydipsia. Bruises/bleeds easily.   Psychiatric/Behavioral:  Negative for depression, hallucinations, memory loss, substance abuse and suicidal ideas. The patient is not nervous/anxious and does not have insomnia.      Vitals  /72 (BP Location: Right arm, Patient Position: Sitting)   Pulse 83   Temp 98.5 °F (36.9 °C) (Oral)   Resp 16   Ht 5' 2" (1.575 m)   Wt 59.9 kg (132 lb)   LMP  (LMP Unknown)   SpO2 100%   BMI 24.14 kg/m²   Physical Exam  Vitals and nursing note reviewed.   Constitutional:       General: She is not in acute distress.     Appearance: She is well-developed. She is not diaphoretic.   HENT:      Head: Normocephalic and atraumatic.      Nose: Nose normal.      Mouth/Throat:      Pharynx: No oropharyngeal exudate.   Eyes:      General: No scleral icterus.     Conjunctiva/sclera: Conjunctivae normal.      Pupils: Pupils are equal, round, and reactive to light.   Neck:      Thyroid: No thyromegaly.      Vascular: No JVD.      Trachea: No tracheal deviation.   Cardiovascular:      Rate and Rhythm: Normal rate and regular rhythm.      Heart sounds: Normal heart sounds. No murmur heard.     No friction rub. No gallop.   Pulmonary:      Effort: Pulmonary effort " is normal. No respiratory distress.      Breath sounds: Normal breath sounds. No wheezing or rales.   Abdominal:      General: Bowel sounds are normal. There is no distension.      Palpations: Abdomen is soft.      Tenderness: There is no abdominal tenderness.   Musculoskeletal:         General: No deformity. Normal range of motion.      Cervical back: Normal range of motion and neck supple.   Skin:     General: Skin is warm and dry.      Capillary Refill: Capillary refill takes less than 2 seconds.      Coloration: Skin is not pale.      Findings: No erythema or rash.   Neurological:      Mental Status: She is alert and oriented to person, place, and time.      Cranial Nerves: No cranial nerve deficit.   Psychiatric:         Judgment: Judgment normal.         Labs:  Hospital Outpatient Visit on 01/27/2025   Component Date Value    Physician Comment 01/27/2025                      Value:Spirometry is normal. Lung volumes show mild restriction is present. Airway mechanics show normal airway resistance and increased conductance. DLCO is moderately decreased.   Â   Notes: No recent hemoglobin value available. DLCO interpretation assumes a normal hemoglobin value.      Pre FVC 01/27/2025 2.17     PRE FEV5 01/27/2025 1.70     Pre FEV1 01/27/2025 1.91     Pre FEV1 FVC 01/27/2025 87.76     Pre FEF 25 75 01/27/2025 3.33     Pre PEF 01/27/2025 7.09 (H)     Pre  01/27/2025 6.01     Pre DLCO 01/27/2025 11.14 (L)     DLCOVA PRE 01/27/2025 4.04     VA PRE 01/27/2025 2.76 (L)     IVC PRE 01/27/2025 2.03     Pre TLC 01/27/2025 3.06 (L)     VC PRE 01/27/2025 2.17     PRE IC 01/27/2025 1.31     Pre FRC PL 01/27/2025 1.75 (L)     Pre ERV 01/27/2025 0.86     Pre RV 01/27/2025 0.89 (L)     RVTLC PRE 01/27/2025 28.98 (L)     Raw PRE 01/27/2025 2.77 (L)     sGaw PRE 01/27/2025 0.17 (H)     FVC Ref 01/27/2025 2.68     FVC LLN 01/27/2025 1.96     FVC Pre Ref 01/27/2025 81.1     FEV05 REF 01/27/2025 1.62     FEV05 LLN 01/27/2025 0.76      PRE FEV05 REF 01/27/2025 105.2     FEV1 Ref 01/27/2025 2.09     FEV1 LLN 01/27/2025 1.53     FEV1 Pre Ref 01/27/2025 91.2     FEV1 FVC Ref 01/27/2025 79     FEV1 FVC LLN 01/27/2025 65     FEV1 FVC Pre Ref 01/27/2025 111.8     FEF 25 75 Ref 01/27/2025 2.54     FEF 25 75 LLN 01/27/2025 1.14     FEF 25 75 Pre Ref 01/27/2025 131.0     PEF Ref 01/27/2025 5.42     PEF LLN 01/27/2025 3.81     PEF Pre Ref 01/27/2025 130.9     TLC Ref 01/27/2025 4.60     TLC LLN 01/27/2025 3.62     TLC ULN 01/27/2025 5.59     TLC Pre Ref 01/27/2025 66.5     VC Ref 01/27/2025 2.68     VC LLN 01/27/2025 1.96     VC ULN 01/27/2025 3.43     VC Pre Ref 01/27/2025 81.1     REF IC 01/27/2025 1.76     LLN IC 01/27/2025 -64611.24     ULN IC 01/27/2025 49161.76     PRE REF IC 01/27/2025 74.4     FRCpleth Ref 01/27/2025 2.60     FRCpleth LLN 01/27/2025 1.77     FRC PLETH ULN 01/27/2025 3.42     FRCpleth PreRef 01/27/2025 67.5     ERV Ref 01/27/2025 0.64     ERV LLN 01/27/2025 -21194.36     ERV ULN 01/27/2025 65737.64     ERV Pre Ref 01/27/2025 134.7     RV Ref 01/27/2025 1.95     RV LLN 01/27/2025 1.38     RV ULN 01/27/2025 2.53     RV Pre Ref 01/27/2025 45.4     RVTLC Ref 01/27/2025 42     RVTLC LLN 01/27/2025 33     RV TLC ULN 01/27/2025 52     RVTLC Pre Ref 01/27/2025 68.3     Raw Ref 01/27/2025 3.06     Raw Pre Ref 01/27/2025 90.4     sGaw Ref 01/27/2025 0.10     sGaw Pre Ref 01/27/2025 167.5     DLCO Single Breath Ref 01/27/2025 20.19     DLCO Single Breath LLN 01/27/2025 14.45     DLCO SINGLEBREATH ULN 01/27/2025 25.92     DLCO Single Breath Pre R* 01/27/2025 55.2     DLCOc Single Breath Ref 01/27/2025 20.19     DLCOc Single Breath LLN 01/27/2025 14.45     DLCOC SINGLEBREATH ULN 01/27/2025 25.92     DLCOVA Ref 01/27/2025 4.39     DLCOVA LLN 01/27/2025 2.82     DLCOVA ULN 01/27/2025 5.95     DLCOVA Pre Ref 01/27/2025 92.1     DLCOc SBVA Ref 01/27/2025 4.39     DLCOc SBVA LLN 01/27/2025 2.82     DLCOCSBVA ULN 01/27/2025 5.95     VA Single  Breath Ref 01/27/2025 4.45     VA Single Breath LLN 01/27/2025 4.45     VA SINGLEBREATH ULN 01/27/2025 4.45     VA Single Breath Pre Ref 01/27/2025 61.9     IVC Single Breath Ref 01/27/2025 2.68     IVC Single Breath LLN 01/27/2025 1.96     IVC SINGLEBREATH ULN 01/27/2025 3.43     IVC Single Breath Pre Ref 01/27/2025 75.7     FVCZSCORE 01/27/2025 -1.15     SCH4OBAEGA 01/27/2025 -0.55     JEZ7FKMMUHNOS 01/27/2025 1.33     TLCZSCORE 01/27/2025 -2.57     DLCOSINGLEBREATHZSCORE 01/27/2025 -2.60            1/27/2025     2:30 PM 11/18/2024    12:26 PM 5/2/2024     9:54 AM 10/25/2023     1:41 PM 4/17/2023     1:19 PM 12/8/2022    10:24 AM 9/12/2022     6:00 PM   Pulmonary Function Tests   FVC 2.17 liters 2.15 liters 2.08 liters 2.16 liters 2.02 liters 1.86 liters 1.82 liters   FEV1 1.91 liters 1.88 liters 1.83 liters 1.85 liters 1.76 liters 1.64 liters 1.62 liters   TLC (liters) 3.06 liters 3.09 liters 2.67 liters 2.77 liters 2.94 liters 2.88 liters    DLCO (ml/mmHg sec) 11.14 ml/mmHg sec 11.9 ml/mmHg sec 9.13 ml/mmHg sec 8.32 ml/mmHg sec 9.22 ml/mmHg sec 8.62 ml/mmHg sec    FVC% 81 93 89 92 86 78 77   FEV1% 91 101.2 98 98 93 86 85   FEF 25-75 3.33 3.14 3.13 3 2.9 2.93 2.69   FEF 25-75% 131 121 121 166 159 160 146   TLC% 66 67 58 60 63 63    RV 0.89 0.87 0.6 0.6 0.92 1.01    RV% 45 45 31 31 48 53    DLCO% 55 58 45 41 45 42          8/22/2024     8:04 AM 5/2/2024     8:36 AM 7/20/2023     9:33 AM 4/17/2023    11:38 AM 12/22/2022    10:05 AM 12/8/2022     9:08 AM 9/12/2022    11:43 AM   6MW   6MWT Status completed without stopping completed without stopping completed without stopping completed without stopping completed without stopping completed without stopping completed without stopping   Patient Reported Dyspnea;Other (Comment) No complaints No complaints Dyspnea No complaints Dyspnea Dyspnea   Was O2 used? No No No No No No No   6MW Distance walked (feet) 1450 feet 1532 feet 1500 feet 1500 feet 1300 feet 1332 feet 1300  feet   Distance walked (meters) 441.96 meters 466.95 meters 457.2 meters 457.2 meters 396.24 meters 405.99 meters 396.24 meters   Did patient stop? No No  No No No No   Oxygen Saturation 99 % 98 % 98 % 98 % 98 % 99 % 98 %   Supplemental Oxygen Room Air Room Air Room Air Room Air Room Air Room Air Room Air   Heart Rate 87 bpm 85 bpm 95 bpm 85 bpm 78 bpm 81 bpm 89 bpm   Blood Pressure 105/69 114/75 109/62 113/64 118/77 145/78 120/74   Rigoberto Dyspnea Rating  moderate nothing at all light nothing at all light moderate light   Oxygen Saturation 99 % 98 % 94 % 94 % 95 % 98 % 95 %   Supplemental Oxygen Room Air Room Air  Room Air Room Air Room Air Room Air   Heart Rate 114 bpm 113 bpm 126 bpm 125 bpm 113 bpm 106 bpm 113 bpm   Blood Pressure 117/74 118/76 114/62 132/67 130/86 129/66 131/77   Rigoberto Dyspnea Rating  heavy moderate moderate somewhat heavy somewhat heavy somewhat heavy somewhat heavy   Recovery Time (seconds) 119 seconds 135 seconds 180 seconds 149 seconds 180 seconds 123 seconds 72 seconds   Oxygen Saturation 99 % 98 % 97 % 98 % 99 % 98 % 98 %   Supplemental Oxygen Room Air Room Air Room Air Room Air Room Air Room Air Room Air   Heart Rate 88 bpm 95 bpm 101 bpm 106 bpm 83 bpm 94 bpm 97 bpm       Imaging:  Results for orders placed during the hospital encounter of 07/27/22    CT Chest Without Contrast    Narrative  EXAMINATION:  CT CHEST WITHOUT CONTRAST    CLINICAL HISTORY:  Interstitial lung disease; Cough, unspecified    TECHNIQUE:  Low dose axial images, sagittal and coronal reformations were obtained from the thoracic inlet to the lung bases.  Intravenous contrast was not administered.    COMPARISON:  CT thorax 06/28/2021    FINDINGS:  Base of Neck: Imaged portions of the base of the neck are grossly unremarkable.    Aorta: 3-branch vessel configuration of a left-sided type 3 aortic arch.  No hyperdense crescent sign, aneurysmal degeneration, periaortic fat stranding or periaortic fluid.    Heart: Heart is  normal in size.  No significant pericardial thickening. No appreciable coronary artery calcifications.    Pulmonary vasculature: Pulmonary arteries are not enlarged and distribute normally.  There are four pulmonary veins.    Axilla/Alexandra/Mediastinum: Prominent mediastinal lymph nodes however, no rachael axillary or mediastinal lymphadenopathy.  Evaluation of hilar lymph nodes is limited without IV contrast.    Airways: Trachea and mainstem bronchi are patent.  Symmetric mild central bronchiectasis present.    Lungs/Pleura: Significant interval progression of previously noted subpleural predominant reticular and ground-glass airspace opacities associated with bilateral lower lobe volume loss, architectural distortion, symmetric mild central bronchiectasis and apicobasal gradient.  No pleural effusions.  No pneumothorax.    Esophagus: Small hiatal hernia, not significantly changed.  Otherwise, normal in course and caliber however, evaluation is limited given the lack of oral contrast.    Soft tissues: Imaged soft tissues are grossly unremarkable.    Osseous structures: Diffuse osseous demineralization and mild multilevel degenerative changes of the imaged spine.  No acute displaced fracture, dislocation or suspicious lytic/blastic osseous lesion.    Upper Abdomen: Imaged portions of the upper abdomen are grossly unremarkable.    Impression  1. Significant interval progression of previously noted subpleural predominant reticular and ground-glass airspace opacities associated with bilateral lower lobe volume loss, architectural distortion, symmetric mild central bronchiectasis and apicobasal gradient suggestive of UIP pattern of interstitial lung disease which may reflect IPF.  Additional diagnostic considerations include NSIP, asbestosis, fibrotic hypersensitivity pneumonitis connective tissue associated lung disease and drug induced lung disease amongst other etiologies.      Electronically signed by: Braulio  Estrellita  Date:    07/27/2022  Time:    16:11    Cardiodiagnostics:  6/28/2021:  The estimated ejection fraction is 55%.  Normal systolic function.  Normal right ventricular size with normal right ventricular systolic function.  Mild to moderate left atrial enlargement.  Mild right atrial enlargement.  Normal central venous pressure (3 mmHg).  The estimated PA systolic pressure is 18 mmHg.       Assessment:  1. Interstitial pulmonary disease, unspecified    2. Rheumatoid arthritis involving multiple sites with positive rheumatoid factor    3. Seasonal allergic rhinitis, unspecified trigger    4. Gastroesophageal reflux disease, unspecified whether esophagitis present        Plan:   1. Followed for RA-ILD.  Failed MTX and AZA.  Currently on rituxan infusions; having muscle symptoms with infusions despite reduced dose.  Does not plan for further infusions.  Follows with ENT/derm/allergy; takes daily antihistamine, and uses NETI pot regularly.  Acute hepatitis 2/2 OFEV so not a candidate for anti fibrotic therapy.  Continue with pulmonary rehab exercises with online RT.  FVC and DLCO stable.  Does not require supplemental oxygen.  UTD on TTE.  Schedule CT chest for routine surveillance in May 2025.       2. Follow-up with Rheum.  Currently on rituxan; plans to stop therapy.  Follows with Dr. Feliciano    3. Pt to schedule EGD as outpatient.  She was seen by speech therapy at [facility] and had a negative barium swallow for aspiration.      4. Follow-up in May 2025 with CT chest and PFTs.          Barbara Haile D.O.  Pulmonary/Critical Care and Lung Transplantation  [facility]

## 2025-02-20 DIAGNOSIS — M05.79 RHEUMATOID ARTHRITIS INVOLVING MULTIPLE SITES WITH POSITIVE RHEUMATOID FACTOR: ICD-10-CM

## 2025-02-20 DIAGNOSIS — J84.9 INTERSTITIAL PULMONARY DISEASE, UNSPECIFIED: Primary | ICD-10-CM

## 2025-04-17 ENCOUNTER — TELEPHONE (OUTPATIENT)
Dept: TRANSPLANT | Facility: CLINIC | Age: 70
End: 2025-04-17
Payer: MEDICARE

## 2025-04-17 ENCOUNTER — PATIENT MESSAGE (OUTPATIENT)
Dept: RHEUMATOLOGY | Facility: CLINIC | Age: 70
End: 2025-04-17
Payer: MEDICARE

## 2025-04-17 NOTE — TELEPHONE ENCOUNTER
Attempted to reach patient, left voicemail informing of appointment timing. CT chest, PFTs, MD.   ----- Message from Elma sent at 4/17/2025  8:32 AM CDT -----  Regarding: upcoming appt schedule  Contact: Juan  Consult/Advisory Name Of Caller:Juan Cruz  Contact Preference: [phone] (Mobile) , requesting a call back.  Nature of call:pt is calling with concerns of being late for her upcoming appts because of how they are scheduled. Please advise.

## 2025-04-21 ENCOUNTER — HOSPITAL ENCOUNTER (OUTPATIENT)
Dept: PULMONOLOGY | Facility: CLINIC | Age: 70
Discharge: HOME | End: 2025-04-21
Attending: INTERNAL MEDICINE
Payer: MEDICARE

## 2025-04-21 ENCOUNTER — HOSPITAL ENCOUNTER (OUTPATIENT)
Dept: RADIOLOGY | Facility: HOSPITAL | Age: 70
Discharge: HOME | End: 2025-04-21
Attending: INTERNAL MEDICINE
Payer: MEDICARE

## 2025-04-21 ENCOUNTER — OFFICE VISIT (OUTPATIENT)
Dept: TRANSPLANT | Facility: CLINIC | Age: 70
End: 2025-04-21
Payer: MEDICARE

## 2025-04-21 VITALS
HEART RATE: 71 BPM | WEIGHT: 128.19 LBS | RESPIRATION RATE: 16 BRPM | BODY MASS INDEX: 23.59 KG/M2 | DIASTOLIC BLOOD PRESSURE: 58 MMHG | TEMPERATURE: 98 F | HEIGHT: 62 IN | OXYGEN SATURATION: 99 % | SYSTOLIC BLOOD PRESSURE: 109 MMHG

## 2025-04-21 DIAGNOSIS — M05.79 RHEUMATOID ARTHRITIS INVOLVING MULTIPLE SITES WITH POSITIVE RHEUMATOID FACTOR: ICD-10-CM

## 2025-04-21 DIAGNOSIS — J30.2 SEASONAL ALLERGIC RHINITIS, UNSPECIFIED TRIGGER: ICD-10-CM

## 2025-04-21 DIAGNOSIS — J84.9 INTERSTITIAL PULMONARY DISEASE, UNSPECIFIED: ICD-10-CM

## 2025-04-21 DIAGNOSIS — J84.9 INTERSTITIAL PULMONARY DISEASE, UNSPECIFIED: Primary | ICD-10-CM

## 2025-04-21 DIAGNOSIS — K21.9 GASTROESOPHAGEAL REFLUX DISEASE, UNSPECIFIED WHETHER ESOPHAGITIS PRESENT: ICD-10-CM

## 2025-04-21 PROCEDURE — 1159F MED LIST DOCD IN RCRD: CPT | Mod: CPTII,NTX,S$GLB, | Performed by: INTERNAL MEDICINE

## 2025-04-21 PROCEDURE — 1101F PT FALLS ASSESS-DOCD LE1/YR: CPT | Mod: CPTII,NTX,S$GLB, | Performed by: INTERNAL MEDICINE

## 2025-04-21 PROCEDURE — 94729 DIFFUSING CAPACITY: CPT | Mod: NTX,S$GLB,, | Performed by: INTERNAL MEDICINE

## 2025-04-21 PROCEDURE — 3074F SYST BP LT 130 MM HG: CPT | Mod: CPTII,NTX,S$GLB, | Performed by: INTERNAL MEDICINE

## 2025-04-21 PROCEDURE — 99214 OFFICE O/P EST MOD 30 MIN: CPT | Mod: 25,NTX,S$GLB, | Performed by: INTERNAL MEDICINE

## 2025-04-21 PROCEDURE — 3288F FALL RISK ASSESSMENT DOCD: CPT | Mod: CPTII,NTX,S$GLB, | Performed by: INTERNAL MEDICINE

## 2025-04-21 PROCEDURE — 3078F DIAST BP <80 MM HG: CPT | Mod: CPTII,NTX,S$GLB, | Performed by: INTERNAL MEDICINE

## 2025-04-21 PROCEDURE — 1126F AMNT PAIN NOTED NONE PRSNT: CPT | Mod: CPTII,NTX,S$GLB, | Performed by: INTERNAL MEDICINE

## 2025-04-21 PROCEDURE — 94010 BREATHING CAPACITY TEST: CPT | Mod: NTX,S$GLB,, | Performed by: INTERNAL MEDICINE

## 2025-04-21 PROCEDURE — 99999 PR PBB SHADOW E&M-EST. PATIENT-LVL IV: CPT | Mod: PBBFAC,TXP,, | Performed by: INTERNAL MEDICINE

## 2025-04-21 PROCEDURE — 71250 CT THORAX DX C-: CPT | Mod: 26,NTX,, | Performed by: RADIOLOGY

## 2025-04-21 PROCEDURE — 3008F BODY MASS INDEX DOCD: CPT | Mod: CPTII,NTX,S$GLB, | Performed by: INTERNAL MEDICINE

## 2025-04-21 PROCEDURE — 94726 PLETHYSMOGRAPHY LUNG VOLUMES: CPT | Mod: NTX,S$GLB,, | Performed by: INTERNAL MEDICINE

## 2025-04-21 PROCEDURE — 1160F RVW MEDS BY RX/DR IN RCRD: CPT | Mod: CPTII,NTX,S$GLB, | Performed by: INTERNAL MEDICINE

## 2025-04-21 PROCEDURE — 71250 CT THORAX DX C-: CPT | Mod: TC,TXP

## 2025-04-21 NOTE — LETTER
April 25, 2025        Ibis Holcomb  [address]  [facility] [address]  Phone: [phone]  Fax: [phone]             [facility] Trans[facility] 1st Fl  [address]  [facility] [address]  Phone: [phone]   Patient: Juan Cruz   MR Number: [id number]   YOB: 1955   Date of Visit: 4/21/2025       Dear Dr. Ibis Holcomb    Thank you for referring Juan Cruz to me for evaluation. Attached you will find relevant portions of my assessment and plan of care.    If you have questions, please do not hesitate to call me. I look forward to following Juan Cruz along with you.    Sincerely,    Barbara Haile, DO    Enclosure    If you would like to receive this communication electronically, please contact [email or web address] or [phone] to request [employer] access.    [employer] is a tool which provides read-only access to select patient information with whom you have a relationship. Its easy to use and provides real time access to review your patients record including encounter summaries, notes, results, and demographic information.    If you feel you have received this communication in error or would no longer like to receive these types of communications, please e-mail [email or web address]

## 2025-04-21 NOTE — PROGRESS NOTES
ADVANCED LUNG DISEASE FOLLOW-UP                                                                                                                     Reason for Visit:  RA-ILD, cough    Referring Physician: Ibis Holcomb    History of Present Illness: Juan Cruz is a 70 y.o. female who is on 0L of oxygen.  She is on no assisted ventilation.  Her New [place] Heart Association Class is II and a Karnofsky score of 80% - Normal activity with effort: some symptoms of disease. She is not diabetic.     She presents today for routine follow-up of RA-ILD.  Since her last visit, she went to [facility] for vacation.  She also planned a 2 week stay at a medical clinic for different medical, holistic, and massage treatments of her RA and ILD.  She was off inhaler therapy and allergy regimen while in [facility].  Her respiratory symptoms are relatively stable. She has some increased cough since returning from [facility] but is just getting back started with her allergy and inhaler regimens.  She feels like her current symptoms are related to sinuses.  Her joint pains and previous proximal muscle weakness has been improved.  She was concerned that this was related to previous rituxan.  She is scheduled for labs and then discussions with rheum on how to proceed with further ritux doses; possibly reducing the dose or only doing 1 dose every 6 months instead of 2.  Overall doing very well.       Previous HPI:  She presents today for follow-up of RA-ILD.  She is doing well from a respiratory standpoint and feels like her cough is at baseline.  No recent illnesses or hospitalizations.  Rhinitis is controlled.  Her biggest issue is currently with pelvic girdle and upper thigh pain and stiffness.  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  Not on any therapy.  She attributes findings to her rituxan infusions which exacerbate her muscle symptoms.  She is due for her next infusion in May but does not plan to get it.   She plans to leave for [place] on Sunday.           Past Medical History:   Diagnosis Date    Bruise 09/12/2022    Chronic bilateral low back pain without sciatica 11/20/2024    Chronic sinusitis, unspecified     Dyspnea on exertion 09/16/2022    Elevated liver enzymes 09/12/2022    History of recent steroid use 11/14/2024    History of SIADH     Idiopathic pulmonary fibrosis 09/12/2022    Interstitial pulmonary disease, unspecified 07/20/2023    LLL pneumonia 12/20/2022    Mitral valve prolapse     Mitral valve prolapse 11/06/2024    Osteopenia     Petechiae 09/12/2022    Pulmonary fibrosis 09/12/2022    Rheumatoid arthritis involving multiple sites     Skin rash 03/09/2023    Stiffness in joint 11/14/2024       Past Surgical History:   Procedure Laterality Date    BREAST BIOPSY Bilateral     FUNCTIONAL ENDOSCOPIC SINUS SURGERY (FESS)      ORIF WRIST FRACTURE Right        Allergies: Methotrexate, Gluten protein, Paxlovid [nirmatrelvir-ritonavir], Ppd black rubber mix, and Prolia [denosumab]    Current Outpatient Medications   Medication Sig    albuterol sulfate (VENTOLIN HFA INHL) Ventolin HFA    B-complex with vitamin C (Z-BEC OR EQUIV) tablet Take 1 tablet by mouth 2 (two) times a day.    BREO ELLIPTA 200-25 mcg/dose DsDv diskus inhaler INHALE 1 PUFF BY MOUTH ONCE DAILY (CONTROLLER)    calcium citrate-vitamin D3 315-200 mg (CITRACAL+D) 315-200 mg-unit per tablet Take 1 tablet by mouth 2 (two) times daily.    estradioL (VAGIFEM) 10 mcg Tab Yuvafem 10 mcg vaginal tablet   INSERT 1 TABLET VAGINALLY TWICE A WEEK    famotidine (PEPCID) 40 MG tablet Take 40 mg by mouth 2 (two) times daily.    magnesium oxide (MAG-OX) 400 mg (241.3 mg magnesium) tablet Take 400 mg by mouth 2 (two) times daily.    metoprolol succinate (TOPROL-XL) 25 MG 24 hr tablet Take 1 tablet (25 mg total) by mouth once daily.    RESTASIS 0.05 % ophthalmic emulsion Place 1 drop into both eyes once daily. Has not started    cetirizine (ZYRTEC) 10 MG  tablet Take 1 tablet (10 mg total) by mouth once daily.     Current Facility-Administered Medications   Medication    riTUXimab-abbs (TRUXIMA) 1,000 mg in sodium chloride 0.9% 1,000 mL infusion (conc: 1 mg/mL)    riTUXimab-abbs (TRUXIMA) 1,000 mg in sodium chloride 0.9% 1,000 mL infusion (conc: 1 mg/mL)    riTUXimab-abbs (TRUXIMA) 1,000 mg in sodium chloride 0.9% 1,000 mL infusion (conc: 1 mg/mL)    sodium chloride 0.9% flush 10 mL       Immunization History   Administered Date(s) Administered    COVID-19, MRNA, LN-S, PF (Pfizer) (Purple Cap) 12/31/2020, 01/25/2021, 08/25/2021    Pneumococcal Conjugate - 20 Valent 09/21/2023, 08/02/2024    Pneumococcal Polysaccharide - 23 Valent 09/16/2022    Yellow Fever 08/31/2017    Zoster Recombinant 01/18/2020, 08/19/2020     Family History:    Family History   Problem Relation Name Age of Onset    Breast cancer Neg Hx      Colon cancer Neg Hx      Ovarian cancer Neg Hx      Uterine cancer Neg Hx      Cervical cancer Neg Hx       Social History     Substance and Sexual Activity   Alcohol Use No      Social History     Substance and Sexual Activity   Drug Use No      Social History     Socioeconomic History    Marital status:    Tobacco Use    Smoking status: Never     Passive exposure: Never    Smokeless tobacco: Never   Substance and Sexual Activity    Alcohol use: No    Drug use: No    Sexual activity: Not Currently     Social Drivers of Health     Financial Resource Strain: Low Risk  (12/11/2024)    Overall Financial Resource Strain (CARDIA)     Difficulty of Paying Living Expenses: Not hard at all   Food Insecurity: No Food Insecurity (12/11/2024)    Hunger Vital Sign     Worried About Running Out of Food in the Last Year: Never true     Ran Out of Food in the Last Year: Never true   Transportation Needs: No Transportation Needs (12/11/2024)    PRAPARE - Transportation     Lack of Transportation (Medical): No     Lack of Transportation (Non-Medical): No   Physical  Activity: Sufficiently Active (12/11/2024)    Exercise Vital Sign     Days of Exercise per Week: 7 days     Minutes of Exercise per Session: 50 min   Stress: Stress Concern Present (12/11/2024)    [employer] [place] [employer] - Occupational Stress Questionnaire     Feeling of Stress : To some extent   Housing Stability: Unknown (12/11/2024)    Housing Stability Vital Sign     Unable to Pay for Housing in the Last Year: No     Homeless in the Last Year: No     Review of Systems   Constitutional:  Negative for chills, diaphoresis, fever, malaise/fatigue and weight loss.   HENT:  Negative for congestion, ear discharge, ear pain, hearing loss, nosebleeds, sinus pain, sore throat and tinnitus.    Eyes:  Negative for blurred vision, double vision, photophobia, pain, discharge and redness.   Respiratory:  Positive for cough (stable). Negative for hemoptysis, sputum production, shortness of breath (improved), wheezing and stridor.    Cardiovascular:  Negative for chest pain, palpitations, orthopnea, claudication, leg swelling and PND.   Gastrointestinal:  Negative for abdominal pain, blood in stool, constipation, diarrhea, heartburn, melena, nausea and vomiting.   Genitourinary:  Negative for dysuria, flank pain, frequency, hematuria and urgency.   Musculoskeletal:  Positive for myalgias. Negative for back pain, falls, joint pain and neck pain.   Skin:  Negative for itching and rash.   Neurological:  Negative for dizziness, tingling, tremors, sensory change, speech change, focal weakness, seizures, loss of consciousness, weakness and headaches.   Endo/Heme/Allergies:  Negative for environmental allergies and polydipsia. Bruises/bleeds easily.   Psychiatric/Behavioral:  Negative for depression, hallucinations, memory loss, substance abuse and suicidal ideas. The patient is not nervous/anxious and does not have insomnia.      Vitals  BP (!) 109/58 (BP Location: Right arm, Patient Position: Sitting)   Pulse 71    "Temp 98.4 °F (36.9 °C) (Oral)   Resp 16   Ht 5' 2" (1.575 m)   Wt 58.2 kg (128 lb 3.2 oz)   LMP  (LMP Unknown)   SpO2 99%   BMI 23.45 kg/m²   Physical Exam  Vitals and nursing note reviewed.   Constitutional:       General: She is not in acute distress.     Appearance: She is well-developed. She is not diaphoretic.   HENT:      Head: Normocephalic and atraumatic.      Nose: Nose normal.      Mouth/Throat:      Pharynx: No oropharyngeal exudate.   Eyes:      General: No scleral icterus.     Conjunctiva/sclera: Conjunctivae normal.      Pupils: Pupils are equal, round, and reactive to light.   Neck:      Thyroid: No thyromegaly.      Vascular: No JVD.      Trachea: No tracheal deviation.   Cardiovascular:      Rate and Rhythm: Normal rate and regular rhythm.      Heart sounds: Normal heart sounds. No murmur heard.     No friction rub. No gallop.   Pulmonary:      Effort: Pulmonary effort is normal. No respiratory distress.      Breath sounds: Normal breath sounds. No wheezing or rales.   Abdominal:      General: Bowel sounds are normal. There is no distension.      Palpations: Abdomen is soft.      Tenderness: There is no abdominal tenderness.   Musculoskeletal:         General: No deformity. Normal range of motion.      Cervical back: Normal range of motion and neck supple.   Skin:     General: Skin is warm and dry.      Capillary Refill: Capillary refill takes less than 2 seconds.      Coloration: Skin is not pale.      Findings: No erythema or rash.   Neurological:      Mental Status: She is alert and oriented to person, place, and time.      Cranial Nerves: No cranial nerve deficit.   Psychiatric:         Judgment: Judgment normal.         Labs:  Hospital Outpatient Visit on 04/21/2025   Component Date Value    Physician Comment 04/22/2025                      Value:Spirometry is normal. Lung volumes show mild restriction is present. Airway mechanics show normal airway resistance and conductance. DLCO is " moderately decreased.   Â   Notes:  Â   No recent hemoglobin value available. DLCO interpretation assumes a normal hemoglobin value.       Pre FVC 04/22/2025 2.08     PRE FEV5 04/22/2025 1.67     Pre FEV1 04/22/2025 1.86     Pre FEV1 FVC 04/22/2025 89.08     Pre FEF 25 75 04/22/2025 3.42     Pre PEF 04/22/2025 6.74     Pre  04/22/2025 5.29     Pre DLCO 04/22/2025 11.17 (L)     DLCOVA PRE 04/22/2025 4.06     VA PRE 04/22/2025 2.75 (L)     IVC PRE 04/22/2025 2.02     Pre TLC 04/22/2025 3.11 (L)     VC PRE 04/22/2025 2.08     PRE IC 04/22/2025 1.36     Pre FRC PL 04/22/2025 1.76 (L)     Pre ERV 04/22/2025 0.73     Pre RV 04/22/2025 1.03 (L)     RVTLC PRE 04/22/2025 33.07     Raw PRE 04/22/2025 2.98 (L)     sGaw PRE 04/22/2025 0.16 (H)     FVC Ref 04/22/2025 2.67     FVC LLN 04/22/2025 1.96     FVC Pre Ref 04/22/2025 77.9     FEV05 REF 04/22/2025 1.62     FEV05 LLN 04/22/2025 0.76     PRE FEV05 REF 04/22/2025 103.2     FEV1 Ref 04/22/2025 2.09     FEV1 LLN 04/22/2025 1.52     FEV1 Pre Ref 04/22/2025 89.0     FEV1 FVC Ref 04/22/2025 78     FEV1 FVC LLN 04/22/2025 65     FEV1 FVC Pre Ref 04/22/2025 113.5     FEF 25 75 Ref 04/22/2025 2.54     FEF 25 75 LLN 04/22/2025 1.14     FEF 25 75 Pre Ref 04/22/2025 134.7     PEF Ref 04/22/2025 5.42     PEF LLN 04/22/2025 3.81     PEF Pre Ref 04/22/2025 124.3     TLC Ref 04/22/2025 4.60     TLC LLN 04/22/2025 3.62     TLC ULN 04/22/2025 5.59     TLC Pre Ref 04/22/2025 67.6     VC Ref 04/22/2025 2.67     VC LLN 04/22/2025 1.96     VC ULN 04/22/2025 3.43     VC Pre Ref 04/22/2025 77.9     REF IC 04/22/2025 1.76     LLN IC 04/22/2025 -33943.24     ULN IC 04/22/2025 34131.76     PRE REF IC 04/22/2025 77.0     FRCpleth Ref 04/22/2025 2.60     FRCpleth LLN 04/22/2025 1.77     FRC PLETH ULN 04/22/2025 3.42     FRCpleth PreRef 04/22/2025 67.6     ERV Ref 04/22/2025 0.64     ERV LLN 04/22/2025 -71112.36     ERV ULN 04/22/2025 35136.64     ERV Pre Ref 04/22/2025 113.2     RV Ref  04/22/2025 1.95     RV LLN 04/22/2025 1.38     RV ULN 04/22/2025 2.53     RV Pre Ref 04/22/2025 52.7     RVTLC Ref 04/22/2025 42     RVTLC LLN 04/22/2025 33     RV TLC ULN 04/22/2025 52     RVTLC Pre Ref 04/22/2025 77.9     Raw Ref 04/22/2025 3.06     Raw Pre Ref 04/22/2025 97.3     sGaw Ref 04/22/2025 0.10     sGaw Pre Ref 04/22/2025 157.3     DLCO Single Breath Ref 04/22/2025 20.19     DLCO Single Breath LLN 04/22/2025 14.45     DLCO SINGLEBREATH ULN 04/22/2025 25.92     DLCO Single Breath Pre R* 04/22/2025 55.3     DLCOc Single Breath Ref 04/22/2025 20.19     DLCOc Single Breath LLN 04/22/2025 14.45     DLCOC SINGLEBREATH ULN 04/22/2025 25.92     DLCOVA Ref 04/22/2025 4.39     DLCOVA LLN 04/22/2025 2.82     DLCOVA ULN 04/22/2025 5.95     DLCOVA Pre Ref 04/22/2025 92.6     DLCOc SBVA Ref 04/22/2025 4.39     DLCOc SBVA LLN 04/22/2025 2.82     DLCOCSBVA ULN 04/22/2025 5.95     VA Single Breath Ref 04/22/2025 4.45     VA Single Breath LLN 04/22/2025 4.45     VA SINGLEBREATH ULN 04/22/2025 4.45     VA Single Breath Pre Ref 04/22/2025 61.7     IVC Single Breath Ref 04/22/2025 2.67     IVC Single Breath LLN 04/22/2025 1.96     IVC SINGLEBREATH ULN 04/22/2025 3.43     IVC Single Breath Pre Ref 04/22/2025 75.4     FVCZSCORE 04/22/2025 -1.35     JHX7QOVZVH 04/22/2025 -0.68     TVS1KTUBMHCJH 04/22/2025 1.53     TLCZSCORE 04/22/2025 -2.49     DLCOSINGLEBREATHZSCORE 04/22/2025 -2.59    Lab Visit on 04/21/2025   Component Date Value    Hep BsAg Interp 04/21/2025 Non-Reactive     Hep BcAb Interp 04/21/2025 Non-Reactive     Sodium 04/21/2025 138     Potassium 04/21/2025 4.5     Chloride 04/21/2025 106     CO2 04/21/2025 24     Glucose 04/21/2025 89     BUN 04/21/2025 10     Creatinine 04/21/2025 0.7     Calcium 04/21/2025 8.9     Protein Total 04/21/2025 6.5     Albumin 04/21/2025 3.6     Bilirubin Total 04/21/2025 0.3     ALP 04/21/2025 60     AST 04/21/2025 21     ALT 04/21/2025 16     Anion Gap 04/21/2025 8     eGFR  04/21/2025 >60     Sed Rate 04/21/2025 7     CRP 04/21/2025 1.4     IgA Level 04/21/2025 92     IgG Level 04/21/2025 624 (L)     IgM Level 04/21/2025 40 (L)     WBC 04/21/2025 6.72     RBC 04/21/2025 4.48     HGB 04/21/2025 13.7     HCT 04/21/2025 41.1     MCV 04/21/2025 92     MCH 04/21/2025 30.6     MCHC 04/21/2025 33.3     RDW 04/21/2025 12.2     Platelet Count 04/21/2025 297     MPV 04/21/2025 10.8     Nucleated RBC 04/21/2025 0     Neut % 04/21/2025 60.7     Lymph % 04/21/2025 26.6     Mono % 04/21/2025 10.0     Eos % 04/21/2025 2.2     Basophil % 04/21/2025 0.1     Imm Grans % 04/21/2025 0.4     Neut # 04/21/2025 4.07     Lymph # 04/21/2025 1.79     Mono # 04/21/2025 0.67     Eos # 04/21/2025 0.15     Baso # 04/21/2025 0.01     Imm Grans # 04/21/2025 0.03     QuantiFERON-TB Gold Plus 04/21/2025 Negative     NIL TB 04/21/2025 0.24279     TB1 Ag minus Nil 04/21/2025 0.08736     TB2 Ag minus Nil 04/21/2025 0.75762     Mitogen minus Nil 04/21/2025 9.38223            4/21/2025    10:49 AM 1/27/2025     2:30 PM 11/18/2024    12:26 PM 5/2/2024     9:54 AM 10/25/2023     1:41 PM 4/17/2023     1:19 PM 12/8/2022    10:24 AM   Pulmonary Function Tests   FVC 2.08 liters 2.17 liters 2.15 liters 2.08 liters 2.16 liters 2.02 liters 1.86 liters   FEV1 1.86 liters 1.91 liters 1.88 liters 1.83 liters 1.85 liters 1.76 liters 1.64 liters   TLC (liters) 3.11 liters 3.06 liters 3.09 liters 2.67 liters 2.77 liters 2.94 liters 2.88 liters   DLCO (ml/mmHg sec) 11.17 ml/mmHg sec 11.14 ml/mmHg sec 11.9 ml/mmHg sec 9.13 ml/mmHg sec 8.32 ml/mmHg sec 9.22 ml/mmHg sec 8.62 ml/mmHg sec   FVC% 78 81 93 89 92 86 78   FEV1% 89 91 101.2 98 98 93 86   FEF 25-75 3.42 3.33 3.14 3.13 3 2.9 2.93   FEF 25-75% 134 131 121 121 166 159 160   TLC% 67 66 67 58 60 63 63   RV 1.03 0.89 0.87 0.6 0.6 0.92 1.01   RV% 52 45 45 31 31 48 53   DLCO% 55 55 58 45 41 45 42         8/22/2024     8:04 AM 5/2/2024     8:36 AM 7/20/2023     9:33 AM 4/17/2023    11:38 AM  12/22/2022    10:05 AM 12/8/2022     9:08 AM 9/12/2022    11:43 AM   6MW   6MWT Status completed without stopping completed without stopping completed without stopping completed without stopping completed without stopping completed without stopping completed without stopping   Patient Reported Dyspnea;Other (Comment)  No complaints  No complaints  Dyspnea  No complaints  Dyspnea  Dyspnea    Was O2 used? No No No No No No No   6MW Distance walked (feet) 1450 feet 1532 feet 1500 feet 1500 feet 1300 feet 1332 feet 1300 feet   Distance walked (meters) 441.96 meters 466.95 meters 457.2 meters 457.2 meters 396.24 meters 405.99 meters 396.24 meters   Did patient stop? No No  No No No No   Oxygen Saturation 99 % 98 % 98 % 98 % 98 % 99 % 98 %   Supplemental Oxygen Room Air  Room Air  Room Air  Room Air  Room Air  Room Air  Room Air    Heart Rate 87 bpm 85 bpm 95 bpm 85 bpm 78 bpm 81 bpm 89 bpm   Blood Pressure 105/69 114/75 109/62 113/64 118/77 145/78 120/74   Rigoberto Dyspnea Rating  moderate nothing at all light nothing at all light moderate light   Oxygen Saturation 99 % 98 % 94 % 94 % 95 % 98 % 95 %   Supplemental Oxygen Room Air  Room Air   Room Air  Room Air  Room Air  Room Air    Heart Rate 114 bpm 113 bpm 126 bpm 125 bpm 113 bpm 106 bpm 113 bpm   Blood Pressure 117/74 118/76 114/62 132/67 130/86 129/66 131/77   Rigoberto Dyspnea Rating  heavy moderate moderate somewhat heavy somewhat heavy somewhat heavy somewhat heavy   Recovery Time (seconds) 119 seconds 135 seconds 180 seconds 149 seconds 180 seconds 123 seconds 72 seconds   Oxygen Saturation 99 % 98 % 97 % 98 % 99 % 98 % 98 %   Supplemental Oxygen Room Air  Room Air  Room Air  Room Air  Room Air  Room Air  Room Air    Heart Rate 88 bpm 95 bpm 101 bpm 106 bpm 83 bpm 94 bpm 97 bpm       Data saved with a previous flowsheet row definition       Imaging:  Results for orders placed during the hospital encounter of 07/27/22    CT Chest Without  Contrast    Narrative  EXAMINATION:  CT CHEST WITHOUT CONTRAST    CLINICAL HISTORY:  Interstitial lung disease; Cough, unspecified    TECHNIQUE:  Low dose axial images, sagittal and coronal reformations were obtained from the thoracic inlet to the lung bases.  Intravenous contrast was not administered.    COMPARISON:  CT thorax 06/28/2021    FINDINGS:  Base of Neck: Imaged portions of the base of the neck are grossly unremarkable.    Aorta: 3-branch vessel configuration of a left-sided type 3 aortic arch.  No hyperdense crescent sign, aneurysmal degeneration, periaortic fat stranding or periaortic fluid.    Heart: Heart is normal in size.  No significant pericardial thickening. No appreciable coronary artery calcifications.    Pulmonary vasculature: Pulmonary arteries are not enlarged and distribute normally.  There are four pulmonary veins.    Axilla/Alexandra/Mediastinum: Prominent mediastinal lymph nodes however, no rachael axillary or mediastinal lymphadenopathy.  Evaluation of hilar lymph nodes is limited without IV contrast.    Airways: Trachea and mainstem bronchi are patent.  Symmetric mild central bronchiectasis present.    Lungs/Pleura: Significant interval progression of previously noted subpleural predominant reticular and ground-glass airspace opacities associated with bilateral lower lobe volume loss, architectural distortion, symmetric mild central bronchiectasis and apicobasal gradient.  No pleural effusions.  No pneumothorax.    Esophagus: Small hiatal hernia, not significantly changed.  Otherwise, normal in course and caliber however, evaluation is limited given the lack of oral contrast.    Soft tissues: Imaged soft tissues are grossly unremarkable.    Osseous structures: Diffuse osseous demineralization and mild multilevel degenerative changes of the imaged spine.  No acute displaced fracture, dislocation or suspicious lytic/blastic osseous lesion.    Upper Abdomen: Imaged portions of the upper abdomen  are grossly unremarkable.    Impression  1. Significant interval progression of previously noted subpleural predominant reticular and ground-glass airspace opacities associated with bilateral lower lobe volume loss, architectural distortion, symmetric mild central bronchiectasis and apicobasal gradient suggestive of UIP pattern of interstitial lung disease which may reflect IPF.  Additional diagnostic considerations include NSIP, asbestosis, fibrotic hypersensitivity pneumonitis connective tissue associated lung disease and drug induced lung disease amongst other etiologies.      Electronically signed by: Braulio Haro  Date:    07/27/2022  Time:    16:11    Cardiodiagnostics:  6/28/2021:  The estimated ejection fraction is 55%.  Normal systolic function.  Normal right ventricular size with normal right ventricular systolic function.  Mild to moderate left atrial enlargement.  Mild right atrial enlargement.  Normal central venous pressure (3 mmHg).  The estimated PA systolic pressure is 18 mmHg.       Assessment:  1. Interstitial pulmonary disease, unspecified    2. Rheumatoid arthritis involving multiple sites with positive rheumatoid factor    3. Seasonal allergic rhinitis, unspecified trigger    4. Gastroesophageal reflux disease, unspecified whether esophagitis present          Plan:   1. Followed for RA-ILD.  Failed MTX and AZA.  Currently on rituxan infusions; having muscle symptoms with infusions despite reduced dose.  Feels better after recent trip to [facility].  Rheum labs today and then will discuss with rheum how to proceed with treatments.  Follows with ENT/derm/allergy; takes daily antihistamine, and uses NETI pot regularly.  Acute hepatitis 2/2 OFEV so not a candidate for anti fibrotic therapy.  Continue with pulmonary rehab exercises with online RT.  FVC and DLCO stable.  Does not require supplemental oxygen.  UTD on TTE.  CT scan reviewed.  Stability of fibrotic changes noted.  No acute GGOs.       2.  Follow-up with Rheum.  Follows with Dr. Feliciano to further discuss rituxan plans.    3. Continue with allergy and sinus regimen.        4. Follow-up in 6 months with PFTs and 6MWT.          Barbara Haile D.O.  Pulmonary/Critical Care and Lung Transplantation  [facility] [place]

## 2025-04-22 ENCOUNTER — PATIENT MESSAGE (OUTPATIENT)
Dept: TRANSPLANT | Facility: CLINIC | Age: 70
End: 2025-04-22
Payer: MEDICARE

## 2025-04-22 ENCOUNTER — PATIENT MESSAGE (OUTPATIENT)
Dept: PRIMARY CARE CLINIC | Facility: CLINIC | Age: 70
End: 2025-04-22
Payer: MEDICARE

## 2025-04-22 ENCOUNTER — RESULTS FOLLOW-UP (OUTPATIENT)
Dept: RHEUMATOLOGY | Facility: CLINIC | Age: 70
End: 2025-04-22

## 2025-04-22 LAB
DLCO SINGLE BREATH LLN: 14.45
DLCO SINGLE BREATH PRE REF: 55.3 %
DLCO SINGLE BREATH REF: 20.19
DLCOC SBVA LLN: 2.82
DLCOC SBVA REF: 4.39
DLCOC SINGLE BREATH LLN: 14.45
DLCOC SINGLE BREATH REF: 20.19
DLCOCSBVAULN: 5.95
DLCOCSINGLEBREATHULN: 25.92
DLCOSINGLEBREATHULN: 25.92
DLCOSINGLEBREATHZSCORE: -2.59
DLCOVA LLN: 2.82
DLCOVA PRE REF: 92.6 %
DLCOVA PRE: 4.06 ML/(MIN*MMHG*L) (ref 2.82–5.95)
DLCOVA REF: 4.39
DLCOVAULN: 5.95
ERV LLN: -16449.36
ERV PRE REF: 113.2 %
ERV REF: 0.64
ERVULN: ABNORMAL
FEF 25 75 LLN: 1.14
FEF 25 75 PRE REF: 134.7 %
FEF 25 75 REF: 2.54
FEV05 LLN: 0.76
FEV05 REF: 1.62
FEV1 FVC LLN: 65
FEV1 FVC PRE REF: 113.5 %
FEV1 FVC REF: 78
FEV1 LLN: 1.52
FEV1 PRE REF: 89 %
FEV1 REF: 2.09
FEV1FVCZSCORE: 1.53
FEV1ZSCORE: -0.68
FRCPLETH LLN: 1.77
FRCPLETH PREREF: 67.6 %
FRCPLETH REF: 2.6
FRCPLETHULN: 3.42
FVC LLN: 1.96
FVC PRE REF: 77.9 %
FVC REF: 2.67
FVCZSCORE: -1.35
IVC PRE: 2.02 L (ref 1.96–3.43)
IVC SINGLE BREATH LLN: 1.96
IVC SINGLE BREATH PRE REF: 75.4 %
IVC SINGLE BREATH REF: 2.67
IVCSINGLEBREATHULN: 3.43
LLN IC: -16448.24
PEF LLN: 3.81
PEF PRE REF: 124.3 %
PEF REF: 5.42
PHYSICIAN COMMENT: ABNORMAL
PRE DLCO: 11.17 ML/(MIN*MMHG) (ref 14.45–25.92)
PRE ERV: 0.73 L (ref -16449.36–16450.64)
PRE FEF 25 75: 3.42 L/S (ref 1.14–3.94)
PRE FET 100: 5.29 SEC
PRE FEV05 REF: 103.2 %
PRE FEV1 FVC: 89.08 % (ref 65.27–89.85)
PRE FEV1: 1.86 L (ref 1.52–2.62)
PRE FEV5: 1.67 L (ref 0.76–2.48)
PRE FRC PL: 1.76 L (ref 1.77–3.42)
PRE FVC: 2.08 L (ref 1.96–3.43)
PRE IC: 1.36 L (ref -16448.24–16451.76)
PRE PEF: 6.74 L/S (ref 3.81–7.02)
PRE REF IC: 77 %
PRE RV: 1.03 L (ref 1.38–2.53)
PRE TLC: 3.11 L (ref 3.62–5.59)
RAW PRE REF: 97.3 %
RAW PRE: 2.98 CMH2O*S/L (ref 3.06–3.06)
RAW REF: 3.06
REF IC: 1.76
RV LLN: 1.38
RV PRE REF: 52.7 %
RV REF: 1.95
RVTLC LLN: 33
RVTLC PRE REF: 77.9 %
RVTLC PRE: 33.07 % (ref 32.83–52.01)
RVTLC REF: 42
RVTLCULN: 52
RVULN: 2.53
SGAW PRE REF: 157.3 %
SGAW PRE: 0.16 1/(CMH2O*S) (ref 0.1–0.1)
SGAW REF: 0.1
TLC LLN: 3.62
TLC PRE REF: 67.6 %
TLC REF: 4.6
TLC ULN: 5.59
TLCZSCORE: -2.49
ULN IC: ABNORMAL
VA PRE: 2.75 L (ref 4.45–4.45)
VA SINGLE BREATH LLN: 4.45
VA SINGLE BREATH PRE REF: 61.7 %
VA SINGLE BREATH REF: 4.45
VASINGLEBREATHULN: 4.45
VC LLN: 1.96
VC PRE REF: 77.9 %
VC PRE: 2.08 L (ref 1.96–3.43)
VC REF: 2.67
VC ULN: 3.43

## 2025-04-23 ENCOUNTER — TELEPHONE (OUTPATIENT)
Dept: RHEUMATOLOGY | Facility: CLINIC | Age: 70
End: 2025-04-23
Payer: MEDICARE

## 2025-04-23 DIAGNOSIS — J84.9 INTERSTITIAL LUNG DISEASE: ICD-10-CM

## 2025-04-23 DIAGNOSIS — J84.10 PULMONARY FIBROSIS: ICD-10-CM

## 2025-04-23 DIAGNOSIS — M05.79 RHEUMATOID ARTHRITIS INVOLVING MULTIPLE SITES WITH POSITIVE RHEUMATOID FACTOR: ICD-10-CM

## 2025-04-23 NOTE — TELEPHONE ENCOUNTER
----- Message from Med Assistant Arnold sent at 4/21/2025 11:15 AM CDT -----  Contact: [phone]  Which labs are pt needing done?  ----- Message -----  From: Magaly Sanchez  Sent: 4/21/2025   9:04 AM CDT  To: [facility] Staff    Consult/AdvisoryName Of Caller:Juan Cruz Contact Preference:[phone] of call: Requesting to have orders for labs sent  [facility] She states Dr Feliciano said to have the labs done today in her messages

## 2025-04-24 ENCOUNTER — TELEPHONE (OUTPATIENT)
Dept: RHEUMATOLOGY | Facility: CLINIC | Age: 70
End: 2025-04-24
Payer: MEDICARE

## 2025-04-24 ENCOUNTER — PATIENT MESSAGE (OUTPATIENT)
Dept: TRANSPLANT | Facility: CLINIC | Age: 70
End: 2025-04-24
Payer: MEDICARE

## 2025-04-24 ENCOUNTER — TELEPHONE (OUTPATIENT)
Dept: TRANSPLANT | Facility: CLINIC | Age: 70
End: 2025-04-24
Payer: MEDICARE

## 2025-04-24 NOTE — TELEPHONE ENCOUNTER
"Routed message to Dr. Haile to determine if VV appropriate or provider call. Note previous response from Dr. Haile - "Nothing to do. Super small. 2mm.... itty bitty. We will continue to monitor it because we will be monitoring yearly CTs for her ILD but I am not concerned at all. "    Will send message to patient for review.   ----- Message from Barbara sent at 4/24/2025 10:31 AM CDT -----  Regarding: Results  Contact: Pt  [phone]  Name of Caller:  Mary  Contact Preference: [phone] of Call:  Requesting a call back would like to go over results of test from 04/21/25  "

## 2025-04-24 NOTE — TELEPHONE ENCOUNTER
I spoke with pt and offered her a appt with Dr. Feliciano before her infusion. The appointment offered was April 28th @ 8:30, per Dr. Feliciano request as an over book. Pt verbalized understanding and confirmed appt.

## 2025-04-28 ENCOUNTER — OFFICE VISIT (OUTPATIENT)
Facility: CLINIC | Age: 70
End: 2025-04-28
Payer: MEDICARE

## 2025-04-28 ENCOUNTER — LAB VISIT (OUTPATIENT)
Dept: LAB | Facility: HOSPITAL | Age: 70
End: 2025-04-28
Payer: MEDICARE

## 2025-04-28 ENCOUNTER — OFFICE VISIT (OUTPATIENT)
Dept: RHEUMATOLOGY | Facility: CLINIC | Age: 70
End: 2025-04-28
Payer: MEDICARE

## 2025-04-28 ENCOUNTER — HOSPITAL ENCOUNTER (OUTPATIENT)
Dept: RADIOLOGY | Facility: HOSPITAL | Age: 70
Discharge: HOME | End: 2025-04-28
Attending: INTERNAL MEDICINE
Payer: MEDICARE

## 2025-04-28 VITALS
BODY MASS INDEX: 23.61 KG/M2 | SYSTOLIC BLOOD PRESSURE: 116 MMHG | HEART RATE: 80 BPM | WEIGHT: 128.31 LBS | DIASTOLIC BLOOD PRESSURE: 79 MMHG | HEIGHT: 62 IN

## 2025-04-28 VITALS
HEIGHT: 62 IN | BODY MASS INDEX: 23.61 KG/M2 | SYSTOLIC BLOOD PRESSURE: 107 MMHG | DIASTOLIC BLOOD PRESSURE: 67 MMHG | HEART RATE: 78 BPM | WEIGHT: 128.31 LBS

## 2025-04-28 DIAGNOSIS — G60.8 SENSORY POLYNEUROPATHY: Primary | ICD-10-CM

## 2025-04-28 DIAGNOSIS — M05.79 RHEUMATOID ARTHRITIS INVOLVING MULTIPLE SITES WITH POSITIVE RHEUMATOID FACTOR: Primary | ICD-10-CM

## 2025-04-28 DIAGNOSIS — G60.8 SENSORY POLYNEUROPATHY: ICD-10-CM

## 2025-04-28 DIAGNOSIS — Z79.60 LONG-TERM USE OF IMMUNOSUPPRESSANT MEDICATION: ICD-10-CM

## 2025-04-28 DIAGNOSIS — J84.112 IDIOPATHIC PULMONARY FIBROSIS: ICD-10-CM

## 2025-04-28 DIAGNOSIS — J84.9 INTERSTITIAL LUNG DISEASE: ICD-10-CM

## 2025-04-28 DIAGNOSIS — M05.79 RHEUMATOID ARTHRITIS INVOLVING MULTIPLE SITES WITH POSITIVE RHEUMATOID FACTOR: ICD-10-CM

## 2025-04-28 DIAGNOSIS — M81.0 AGE-RELATED OSTEOPOROSIS WITHOUT CURRENT PATHOLOGICAL FRACTURE: ICD-10-CM

## 2025-04-28 DIAGNOSIS — Z12.31 ENCOUNTER FOR SCREENING MAMMOGRAM FOR BREAST CANCER: ICD-10-CM

## 2025-04-28 LAB
ABSOLUTE EOSINOPHIL (OHS): 0.17 K/UL
ABSOLUTE MONOCYTE (OHS): 0.62 K/UL (ref 0.3–1)
ABSOLUTE NEUTROPHIL COUNT (OHS): 3.9 K/UL (ref 1.8–7.7)
ALBUMIN SERPL BCP-MCNC: 3.8 G/DL (ref 3.5–5.2)
ALP SERPL-CCNC: 66 UNIT/L (ref 40–150)
ALT SERPL W/O P-5'-P-CCNC: 17 UNIT/L (ref 10–44)
ANION GAP (OHS): 7 MMOL/L (ref 8–16)
AST SERPL-CCNC: 23 UNIT/L (ref 11–45)
BASOPHILS # BLD AUTO: 0.02 K/UL
BASOPHILS NFR BLD AUTO: 0.3 %
BILIRUB SERPL-MCNC: 0.2 MG/DL (ref 0.1–1)
BUN SERPL-MCNC: 11 MG/DL (ref 8–23)
CALCIUM SERPL-MCNC: 9.2 MG/DL (ref 8.7–10.5)
CHLORIDE SERPL-SCNC: 105 MMOL/L (ref 95–110)
CO2 SERPL-SCNC: 27 MMOL/L (ref 23–29)
CREAT SERPL-MCNC: 0.7 MG/DL (ref 0.5–1.4)
CRP SERPL-MCNC: 2.3 MG/L
EAG (OHS): 111 MG/DL (ref 68–131)
ERYTHROCYTE [DISTWIDTH] IN BLOOD BY AUTOMATED COUNT: 12 % (ref 11.5–14.5)
ERYTHROCYTE [SEDIMENTATION RATE] IN BLOOD BY PHOTOMETRIC METHOD: 4 MM/HR
FOLATE SERPL-MCNC: 17.6 NG/ML (ref 4–24)
GFR SERPLBLD CREATININE-BSD FMLA CKD-EPI: >60 ML/MIN/1.73/M2
GLUCOSE SERPL-MCNC: 91 MG/DL (ref 70–110)
HBA1C MFR BLD: 5.5 % (ref 4–5.6)
HCT VFR BLD AUTO: 41.9 % (ref 37–48.5)
HGB BLD-MCNC: 13.6 GM/DL (ref 12–16)
IMM GRANULOCYTES # BLD AUTO: 0.02 K/UL (ref 0–0.04)
IMM GRANULOCYTES NFR BLD AUTO: 0.3 % (ref 0–0.5)
LYMPHOCYTES # BLD AUTO: 2.29 K/UL (ref 1–4.8)
MCH RBC QN AUTO: 29.8 PG (ref 27–31)
MCHC RBC AUTO-ENTMCNC: 32.5 G/DL (ref 32–36)
MCV RBC AUTO: 92 FL (ref 82–98)
NUCLEATED RBC (/100WBC) (OHS): 0 /100 WBC
PLATELET # BLD AUTO: 299 K/UL (ref 150–450)
PMV BLD AUTO: 10.7 FL (ref 9.2–12.9)
POTASSIUM SERPL-SCNC: 4.3 MMOL/L (ref 3.5–5.1)
PROT SERPL-MCNC: 7.1 GM/DL (ref 6–8.4)
RBC # BLD AUTO: 4.57 M/UL (ref 4–5.4)
RELATIVE EOSINOPHIL (OHS): 2.4 %
RELATIVE LYMPHOCYTE (OHS): 32.6 % (ref 18–48)
RELATIVE MONOCYTE (OHS): 8.8 % (ref 4–15)
RELATIVE NEUTROPHIL (OHS): 55.6 % (ref 38–73)
SODIUM SERPL-SCNC: 139 MMOL/L (ref 136–145)
WBC # BLD AUTO: 7.02 K/UL (ref 3.9–12.7)

## 2025-04-28 PROCEDURE — G2211 COMPLEX E/M VISIT ADD ON: HCPCS | Mod: NTX,S$GLB,, | Performed by: INTERNAL MEDICINE

## 2025-04-28 PROCEDURE — 20552 NJX 1/MLT TRIGGER POINT 1/2: CPT | Mod: S$GLB,,, | Performed by: PSYCHIATRY & NEUROLOGY

## 2025-04-28 PROCEDURE — 1101F PT FALLS ASSESS-DOCD LE1/YR: CPT | Mod: CPTII,S$GLB,, | Performed by: PSYCHIATRY & NEUROLOGY

## 2025-04-28 PROCEDURE — 85652 RBC SED RATE AUTOMATED: CPT | Mod: TXP

## 2025-04-28 PROCEDURE — 36415 COLL VENOUS BLD VENIPUNCTURE: CPT | Mod: TXP

## 2025-04-28 PROCEDURE — 99214 OFFICE O/P EST MOD 30 MIN: CPT | Mod: NTX,S$GLB,, | Performed by: INTERNAL MEDICINE

## 2025-04-28 PROCEDURE — 3008F BODY MASS INDEX DOCD: CPT | Mod: CPTII,S$GLB,, | Performed by: PSYCHIATRY & NEUROLOGY

## 2025-04-28 PROCEDURE — 3074F SYST BP LT 130 MM HG: CPT | Mod: CPTII,NTX,S$GLB, | Performed by: INTERNAL MEDICINE

## 2025-04-28 PROCEDURE — 84207 ASSAY OF VITAMIN B-6: CPT | Mod: TXP

## 2025-04-28 PROCEDURE — 3078F DIAST BP <80 MM HG: CPT | Mod: CPTII,S$GLB,, | Performed by: PSYCHIATRY & NEUROLOGY

## 2025-04-28 PROCEDURE — 3078F DIAST BP <80 MM HG: CPT | Mod: CPTII,NTX,S$GLB, | Performed by: INTERNAL MEDICINE

## 2025-04-28 PROCEDURE — 1159F MED LIST DOCD IN RCRD: CPT | Mod: CPTII,NTX,S$GLB, | Performed by: INTERNAL MEDICINE

## 2025-04-28 PROCEDURE — 86140 C-REACTIVE PROTEIN: CPT | Mod: TXP

## 2025-04-28 PROCEDURE — 1101F PT FALLS ASSESS-DOCD LE1/YR: CPT | Mod: CPTII,NTX,S$GLB, | Performed by: INTERNAL MEDICINE

## 2025-04-28 PROCEDURE — 99215 OFFICE O/P EST HI 40 MIN: CPT | Mod: 25,S$GLB,, | Performed by: PSYCHIATRY & NEUROLOGY

## 2025-04-28 PROCEDURE — 1160F RVW MEDS BY RX/DR IN RCRD: CPT | Mod: CPTII,NTX,S$GLB, | Performed by: INTERNAL MEDICINE

## 2025-04-28 PROCEDURE — 77067 SCR MAMMO BI INCL CAD: CPT | Mod: TC,TXP

## 2025-04-28 PROCEDURE — 82947 ASSAY GLUCOSE BLOOD QUANT: CPT | Mod: TXP

## 2025-04-28 PROCEDURE — 99999 PR PBB SHADOW E&M-EST. PATIENT-LVL III: CPT | Mod: PBBFAC,TXP,, | Performed by: INTERNAL MEDICINE

## 2025-04-28 PROCEDURE — 85025 COMPLETE CBC W/AUTO DIFF WBC: CPT | Mod: TXP

## 2025-04-28 PROCEDURE — 3008F BODY MASS INDEX DOCD: CPT | Mod: CPTII,NTX,S$GLB, | Performed by: INTERNAL MEDICINE

## 2025-04-28 PROCEDURE — 82746 ASSAY OF FOLIC ACID SERUM: CPT | Mod: TXP

## 2025-04-28 PROCEDURE — 99999 PR PBB SHADOW E&M-EST. PATIENT-LVL IV: CPT | Mod: PBBFAC,,, | Performed by: PSYCHIATRY & NEUROLOGY

## 2025-04-28 PROCEDURE — 3288F FALL RISK ASSESSMENT DOCD: CPT | Mod: CPTII,NTX,S$GLB, | Performed by: INTERNAL MEDICINE

## 2025-04-28 PROCEDURE — 83036 HEMOGLOBIN GLYCOSYLATED A1C: CPT | Mod: TXP

## 2025-04-28 PROCEDURE — 84425 ASSAY OF VITAMIN B-1: CPT | Mod: TXP

## 2025-04-28 PROCEDURE — 1126F AMNT PAIN NOTED NONE PRSNT: CPT | Mod: CPTII,NTX,S$GLB, | Performed by: INTERNAL MEDICINE

## 2025-04-28 PROCEDURE — 3074F SYST BP LT 130 MM HG: CPT | Mod: CPTII,S$GLB,, | Performed by: PSYCHIATRY & NEUROLOGY

## 2025-04-28 PROCEDURE — 77063 BREAST TOMOSYNTHESIS BI: CPT | Mod: 26,NTX,, | Performed by: RADIOLOGY

## 2025-04-28 PROCEDURE — 1125F AMNT PAIN NOTED PAIN PRSNT: CPT | Mod: CPTII,S$GLB,, | Performed by: PSYCHIATRY & NEUROLOGY

## 2025-04-28 PROCEDURE — 84630 ASSAY OF ZINC: CPT | Mod: TXP

## 2025-04-28 PROCEDURE — 77067 SCR MAMMO BI INCL CAD: CPT | Mod: 26,NTX,, | Performed by: RADIOLOGY

## 2025-04-28 PROCEDURE — 1159F MED LIST DOCD IN RCRD: CPT | Mod: CPTII,S$GLB,, | Performed by: PSYCHIATRY & NEUROLOGY

## 2025-04-28 PROCEDURE — 3288F FALL RISK ASSESSMENT DOCD: CPT | Mod: CPTII,S$GLB,, | Performed by: PSYCHIATRY & NEUROLOGY

## 2025-04-28 ASSESSMENT — ROUTINE ASSESSMENT OF PATIENT INDEX DATA (RAPID3)
TOTAL RAPID3 SCORE: 3.22
MDHAQ FUNCTION SCORE: 0.2
WHEN YOU AWAKENED IN THE MORNING OVER THE LAST WEEK, PLEASE INDICATE THE AMOUNT OF TIME IT TAKES UNTIL YOU ARE AS LIMBER AS YOU WILL BE FOR THE DAY: 0.5
PATIENT GLOBAL ASSESSMENT SCORE: 8
PAIN SCORE: 1
FATIGUE SCORE: 0
PSYCHOLOGICAL DISTRESS SCORE: 1.1
AM STIFFNESS SCORE: 1, YES

## 2025-04-28 NOTE — PROGRESS NOTES
Procedure Note for Trigger point injection    Two patient identifiers were confirmed with the patient prior to performing this procedure. A time out to determine correct patient and and agreement on procedure performed was conducted prior to the procedure.     The goal of the injections will be to reduce pain in local tender point.   Using a 30 gauge 0.5 inch injection needle and aseptic technique the following muscles- Right supraspinatus was identified and injected with Lidocaine 2%.     No major complication was noted.    Tracee Perry MD, FCPS, FRCP  [facility]

## 2025-04-28 NOTE — ASSESSMENT & PLAN NOTE
No clinical rheumatoid arthritis ; no synovitis, swelling, tenderness of peripheral joints    Interstitial lung disease reportedly stable, with UIP and not GGO on recent CT    She has completed 3 1/2 years of B cell suppression with stabilization of lung disease and no evidence of ongoing inflammatory disease, so will discontinue rituximab for now and monitor her clinically; if rheumatoid arthritis becomes active, will consider anti-IL6 in effort to minimize further B cell suppression and immunoglobulin deficiency    Return to clinic me 6 month with CBC/CMP/sedimentation rate/CRP

## 2025-04-28 NOTE — ASSESSMENT & PLAN NOTE
Some IgG and IgM Hypogammaglobulinemia but will monitor this since she is able to stop rituximab at least for now

## 2025-04-28 NOTE — PROGRESS NOTES
4/25/2025     1:13 PM   Rapid3 Question Responses and Scores   MDHAQ Score 0.2   Psychologic Score 1.1   Pain Score 1   When you awakened in the morning OVER THE LAST WEEK, did you feel stiff? Yes   If Yes, please indicate the number of hours until you are as limber as you will be for the day 0.5   Fatigue Score 0   Global Health Score 8   RAPID3 Score 3.22        Answers submitted by the patient for this visit:  Rheumatology Questionnaire (Submitted on 4/25/2025)  fever: No  eye redness: Yes  mouth sores: No  headaches: No  shortness of breath: Yes  chest pain: No  trouble swallowing: No  diarrhea: No  constipation: No  unexpected weight change: No  genital sore: No  dysuria: No  During the last 3 days, have you had a skin rash?: No  Bruises or bleeds easily: No  cough: Yes

## 2025-04-28 NOTE — ASSESSMENT & PLAN NOTE
PFT and CT scans have stabilized and sedimentation rate/CRP are normal so will monitor her for now off of rituximab

## 2025-04-28 NOTE — PROGRESS NOTES
Subjective:       Patient ID: Juan Cruz is a 70 y.o. female.    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 Velasquez Holcomb saw her 2022 with interstitial lung disease and CCP (1162) and RF (173); dx rheumatoid arthritis 2022 though less arthritis and more tendinitis; started methotrexate May 2022 but interstitial lung disease worsened; pulmonary Rx Ofev but prompt liver toxicity; took azathioprine briefly  Rituximab: Oct 24 and Nov 7 2022 4/25 and 5/10/23  11/10 and 11/24/23  5/10 and 5/24/24 Nov 2024     IVIG levels decreased   11/4/24 IgG 678, IgA 111, IgM 41  Last hep b tests 4/16/24     Got COVID Jan 2023  Rash after COVID and paxlovid treated with steroid  Prolia # 2 2023 with skin reaction again treated with steroid  Now on alendronate and Tymlos recommended  Prednisone completed Dec 2023    Today reports stable cough and dyspnea  Recent trip to [place]; all stable and no progression      Review of Systems   Constitutional:  Negative for fever and unexpected weight change.   HENT:  Negative for mouth sores and trouble swallowing.    Eyes:  Positive for redness.   Respiratory:  Positive for cough and shortness of breath.    Cardiovascular:  Negative for chest pain.   Gastrointestinal:  Negative for constipation and diarrhea.   Genitourinary:  Negative for dysuria and genital sores.   Skin:  Negative for rash.   Neurological:  Negative for headaches.   Hematological:  Does not bruise/bleed easily.           4/25/2025     1:13 PM   Rapid3 Question Responses and Scores   MDHAQ Score 0.2   Psychologic Score 1.1   Pain Score 1   When you awakened in the morning OVER THE LAST WEEK, did you feel stiff? Yes   If Yes, please indicate the number of hours until you are as limber as you will be for the day 0.5   Fatigue Score 0  "  Global Health Score 8   RAPID3 Score 3.22      Objective:   /67 (BP Location: Left arm, Patient Position: Sitting)   Pulse 78   Ht 5' 2" (1.575 m)   Wt 58.2 kg (128 lb 4.9 oz)   LMP  (LMP Unknown)   BMI 23.47 kg/m²      Physical Exam   Eyes: Conjunctivae are normal.   Cardiovascular: Normal rate and regular rhythm.   Pulmonary/Chest: She has no wheezes. She has no rales.   Musculoskeletal:         General: No swelling, tenderness or deformity.   Lymphadenopathy:     She has no cervical adenopathy.   Skin: No rash noted.          11/6/2024 4/28/2025   Tender (SUN-28) 0 / 28  0 / 28    Swollen (SUN-28) 0 / 28  0 / 28    Provider Global 0 / 100 0 / 100   Patient Global 60 / 100 20 / 100   ESR 12 mm/hr 7 mm/hr   CRP 1.7 mg/L 1.4 mg/L   SUN-28 (ESR) 2.58 (Remission) 1.64 (Remission)   SUN-28 (CRP) 2.16 (Remission) 1.56 (Remission)   CDAI Score 6  2       Assessment:       1. Rheumatoid arthritis involving multiple sites with positive rheumatoid factor    2. Idiopathic pulmonary fibrosis    3. Interstitial lung disease    4. Long-term use of immunosuppressant medication    5. Age-related osteoporosis without current pathological fracture            Plan:       Problem List Items Addressed This Visit          Active Problems    Rheumatoid arthritis involving multiple sites with positive rheumatoid factor - Primary    No clinical rheumatoid arthritis ; no synovitis, swelling, tenderness of peripheral joints    Interstitial lung disease reportedly stable, with UIP and not GGO on recent CT    She has completed 3 1/2 years of B cell suppression with stabilization of lung disease and no evidence of ongoing inflammatory disease, so will discontinue rituximab for now and monitor her clinically; if rheumatoid arthritis becomes active, will consider anti-IL6 in effort to minimize further B cell suppression and immunoglobulin deficiency    Return to clinic me 6 month with CBC/CMP/sedimentation rate/CRP          Relevant " Orders    CBC Auto Differential    Comprehensive Metabolic Panel    C-Reactive Protein    Sedimentation rate    Interstitial lung disease    PFT and CT scans have stabilized and sedimentation rate/CRP are normal so will monitor her for now off of rituximab          Long-term use of immunosuppressant medication    Some IgG and IgM Hypogammaglobulinemia but will monitor this since she is able to stop rituximab at least for now         Age-related osteoporosis without current pathological fracture    Continues on alendronate          Idiopathic pulmonary fibrosis

## 2025-04-28 NOTE — PROGRESS NOTES
[facility] [address] [facility], [place]    Date: 4/28/25  Patient Name: Juan Cruz   MRN: [id number]   Referring Provider: No ref. provider found    Thank you so much No ref. provider found for your patient referral to Neuromuscular team at [facility]. We take pride in our care coordination and look forward to your feedback and questions.    Assessment:   69-year-old female with multiple complicated medical issues including interstitial lung disease, rheumatoid arthritis on chronic immunosuppression including steroids (1 year up to a dose of 40 mg daily) for follow up.  Some degree of proximal muscle weakness could be secondary to chronic steroid use, her episode of inability to move the neck muscles could be secondary to neck muscle myositis secondary to autoimmune condition or infection like COVID.  We discussed about some patients with concern for long Covid proceed with muscle atrophy. She would like to proceed with muscle biopsy to understand her ongoing symptoms.     I did trigger point injection in right scapular region.    I addressed her complaints. I provided information about fall precautions and healthy lifestyle. I would wish her very best for improvement/recovery in her condition.    Future direction based on feedback:    Plan:     Problem List Items Addressed This Visit          Pulmonary    Interstitial lung disease    Overview   TTE 10/2023 WNL    4/2022 Spirometry is normal. Lung volumes show mild-moderate restriction is present. Airway mechanics are abnormal showing increased airway resistance and decreased conductance. DLCO is moderately decreased    CT chest 12/09/2022: Diffuse subpleural reticular opacity and nodular pleural thickening.  Minimal perihilar bronchiectasis.  No evidence of honeycombing.  No focal opacity.  No pleural thickening.  Dx: Diffuse nonspecific interstitial lung disease, possible UIP pattern    Had Lung toxicity with MTX, intolerance  of aza, and hepatotoxicity to OFEV.              Immunology/Multi System    Rheumatoid arthritis involving multiple sites with positive rheumatoid factor    Overview   11/15/2022:  ESR 36 (48),  CRP 19 (14), WBC 15.26  2/2023: ESR 22, CRP 2.3    Failed MTX and ASA  2/2024 CRP 1.83, ESR 35, B12 617, fasting glucose 106 and 4/2024 CRP 1.8 and ESR 15 with normal CMP, CBC    Unusual presentation of interstitial lung disease with rheumatoid arthritis serology and arthralgia though no synovitis; but now reports less musc-sk pain after rituximab         Relevant Orders    Pharmacogenomics Panel     Other Visit Diagnoses         Sensory polyneuropathy    -  Primary    Relevant Orders    Vitamin B1    Vitamin B6    FOLATE    Zinc    Hemoglobin A1C    Ambulatory referral/consult to General Surgery            Tracee Perry MD    This evaluation was completed in >55  Minutes over 50% of the time spent on education & counseling. This includes face to face time and non-face to face time preparing to see the patient (eg, review of tests), obtaining and/or reviewing separately obtained history, documenting clinical information in the electronic or other health record, independently interpreting results and communicating results to the patient/family/caregiver, or care coordinator.    Visit today is associated with current or anticipated ongoing medical care related to this patient's single serious condition/complex condition (diffuse hyperreflexia and fatigue ). Follow up: 3 months    Patient note was created using MModal Dictation.  Any errors in syntax or even information may not have been identified and edited on initial review prior to signing this note.    Details provided by:    Patient    Reason for visit:  Upper and lower extremity weakness for evaluation    Interval history on 4/28/25   Since last visit, she has been to [place] and underwent Ayurvedic treatment including enemas, massages and herbal treatments. She feels  her breathing is improved. She has noted muscle spasm on bending mainly in mid back to lower back and upper abdomen sec to chronic coughing.    She mentioned numbness for past 2 weeks in her right mid back and some spasm like sensation in right foot lateral toes. Both of these symptoms are transient.    Her last dose of Rituxan was on 28th Nov 2024. She is taking magnesium Glycinate once daily but not sure if it is helping.    On examination, normal strength, DTRs and sensation.    Interval work up:  Musk antibody negative   RNA polymerase 3 antibody negative   NCS 70 antibody negative   Anti SM/RNP antibody negative   MyoMarker panel 3- negative   Aldolase level 2.6  CPK normal  Scleroderma antibody negative   SSA/SSB negative   Myasthenia gravis panel negative   Glutamic acid decarboxylase antibody negative   Vitamin B12 level 768     NCS/EMG on 12/31/2024  Abnormal study.  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 The differential diagnosis for sensory polyneuropathy may include vitamin deficiency, immune mediated, paraneoplastic, toxic/metabolic neuropathy.  There is no evidence of myopathy, myositis, right cervical or lumbosacral radiculopathy.    ====================================================================  Initial encounter on 11/14/2024  HISTORY OF PRESENT ILLNESS   The patient is here today for an evaluation of her medical condition, specifically the weakness in her arms and legs.  She has PMH of idiopathic pulmonary fibrosis, mitral valve prolapse, seropositive rheumatoid arthritis, long-term immunosuppression, COVID-19 illness and osteoporosis.  She is a retired anesthesiologist.      She reports that after her Rituxan infusion in 05/2024, she experienced difficulty squatting and standing up. She receives first two doses of Rituxan followed by maintenance every six months for her RA and ILD since 2022 (1st course of RTX 10/24/22 & 11/7/22 ). She describes a significant  amount of stiffness, particularly in her knees and glutes, and believes it may be due to tightness in the soft tissue, fascia, or tendons.  She has not had an EMG or nerve conduction study.  Currently she is not having any difficulty with stairs but she feels her gait is not normal with proximal hip weakness but she noticed general improvement in her fatigue over the time.  There is questionable swallowing difficulty and breathlessness on exertion.    Her RA test was negative in 2020, but she has experienced vague muscle and joint pains growing up. After receiving the Pfizer COVID-19 vaccine in 2020, she noticed difficulty breathing and a persistent cough, especially when talking to patients. She also had difficulty climbing stairs. Despite seeing two pulmonologists and having multiple PFTs, no cause was found. She received a booster in 10/2021 or 11/2021, but her cough persisted. An ENT diagnosed her with chronic sinusitis and removed three polyps in 01/2022.    In 03/2022, she woke up unable to move her neck, shoulders, and upper body. X-rays were normal, but her ESR and CRP were elevated. She was put on a minimal dose of steroids, which was later increased to 10 to 20 mg. She started methotrexate in 06/2022 or 07/2022, but within a week, she felt something was off. Her pain was along the wrist tendons, and her joints were fine, but the tendons were so inflamed that she couldn't even move her fingers. She was put on high-dose steroids and admitted to the hospital. A CT scan showed ILD and pulmonary fibrosis. She was put on Ofev for pulmonary fibrosis and Adriamycin by her rheumatologist. However, she developed severe obstructive jaundice and liver toxicity, so both medications were stopped.    In 01/2023, she got COVID-19 and was given Paxlovid. In 03/2023, she started shedding skin, which was diagnosed as a reaction to Paxlovid. She was put back on high-dose steroids and took her second dose of Prolia, which  caused severe inflammation of her face. She has been off steroids since 12/2023 and is currently on Breo daily.     She has noticed a loss of muscle mass in her left arm and has been going for deep tissue massages for the last three months, which have helped with her stiffness and rigidity. She has started doing yoga and stretching again, but has noticed that she struggles with certain exercises, particularly those involving both legs.  She noted difficulty in swiping cell phone screen with right hand.  She is right-handed and sometimes struggles to roll cotton with right hand.    She has a history of SIADH in 1999 with a relapse lasting up to six months, but no cause was found. In 2001, she experienced severe bouts of vertigo with hearing loss in her right ear, which lasted for almost a year. Despite three years of follow-up MRIs, no cause was found. She has stopped practicing as an anesthetist due to these health issues.    SOCIAL HISTORY  She does not smoke or drink alcohol. She has 1 daughter who is a .    FAMILY HISTORY  Her mother had RA, but she was diagnosed at age of 84. She never had any issues before that and she got diagnosed because she developed maybe little GI symptoms and then suddenly she was basically not able to move with severe swelling and stuff like that and she passed away after 2 years with all the complications. Her brother passed away because of COVID-19.    IMMUNIZATIONS  She had 2 doses of COVID-19 vaccine and a booster in 10/2021 or 11/2021.    Medications tried in the past include:   Methotrexate 2022   Adriamycin   Ofev   Prolia   Paxlovid   Rituximab    Pertinent work up based on chart review for current condition:  Hepatitis-B antibody negative   Low IgM immunoglobulin   C-reactive protein 1.7   ESR 12   Comprehensive metabolic panel normal   CBC with MCV 91   CPK 13   VZV IgG positive   RPR nonreactive   HIV 1 and 2 antibody negative   Aldolase level 4.4   Rheumatoid  factor strongly positive in May 2022   Anti CCP antibodies strongly positive   Myositis panel negative   Scleroderma antibody negative   SSA/SSB negative   ROBBIE negative   C3 and C4 complement normal   Cardiolipin antibody positive    MRI cervical spine on 03/22/2022.    Unchanged minimal disc degeneration predominantly at the C5-6 and C6-7 levels with an annular fissure also noted at C5-6 also without change. There is no spinal canal or significant neural foraminal narrowing.    MRI brain on 03/22/2022.    No acute intracranial hemorrhage or evidence of acute ischemia. Hyperintense signal intensity scattered throughout the subcortical and periventricular deep white matter, which is a finding that is nonspecific but can be seen in the setting of chronic small vessel ischemia, vasculitis or as a sequela of chronic migraine headaches. The possibility of demyelinating disease is felt to be unlikely given the distribution and appearance of the findings.     CT scan of chest on 06/28/2021.    1. Lower lobe predominant patchy increased attenuation and reticulation within the periphery of the bilateral lower lobes, favored to represent sequela of atelectasis.  An early interstitial lung disease can present with similar findings and cannot be entirely excluded.  No bronchiectasis.  No honeycombing.  No significant ground-glass attenuation.  2. Small sliding-type hiatal hernia    Echocardiogram on 11/01/2023.    The left ventricle is normal in size. Normal wall thickness. There is concentric remodeling. Normal wall motion. There is normal systolic function with a visually estimated ejection fraction of 65 - 70%. There is normal diastolic function.    Review of Systems:  12 system review of systems is negative except for the symptoms mentioned in HPI.     PHYSICAL EXAMINATION     There were no vitals filed for this visit.      There is no height or weight on file to calculate BMI.     GENERAL/CONSTITUTIONAL/SYSTEMIC:    -Well  appearing; well nourished    HIGHER INTEGRATIVE FUNCTIONS:   -Attention & concentration: Normal   -Orientation: Oriented to person, place & time  -Memory: Normal  -Language: Normal   -Fund of Knowledge: Normal     CRANIAL NERVES:   -CN 2: Visual fields full  -CN 2,3: PERRL  -CN 3,4,6: EOMI  -CN 5: Facial sensation intact bilaterally, jaw jerk normal  -CN 7: Facial strength/movement intact bilaterally  -CN 8: Hearing normal bilaterally  -CN 9,10: Palate elevates symmetrically  -CN 11: Normal shoulder shrug and head turn  -CN 12: Tongue protrudes midline.      MOTOR:   -Tone: normal in upper and lower extremities  -UE/LE motor: 5/5 throughout, no pronator drift     SENSATION:   -Intact bilaterally to Vibration and pin prick    REFLEXES:   -2/4 upper and lower extremities bilaterally with some spreading of reflexes in upper and lower extremities  -Withdrawal plantar reflex bilaterally    COORDINATION:   -FNF normal bilaterally    GAIT:   -Normal casual  gait. Able to stand on heels and toes without difficulty.  -mild difficulty in squatting    Scheduled Follow-up :  Future Appointments   Date Time Provider Department Center   4/28/2025 11:30 AM Tracee Perry MD [facility] YESSICA Pimentel [facility]   4/28/2025  1:30 PM [facility] MAMMO2 [facility] MAMMO [facility]   11/3/2025 10:00 AM LAB, [facility] LAB [facility]       After Visit Medication List :     Medication List            Accurate as of April 28, 2025 11:15 AM. If you have any questions, ask your nurse or doctor.                CONTINUE taking these medications      B-complex with vitamin C tablet  Commonly known as: Z-Bec or Equiv     BREO ELLIPTA 200-25 mcg/dose Dsdv diskus inhaler  Generic drug: fluticasone furoate-vilanteroL  INHALE 1 PUFF BY MOUTH ONCE DAILY (CONTROLLER)     calcium citrate-vitamin D3 315-200 mg 315 mg-5 mcg (200 unit) per tablet  Commonly known as: CITRACAL+D     cetirizine 10 MG tablet  Commonly known as: ZYRTEC  Take 1 tablet (10 mg total) by mouth  once daily.     estradioL 10 mcg Tab  Commonly known as: VAGIFEM  Yuvafem 10 mcg vaginal tablet   INSERT 1 TABLET VAGINALLY TWICE A WEEK     famotidine 40 MG tablet  Commonly known as: PEPCID     magnesium oxide 400 mg (241.3 mg magnesium) tablet  Commonly known as: MAG-OX     metoprolol succinate 25 MG 24 hr tablet  Commonly known as: TOPROL-XL  Take 1 tablet (25 mg total) by mouth once daily.     RESTASIS 0.05 % ophthalmic emulsion  Generic drug: cycloSPORINE     VENTOLIN HFA INHL              Signing Physician:          Tracee Perry MD  , [facility] [employer] [facility] ([place]).  Section Head Neuromuscular Medicine. [facility].   [address] [address]th [place] [address].    This note was generated with the assistance of ambient listening technology. Verbal consent was obtained by the patient and accompanying visitor(s) for the recording of patient appointment to facilitate this note. I attest to having reviewed and edited the generated note for accuracy, though some syntax or spelling errors may persist. Please contact the author of this note for any clarification.

## 2025-04-30 ENCOUNTER — RESULTS FOLLOW-UP (OUTPATIENT)
Dept: PRIMARY CARE CLINIC | Facility: CLINIC | Age: 70
End: 2025-04-30
Payer: MEDICARE

## 2025-04-30 ENCOUNTER — PATIENT MESSAGE (OUTPATIENT)
Dept: PRIMARY CARE CLINIC | Facility: CLINIC | Age: 70
End: 2025-04-30
Payer: MEDICARE

## 2025-04-30 DIAGNOSIS — Z00.00 WELL ADULT EXAM: Primary | ICD-10-CM

## 2025-05-01 ENCOUNTER — PATIENT MESSAGE (OUTPATIENT)
Facility: CLINIC | Age: 70
End: 2025-05-01
Payer: MEDICARE

## 2025-05-01 DIAGNOSIS — E55.9 VITAMIN D DEFICIENCY: ICD-10-CM

## 2025-05-01 DIAGNOSIS — E78.2 MIXED HYPERLIPIDEMIA: Primary | ICD-10-CM

## 2025-05-01 LAB — W ZINC: 76 UG/DL

## 2025-05-02 ENCOUNTER — TELEPHONE (OUTPATIENT)
Dept: SURGERY | Facility: CLINIC | Age: 70
End: 2025-05-02
Payer: MEDICARE

## 2025-05-02 DIAGNOSIS — J84.9 INTERSTITIAL PULMONARY DISEASE, UNSPECIFIED: Primary | ICD-10-CM

## 2025-05-02 NOTE — PROGRESS NOTES
Per provider note Dr. Haile, patient RTC 6 months with PFTs, 6MWT. Start on room air, modified. Request to .

## 2025-05-05 ENCOUNTER — RESULTS FOLLOW-UP (OUTPATIENT)
Dept: PRIMARY CARE CLINIC | Facility: CLINIC | Age: 70
End: 2025-05-05

## 2025-05-05 ENCOUNTER — LAB VISIT (OUTPATIENT)
Dept: LAB | Facility: HOSPITAL | Age: 70
End: 2025-05-05
Attending: INTERNAL MEDICINE
Payer: MEDICARE

## 2025-05-05 DIAGNOSIS — E78.2 MIXED HYPERLIPIDEMIA: ICD-10-CM

## 2025-05-05 DIAGNOSIS — E55.9 VITAMIN D DEFICIENCY: ICD-10-CM

## 2025-05-05 LAB
25(OH)D3+25(OH)D2 SERPL-MCNC: 45 NG/ML (ref 30–96)
CHOLEST SERPL-MCNC: 259 MG/DL (ref 120–199)
CHOLEST/HDLC SERPL: 3.6 {RATIO} (ref 2–5)
HDLC SERPL-MCNC: 72 MG/DL (ref 40–75)
HDLC SERPL: 27.8 % (ref 20–50)
LDLC SERPL CALC-MCNC: 166 MG/DL (ref 63–159)
NONHDLC SERPL-MCNC: 187 MG/DL
TRIGL SERPL-MCNC: 105 MG/DL (ref 30–150)
TSH SERPL-ACNC: 2.19 UIU/ML (ref 0.4–4)

## 2025-05-05 PROCEDURE — 80061 LIPID PANEL: CPT | Mod: TXP

## 2025-05-05 PROCEDURE — 36415 COLL VENOUS BLD VENIPUNCTURE: CPT | Mod: TXP

## 2025-05-05 PROCEDURE — 84443 ASSAY THYROID STIM HORMONE: CPT | Mod: TXP

## 2025-05-05 PROCEDURE — 82306 VITAMIN D 25 HYDROXY: CPT | Mod: TXP

## 2025-05-06 ENCOUNTER — OFFICE VISIT (OUTPATIENT)
Dept: SURGERY | Facility: CLINIC | Age: 70
End: 2025-05-06
Payer: MEDICARE

## 2025-05-06 VITALS
WEIGHT: 128.5 LBS | SYSTOLIC BLOOD PRESSURE: 113 MMHG | HEART RATE: 85 BPM | DIASTOLIC BLOOD PRESSURE: 75 MMHG | BODY MASS INDEX: 23.65 KG/M2 | OXYGEN SATURATION: 98 % | HEIGHT: 62 IN

## 2025-05-06 DIAGNOSIS — G60.8 SENSORY POLYNEUROPATHY: ICD-10-CM

## 2025-05-06 PROCEDURE — 99999 PR PBB SHADOW E&M-EST. PATIENT-LVL V: CPT | Mod: PBBFAC,TXP,, | Performed by: SURGERY

## 2025-05-07 ENCOUNTER — PATIENT MESSAGE (OUTPATIENT)
Dept: PRIMARY CARE CLINIC | Facility: CLINIC | Age: 70
End: 2025-05-07
Payer: MEDICARE

## 2025-05-09 NOTE — PRE-PROCEDURE INSTRUCTIONS
PreOp Instructions given:     -- Medication information (what to hold and what to take)   -- NPO guidelines as follows: (or as per your Surgeon)  Stop ALL solid food, gum, candy 8 hours before arrival time.  Stop all CLOUDY liquids: coffee with creamer, cloudy juices, 8 hours prior to arrival time.  The patient should be ENCOURAGED to drink carbohydrate-rich clear liquids (sports drinks, clear juices) until 2 hours prior to arrival time.  Stop clear liquids 2 hours prior to arrival time.  CLEAR liquids include water, black coffee NO creamer, clear oral rehydration drinks, clear sports drinks and clear fruit juices (no orange juice, no pulpy juices, no apple cider).   IF IN DOUBT, drink water instead.   NOTHING TO DRINK 2 hours before to surgery/procedure  time. If you are told to take medication on the morning of surgery, it may be taken with a sip of water.   -- *Arrival place and directions given*.  Time to be given the day before procedure by the Surgeon's Office   -- Bathe with antibacterial soap (dial or Hibiclens as instructed)  -- Don't wear any jewelry or valuables and not metals on skin or hair AM of surgery   -- No makeup or moisturizer to face   -- No perfume/cologne, powder, lotions, aftershave or deodorant     Pt verbalized understanding.            *If going to , see below:      Directions and Instructions for [facility]   At [facility], we have an outstanding team of physicians, anesthesiologists, CRNAs, Registered Nurses, Surgical Technologists, and other ancillary team members all focused on your surgical and procedural care.   Before Your Procedure:   The physician's office will call you with a specific arrival time and directions a day or two before your scheduled procedure. You may also receive these instructions through your [facility] portal.   Day of Procedure:   Please be sure to arrive at the arrival time given or you may risk your surgery being delayed  or canceled. The arrival time is earlier than your scheduled surgery or procedure time. In the winter months please dress warm and bring blankets for you or your child as the waiting room may be cold. If you have difficulty locating the facility, please give us a call at [phone].   Directions:   The [facility] is located on the 1st floor of the hospital building near the [place] entrance.   Parking:   You will park in the South Parking Garage (note location on map). [facility] opens at 5:00 a.m. and has a drop off area by the entrance.  parking is available starting at 7:00 a.m. Please see below for further  parking instructions.   Directions from the parking garage elevators   [facility] Elevators: From the parking garage, take the blue [place] elevators (located in the center of the parking garage) to the 1st floor of the garage. You will then take a right once off the elevators then another right to the outside of the parking garage. You will be across from the [facility]. You will walk down the sidewalk, pass the  curve at the [place] entrance and continue to follow the sidewalk. You will pass the radiation oncology entrance on your right. Continue to follow the sidewalk to the [facility] glass door entrance.   Hospital Entrance (Inside Route): If a mostly inside route is preferred: Take the inside elevator bank (located at the far north end of the garage) from the parking garage to the 1st floor. On the 1st floor walk past [employer]. Keep walking down the center of the hallway towards the hospital elevators. Once you reach the red brick jadon, take a left and go past the hospital elevators. Take another left and follow the blue and white [place] signs around the hallway to the end. Go outside of the door. You will see the [facility] entrance to your right.   Drop Off:   There is a drop off area  at the doors of the [facility] for your convenience. If utilized for pediatric patients, an adult must accompany the patient into the surgery center while another adult bhardwaj the vehicle.   [place] (at 7:00 a.m.):   Upon check-in, please let the  know that you are utilizing [facility] [employer] parking which is free. The . will then call  for your car to be picked up. Your keys and phone number will be collected and given to [employer] services. You will then be given a ticket. Upon discharge,  will be notified to bring your vehicle back when you are ready.           Directions to [facility]:  [phone]     From 1st floor garage elevators: go past [facility] [place] [employer] shop. Look for Black piano on side of coffee shop. Take Atrium (gold) elevator by piano up to 2nd floor. When you exit elevator follow long hallway (you should see a sign hanging from the ceiling that says Day of Surgery Family Waiting Room. When hallway ends you will be entering the day of surgery waiting area. Check in at the desk for your procedure.      From [facility] entrance: Make a right after you enter the door to the hospital. On your Left, take [place] elevator to 2nd floor. Check in at desk      From Lab desk on 2nd floor: Exit lab area toward hospital atrium. You should see a sign that says Day of Surgery Family Waiting Area. Make a Left and follow long hallway (you should see a sign hanging from the ceiling that says Day of Surgery Family Waiting Room. When hallway ends you will be entering the day of surgery waiting area. Check in at the desk for your procedure.

## 2025-05-12 ENCOUNTER — TELEPHONE (OUTPATIENT)
Dept: SURGERY | Facility: CLINIC | Age: 70
End: 2025-05-12
Payer: MEDICARE

## 2025-05-12 ENCOUNTER — PATIENT MESSAGE (OUTPATIENT)
Dept: SURGERY | Facility: CLINIC | Age: 70
End: 2025-05-12
Payer: MEDICARE

## 2025-05-12 NOTE — TELEPHONE ENCOUNTER
Left message on patient's voicemail and [facility] Pt Portal for her to arrive at the 2nd Floor Surgery Center tomorrow 5/13/25 at 7:30am for surgery with Dr. Ruff.  Pre-Op instructions reinforced.  Direct phone number given for her to call back with any questions or concerns.

## 2025-05-13 ENCOUNTER — HOSPITAL ENCOUNTER (OUTPATIENT)
Facility: HOSPITAL | Age: 70
Discharge: HOME | End: 2025-05-13
Attending: SURGERY | Admitting: SURGERY
Payer: MEDICARE

## 2025-05-13 DIAGNOSIS — G60.8 SENSORY POLYNEUROPATHY: ICD-10-CM

## 2025-05-13 PROCEDURE — 37000009 HC ANESTHESIA EA ADD 15 MINS: Mod: TXP | Performed by: SURGERY

## 2025-05-13 PROCEDURE — 71000015 HC POSTOP RECOV 1ST HR: Mod: TXP | Performed by: SURGERY

## 2025-05-13 PROCEDURE — 71000044 HC DOSC ROUTINE RECOVERY FIRST HOUR: Mod: TXP | Performed by: SURGERY

## 2025-05-13 PROCEDURE — 20205 DEEP MUSCLE BIOPSY: CPT | Mod: NTX,,, | Performed by: SURGERY

## 2025-05-13 PROCEDURE — 36000704 HC OR TIME LEV I 1ST 15 MIN: Mod: TXP | Performed by: SURGERY

## 2025-05-13 PROCEDURE — 63600175 PHARM REV CODE 636 W HCPCS: Mod: TXP | Performed by: SURGERY

## 2025-05-13 PROCEDURE — 36000705 HC OR TIME LEV I EA ADD 15 MIN: Mod: NTX | Performed by: SURGERY

## 2025-05-13 PROCEDURE — 37000008 HC ANESTHESIA 1ST 15 MINUTES: Mod: TXP | Performed by: SURGERY

## 2025-05-13 RX ORDER — ONDANSETRON HYDROCHLORIDE 2 MG/ML
4 INJECTION, SOLUTION INTRAVENOUS DAILY PRN
Status: DISCONTINUED | OUTPATIENT
Start: 2025-05-13 | End: 2025-05-13 | Stop reason: HOSPADM

## 2025-05-13 RX ORDER — GUAIFENESIN 600 MG/1
1200 TABLET, EXTENDED RELEASE ORAL 2 TIMES DAILY PRN
COMMUNITY

## 2025-05-13 RX ORDER — HYDROMORPHONE HYDROCHLORIDE 1 MG/ML
0.2 INJECTION, SOLUTION INTRAMUSCULAR; INTRAVENOUS; SUBCUTANEOUS EVERY 5 MIN PRN
Status: DISCONTINUED | OUTPATIENT
Start: 2025-05-13 | End: 2025-05-13 | Stop reason: HOSPADM

## 2025-05-13 RX ORDER — ONDANSETRON HYDROCHLORIDE 2 MG/ML
4 INJECTION, SOLUTION INTRAVENOUS ONCE AS NEEDED
Status: DISCONTINUED | OUTPATIENT
Start: 2025-05-13 | End: 2025-05-13 | Stop reason: HOSPADM

## 2025-05-13 RX ORDER — SODIUM CHLORIDE 9 MG/ML
INJECTION, SOLUTION INTRAVENOUS CONTINUOUS
Status: DISCONTINUED | OUTPATIENT
Start: 2025-05-13 | End: 2025-05-13 | Stop reason: HOSPADM

## 2025-05-13 RX ORDER — BUPIVACAINE HYDROCHLORIDE 2.5 MG/ML
INJECTION, SOLUTION EPIDURAL; INFILTRATION; INTRACAUDAL; PERINEURAL
Status: DISCONTINUED | OUTPATIENT
Start: 2025-05-13 | End: 2025-05-13 | Stop reason: HOSPADM

## 2025-05-13 RX ORDER — CEFAZOLIN 2 G/1
2 INJECTION, POWDER, FOR SOLUTION INTRAMUSCULAR; INTRAVENOUS
Status: DISCONTINUED | OUTPATIENT
Start: 2025-05-13 | End: 2025-05-13 | Stop reason: HOSPADM

## 2025-05-13 NOTE — OP NOTE
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 Skip Ruff MD  HOUSE STAFF SURGEONS: Tony Rodríguez MD  ANESTHESIA: Local MAC  FINDINGS:   right thigh muscle biopsy performed, incision closed in layers   EST BLOOD LOSS:  < 1mL    SPECIMENS:   Specimens (From admission, onward)       Start     Ordered    25 1119  Specimen to Pathology General Surgery  RELEASE UPON ORDERING        References:    Click here for ordering Quick Tip   Question:  Release to patient  Answer:  Immediate    25 1119                  DRAINS:  none  COMPLICATIONS: none apparent at the end of the case    INDICATIONS: Juan Cruz is a 70 y.o. female referred to General Surgery Clinic with a history of muscle weakness and concern for myositis. Muscle biopsy was needed to assist with diagnosis. The patient signed informed consent and expressed understanding of the risks and benefits of surgery.    OPERATIVE PROCEDURE:   The patient was identified in preoperative holding and questions were answered. The patient was brought back to the operating room and placed supine on the operating table. The right thigh was prepped and draped in the standard sterile surgical fashion. A timeout was performed and all team members present, agreed this was the correct procedure on the correct patient.     Local was administered over the proposed incision site. A 2 cm incision was made on the right thigh over the vastus lateralis. Using electrocautery the incision was deepened through the subcutaneous tissue until the fascia was encountered. The fascia was opened with Metzenbaum scissors along the length of the incision. The muscles fibers were spread to isolate a piece of muscle which was removed sharply.     The fascia  was closed with a figure of 8 3-0 vicryl suture. The incision was then further closed in layers with deep dermals and a running subcuticular 4-0 monocryl. Dermabond was then applied. The patient was transported to the PACU in stable condition. All sponge, instrument and needle counts were correct at the end of the case.

## 2025-05-13 NOTE — PLAN OF CARE
Patient Awake and alert. Pain manageable, denies nausea/vomiting.  VSS. No distress noted. D/C instructions given to patient & family. Verbals understanding. Pt was done under local only. A&Ox4. Denies pain. Wished to be discharged now. Ambulated w/o limp or pain.

## 2025-05-13 NOTE — SUBJECTIVE & OBJECTIVE
No current facility-administered medications on file prior to encounter.     Current Outpatient Medications on File Prior to Encounter   Medication Sig    albuterol sulfate (VENTOLIN HFA INHL) Ventolin HFA    B-complex with vitamin C (Z-BEC OR EQUIV) tablet Take 1 tablet by mouth 2 (two) times a day.    BREO ELLIPTA 200-25 mcg/dose DsDv diskus inhaler INHALE 1 PUFF BY MOUTH ONCE DAILY (CONTROLLER)    calcium citrate-vitamin D3 315-200 mg (CITRACAL+D) 315-200 mg-unit per tablet Take 1 tablet by mouth 2 (two) times daily.    cetirizine (ZYRTEC) 10 MG tablet Take 1 tablet (10 mg total) by mouth once daily.    estradioL (VAGIFEM) 10 mcg Tab Yuvafem 10 mcg vaginal tablet   INSERT 1 TABLET VAGINALLY TWICE A WEEK    metoprolol succinate (TOPROL-XL) 25 MG 24 hr tablet Take 1 tablet (25 mg total) by mouth once daily.    famotidine (PEPCID) 40 MG tablet Take 40 mg by mouth 2 (two) times daily.    magnesium oxide (MAG-OX) 400 mg (241.3 mg magnesium) tablet Take 400 mg by mouth 2 (two) times daily. (Patient not taking: Reported on 5/9/2025)    RESTASIS 0.05 % ophthalmic emulsion Place 1 drop into both eyes once daily. Has not started    [DISCONTINUED] teriparatide 20 mcg/dose (620mcg/2.48mL) PnIj Inject 20 mcg into the skin once daily.       Review of patient's allergies indicates:   Allergen Reactions    Methotrexate Shortness Of Breath    Diphenhydramine hcl     Gluten protein Other (See Comments)     Sensitive - not allergic    Paxlovid [nirmatrelvir-ritonavir] Other (See Comments)     Trunk / chest / legs peeling after paxlovid    Ppd black rubber mix     Prolia [denosumab] Other (See Comments)     Rash to arms/trunk       Past Medical History:   Diagnosis Date    Bruise 09/12/2022    Cataract     Chronic bilateral low back pain without sciatica 11/20/2024    Chronic sinusitis, unspecified     Dyspnea on exertion 09/16/2022    Elevated liver enzymes 09/12/2022    History of recent steroid use 11/14/2024    History of SIADH      Idiopathic pulmonary fibrosis 09/12/2022    Interstitial pulmonary disease, unspecified 07/20/2023    LLL pneumonia 12/20/2022    Mitral valve prolapse     Mitral valve prolapse 11/06/2024    Osteopenia     Petechiae 09/12/2022    Pulmonary fibrosis 09/12/2022    Rheumatoid arthritis involving multiple sites     Skin rash 03/09/2023    Stiffness in joint 11/14/2024     Past Surgical History:   Procedure Laterality Date    BREAST BIOPSY Bilateral     FUNCTIONAL ENDOSCOPIC SINUS SURGERY (FESS)      ORIF WRIST FRACTURE Right      Family History    None       Tobacco Use    Smoking status: Never     Passive exposure: Never    Smokeless tobacco: Never   Substance and Sexual Activity    Alcohol use: No    Drug use: No    Sexual activity: Not Currently     Review of Systems   Constitutional:  Negative for chills and fever.   Respiratory:  Positive for shortness of breath.    Cardiovascular:  Negative for chest pain.   Gastrointestinal:  Negative for abdominal pain, nausea and vomiting.   Musculoskeletal:  Negative for back pain and neck pain.   Neurological:  Negative for dizziness, light-headedness and headaches.   Psychiatric/Behavioral:  Negative for agitation and confusion.      Objective:     Vital Signs (Most Recent):    Vital Signs (24h Range):           There is no height or weight on file to calculate BMI.     Physical Exam       I have reviewed all pertinent lab results within the past 24 hours.    Significant Diagnostics:  I have reviewed all pertinent imaging results/findings within the past 24 hours.

## 2025-05-13 NOTE — H&P
[facility] ([facility])  General Surgery  History & Physical    Patient Name: Juan Cruz  MRN: [id number]  Admission Date: 5/13/2025  Attending Physician: Skip Ruff MD   Primary Care Provider: Gianna Carpenter MD    Patient information was obtained from patient and past medical records.     Subjective:     Chief Complaint: elective muscle biopsy, hx of interstitial lung disease       History of Present Illness: 70 y.o. female w/ interstitial lung disease, hx of rheumatoid arthritis, former anesthesiologist (stopped practicing in 2022), follows with Lung Transplant and currently on rituxan infusions but having muscle symptoms with infusions despite reduced dose. Presents today for elective muscle biopsy of the right thigh. Informed consent obtained.        No current facility-administered medications on file prior to encounter.     Current Outpatient Medications on File Prior to Encounter   Medication Sig    albuterol sulfate (VENTOLIN HFA INHL) Ventolin HFA    B-complex with vitamin C (Z-BEC OR EQUIV) tablet Take 1 tablet by mouth 2 (two) times a day.    BREO ELLIPTA 200-25 mcg/dose DsDv diskus inhaler INHALE 1 PUFF BY MOUTH ONCE DAILY (CONTROLLER)    calcium citrate-vitamin D3 315-200 mg (CITRACAL+D) 315-200 mg-unit per tablet Take 1 tablet by mouth 2 (two) times daily.    cetirizine (ZYRTEC) 10 MG tablet Take 1 tablet (10 mg total) by mouth once daily.    estradioL (VAGIFEM) 10 mcg Tab Yuvafem 10 mcg vaginal tablet   INSERT 1 TABLET VAGINALLY TWICE A WEEK    metoprolol succinate (TOPROL-XL) 25 MG 24 hr tablet Take 1 tablet (25 mg total) by mouth once daily.    famotidine (PEPCID) 40 MG tablet Take 40 mg by mouth 2 (two) times daily.    magnesium oxide (MAG-OX) 400 mg (241.3 mg magnesium) tablet Take 400 mg by mouth 2 (two) times daily. (Patient not taking: Reported on 5/9/2025)    RESTASIS 0.05 % ophthalmic emulsion Place 1 drop into both eyes once daily. Has not started    [DISCONTINUED]  teriparatide 20 mcg/dose (620mcg/2.48mL) PnIj Inject 20 mcg into the skin once daily.       Review of patient's allergies indicates:   Allergen Reactions    Methotrexate Shortness Of Breath    Diphenhydramine hcl     Gluten protein Other (See Comments)     Sensitive - not allergic    Paxlovid [nirmatrelvir-ritonavir] Other (See Comments)     Trunk / chest / legs peeling after paxlovid    Ppd black rubber mix     Prolia [denosumab] Other (See Comments)     Rash to arms/trunk       Past Medical History:   Diagnosis Date    Bruise 09/12/2022    Cataract     Chronic bilateral low back pain without sciatica 11/20/2024    Chronic sinusitis, unspecified     Dyspnea on exertion 09/16/2022    Elevated liver enzymes 09/12/2022    History of recent steroid use 11/14/2024    History of SIADH     Idiopathic pulmonary fibrosis 09/12/2022    Interstitial pulmonary disease, unspecified 07/20/2023    LLL pneumonia 12/20/2022    Mitral valve prolapse     Mitral valve prolapse 11/06/2024    Osteopenia     Petechiae 09/12/2022    Pulmonary fibrosis 09/12/2022    Rheumatoid arthritis involving multiple sites     Skin rash 03/09/2023    Stiffness in joint 11/14/2024     Past Surgical History:   Procedure Laterality Date    BREAST BIOPSY Bilateral     FUNCTIONAL ENDOSCOPIC SINUS SURGERY (FESS)      ORIF WRIST FRACTURE Right      Family History    None       Tobacco Use    Smoking status: Never     Passive exposure: Never    Smokeless tobacco: Never   Substance and Sexual Activity    Alcohol use: No    Drug use: No    Sexual activity: Not Currently     Review of Systems   Constitutional:  Negative for chills and fever.   Respiratory:  Positive for shortness of breath.    Cardiovascular:  Negative for chest pain.   Gastrointestinal:  Negative for abdominal pain, nausea and vomiting.   Musculoskeletal:  Negative for back pain and neck pain.   Neurological:  Negative for dizziness, light-headedness and headaches.   Psychiatric/Behavioral:   Negative for agitation and confusion.      Objective:     Vital Signs (Most Recent):    Vital Signs (24h Range):           There is no height or weight on file to calculate BMI.     Physical Exam       I have reviewed all pertinent lab results within the past 24 hours.    Significant Diagnostics:  I have reviewed all pertinent imaging results/findings within the past 24 hours.    Assessment/Plan:     Interstitial lung disease  70 y.o. F w/ rheumatoid arthritis, interstitial lung disease who presents today for elective muscle biopsy of the right thigh.    -OR today for muscle biopsy  -Marked  -Consent obtained  -NPO since midnight      VTE Risk Mitigation (From admission, onward)           Ordered     IP VTE LOW RISK PATIENT  Once         05/13/25 0746     Place UNRULY hose  Until discontinued         05/13/25 0746     Place sequential compression device  Until discontinued         05/13/25 0746                    Ori Lyman MD  General Surgery  [facility] ([facility])

## 2025-05-13 NOTE — HPI
70 y.o. female w/ interstitial lung disease, hx of rheumatoid arthritis, former anesthesiologist (stopped practicing in 2022), follows with Lung Transplant and currently on rituxan infusions but having muscle symptoms with infusions despite reduced dose. Presents today for elective muscle biopsy of the right thigh. Informed consent obtained.

## 2025-05-13 NOTE — BRIEF OP NOTE
[facility] (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]    Procedure(s) (LRB):  BIOPSY, MUSCLE Right Thigh (Right)    Anesthesia: Local MAC    Operative Findings: right thigh muscle biopsy performed, incision closed in layers    Estimated Blood Loss: < 1 cc         Specimens:   Specimen (24h ago, onward)       Start     Ordered    05/13/25 1119  Specimen to Pathology General Surgery  RELEASE UPON ORDERING        References:    Click here for ordering Quick Tip   Question:  Release to patient  Answer:  Immediate    05/13/25 1119                    ID Type Source Tests Collected by Time Destination   1 : Right Thigh Muscle Biopsy Tissue Thigh, Right SPECIMEN TO PATHOLOGY Skip Ruff MD 5/13/2025 1118            Discharge Note    OUTCOME: Patient tolerated treatment/procedure well without complication and is now ready for discharge.    DISPOSITION: Home or Self Care    FINAL DIAGNOSIS:  Sensory polyneuropathy    FOLLOWUP: In clinic    DISCHARGE INSTRUCTIONS:    Discharge Procedure Orders   Diet Adult Regular     Notify your health care provider if you experience any of the following:  temperature >100.4     Notify your health care provider if you experience any of the following:  persistent nausea and vomiting or diarrhea     Notify your health care provider if you experience any of the following:  severe uncontrolled pain     Notify your health care provider if you experience any of the following:  redness, tenderness, or signs of infection (pain, swelling, redness, odor or green/yellow discharge around incision site)     Notify your health care provider if you experience any of the following:  difficulty breathing or increased cough

## 2025-05-13 NOTE — ASSESSMENT & PLAN NOTE
70 y.o. F w/ rheumatoid arthritis, interstitial lung disease who presents today for elective muscle biopsy of the right thigh.    -OR today for muscle biopsy  -Marked  -Consent obtained  -NPO since midnight

## 2025-05-13 NOTE — DISCHARGE INSTRUCTIONS
Postoperative General Instructions    What to Expect:  It is normal to experience pain and swelling at the surgical site.  The pain usually decreases significantly after the first week but may last for many weeks.  Each day, the pain should be similar or better to the previous day. If it worsens, call the doctor's office.     Activity:  You should walk beginning on the day of surgery and increase activity slowly over the next two to four weeks.  Do not drive while taking prescription pain medication.  You may return to work when you feel ready.     Wound Care:  You may shower. Let soap and water run over the incisions and pat dry. Do not submerge in a bathtub or pool.     Diet:  You may resume your normal diet postoperatively.     Medication:  Pain Control  Take acetaminophen (Tylenol) 650 mg 4 times daily as needed for pain.  Take ibuprofen 600 mg 3 times daily as needed for pain. Stop taking this medication if it causes an upset stomach.  Previous Home Medication  Restart your previous home medication unless otherwise instructed.    Call Your Doctor's Office If You Experience:  Fevers greater than 101.3°F.  Vomiting.  Spreading redness or drainage from the incisions.  Opening of the incisions.  Worsening pain not controlled by medication.  Chest pain or shortness of breath.     Contact Information:  During normal business hours call the clinic with any questions or concerns. On weekends and evenings call [phone] to have the operators page the surgery resident on call after hours with questions or concerns.

## 2025-05-13 NOTE — PLAN OF CARE
Pt in [facility] 08 prior to scheduled right thigh biopsy w/ Dr. Ruff. Case will be performed under local only per Dr. Valle w/ Anesthesia; surgery team aware.

## 2025-05-14 ENCOUNTER — HOSPITAL ENCOUNTER (OUTPATIENT)
Dept: RADIOLOGY | Facility: HOSPITAL | Age: 70
Discharge: HOME | End: 2025-05-14
Attending: INTERNAL MEDICINE
Payer: MEDICARE

## 2025-05-14 ENCOUNTER — RESULTS FOLLOW-UP (OUTPATIENT)
Dept: PRIMARY CARE CLINIC | Facility: CLINIC | Age: 70
End: 2025-05-14

## 2025-05-14 ENCOUNTER — OFFICE VISIT (OUTPATIENT)
Dept: PRIMARY CARE CLINIC | Facility: CLINIC | Age: 70
End: 2025-05-14
Payer: MEDICARE

## 2025-05-14 VITALS
OXYGEN SATURATION: 96 % | BODY MASS INDEX: 23.24 KG/M2 | WEIGHT: 126.31 LBS | DIASTOLIC BLOOD PRESSURE: 74 MMHG | HEIGHT: 62 IN | HEART RATE: 72 BPM | SYSTOLIC BLOOD PRESSURE: 94 MMHG

## 2025-05-14 DIAGNOSIS — Z00.00 PREVENTATIVE HEALTH CARE: Primary | ICD-10-CM

## 2025-05-14 DIAGNOSIS — M81.0 AGE-RELATED OSTEOPOROSIS WITHOUT CURRENT PATHOLOGICAL FRACTURE: ICD-10-CM

## 2025-05-14 DIAGNOSIS — K90.41 NCGS (NON-CELIAC GLUTEN SENSITIVITY): ICD-10-CM

## 2025-05-14 DIAGNOSIS — K21.9 GASTROESOPHAGEAL REFLUX DISEASE WITHOUT ESOPHAGITIS: ICD-10-CM

## 2025-05-14 DIAGNOSIS — R92.8 ABNORMAL MAMMOGRAPHY: ICD-10-CM

## 2025-05-14 DIAGNOSIS — J84.9 INTERSTITIAL LUNG DISEASE: ICD-10-CM

## 2025-05-14 DIAGNOSIS — N95.1 VAGINAL DRYNESS, MENOPAUSAL: ICD-10-CM

## 2025-05-14 DIAGNOSIS — J84.112 IDIOPATHIC PULMONARY FIBROSIS: ICD-10-CM

## 2025-05-14 DIAGNOSIS — L30.9 ECZEMA, UNSPECIFIED TYPE: ICD-10-CM

## 2025-05-14 DIAGNOSIS — M05.79 RHEUMATOID ARTHRITIS INVOLVING MULTIPLE SITES WITH POSITIVE RHEUMATOID FACTOR: ICD-10-CM

## 2025-05-14 PROCEDURE — 99999 PR PBB SHADOW E&M-EST. PATIENT-LVL IV: CPT | Mod: PBBFAC,TXP,, | Performed by: INTERNAL MEDICINE

## 2025-05-14 PROCEDURE — 77065 DX MAMMO INCL CAD UNI: CPT | Mod: TC,LT,TXP

## 2025-05-14 RX ORDER — TRIAMCINOLONE ACETONIDE 1 MG/G
OINTMENT TOPICAL
Qty: 30 G | Refills: 3 | Status: SHIPPED | OUTPATIENT
Start: 2025-05-14

## 2025-05-14 NOTE — ASSESSMENT & PLAN NOTE
Start Reclast IV   Cont Caltrate and Vit 2000 IU daily   Cont weight-bearing exercises  30 min 5 times per week

## 2025-05-14 NOTE — ASSESSMENT & PLAN NOTE
Cool short showers with TAC 0.1% ointment bid x 7-10 days on moist skin  Avoid scratching, wear cotton, avoid getting over-heated, moisturize with Cerave hypoallergenic cream   Go to derm if needed

## 2025-05-14 NOTE — PROGRESS NOTES
[facility] Progress Note      Chief Complaint     Annual Exam       Subjective:      History of Present Illness:  Juan Cruz is a 70 y.o. female  had labs drawn 5/2025 and 4/2025 reviewed with the pt     S/p right thigh muscle bx by Dr. Ruff 5/13/2025 with results pending      Hypopigmentation on right lateral infraorbital eye: not itchy but unclear etiology     Did water-based and then oil-based enema which helped to relax her muscle; 3 tx of osteopath for chest and hips; unable to manage tinnitus; drops for the nose which are oily in the am which helps increase mucous production; eye drops for eye dry; powder for bloating; came back on March 19, 2025     RA-ILD with NYHA 2 (failed MTX and ASA) as per Dr. Haile on NO supplemental oxygen:  pt tolerated infusion of Truxima on 5/24/20204  2nd of 2 infusions 15 days apart every 6 mo but felt very tired  with paradoxical insomnia x 2 days since she gets Benadryl and steroids with the infusion  -still has intermittent dry cough but does not interfere with routine activities - able to climb stairs in her home and travel   Saw Dr. Haile on 5/02/2024 who stopped her Breo x 1.5 months; will repeat PFTs and 6MWT in 6 mo   Pulm stress test 5/02/2024: 98% RA and 94% post-exercise with  (rest 85); PFTS 2/2024 stable   Labs: 2/2024 CRP 1.83, ESR 35, B12 617, fasting glucose 106 and 4/2024 CRP 1.8 and ESR 15 with normal CMP, CBC  -O2 sat 98% on RA now denies BAKER, SOB  -Dr. Holcomb retired and Dr. GALEANA is taking her place   -currently in remission with normal CRP, ESR 4/28/2205 so infusion stopped in May 2025     Ig def'y: Ig G 630 (1165) ;  Ig M 44 (114) 4/2024:     Vit D 2/24 level 37:  had Vit D injection in 2/2024 with 2 wks of Vit D 50,000 IU weekly; takes Calcium 600mg daily     , , , HDL 52 2/2024    HbA1C 5.1     Osteoporosis:  6/2024  The bone mineral density measured from L1 through L4 is 0.837 g/cm2.  This  corresponds to a T score of -2.9 and a Z score of -1.2.     The bone mineral density within the left femoral neck measures 0.683 g/cm2.  This corresponds to a T score of    -2.6 and a Z score of -0.9.     The bone mineral density within the right femoral neck measures 0.691 g/cm2.  This corresponds to a T score of    -2.5 and a Z score of -0.9.[facility] DEXA 1/2024:  LS -2.7, left FN -2.4, right FN -2.4, left hip -1.9; right hip -2.1, radius -4.1   -did not tolerate Prolia  3/2023 due to painful skin rash   -level 45 on 5/2025 - takes Calcium/Vit D and Vit 2000 IU daily     Mag def'y: takes Mag glycinate 240mg at night; still has RLE spasms    GERD: cont Pepcid 40mg prn     Sinus tachycardia: HR 78 on Metoprolol Er 25mg daily     AR: on zyrtec 10mg daily    Vaginal dryness: Vagifem 10mg biweekly     Past Medical History:  Past Medical History:   Diagnosis Date    Bruise 09/12/2022    Cataract     Chronic bilateral low back pain without sciatica 11/20/2024    Chronic sinusitis, unspecified     Dyspnea on exertion 09/16/2022    Elevated liver enzymes 09/12/2022    History of recent steroid use 11/14/2024    History of SIADH     Idiopathic pulmonary fibrosis 09/12/2022    Interstitial pulmonary disease, unspecified 07/20/2023    LLL pneumonia 12/20/2022    Mitral valve prolapse     Mitral valve prolapse 11/06/2024    Osteopenia     Petechiae 09/12/2022    Pulmonary fibrosis 09/12/2022    Rheumatoid arthritis involving multiple sites     Skin rash 03/09/2023    Stiffness in joint 11/14/2024       Past Surgical History:  Past Surgical History:   Procedure Laterality Date    BREAST BIOPSY Bilateral     FUNCTIONAL ENDOSCOPIC SINUS SURGERY (FESS)      MUSCLE BIOPSY Right 5/13/2025    Procedure: BIOPSY, MUSCLE Right Thigh;  Surgeon: Skip Ruff MD;  Location: [facility] OR [address] [place] [address];  Service: General;  Laterality: Right;    ORIF WRIST FRACTURE Right        Allergies:  Review of patient's allergies indicates:   Allergen  "Reactions    Methotrexate Shortness Of Breath    Diphenhydramine hcl     Gluten protein Other (See Comments)     Sensitive - not allergic    Mycobacterium tuberculosis (tuberculin ppd)     Ofev [nintedanib] Other (See Comments)     "It causes liver toxicity."    Paxlovid [nirmatrelvir-ritonavir] Other (See Comments)     Trunk / chest / legs peeling after paxlovid    Prolia [denosumab] Other (See Comments)     Rash to arms/trunk       Home Medications:  Current Outpatient Medications   Medication Sig Dispense Refill    albuterol sulfate (VENTOLIN HFA INHL) Ventolin HFA      B-complex with vitamin C (Z-BEC OR EQUIV) tablet Take 1 tablet by mouth 2 (two) times a day.      BREO ELLIPTA 200-25 mcg/dose DsDv diskus inhaler INHALE 1 PUFF BY MOUTH ONCE DAILY (CONTROLLER) 180 each 0    calcium citrate-vitamin D3 315-200 mg (CITRACAL+D) 315-200 mg-unit per tablet Take 1 tablet by mouth 2 (two) times daily.      cetirizine (ZYRTEC) 10 MG tablet Take 1 tablet (10 mg total) by mouth once daily. 30 tablet 5    estradioL (VAGIFEM) 10 mcg Tab Yuvafem 10 mcg vaginal tablet   INSERT 1 TABLET VAGINALLY TWICE A WEEK 45 tablet 5    famotidine (PEPCID) 40 MG tablet Take 40 mg by mouth 2 (two) times daily.      guaiFENesin (MUCINEX) 600 mg 12 hr tablet Take 1,200 mg by mouth 2 (two) times daily as needed for Congestion.      magnesium oxide (MAG-OX) 400 mg (241.3 mg magnesium) tablet Take 400 mg by mouth 2 (two) times daily.      metoprolol succinate (TOPROL-XL) 25 MG 24 hr tablet Take 1 tablet (25 mg total) by mouth once daily. 90 tablet 3    RESTASIS 0.05 % ophthalmic emulsion Place 1 drop into both eyes once daily. Has not started      triamcinolone acetonide 0.1% (KENALOG) 0.1 % ointment Apply thin film to affected skin bid x 7-10 days max but avoid on face 30 g 3     No current facility-administered medications for this visit.        Family History:  Family History   Problem Relation Name Age of Onset    Breast cancer Neg Hx      " Colon cancer Neg Hx      Ovarian cancer Neg Hx      Uterine cancer Neg Hx      Cervical cancer Neg Hx         Social History:  Social History     Tobacco Use    Smoking status: Never     Passive exposure: Never    Smokeless tobacco: Never   Substance Use Topics    Alcohol use: No    Drug use: No       Review of Systems:  Pertinent positives and negatives listed in HPI. All other systems are reviewed and are negative.    Health Maintaince :   Health Maintenance Topics with due status: Not Due       Topic Last Completion Date    DEXA Scan 06/28/2024    Colorectal Cancer Screening 11/18/2024    Lipid Panel 05/05/2025    Mammogram 05/14/2025    Influenza Vaccine Not Due           Eye: UTD  Dental: UTD    Immunizations:   Tdap: n/a.  Influenza: UTD.  Pneumovax: UTD  Shingrex x 2: n/a  COVID: needs  Hepatitis C:   Cancer Screening:  PAP: UTD - needs annual gyn exam  Mammogram: UTD 5/2025 s/p bx  Colonoscopy: 10/2021 neg - repeat 10/2024 at [facility]   DEXA: 6/2024  The bone mineral density measured from L1 through L4 is 0.837 g/cm2.  This corresponds to a T score of -2.9 and a Z score of -1.2.     The bone mineral density within the left femoral neck measures 0.683 g/cm2.  This corresponds to a T score of    -2.6 and a Z score of -0.9.     The bone mineral density within the right femoral neck measures 0.691 g/cm2.  This corresponds to a T score of    -2.5 and a Z score of -0.9.     The FRAX score (10 year probability of fracture) is 22.8 % for a major osteoporotic fracture and 5.8 % for hip fracture.     Impression:     Osteoporosis  LDCT: n/a    The 10-year ASCVD risk score (Yohana GARCIA, et al., 2019) is: 4.9%    Values used to calculate the score:      Age: 70 years      Sex: Female      Is Non- : No      Diabetic: No      Tobacco smoker: No      Systolic Blood Pressure: 94 mmHg      Is BP treated: No      HDL Cholesterol: 72 mg/dL      Total Cholesterol: 259 mg/dL      Objective:   BP  "94/74 (BP Location: Left arm, Patient Position: Sitting)   Pulse 72   Ht 5' 2" (1.575 m)   Wt 57.3 kg (126 lb 5.2 oz)   LMP  (LMP Unknown)   SpO2 96%   BMI 23.10 kg/m²      Body mass index is 23.1 kg/m².       Physical Examination:  General: Alert and awake in no apparent distress  HEENT: Normocephalic and atraumatic; Tms WNL  Eyes:  PERRL; EOMi with anicteric sclera and clear conjunctivae  Mouth:  Oropharynx clear and without exudate; moist mucous membranes  Neck:   Cervical nodes not enlarged; supple; no bruits  Cardio:  Regular rate and rhythm with normal S1 and S2; no murmurs or rubs  Resp:  CTAB; respirations unlabored; no wheezes, crackles or rhonchi  Abdom: Soft, NTND with normoactive bowel sounds; negative HSM  Extrem: Warm and well-perfused with no clubbing, cyanosis or edema  Skin:  No rashes, lesions, or color changes  Pulses:  2+ and symmetric distally  Neuro:  AAOx3; cooperative and pleasant with no focal deficits    Laboratory:      Most Recent Data:  Lab Results   Component Value Date    WBC 7.02 04/28/2025    HGB 13.6 04/28/2025    HCT 41.9 04/28/2025     04/28/2025    CHOL 259 (H) 05/05/2025    TRIG 105 05/05/2025    HDL 72 05/05/2025    ALT 17 04/28/2025    AST 23 04/28/2025     04/28/2025    K 4.3 04/28/2025     04/28/2025    BUN 11 04/28/2025    CO2 27 04/28/2025    TSH 2.186 05/05/2025    INR 0.9 09/09/2022    HGBA1C 5.5 04/28/2025              CBC:   WBC   Date Value Ref Range Status   04/28/2025 7.02 3.90 - 12.70 K/uL Final     Hemoglobin   Date Value Ref Range Status   11/04/2024 14.1 12.0 - 16.0 g/dL Final     HGB   Date Value Ref Range Status   04/28/2025 13.6 12.0 - 16.0 gm/dL Final     Hematocrit   Date Value Ref Range Status   11/04/2024 41.4 37.0 - 48.5 % Final     HCT   Date Value Ref Range Status   04/28/2025 41.9 37.0 - 48.5 % Final     Platelet Count   Date Value Ref Range Status   04/28/2025 299 150 - 450 K/uL Final     Platelets   Date Value Ref Range " Status   11/04/2024 295 150 - 450 K/uL Final     MCV   Date Value Ref Range Status   04/28/2025 92 82 - 98 fL Final   11/04/2024 91 82 - 98 fL Final     RDW   Date Value Ref Range Status   04/28/2025 12.0 11.5 - 14.5 % Final   11/04/2024 12.4 11.5 - 14.5 % Final     BMP:   Sodium   Date Value Ref Range Status   04/28/2025 139 136 - 145 mmol/L Final   11/04/2024 137 136 - 145 mmol/L Final     Potassium   Date Value Ref Range Status   04/28/2025 4.3 3.5 - 5.1 mmol/L Final   11/04/2024 4.3 3.5 - 5.1 mmol/L Final     Chloride   Date Value Ref Range Status   04/28/2025 105 95 - 110 mmol/L Final   11/04/2024 104 95 - 110 mmol/L Final     CO2   Date Value Ref Range Status   04/28/2025 27 23 - 29 mmol/L Final   11/04/2024 26 23 - 29 mmol/L Final     BUN   Date Value Ref Range Status   04/28/2025 11 8 - 23 mg/dL Final     Creatinine   Date Value Ref Range Status   04/28/2025 0.7 0.5 - 1.4 mg/dL Final     Glucose   Date Value Ref Range Status   04/28/2025 91 70 - 110 mg/dL Final   11/04/2024 97 70 - 110 mg/dL Final     Calcium   Date Value Ref Range Status   04/28/2025 9.2 8.7 - 10.5 mg/dL Final   11/04/2024 9.6 8.7 - 10.5 mg/dL Final     Magnesium   Date Value Ref Range Status   06/28/2024 1.9 1.6 - 2.6 mg/dL Final     LFTs:   Protein Total   Date Value Ref Range Status   04/28/2025 7.1 6.0 - 8.4 gm/dL Final     Total Protein   Date Value Ref Range Status   11/04/2024 7.0 6.0 - 8.4 g/dL Final     Albumin   Date Value Ref Range Status   04/28/2025 3.8 3.5 - 5.2 g/dL Final   11/04/2024 3.8 3.5 - 5.2 g/dL Final     Total Bilirubin   Date Value Ref Range Status   11/04/2024 0.4 0.1 - 1.0 mg/dL Final     Comment:     For infants and newborns, interpretation of results should be based  on gestational age, weight and in agreement with clinical  observations.    Premature Infant recommended reference ranges:  Up to 24 hours.............<8.0 mg/dL  Up to 48 hours............<12.0 mg/dL  3-5 days..................<15.0 mg/dL  6-29  days.................<15.0 mg/dL       Bilirubin Total   Date Value Ref Range Status   04/28/2025 0.2 0.1 - 1.0 mg/dL Final     Comment:     For infants and newborns, interpretation of results should be based   on gestational age, weight and in agreement with clinical   observations.    Premature Infant recommended reference ranges:   0-24 hours:  <8.0 mg/dL   24-48 hours: <12.0 mg/dL   3-5 days:    <15.0 mg/dL   6-29 days:   <15.0 mg/dL     AST   Date Value Ref Range Status   04/28/2025 23 11 - 45 unit/L Final   11/04/2024 24 10 - 40 U/L Final     Alkaline Phosphatase   Date Value Ref Range Status   11/04/2024 67 40 - 150 U/L Final     ALP   Date Value Ref Range Status   04/28/2025 66 40 - 150 unit/L Final     ALT   Date Value Ref Range Status   04/28/2025 17 10 - 44 unit/L Final   11/04/2024 21 10 - 44 U/L Final     Coags:   INR   Date Value Ref Range Status   09/09/2022 0.9 0.8 - 1.2 Final     Comment:     Coumadin Therapy:  2.0 - 3.0 for INR for all indicators except mechanical heart valves  and antiphospholipid syndromes which should use 2.5 - 3.5.       FLP:      Lab Results   Component Value Date    CHOL 259 (H) 05/05/2025    CHOL 270 (H) 11/04/2024    CHOL 219 (H) 12/19/2023     Lab Results   Component Value Date    HDL 72 05/05/2025    HDL 76 (H) 11/04/2024    HDL 42 12/19/2023     Lab Results   Component Value Date    LDLCALC 166.0 (H) 05/05/2025    LDLCALC 177.6 (H) 11/04/2024    LDLCALC 151.0 12/19/2023     Lab Results   Component Value Date    TRIG 105 05/05/2025    TRIG 82 11/04/2024    TRIG 130 12/19/2023     Lab Results   Component Value Date    CHOLHDL 27.8 05/05/2025    CHOLHDL 28.1 11/04/2024    CHOLHDL 19.2 (L) 12/19/2023      DM:      Hemoglobin A1C   Date Value Ref Range Status   12/19/2023 5.4 4.0 - 5.6 % Final     Comment:     ADA Screening Guidelines:  5.7-6.4%  Consistent with prediabetes  >or=6.5%  Consistent with diabetes    High levels of fetal hemoglobin interfere with the HbA1C  assay.  Heterozygous hemoglobin variants (HbS, HgC, etc)do  not significantly interfere with this assay.   However, presence of multiple variants may affect accuracy.     12/22/2022 5.6 4.0 - 5.6 % Final     Comment:     ADA Screening Guidelines:  5.7-6.4%  Consistent with prediabetes  >or=6.5%  Consistent with diabetes    High levels of fetal hemoglobin interfere with the HbA1C  assay. Heterozygous hemoglobin variants (HbS, HgC, etc)do  not significantly interfere with this assay.   However, presence of multiple variants may affect accuracy.       Hemoglobin A1c   Date Value Ref Range Status   04/28/2025 5.5 4.0 - 5.6 % Final     Comment:     ADA Screening Guidelines:  5.7-6.4%  Consistent with prediabetes  >=6.5%  Consistent with diabetes    High levels of fetal hemoglobin interfere with the HbA1C  assay. Heterozygous hemoglobin variants (HbS, HgC, etc)do  not significantly interfere with this assay.   However, presence of multiple variants may affect accuracy.     LDL Cholesterol   Date Value Ref Range Status   05/05/2025 166.0 (H) 63.0 - 159.0 mg/dL Final     Comment:     The National Cholesterol Education Program (NCEP) has set the  following guidelines (reference values) for LDL Cholesterol:  Optimal.......................<130 mg/dL  Borderline High...............130-159 mg/dL  High..........................160-189 mg/dL  Very High.....................>190 mg/dL  LDL calculated using the Friedewald equation.     Creatinine   Date Value Ref Range Status   04/28/2025 0.7 0.5 - 1.4 mg/dL Final     Thyroid:   TSH   Date Value Ref Range Status   05/05/2025 2.186 0.400 - 4.000 uIU/mL Final     Anemia:   Vitamin B-12   Date Value Ref Range Status   11/14/2024 768 180 - 914 ng/L Final     Comment:     Test Performed by:  [facility]  [address] [place] [address]  : Mela Hdz Ph.D.; CLIA# [id number]       Folate Level   Date Value Ref Range Status    04/28/2025 17.6 4.0 - 24.0 ng/mL Final     Cardiac:   Troponin I   Date Value Ref Range Status   02/10/2023 <0.006 0.000 - 0.026 ng/mL Final     Comment:     The reference interval for Troponin I represents the 99th percentile   cutoff   for our facility and is consistent with 3rd generation assay   performance.       BNP   Date Value Ref Range Status   02/10/2023 <10 0 - 99 pg/mL Final     Comment:     Values of less than 100 pg/ml are consistent with non-CHF populations.     Urinalysis:   Color, UA   Date Value Ref Range Status   12/10/2023 Colorless (A) Yellow, Straw, Krystal Final     Specific Gravity, UA   Date Value Ref Range Status   12/10/2023 <1.005 (A) 1.005 - 1.030 Final     Nitrite, UA   Date Value Ref Range Status   12/10/2023 Negative Negative Final     Ketones, UA   Date Value Ref Range Status   12/10/2023 1+ (A) Negative Final     Urobilinogen, UA   Date Value Ref Range Status   12/10/2023 Negative <2.0 EU/dL Final       Other Results:  EKG (my interpretation):     Radiology:  Mammo Digital Diagnostic Left with Luis (XPD)  Narrative: Facility:  [facility] [address] [place] [id number] [phone]    Name: Juan Cruz    MRN: [id number]    Result:   Mammo Digital Diagnostic Left with Luis (XPD)     History:  Patient is 70 y.o. and is seen for abnormal mammography.    Films Compared:  Compared to: 04/28/2025 Mammo Digital Screening Bilat w/ Luis (XPD),   04/18/2024 Mammo Digital Screening Bilat w/ Luis, 01/05/2023 Mammo Digital   Screening Bilat w/ Luis, and 11/04/2021 Mammo Digital Screening Bilat w/   Luis     Findings:  This procedure was performed using tomosynthesis. Computer-aided detection   was utilized in the interpretation of this examination.  The breasts are heterogeneously dense, which may obscure small masses.  On spot compression views, the asymmetry has not significantly changed   from prior exams, consistent with benign breast tissue.   No evidence of   suspicious masses.  Impression: There is no mammographic evidence of malignancy in the left breast.    BI-RADS Category:   Overall: 2 - Benign    Recommendation:  Routine screening mammogram in 1 year is recommended.    Your estimated lifetime risk of breast cancer (to age 85) based on   Tyrer-Cuzick risk assessment model is 5.38%.  According to the American   Cancer Society, patients with a lifetime breast cancer risk of 20% or   higher might benefit from supplemental screening tests, such as screening   breast MRI.    Electronically signed by,  Kush Lomeli MD          Assessment/Plan     Juan Cruz is a 70 y.o. female with:  1. Preventative health care  Assessment & Plan:  Make appt with Zara WOODS in 10/2025 for repeat colonoscopy       2. Eczema, unspecified type  Assessment & Plan:  Cool short showers with TAC 0.1% ointment bid x 7-10 days on moist skin  Avoid scratching, wear cotton, avoid getting over-heated, moisturize with Cerave hypoallergenic cream   Go to derm if needed     Orders:  -     triamcinolone acetonide 0.1% (KENALOG) 0.1 % ointment; Apply thin film to affected skin bid x 7-10 days max but avoid on face  Dispense: 30 g; Refill: 3    3. Vaginal dryness, menopausal  Assessment & Plan:  Stable on Vagifem 10mg biweekly       4. Idiopathic pulmonary fibrosis  Assessment & Plan:  Stable as per Dr. Haile       5. NCGS (non-celiac gluten sensitivity)  Assessment & Plan:  Cont Celiac-free diet      6. Age-related osteoporosis without current pathological fracture  Overview:  Indications: History of Fracture (Adult), Family history of osteoporosis, Post-menopause, , Height Loss   Comparison: DEXA scan 7/3/2019 - done 5/2022  Treatments: Exercises 3 or more times a week, Vitamin D supplementation, Calcium supplementation    A DEXA scan was performed on the lumbar spine and both hips.    The average trabecular bone mineral density of the lumbar spine from L2-L4 was 0.880  g/cm2. This represents a T-score of -2.7 and a Z-score of  -0.9. Previous T score of -2.8 on 7/3/2019    The average trabecular bone mineral density measured at the proximal femurs was 0.778 g/cm2. This represents a T-score of -1.8 and Z-score of -0.4. Previous T score of -1.7 on 7/3/2019    FRAX Result (10 year probability of fracture):  Major osteoporotic fracture: 11%  Hip fracture: 2.1%      [facility] DEXA 1/2024:  LS -2.7, left FN -2.4, right FN -2.4, left hip -1.9; right hip -2.1, radius -4.1   -did not tolerate Prolia  3/2023 due to painful skin rash       Assessment & Plan:  Start Reclast IV   Cont Caltrate and Vit 2000 IU daily   Cont weight-bearing exercises  30 min 5 times per week       7. Rheumatoid arthritis involving multiple sites with positive rheumatoid factor  Overview:  11/15/2022:  ESR 36 (48),  CRP 19 (14), WBC 15.26  2/2023: ESR 22, CRP 2.3    Failed MTX and ASA  2/2024 CRP 1.83, ESR 35, B12 617, fasting glucose 106 and 4/2024 CRP 1.8 and ESR 15 with normal CMP, CBC    Unusual presentation of interstitial lung disease with rheumatoid arthritis serology and arthralgia though no synovitis; but now reports less musc-sk pain after rituximab    Assessment & Plan:  Now in remission no longer requiring further infusions until inflammatory markers worsen      8. Interstitial lung disease  Overview:  TTE 10/2023 WNL    4/2022 Spirometry is normal. Lung volumes show mild-moderate restriction is present. Airway mechanics are abnormal showing increased airway resistance and decreased conductance. DLCO is moderately decreased    CT chest 12/09/2022: Diffuse subpleural reticular opacity and nodular pleural thickening.  Minimal perihilar bronchiectasis.  No evidence of honeycombing.  No focal opacity.  No pleural thickening.  Dx: Diffuse nonspecific interstitial lung disease, possible UIP pattern    Had Lung toxicity with MTX, intolerance of aza, and hepatotoxicity to OFEV.      Assessment & Plan:  F/u   Lazara every 6 mo with CT chest without contrast       9. Gastroesophageal reflux disease without esophagitis  Assessment & Plan:  Minimize use of PPI and change to Pepcid prn  Initiate GERD precautions ie lose weight, avoid eating 3 hours prior to bed, minimize caffeine, alcohol, tobacco, spicy, greasy, fatty foods; avoid peppermint chocolates, citrus and other acidic foods. Increase exercise to help with digestion and avoid lying down immediately after eating.  Elevate head of bed if GERD awakens pt from sleep.  Eat 6 small snacks rather than 3 large meals.                       I spent a total of 55 minutes on the day of the visit.This includes face to face time and non-face to face time preparing to see the patient (eg, review of tests), obtaining and/or reviewing separately obtained history, documenting clinical information in the electronic or other health record, independently interpreting results and communicating results to the patient/family/caregiver, or care coordinator.   Code Status:     Gianna Carpenter MD

## 2025-05-14 NOTE — ASSESSMENT & PLAN NOTE
Minimize use of PPI and change to Pepcid prn  Initiate GERD precautions ie lose weight, avoid eating 3 hours prior to bed, minimize caffeine, alcohol, tobacco, spicy, greasy, fatty foods; avoid peppermint chocolates, citrus and other acidic foods. Increase exercise to help with digestion and avoid lying down immediately after eating.  Elevate head of bed if GERD awakens pt from sleep.  Eat 6 small snacks rather than 3 large meals.

## 2025-05-14 NOTE — PATIENT INSTRUCTIONS
Preventative health care  Assessment & Plan:  Make appt with Zara WOODS in 10/2025 for repeat colonoscopy       Eczema, unspecified type  Assessment & Plan:  Cool short showers with TAC 0.1% ointment bid x 7-10 days on moist skin  Avoid scratching, wear cotton, avoid getting over-heated, moisturize with Cerave hypoallergenic cream   Go to derm if needed     Orders:  -     triamcinolone acetonide 0.1% (KENALOG) 0.1 % ointment; Apply thin film to affected skin bid x 7-10 days max but avoid on face  Dispense: 30 g; Refill: 3    Vaginal dryness, menopausal  Assessment & Plan:  Stable on Vagifem 10mg biweekly       Idiopathic pulmonary fibrosis  Assessment & Plan:  Stable as per Dr. Haile       [facility] (non-celiac gluten sensitivity)  Assessment & Plan:  Cont Celiac-free diet      Age-related osteoporosis without current pathological fracture  Assessment & Plan:  Start Reclast IV   Cont Caltrate and Vit 2000 IU daily   Cont weight-bearing exercises  30 min 5 times per week       Rheumatoid arthritis involving multiple sites with positive rheumatoid factor  Assessment & Plan:  Now in remission no longer requiring further infusions until inflammatory markers worsen      Interstitial lung disease  Assessment & Plan:  F/u Dr. Haile every 6 mo with CT chest without contrast       Gastroesophageal reflux disease without esophagitis  Assessment & Plan:  Minimize use of PPI and change to Pepcid prn  Initiate GERD precautions ie lose weight, avoid eating 3 hours prior to bed, minimize caffeine, alcohol, tobacco, spicy, greasy, fatty foods; avoid peppermint chocolates, citrus and other acidic foods. Increase exercise to help with digestion and avoid lying down immediately after eating.  Elevate head of bed if GERD awakens pt from sleep.  Eat 6 small snacks rather than 3 large meals.

## 2025-05-15 VITALS
HEART RATE: 65 BPM | DIASTOLIC BLOOD PRESSURE: 67 MMHG | TEMPERATURE: 97 F | OXYGEN SATURATION: 100 % | SYSTOLIC BLOOD PRESSURE: 115 MMHG | RESPIRATION RATE: 16 BRPM

## 2025-05-22 ENCOUNTER — PATIENT MESSAGE (OUTPATIENT)
Facility: CLINIC | Age: 70
End: 2025-05-22
Payer: MEDICARE

## 2025-05-22 ENCOUNTER — RESULTS FOLLOW-UP (OUTPATIENT)
Facility: CLINIC | Age: 70
End: 2025-05-22

## 2025-05-27 ENCOUNTER — OFFICE VISIT (OUTPATIENT)
Dept: SURGERY | Facility: CLINIC | Age: 70
End: 2025-05-27
Payer: MEDICARE

## 2025-05-27 VITALS
BODY MASS INDEX: 23.82 KG/M2 | HEIGHT: 62 IN | WEIGHT: 129.44 LBS | SYSTOLIC BLOOD PRESSURE: 115 MMHG | DIASTOLIC BLOOD PRESSURE: 66 MMHG | OXYGEN SATURATION: 100 % | HEART RATE: 88 BPM

## 2025-05-27 DIAGNOSIS — Z48.89 POSTOPERATIVE VISIT: Primary | ICD-10-CM

## 2025-05-27 PROCEDURE — 1101F PT FALLS ASSESS-DOCD LE1/YR: CPT | Mod: CPTII,NTX,S$GLB, | Performed by: SURGERY

## 2025-05-27 PROCEDURE — 3074F SYST BP LT 130 MM HG: CPT | Mod: CPTII,NTX,S$GLB, | Performed by: SURGERY

## 2025-05-27 PROCEDURE — 99999 PR PBB SHADOW E&M-EST. PATIENT-LVL III: CPT | Mod: PBBFAC,TXP,, | Performed by: SURGERY

## 2025-05-27 PROCEDURE — 3044F HG A1C LEVEL LT 7.0%: CPT | Mod: CPTII,NTX,S$GLB, | Performed by: SURGERY

## 2025-05-27 PROCEDURE — 99024 POSTOP FOLLOW-UP VISIT: CPT | Mod: NTX,S$GLB,, | Performed by: SURGERY

## 2025-05-27 PROCEDURE — 1126F AMNT PAIN NOTED NONE PRSNT: CPT | Mod: CPTII,NTX,S$GLB, | Performed by: SURGERY

## 2025-05-27 PROCEDURE — 3288F FALL RISK ASSESSMENT DOCD: CPT | Mod: CPTII,NTX,S$GLB, | Performed by: SURGERY

## 2025-05-27 PROCEDURE — 1159F MED LIST DOCD IN RCRD: CPT | Mod: CPTII,NTX,S$GLB, | Performed by: SURGERY

## 2025-05-27 PROCEDURE — 3078F DIAST BP <80 MM HG: CPT | Mod: CPTII,NTX,S$GLB, | Performed by: SURGERY

## 2025-05-27 NOTE — PROGRESS NOTES
[facility]  General Surgery  Follow Up Clinic Note    Subjective:     Juan Cruz is a 70 y.o. female who presents to clinic for follow up s/p BIOPSY, MUSCLE Right Thigh, DOS 5/13/2025. Pt reports that they are feeling well. Tolerating a regular diet and having regular bowel movements. Denies fever, chills, chest pain, and shortness of breath. Pain is well controlled. Denies redness or drainage of incision.    Medications:  Medications Ordered Prior to Encounter[1]      Objective:     PHYSICAL EXAM:  Vital Signs (Most Recent)  Pulse: 88 (05/27/25 1339)  SpO2: 100 % (05/27/25 1339)    Physical Exam:  Gen: no apparent distress, awake and alert  CV: regular rate/rhythm  Pulm: non-labored breathing, equal and bilateral chest rise  Abd: soft, non-distended, non-tender  Ext: warm and well perfused, no edema  Skin: warm and dry, no lesions appreciated  Incision: c/d/I, no surrounding erythema or edema  Neuro: motor and sensation grossly intact and symmetric     Pathology 5/13/2025:  Sent, pending      Assessment:     Juan Cruz is a 70 y.o. female presenting to clinic today for follow up s/p BIOPSY, MUSCLE Right Thigh, DOS 5/13/2025. Postoperative course is progressing appropriately    Plan:     - Patient is recovering well  - Pathology sent, pending results   - RTC PRELIZABETH Evans, DPM  General Surgery PGY-1         [1]   Current Outpatient Medications on File Prior to Visit   Medication Sig Dispense Refill    albuterol sulfate (VENTOLIN HFA INHL) Ventolin HFA      B-complex with vitamin C (Z-BEC OR EQUIV) tablet Take 1 tablet by mouth 2 (two) times a day.      BREO ELLIPTA 200-25 mcg/dose DsDv diskus inhaler INHALE 1 PUFF BY MOUTH ONCE DAILY (CONTROLLER) 180 each 0    calcium citrate-vitamin D3 315-200 mg (CITRACAL+D) 315-200 mg-unit per tablet Take 1 tablet by mouth 2 (two) times daily.      cetirizine (ZYRTEC) 10 MG tablet Take 1 tablet (10 mg total) by mouth once daily. 30  tablet 5    estradioL (VAGIFEM) 10 mcg Tab Yuvafem 10 mcg vaginal tablet   INSERT 1 TABLET VAGINALLY TWICE A WEEK 45 tablet 5    famotidine (PEPCID) 40 MG tablet Take 40 mg by mouth 2 (two) times daily.      guaiFENesin (MUCINEX) 600 mg 12 hr tablet Take 1,200 mg by mouth 2 (two) times daily as needed for Congestion.      magnesium oxide (MAG-OX) 400 mg (241.3 mg magnesium) tablet Take 400 mg by mouth 2 (two) times daily.      metoprolol succinate (TOPROL-XL) 25 MG 24 hr tablet Take 1 tablet (25 mg total) by mouth once daily. 90 tablet 3    RESTASIS 0.05 % ophthalmic emulsion Place 1 drop into both eyes once daily. Has not started      triamcinolone acetonide 0.1% (KENALOG) 0.1 % ointment Apply thin film to affected skin bid x 7-10 days max but avoid on face 30 g 3    [DISCONTINUED] teriparatide 20 mcg/dose (620mcg/2.48mL) PnIj Inject 20 mcg into the skin once daily. 2.48 mL 11     No current facility-administered medications on file prior to visit.

## 2025-06-09 ENCOUNTER — PATIENT MESSAGE (OUTPATIENT)
Dept: PRIMARY CARE CLINIC | Facility: CLINIC | Age: 70
End: 2025-06-09
Payer: MEDICARE

## 2025-06-09 ENCOUNTER — PATIENT MESSAGE (OUTPATIENT)
Dept: RHEUMATOLOGY | Facility: CLINIC | Age: 70
End: 2025-06-09
Payer: MEDICARE

## 2025-06-09 RX ORDER — PREDNISONE 20 MG/1
20 TABLET ORAL DAILY
Qty: 10 TABLET | Refills: 0 | Status: SHIPPED | OUTPATIENT
Start: 2025-06-09

## 2025-06-09 NOTE — TELEPHONE ENCOUNTER
LOV with Pauline, Gianna Richard MD , 5/14/2025    Patient states on Saturday her middle finger became stiff, painful, swollen, and red. She applied 2 applications of Diclofenac ointment and had relief. However on Sunday it reoccurred and again responded to the ointment. Patient also state she has slight cough increase and usual stiffness and pain in ankles. Please see pictures attached.

## 2025-07-07 ENCOUNTER — TELEPHONE (OUTPATIENT)
Dept: TRANSPLANT | Facility: CLINIC | Age: 70
End: 2025-07-07
Payer: MEDICARE

## 2025-07-07 NOTE — TELEPHONE ENCOUNTER
"returned call to patient, she states that she wants Dr. Haile to know that Dr. Feliciano  said her status is in remission, and the ritux is on hold since May. In June had digit swelling. Used diclofenac topical  Reports more intermittent cough. Is exercising regularly at gym.  max, using weights.  She feels like sometimes she may not be able to fully breathe out, but otherwise is remaining active. She will call to reschedule if needed.    Copied from CRM #[id number]. Topic: General Inquiry - Return Call  >> Jul 7, 2025  3:48 PM Santos wrote:  Consult/Advisory    Name Of Caller: Self    Contact Preference?: [phone]    Provider Name: Lelo    What is the nature of the call?: Returning call to office.    Additional Notes:  "Thank you for all that you do for our patients"  "

## 2025-07-14 ENCOUNTER — PATIENT MESSAGE (OUTPATIENT)
Dept: PRIMARY CARE CLINIC | Facility: CLINIC | Age: 70
End: 2025-07-14
Payer: MEDICARE

## 2025-07-14 ENCOUNTER — PATIENT MESSAGE (OUTPATIENT)
Dept: RHEUMATOLOGY | Facility: CLINIC | Age: 70
End: 2025-07-14
Payer: MEDICARE

## 2025-07-14 ENCOUNTER — PATIENT MESSAGE (OUTPATIENT)
Facility: CLINIC | Age: 70
End: 2025-07-14
Payer: MEDICARE

## 2025-07-14 NOTE — TELEPHONE ENCOUNTER
LOV with Gianna Carpenter MD , 5/14/2025 (Annual)  6/9/25 message encounter - discussion regarding stiff, swollen, red finger began.   Pt now reports swollen finger reponded well to diclofenac, but she is now experiencing pain. Pt went to dermatologist who prescribed betamethasone cream for itching back, but it didn't help much. Pt asking if she should have labs drawn.    Pt has included photo of her back.

## 2025-07-18 NOTE — PROGRESS NOTES
"History & Physical    Subjective     History of Present Illness:  Patient is a 70 y.o. female w/ interstitial lung disease, hx of rheumatoid arthritis, former anesthesiologist (stopped practicing in 2022), follows with Lung Transplant and currently on rituxan infusions but having muscle symptoms with infusions despite reduced dose. Presents today for evaluation for muscle biopsy for diagnostic purposes of her muscle weakness.    Chief Complaint   Patient presents with    Consult       Review of patient's allergies indicates:   Allergen Reactions    Methotrexate Shortness Of Breath    Diphenhydramine hcl     Gluten protein Other (See Comments)     Sensitive - not allergic    Mycobacterium tuberculosis (tuberculin ppd)     Ofev [nintedanib] Other (See Comments)     "It causes liver toxicity."    Paxlovid [nirmatrelvir-ritonavir] Other (See Comments)     Trunk / chest / legs peeling after paxlovid    Prolia [denosumab] Other (See Comments)     Rash to arms/trunk       Current Medications[1]    Past Medical History:   Diagnosis Date    Bruise 09/12/2022    Cataract     Chronic bilateral low back pain without sciatica 11/20/2024    Chronic sinusitis, unspecified     Dyspnea on exertion 09/16/2022    Elevated liver enzymes 09/12/2022    History of recent steroid use 11/14/2024    History of SIADH     Idiopathic pulmonary fibrosis 09/12/2022    Interstitial pulmonary disease, unspecified 07/20/2023    LLL pneumonia 12/20/2022    Mitral valve prolapse     Mitral valve prolapse 11/06/2024    Osteopenia     Petechiae 09/12/2022    Pulmonary fibrosis 09/12/2022    Rheumatoid arthritis involving multiple sites     Skin rash 03/09/2023    Stiffness in joint 11/14/2024     Past Surgical History:   Procedure Laterality Date    BREAST BIOPSY Bilateral     FUNCTIONAL ENDOSCOPIC SINUS SURGERY (FESS)      MUSCLE BIOPSY Right 5/13/2025    Procedure: BIOPSY, MUSCLE Right Thigh;  Surgeon: Skip Ruff MD;  Location: [facility]" "[facility];  Service: General;  Laterality: Right;    ORIF WRIST FRACTURE Right      Family History   Problem Relation Name Age of Onset    Breast cancer Neg Hx      Colon cancer Neg Hx      Ovarian cancer Neg Hx      Uterine cancer Neg Hx      Cervical cancer Neg Hx       Social History[2]     Review of Systems:  Review of Systems   Constitutional:  Negative for chills and fever.   Respiratory:  Negative for cough and shortness of breath.    Cardiovascular:  Negative for chest pain and palpitations.   Gastrointestinal:  Negative for abdominal pain, nausea and vomiting.   Genitourinary:  Negative for dysuria and hematuria.   Musculoskeletal:  Negative for back pain and gait problem.   Skin:  Negative for color change, rash and wound.   Neurological:  Negative for weakness and headaches.   Hematological:  Negative for adenopathy. Does not bruise/bleed easily.   Psychiatric/Behavioral:  Negative for agitation and confusion.           Objective     Vital Signs (Most Recent)  Pulse: 85 (05/06/25 1435)  BP: 113/75 (05/06/25 1435)  SpO2: 98 % (05/06/25 1435)  5' 2" (1.575 m)  58.3 kg (128 lb 8.5 oz)     Physical Exam:  Physical Exam  Constitutional:       General: She is not in acute distress.     Appearance: Normal appearance. She is not ill-appearing.   HENT:      Head: Normocephalic and atraumatic.   Eyes:      General: No scleral icterus.     Pupils: Pupils are equal, round, and reactive to light.   Neck:      Trachea: No tracheal deviation.   Cardiovascular:      Rate and Rhythm: Normal rate and regular rhythm.   Pulmonary:      Effort: Pulmonary effort is normal. No respiratory distress.      Breath sounds: No stridor.   Abdominal:      General: There is no distension.      Palpations: Abdomen is soft.      Tenderness: There is no abdominal tenderness.   Musculoskeletal:         General: No deformity or signs of injury.      Cervical back: No edema or erythema.   Skin:     General: Skin is warm and dry.      Coloration: " Skin is not jaundiced.   Neurological:      General: No focal deficit present.      Mental Status: She is alert and oriented to person, place, and time.   Psychiatric:         Mood and Affect: Mood normal.         Behavior: Behavior normal.          Assessment and Plan   70-year-old woman with myositis/weakness.    PLAN:  Plan for muscle biopsy in the operating room.    Skip Ruff MD  Acute Care Surgery  [facility] Department of Surgery                    [1]   Current Outpatient Medications   Medication Sig Dispense Refill    albuterol sulfate (VENTOLIN HFA INHL) Ventolin HFA      B-complex with vitamin C (Z-BEC OR EQUIV) tablet Take 1 tablet by mouth 2 (two) times a day.      BREO ELLIPTA 200-25 mcg/dose DsDv diskus inhaler INHALE 1 PUFF BY MOUTH ONCE DAILY (CONTROLLER) 180 each 0    calcium citrate-vitamin D3 315-200 mg (CITRACAL+D) 315-200 mg-unit per tablet Take 1 tablet by mouth 2 (two) times daily.      estradioL (VAGIFEM) 10 mcg Tab Yuvafem 10 mcg vaginal tablet   INSERT 1 TABLET VAGINALLY TWICE A WEEK 45 tablet 5    famotidine (PEPCID) 40 MG tablet Take 40 mg by mouth 2 (two) times daily.      magnesium oxide (MAG-OX) 400 mg (241.3 mg magnesium) tablet Take 400 mg by mouth 2 (two) times daily.      metoprolol succinate (TOPROL-XL) 25 MG 24 hr tablet Take 1 tablet (25 mg total) by mouth once daily. 90 tablet 3    RESTASIS 0.05 % ophthalmic emulsion Place 1 drop into both eyes once daily. Has not started      cetirizine (ZYRTEC) 10 MG tablet Take 1 tablet (10 mg total) by mouth once daily. 30 tablet 5    guaiFENesin (MUCINEX) 600 mg 12 hr tablet Take 1,200 mg by mouth 2 (two) times daily as needed for Congestion.      predniSONE (DELTASONE) 20 MG tablet Take 1 tablet (20 mg total) by mouth once daily. 10 tablet 0    triamcinolone acetonide 0.1% (KENALOG) 0.1 % ointment Apply thin film to affected skin bid x 7-10 days max but avoid on face 30 g 3     No current facility-administered medications for this visit.    [2]   Social History  Tobacco Use    Smoking status: Never     Passive exposure: Never    Smokeless tobacco: Never   Substance Use Topics    Alcohol use: No    Drug use: No

## 2025-07-24 ENCOUNTER — OFFICE VISIT (OUTPATIENT)
Dept: RHEUMATOLOGY | Facility: CLINIC | Age: 70
End: 2025-07-24
Payer: MEDICARE

## 2025-07-24 VITALS
HEART RATE: 73 BPM | WEIGHT: 132.25 LBS | HEIGHT: 62 IN | DIASTOLIC BLOOD PRESSURE: 63 MMHG | SYSTOLIC BLOOD PRESSURE: 95 MMHG | BODY MASS INDEX: 24.34 KG/M2

## 2025-07-24 DIAGNOSIS — Z79.60 LONG-TERM USE OF IMMUNOSUPPRESSANT MEDICATION: ICD-10-CM

## 2025-07-24 DIAGNOSIS — M81.0 AGE-RELATED OSTEOPOROSIS WITHOUT CURRENT PATHOLOGICAL FRACTURE: ICD-10-CM

## 2025-07-24 DIAGNOSIS — J84.9 INTERSTITIAL LUNG DISEASE: ICD-10-CM

## 2025-07-24 DIAGNOSIS — M05.79 RHEUMATOID ARTHRITIS INVOLVING MULTIPLE SITES WITH POSITIVE RHEUMATOID FACTOR: Primary | ICD-10-CM

## 2025-07-24 PROBLEM — D84.9 IMMUNODEFICIENCY, UNSPECIFIED: Status: RESOLVED | Noted: 2024-05-28 | Resolved: 2025-07-24

## 2025-07-24 PROCEDURE — 99999 PR PBB SHADOW E&M-EST. PATIENT-LVL III: CPT | Mod: PBBFAC,TXP,, | Performed by: INTERNAL MEDICINE

## 2025-07-24 ASSESSMENT — ROUTINE ASSESSMENT OF PATIENT INDEX DATA (RAPID3)
TOTAL RAPID3 SCORE: 3.72
AM STIFFNESS SCORE: 1, YES
WHEN YOU AWAKENED IN THE MORNING OVER THE LAST WEEK, PLEASE INDICATE THE AMOUNT OF TIME IT TAKES UNTIL YOU ARE AS LIMBER AS YOU WILL BE FOR THE DAY: .5
PATIENT GLOBAL ASSESSMENT SCORE: 5.5
PSYCHOLOGICAL DISTRESS SCORE: 2.2
FATIGUE SCORE: 3.5
MDHAQ FUNCTION SCORE: 0.5
PAIN SCORE: 4

## 2025-07-24 NOTE — PROGRESS NOTES
"Subjective:       Patient ID: Juan Cruz is a 70 y.o. female.    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 Velasquez Holcomb saw her 2022 with interstitial lung disease and CCP (1162) and RF (173); dx rheumatoid arthritis 2022 though less arthritis and more tendinitis; started methotrexate May 2022 but interstitial lung disease worsened; pulmonary Rx Ofev but prompt liver toxicity; took azathioprine briefly  Rituximab: Oct 24 and Nov 7 2022 4/25 and 5/10/23  11/10 and 11/24/23  5/10 and 5/24/24 Nov 2024     IVIG levels decreased   11/4/24 IgG 678, IgA 111, IgM 41  Last hep b tests 4/16/24     Got COVID Jan 2023  Rash after COVID and paxlovid treated with steroid  Prolia # 2 2023 with skin reaction again treated with steroid  Now on alendronate and Tymlos recommended  Prednisone completed Dec 2023    Last month had painful MCP joint that improved with topical diclofenac  In May Dr Perry did muscle biopsy; report said denervation atrophy  Also had itchy area on back of neck and post shoulders; dermatology said neuropathic; denies tingling / numbness    Exercising ; aerobic and upper body     Review of Systems   Constitutional:  Negative for fever and unexpected weight change.   HENT:  Negative for mouth sores and trouble swallowing.    Eyes:  Positive for redness.   Respiratory:  Positive for cough. Negative for shortness of breath.    Cardiovascular:  Negative for chest pain.   Gastrointestinal:  Negative for constipation and diarrhea.   Genitourinary:  Negative for dysuria and genital sores.   Skin:  Positive for rash.   Neurological:  Negative for headaches.   Hematological:  Does not bruise/bleed easily.         Objective:   BP 95/63 (BP Location: Right arm, Patient Position: Sitting)   Pulse 73   Ht 5' 2" (1.575 m)   Wt 60 kg " (132 lb 4.4 oz)   LMP  (LMP Unknown)   BMI 24.19 kg/m²      Physical Exam   Musculoskeletal:         General: Deformity present. No swelling or tenderness.         11/6/2024 4/28/2025   Tender (SUN-28) 0 / 28  0 / 28    Swollen (SUN-28) 0 / 28  0 / 28    Provider Global 0 / 100 0 / 100   Patient Global 60 / 100 20 / 100   ESR 12 mm/hr 7 mm/hr   CRP 1.7 mg/L 1.4 mg/L   SUN-28 (ESR) 2.58 (Remission) 1.64 (Remission)   SUN-28 (CRP) 2.16 (Remission) 1.56 (Remission)   CDAI Score 6  2      Lab Results   Component Value Date    SEDRATE 4 04/28/2025          Assessment:       1. Rheumatoid arthritis involving multiple sites with positive rheumatoid factor    2. Interstitial lung disease    3. Long-term use of immunosuppressant medication    4. Age-related osteoporosis without current pathological fracture            Plan:       Problem List Items Addressed This Visit          Active Problems    Rheumatoid arthritis involving multiple sites with positive rheumatoid factor - Primary    Joints look normal today; reports symptoms after exercise; encouraged to exercise; will plan to monitor for recurrent synovitis and will defer further DMARD for now         Interstitial lung disease    Has follow up with Advanced Lung Disease team scheduled         Long-term use of immunosuppressant medication    Currently off of medications          Age-related osteoporosis without current pathological fracture    She will remain off of medication for this

## 2025-07-24 NOTE — PROGRESS NOTES
7/19/2025     9:36 AM   Rapid3 Question Responses and Scores   MDHAQ Score 0.5   Psychologic Score 2.2   Pain Score 4   When you awakened in the morning OVER THE LAST WEEK, did you feel stiff? Yes   If Yes, please indicate the number of hours until you are as limber as you will be for the day 0.5   Fatigue Score 3.5   Global Health Score 5.5   RAPID3 Score 3.72        Answers submitted by the patient for this visit:  Rheumatology Questionnaire (Submitted on 7/19/2025)  fever: No  eye redness: Yes  mouth sores: No  headaches: No  shortness of breath: No  chest pain: No  trouble swallowing: No  diarrhea: No  constipation: No  unexpected weight change: No  genital sore: No  dysuria: No  During the last 3 days, have you had a skin rash?: Yes  Bruises or bleeds easily: No  cough: Yes

## 2025-07-24 NOTE — ASSESSMENT & PLAN NOTE
Joints look normal today; reports symptoms after exercise; encouraged to exercise; will plan to monitor for recurrent synovitis and will defer further DMARD for now

## 2025-08-22 ENCOUNTER — OFFICE VISIT (OUTPATIENT)
Facility: CLINIC | Age: 70
End: 2025-08-22
Payer: MEDICARE

## 2025-08-22 DIAGNOSIS — G60.8 SENSORY POLYNEUROPATHY: Primary | ICD-10-CM

## 2025-08-22 DIAGNOSIS — J84.9 INTERSTITIAL LUNG DISEASE: ICD-10-CM

## 2025-08-22 DIAGNOSIS — M05.79 RHEUMATOID ARTHRITIS INVOLVING MULTIPLE SITES WITH POSITIVE RHEUMATOID FACTOR: ICD-10-CM

## (undated) DEVICE — SYR 10CC LUER LOCK

## (undated) DEVICE — SUT 2-0 12-18IN SILK

## (undated) DEVICE — TRAY MINOR GEN SURG OMC

## (undated) DEVICE — GOWN SMART IMP BREATHABLE XXLG

## (undated) DEVICE — APPLICATOR CHLORAPREP ORN 26ML

## (undated) DEVICE — SUT VICRYL PLUS 3-0 SH 18IN

## (undated) DEVICE — ELECTRODE REM PLYHSV RETURN 9

## (undated) DEVICE — NDL HYPO REG 25G X 1 1/2

## (undated) DEVICE — ELECTRODE MEGADYNE RETURN DUAL

## (undated) DEVICE — GLOVE GAMMEX SURG LF PI SZ 7.5

## (undated) DEVICE — TIP YANKAUERS BULB NO VENT

## (undated) DEVICE — PENCIL ROCKER SWITCH 10FT CORD

## (undated) DEVICE — SUT MCRYL PLUS 4-0 PS2 27IN